# Patient Record
Sex: FEMALE | Race: BLACK OR AFRICAN AMERICAN | Employment: OTHER | URBAN - METROPOLITAN AREA
[De-identification: names, ages, dates, MRNs, and addresses within clinical notes are randomized per-mention and may not be internally consistent; named-entity substitution may affect disease eponyms.]

---

## 2017-04-13 ENCOUNTER — HOSPITAL ENCOUNTER (OUTPATIENT)
Age: 47
Setting detail: SPECIMEN
Discharge: HOME | End: 2017-04-13
Payer: COMMERCIAL

## 2017-04-14 LAB
DIRECT EXAM: ABNORMAL
Lab: ABNORMAL
SPECIMEN DESCRIPTION: ABNORMAL
STATUS: ABNORMAL

## 2017-05-11 ENCOUNTER — HOSPITAL ENCOUNTER (OUTPATIENT)
Age: 47
Setting detail: SPECIMEN
Discharge: HOME | End: 2017-05-11
Payer: COMMERCIAL

## 2017-05-11 LAB
ABSOLUTE EOS #: 0.1 K/UL (ref 0–0.4)
ABSOLUTE LYMPH #: 1.9 K/UL (ref 1–4.8)
ABSOLUTE MONO #: 0.4 K/UL (ref 0.1–1.2)
ALBUMIN SERPL-MCNC: 4.4 G/DL (ref 3.5–5.2)
ALBUMIN/GLOBULIN RATIO: 1.3 (ref 1–2.5)
ALP BLD-CCNC: 111 U/L (ref 35–104)
ALT SERPL-CCNC: 26 U/L (ref 5–33)
ANION GAP SERPL CALCULATED.3IONS-SCNC: 18 MMOL/L (ref 9–17)
AST SERPL-CCNC: 27 U/L
BASOPHILS # BLD: 0 %
BASOPHILS ABSOLUTE: 0 K/UL (ref 0–0.2)
BILIRUB SERPL-MCNC: 0.26 MG/DL (ref 0.3–1.2)
BUN BLDV-MCNC: 15 MG/DL (ref 6–20)
BUN/CREAT BLD: ABNORMAL (ref 9–20)
CALCIUM SERPL-MCNC: 10 MG/DL (ref 8.6–10.4)
CHLORIDE BLD-SCNC: 99 MMOL/L (ref 98–107)
CHOLESTEROL, FASTING: 176 MG/DL
CHOLESTEROL/HDL RATIO: 2
CO2: 23 MMOL/L (ref 20–31)
CREAT SERPL-MCNC: 0.68 MG/DL (ref 0.5–0.9)
DIFFERENTIAL TYPE: NORMAL
EOSINOPHILS RELATIVE PERCENT: 2 %
GFR AFRICAN AMERICAN: >60 ML/MIN
GFR NON-AFRICAN AMERICAN: >60 ML/MIN
GFR SERPL CREATININE-BSD FRML MDRD: ABNORMAL ML/MIN/{1.73_M2}
GFR SERPL CREATININE-BSD FRML MDRD: ABNORMAL ML/MIN/{1.73_M2}
GLUCOSE FASTING: 103 MG/DL (ref 70–99)
HCT VFR BLD CALC: 42.2 % (ref 36–46)
HDLC SERPL-MCNC: 88 MG/DL
HEMOGLOBIN: 14.1 G/DL (ref 12–16)
LDL CHOLESTEROL: 68 MG/DL (ref 0–130)
LYMPHOCYTES # BLD: 37 %
MCH RBC QN AUTO: 29.6 PG (ref 26–34)
MCHC RBC AUTO-ENTMCNC: 33.3 G/DL (ref 31–37)
MCV RBC AUTO: 88.8 FL (ref 80–100)
MONOCYTES # BLD: 8 %
PDW BLD-RTO: 13.9 % (ref 12.5–15.4)
PLATELET # BLD: 258 K/UL (ref 140–450)
PLATELET ESTIMATE: NORMAL
PMV BLD AUTO: 9.2 FL (ref 6–12)
POTASSIUM SERPL-SCNC: 3.9 MMOL/L (ref 3.7–5.3)
RBC # BLD: 4.75 M/UL (ref 4–5.2)
RBC # BLD: NORMAL 10*6/UL
SEG NEUTROPHILS: 53 %
SEGMENTED NEUTROPHILS ABSOLUTE COUNT: 2.7 K/UL (ref 1.8–7.7)
SODIUM BLD-SCNC: 140 MMOL/L (ref 135–144)
TOTAL PROTEIN: 7.7 G/DL (ref 6.4–8.3)
TRIGLYCERIDE, FASTING: 98 MG/DL
TSH SERPL DL<=0.05 MIU/L-ACNC: 1.13 MIU/L (ref 0.3–5)
VLDLC SERPL CALC-MCNC: NORMAL MG/DL (ref 1–30)
WBC # BLD: 5.1 K/UL (ref 3.5–11)
WBC # BLD: NORMAL 10*3/UL

## 2018-03-13 ENCOUNTER — HOSPITAL ENCOUNTER (EMERGENCY)
Age: 48
Discharge: HOME | End: 2018-03-14
Attending: EMERGENCY MEDICINE
Payer: COMMERCIAL

## 2018-03-13 DIAGNOSIS — R10.30 LOWER ABDOMINAL PAIN: Primary | ICD-10-CM

## 2018-03-13 LAB
-: NORMAL
ABSOLUTE EOS #: 0.09 K/UL (ref 0–0.44)
ABSOLUTE IMMATURE GRANULOCYTE: <0.03 K/UL (ref 0–0.3)
ABSOLUTE LYMPH #: 1.83 K/UL (ref 1.1–3.7)
ABSOLUTE MONO #: 0.54 K/UL (ref 0.1–1.2)
ALBUMIN SERPL-MCNC: 4.2 G/DL (ref 3.5–5.2)
ALBUMIN/GLOBULIN RATIO: 1.3 (ref 1–2.5)
ALP BLD-CCNC: 132 U/L (ref 35–104)
ALT SERPL-CCNC: 26 U/L (ref 5–33)
AMORPHOUS: NORMAL
ANION GAP SERPL CALCULATED.3IONS-SCNC: 13 MMOL/L (ref 9–17)
AST SERPL-CCNC: 42 U/L
BACTERIA: NORMAL
BASOPHILS # BLD: 0 % (ref 0–2)
BASOPHILS ABSOLUTE: <0.03 K/UL (ref 0–0.2)
BILIRUB SERPL-MCNC: 0.33 MG/DL (ref 0.3–1.2)
BILIRUBIN URINE: NEGATIVE
BUN BLDV-MCNC: 10 MG/DL (ref 6–20)
BUN/CREAT BLD: ABNORMAL (ref 9–20)
CALCIUM SERPL-MCNC: 9.5 MG/DL (ref 8.6–10.4)
CASTS UA: NORMAL /LPF (ref 0–8)
CHLORIDE BLD-SCNC: 105 MMOL/L (ref 98–107)
CO2: 26 MMOL/L (ref 20–31)
COLOR: YELLOW
CREAT SERPL-MCNC: 0.69 MG/DL (ref 0.5–0.9)
CRYSTALS, UA: NORMAL /HPF
DIFFERENTIAL TYPE: NORMAL
EOSINOPHILS RELATIVE PERCENT: 2 % (ref 1–4)
EPITHELIAL CELLS UA: NORMAL /HPF (ref 0–5)
GFR AFRICAN AMERICAN: >60 ML/MIN
GFR NON-AFRICAN AMERICAN: >60 ML/MIN
GFR SERPL CREATININE-BSD FRML MDRD: ABNORMAL ML/MIN/{1.73_M2}
GFR SERPL CREATININE-BSD FRML MDRD: ABNORMAL ML/MIN/{1.73_M2}
GLUCOSE BLD-MCNC: 99 MG/DL (ref 70–99)
GLUCOSE URINE: NEGATIVE
HCT VFR BLD CALC: 42.4 % (ref 36.3–47.1)
HEMOGLOBIN: 13.9 G/DL (ref 11.9–15.1)
IMMATURE GRANULOCYTES: 0 %
KETONES, URINE: NEGATIVE
LEUKOCYTE ESTERASE, URINE: NEGATIVE
LIPASE: 40 U/L (ref 13–60)
LYMPHOCYTES # BLD: 37 % (ref 24–43)
MCH RBC QN AUTO: 29.4 PG (ref 25.2–33.5)
MCHC RBC AUTO-ENTMCNC: 32.8 G/DL (ref 28.4–34.8)
MCV RBC AUTO: 89.6 FL (ref 82.6–102.9)
MONOCYTES # BLD: 11 % (ref 3–12)
MUCUS: NORMAL
NITRITE, URINE: NEGATIVE
NRBC AUTOMATED: 0 PER 100 WBC
OTHER OBSERVATIONS UA: NORMAL
PDW BLD-RTO: 13.3 % (ref 11.8–14.4)
PH UA: 7 (ref 5–8)
PLATELET # BLD: 267 K/UL (ref 138–453)
PLATELET ESTIMATE: NORMAL
PMV BLD AUTO: 10 FL (ref 8.1–13.5)
POTASSIUM SERPL-SCNC: 3.9 MMOL/L (ref 3.7–5.3)
PROTEIN UA: NEGATIVE
RBC # BLD: 4.73 M/UL (ref 3.95–5.11)
RBC # BLD: NORMAL 10*6/UL
RBC UA: NORMAL /HPF (ref 0–4)
RENAL EPITHELIAL, UA: NORMAL /HPF
SEG NEUTROPHILS: 50 % (ref 36–65)
SEGMENTED NEUTROPHILS ABSOLUTE COUNT: 2.53 K/UL (ref 1.5–8.1)
SODIUM BLD-SCNC: 144 MMOL/L (ref 135–144)
SPECIFIC GRAVITY UA: 1.01 (ref 1–1.03)
TOTAL PROTEIN: 7.4 G/DL (ref 6.4–8.3)
TRICHOMONAS: NORMAL
TURBIDITY: CLEAR
URINE HGB: NEGATIVE
UROBILINOGEN, URINE: NORMAL
WBC # BLD: 5 K/UL (ref 3.5–11.3)
WBC # BLD: NORMAL 10*3/UL
WBC UA: NORMAL /HPF (ref 0–5)
YEAST: NORMAL

## 2018-03-13 PROCEDURE — 6360000002 HC RX W HCPCS: Performed by: EMERGENCY MEDICINE

## 2018-03-13 PROCEDURE — 2580000003 HC RX 258: Performed by: EMERGENCY MEDICINE

## 2018-03-13 PROCEDURE — 80053 COMPREHEN METABOLIC PANEL: CPT

## 2018-03-13 PROCEDURE — 83690 ASSAY OF LIPASE: CPT

## 2018-03-13 PROCEDURE — 96374 THER/PROPH/DIAG INJ IV PUSH: CPT

## 2018-03-13 PROCEDURE — 85025 COMPLETE CBC W/AUTO DIFF WBC: CPT

## 2018-03-13 PROCEDURE — 99283 EMERGENCY DEPT VISIT LOW MDM: CPT

## 2018-03-13 PROCEDURE — 6370000000 HC RX 637 (ALT 250 FOR IP): Performed by: EMERGENCY MEDICINE

## 2018-03-13 PROCEDURE — 81001 URINALYSIS AUTO W/SCOPE: CPT

## 2018-03-13 RX ORDER — 0.9 % SODIUM CHLORIDE 0.9 %
1000 INTRAVENOUS SOLUTION INTRAVENOUS ONCE
Status: COMPLETED | OUTPATIENT
Start: 2018-03-13 | End: 2018-03-13

## 2018-03-13 RX ORDER — LABETALOL HYDROCHLORIDE 5 MG/ML
10 INJECTION, SOLUTION INTRAVENOUS ONCE
Status: COMPLETED | OUTPATIENT
Start: 2018-03-14 | End: 2018-03-14

## 2018-03-13 RX ORDER — KETOROLAC TROMETHAMINE 15 MG/ML
15 INJECTION, SOLUTION INTRAMUSCULAR; INTRAVENOUS ONCE
Status: COMPLETED | OUTPATIENT
Start: 2018-03-13 | End: 2018-03-13

## 2018-03-13 RX ORDER — HYDROCODONE BITARTRATE AND ACETAMINOPHEN 5; 325 MG/1; MG/1
1 TABLET ORAL ONCE
Status: COMPLETED | OUTPATIENT
Start: 2018-03-13 | End: 2018-03-13

## 2018-03-13 RX ORDER — LISINOPRIL 10 MG/1
20 TABLET ORAL ONCE
Status: COMPLETED | OUTPATIENT
Start: 2018-03-13 | End: 2018-03-13

## 2018-03-13 RX ORDER — AMLODIPINE BESYLATE 10 MG/1
5 TABLET ORAL ONCE
Status: COMPLETED | OUTPATIENT
Start: 2018-03-13 | End: 2018-03-13

## 2018-03-13 RX ADMIN — AMLODIPINE BESYLATE 5 MG: 10 TABLET ORAL at 22:05

## 2018-03-13 RX ADMIN — HYDROCODONE BITARTRATE AND ACETAMINOPHEN 1 TABLET: 5; 325 TABLET ORAL at 23:22

## 2018-03-13 RX ADMIN — SODIUM CHLORIDE 1000 ML: 9 INJECTION, SOLUTION INTRAVENOUS at 22:05

## 2018-03-13 RX ADMIN — KETOROLAC TROMETHAMINE 15 MG: 15 INJECTION, SOLUTION INTRAMUSCULAR; INTRAVENOUS at 22:05

## 2018-03-13 RX ADMIN — LISINOPRIL 20 MG: 10 TABLET ORAL at 22:05

## 2018-03-13 ASSESSMENT — ENCOUNTER SYMPTOMS
COUGH: 0
RHINORRHEA: 0
NAUSEA: 0
BACK PAIN: 1
DIARRHEA: 0
ABDOMINAL PAIN: 1
SHORTNESS OF BREATH: 0
CONSTIPATION: 0
VOMITING: 0

## 2018-03-13 ASSESSMENT — PAIN DESCRIPTION - ORIENTATION: ORIENTATION: RIGHT;LEFT;LOWER;UPPER

## 2018-03-13 ASSESSMENT — PAIN DESCRIPTION - LOCATION: LOCATION: ABDOMEN

## 2018-03-13 ASSESSMENT — PAIN DESCRIPTION - PAIN TYPE: TYPE: ACUTE PAIN

## 2018-03-13 ASSESSMENT — PAIN SCALES - GENERAL
PAINLEVEL_OUTOF10: 9
PAINLEVEL_OUTOF10: 8
PAINLEVEL_OUTOF10: 9

## 2018-03-13 ASSESSMENT — PAIN DESCRIPTION - FREQUENCY: FREQUENCY: CONTINUOUS

## 2018-03-13 ASSESSMENT — PAIN DESCRIPTION - ONSET: ONSET: ON-GOING

## 2018-03-13 ASSESSMENT — PAIN DESCRIPTION - DESCRIPTORS: DESCRIPTORS: PRESSURE

## 2018-03-14 VITALS
SYSTOLIC BLOOD PRESSURE: 168 MMHG | WEIGHT: 163 LBS | TEMPERATURE: 98.2 F | RESPIRATION RATE: 16 BRPM | OXYGEN SATURATION: 99 % | DIASTOLIC BLOOD PRESSURE: 90 MMHG | BODY MASS INDEX: 29.81 KG/M2 | HEART RATE: 76 BPM

## 2018-03-14 PROCEDURE — 2500000003 HC RX 250 WO HCPCS: Performed by: EMERGENCY MEDICINE

## 2018-03-14 PROCEDURE — 6370000000 HC RX 637 (ALT 250 FOR IP): Performed by: EMERGENCY MEDICINE

## 2018-03-14 PROCEDURE — 96375 TX/PRO/DX INJ NEW DRUG ADDON: CPT

## 2018-03-14 RX ORDER — CYCLOBENZAPRINE HCL 10 MG
10 TABLET ORAL ONCE
Status: COMPLETED | OUTPATIENT
Start: 2018-03-14 | End: 2018-03-14

## 2018-03-14 RX ORDER — CYCLOBENZAPRINE HCL 10 MG
10 TABLET ORAL 3 TIMES DAILY PRN
Qty: 15 TABLET | Refills: 0 | Status: SHIPPED | OUTPATIENT
Start: 2018-03-14 | End: 2018-03-24

## 2018-03-14 RX ADMIN — LABETALOL HYDROCHLORIDE 10 MG: 5 INJECTION, SOLUTION INTRAVENOUS at 00:07

## 2018-03-14 RX ADMIN — CYCLOBENZAPRINE HYDROCHLORIDE 10 MG: 10 TABLET, FILM COATED ORAL at 01:07

## 2018-03-14 NOTE — ED PROVIDER NOTES
today. Concern for nephrolithiasis. Well appearing. CBC, CMP, UA, Lipase pending. Fluids and toradol. Patient reassess following fluids and Toradol. Patient states that her pain has persisted patient was given Norco.  Patient was also given IV labetalol for her hypertension. Patient reassess following Norco and labetalol her blood pressure has responded and his last 168/90. Patient resting comfortably with no acute distress. Patient was given Flexeril and discharged home with prescription for Flexeril. Results patient's labs shows no acute urinary tract infection or evidence of nephrolithiasis. D/Cd    OUTSTANDING TASKS / RECOMMENDATIONS:    1. Follow up labs. 2. If negative and tolerating orals, ok to d/c.   3. If positive treat accordingly. FINAL IMPRESSION:     1.  Lower abdominal pain        DISPOSITION:         DISPOSITION:  [x]  Home   []  Nursing Facility   []  Transfer -    []  Admission -     FOLLOW-UP: [facility] [address] [address] [phone]    Schedule an appointment as soon as possible for a visit   As needed     DISCHARGE MEDICATIONS: Discharge Medication List as of 3/14/2018 12:50 AM      START taking these medications    Details   cyclobenzaprine (FLEXERIL) 10 MG tablet Take 1 tablet by mouth 3 times daily as needed for Muscle spasms, Disp-15 tablet, R-0Print                Erick Stroud MD  Emergency Medicine Resident  [address] Yonathan Allen MD  Resident  03/14/18 6444

## 2018-03-14 NOTE — ED NOTES
Bed: 04  Expected date:   Expected time:   Means of arrival:   Comments:     Edwin Arcos RN  03/13/18 5329

## 2018-03-14 NOTE — ED PROVIDER NOTES
adenopathy. Does not bruise/bleed easily. Psychiatric/Behavioral: Negative for behavioral problems and confusion. PAST MEDICAL / SURGICAL / SOCIAL / FAMILY HISTORY      has a past medical history of Bowel obstruction; Chronic back pain; Hypertension; and Substance abuse.       has a past surgical history that includes Hysterectomy;  section; other surgical history; and Tubal ligation. Social History     Social History    Marital status: Single     Spouse name: N/A    Number of children: N/A    Years of education: N/A     Occupational History    Not on file. Social History Main Topics    Smoking status: Current Every Day Smoker     Packs/day: 0.30     Years: 15.00     Types: Cigarettes    Smokeless tobacco: Never Used    Alcohol use Yes      Comment: socially    Drug use: Yes     Types: Cocaine    Sexual activity: Yes     Other Topics Concern    Not on file     Social History Narrative    No narrative on file         Family History   Problem Relation Age of Onset    High Blood Pressure Mother     High Blood Pressure Father        Portions of the past medical history, surgical history, social history, and family history were discussed and reviewed with the patient/family and is included here or in HPI if pertinent. ALLERGIES / IMMUNIZATIONS / HOME MEDICATIONS       Allergies:  Ibuprofen      IMMUNIZATIONS      There is no immunization history on file for this patient. Home Medications:  Prior to Admission medications    Medication Sig Start Date End Date Taking? Authorizing Provider   amLODIPine (NORVASC) 5 MG tablet Take 5 mg by mouth daily    Historical Provider, MD   naproxen (NAPROSYN) 375 MG tablet Take 1 tablet by mouth 2 times daily (with meals) 16   Samuel Richmond PA-C   ammonium lactate (AMLACTIN) 12 % cream Apply 385 g topically as needed. Apply topically as needed.     Historical Provider, MD   PROAIR  (90 BASE) MCG/ACT inhaler Inhale 1 She has a normal mood and affect. Nursing note and vitals reviewed. Vitals:    Vitals:    03/13/18 2054   BP: (!) 188/98   Pulse: 76   Resp: 16   Temp: 98.2 °F (36.8 °C)   TempSrc: Oral   SpO2: 97%   Weight: 163 lb (73.9 kg)       ORDERS AND MEDICATIONS     PLAN (LABS / IMAGING / EKG):  Orders Placed This Encounter   Procedures    URINALYSIS WITH MICROSCOPIC    CBC WITH AUTO DIFFERENTIAL    Comprehensive Metabolic Panel    LIPASE    Pulse oximetry, continuous    Insert peripheral IV       Plan of care is reviewed and discussed with the family/ patient when able. Family/ Patient consents to treatment and plan if able to do so.     MEDICATIONS ORDERED:  Orders Placed This Encounter   Medications    0.9 % sodium chloride bolus    ketorolac (TORADOL) injection 15 mg    lisinopril (PRINIVIL;ZESTRIL) tablet 20 mg    amLODIPine (NORVASC) tablet 5 mg       DIAGNOSTIC RESULTS      LABS:  Results for orders placed or performed during the hospital encounter of 03/13/18   CBC WITH AUTO DIFFERENTIAL   Result Value Ref Range    WBC 5.0 3.5 - 11.3 k/uL    RBC 4.73 3.95 - 5.11 m/uL    Hemoglobin 13.9 11.9 - 15.1 g/dL    Hematocrit 42.4 36.3 - 47.1 %    MCV 89.6 82.6 - 102.9 fL    MCH 29.4 25.2 - 33.5 pg    MCHC 32.8 28.4 - 34.8 g/dL    RDW 13.3 11.8 - 14.4 %    Platelets 515 666 - 204 k/uL    MPV 10.0 8.1 - 13.5 fL    NRBC Automated 0.0 0.0 per 100 WBC    Differential Type NOT REPORTED     Seg Neutrophils 50 36 - 65 %    Lymphocytes 37 24 - 43 %    Monocytes 11 3 - 12 %    Eosinophils % 2 1 - 4 %    Basophils 0 0 - 2 %    Immature Granulocytes 0 0 %    Segs Absolute 2.53 1.50 - 8.10 k/uL    Absolute Lymph # 1.83 1.10 - 3.70 k/uL    Absolute Mono # 0.54 0.10 - 1.20 k/uL    Absolute Eos # 0.09 0.00 - 0.44 k/uL    Basophils # <0.03 0.00 - 0.20 k/uL    Absolute Immature Granulocyte <0.03 0.00 - 0.30 k/uL    WBC Morphology NOT REPORTED     RBC Morphology NOT REPORTED     Platelet Estimate NOT REPORTED      Labs Reviewed   CBC WITH AUTO DIFFERENTIAL   URINALYSIS WITH MICROSCOPIC   COMPREHENSIVE METABOLIC PANEL   LIPASE       RADIOLOGY:      EKG      All EKG's are interpreted by the Emergency Department Physician who either signs or Co-signs this chart in the absence of a cardiologist.    ED BEDSIDE ULTRASOUND:   Not indicated      DIFFERENTIAL DX / [facility]     DIFFERENTIAL DIAGNOSIS:  Nephrolithiasis, UTI, gallbladder disease, pancreatitis, small bowel obstruction    EMERGENCY DEPARTMENT COURSE/[facility]  Patient presents with lower abdominal pain with bilateral radiation of pain to the sides. No CVA tenderness on exam.  Patient resting comfortably, no acute distress. Evidence soft with mild diffuse lower abdominal tenderness. No rebound or guarding. Heart regular rate and rhythm, lungs clear bilaterally. We will obtain abdominal labs, urinalysis. We'll give Toradol for pain, plan for reevaluation afterwards. Patient starting to receive her medications and labs just sent off at 10 PM.  Patient care signed out to Dr. Almita Duval. Plan for reevaluation after lab work is returned. To treat appropriately and his pain is continued to consider CT abdomen and pelvis to rule out possible nephrolithiasis. PROCEDURES:  Procedures    CONSULTS:  None    CRITICAL CARE:  See Attending note    FINAL IMPRESSION      1. Lower abdominal pain              DISPOSITION / PLAN     DISPOSITION        pending    PATIENT REFERRED TO:  No follow-up provider specified.     DISCHARGE MEDICATIONS:  New Prescriptions    No medications on file       Lorena Bradshaw DO  Emergency Medicine Resident    (Please note that portions of this note were completed with a voice recognition program.  Efforts were made to edit the dictations but occasionally words are mis-transcribed.)        Lorena Bradshaw DO  03/13/18 0876

## 2018-03-14 NOTE — ED NOTES
ULYSSES placed a call to provider to assist with transportation home upon D/C from ED      MYRNA Phillips  03/14/18 0200

## 2018-06-06 ENCOUNTER — APPOINTMENT (OUTPATIENT)
Dept: GENERAL RADIOLOGY | Age: 48
End: 2018-06-06
Payer: COMMERCIAL

## 2018-06-06 ENCOUNTER — HOSPITAL ENCOUNTER (EMERGENCY)
Age: 48
Discharge: HOME | End: 2018-06-06
Attending: EMERGENCY MEDICINE
Payer: COMMERCIAL

## 2018-06-06 VITALS
OXYGEN SATURATION: 100 % | HEART RATE: 98 BPM | DIASTOLIC BLOOD PRESSURE: 89 MMHG | WEIGHT: 165 LBS | RESPIRATION RATE: 15 BRPM | SYSTOLIC BLOOD PRESSURE: 141 MMHG | TEMPERATURE: 98 F | BODY MASS INDEX: 30.18 KG/M2

## 2018-06-06 DIAGNOSIS — E86.0 DEHYDRATION: ICD-10-CM

## 2018-06-06 DIAGNOSIS — K52.9 GASTROENTERITIS: ICD-10-CM

## 2018-06-06 DIAGNOSIS — G44.209 ACUTE NON INTRACTABLE TENSION-TYPE HEADACHE: Primary | ICD-10-CM

## 2018-06-06 LAB
BILIRUBIN URINE: NEGATIVE
COLOR: ABNORMAL
COMMENT UA: ABNORMAL
GLUCOSE URINE: NEGATIVE
KETONES, URINE: ABNORMAL
LEUKOCYTE ESTERASE, URINE: NEGATIVE
NITRITE, URINE: NEGATIVE
PH UA: 6.5 (ref 5–8)
PROTEIN UA: NEGATIVE
SPECIFIC GRAVITY UA: 1.02 (ref 1–1.03)
TURBIDITY: CLEAR
URINE HGB: NEGATIVE
UROBILINOGEN, URINE: NORMAL

## 2018-06-06 PROCEDURE — 96375 TX/PRO/DX INJ NEW DRUG ADDON: CPT

## 2018-06-06 PROCEDURE — 81003 URINALYSIS AUTO W/O SCOPE: CPT

## 2018-06-06 PROCEDURE — 6360000002 HC RX W HCPCS: Performed by: STUDENT IN AN ORGANIZED HEALTH CARE EDUCATION/TRAINING PROGRAM

## 2018-06-06 PROCEDURE — 96374 THER/PROPH/DIAG INJ IV PUSH: CPT

## 2018-06-06 PROCEDURE — 96361 HYDRATE IV INFUSION ADD-ON: CPT

## 2018-06-06 PROCEDURE — 99284 EMERGENCY DEPT VISIT MOD MDM: CPT

## 2018-06-06 PROCEDURE — 2580000003 HC RX 258: Performed by: STUDENT IN AN ORGANIZED HEALTH CARE EDUCATION/TRAINING PROGRAM

## 2018-06-06 RX ORDER — DIPHENHYDRAMINE HYDROCHLORIDE 50 MG/ML
25 INJECTION INTRAMUSCULAR; INTRAVENOUS ONCE
Status: COMPLETED | OUTPATIENT
Start: 2018-06-06 | End: 2018-06-06

## 2018-06-06 RX ORDER — ACETAMINOPHEN 325 MG/1
650 TABLET ORAL EVERY 6 HOURS PRN
Qty: 56 TABLET | Refills: 3 | Status: SHIPPED | OUTPATIENT
Start: 2018-06-06 | End: 2018-06-13

## 2018-06-06 RX ORDER — 0.9 % SODIUM CHLORIDE 0.9 %
1000 INTRAVENOUS SOLUTION INTRAVENOUS ONCE
Status: COMPLETED | OUTPATIENT
Start: 2018-06-06 | End: 2018-06-06

## 2018-06-06 RX ORDER — BUTALBITAL, ACETAMINOPHEN AND CAFFEINE 50; 325; 40 MG/1; MG/1; MG/1
1 TABLET ORAL EVERY 4 HOURS PRN
Status: DISCONTINUED | OUTPATIENT
Start: 2018-06-06 | End: 2018-06-06 | Stop reason: HOSPADM

## 2018-06-06 RX ADMIN — DIPHENHYDRAMINE HYDROCHLORIDE 25 MG: 50 INJECTION INTRAMUSCULAR; INTRAVENOUS at 19:54

## 2018-06-06 RX ADMIN — SODIUM CHLORIDE 1000 ML: 9 INJECTION, SOLUTION INTRAVENOUS at 19:54

## 2018-06-06 RX ADMIN — PROCHLORPERAZINE EDISYLATE 10 MG: 5 INJECTION INTRAMUSCULAR; INTRAVENOUS at 19:55

## 2018-06-06 ASSESSMENT — PAIN DESCRIPTION - DESCRIPTORS: DESCRIPTORS: ACHING

## 2018-06-06 ASSESSMENT — ENCOUNTER SYMPTOMS
WHEEZING: 0
VOMITING: 0
BACK PAIN: 1
COUGH: 1
SORE THROAT: 0
ABDOMINAL PAIN: 0
CONSTIPATION: 0
NAUSEA: 0
DIARRHEA: 0

## 2018-06-06 ASSESSMENT — PAIN DESCRIPTION - PAIN TYPE: TYPE: ACUTE PAIN

## 2018-06-06 ASSESSMENT — PAIN DESCRIPTION - LOCATION: LOCATION: HEAD

## 2018-06-06 ASSESSMENT — PAIN SCALES - GENERAL: PAINLEVEL_OUTOF10: 10

## 2018-06-21 ENCOUNTER — HOSPITAL ENCOUNTER (OUTPATIENT)
Age: 48
Setting detail: SPECIMEN
Discharge: HOME | End: 2018-06-21
Payer: COMMERCIAL

## 2018-06-21 LAB
DIRECT EXAM: ABNORMAL
Lab: ABNORMAL
SPECIMEN DESCRIPTION: ABNORMAL
STATUS: ABNORMAL

## 2018-06-22 LAB
C. TRACHOMATIS DNA ,URINE: NEGATIVE
CULTURE: NORMAL
Lab: NORMAL
N. GONORRHOEAE DNA, URINE: NEGATIVE
SPECIMEN DESCRIPTION: NORMAL
STATUS: NORMAL

## 2018-06-25 LAB
HPV SAMPLE: NORMAL
HPV SOURCE: NORMAL
HPV, GENOTYPE 16: NOT DETECTED
HPV, GENOTYPE 18: NOT DETECTED
HPV, HIGH RISK OTHER: NOT DETECTED
HPV, INTERPRETATION: NORMAL

## 2018-07-09 LAB — CYTOLOGY REPORT: NORMAL

## 2018-09-12 ENCOUNTER — HOSPITAL ENCOUNTER (EMERGENCY)
Age: 48
Discharge: HOME | End: 2018-09-12
Attending: EMERGENCY MEDICINE
Payer: COMMERCIAL

## 2018-09-12 VITALS
HEART RATE: 84 BPM | OXYGEN SATURATION: 99 % | TEMPERATURE: 97.4 F | SYSTOLIC BLOOD PRESSURE: 160 MMHG | DIASTOLIC BLOOD PRESSURE: 100 MMHG | RESPIRATION RATE: 17 BRPM

## 2018-09-12 DIAGNOSIS — S29.019A THORACIC MYOFASCIAL STRAIN, INITIAL ENCOUNTER: Primary | ICD-10-CM

## 2018-09-12 PROCEDURE — 96372 THER/PROPH/DIAG INJ SC/IM: CPT

## 2018-09-12 PROCEDURE — 6360000002 HC RX W HCPCS: Performed by: NURSE PRACTITIONER

## 2018-09-12 PROCEDURE — 6370000000 HC RX 637 (ALT 250 FOR IP): Performed by: NURSE PRACTITIONER

## 2018-09-12 PROCEDURE — 99283 EMERGENCY DEPT VISIT LOW MDM: CPT

## 2018-09-12 RX ORDER — METHOCARBAMOL 500 MG/1
750 TABLET, FILM COATED ORAL 3 TIMES DAILY
Qty: 15 TABLET | Refills: 0 | Status: SHIPPED | OUTPATIENT
Start: 2018-09-12 | End: 2018-09-22

## 2018-09-12 RX ORDER — ACETAMINOPHEN 500 MG
1000 TABLET ORAL EVERY 6 HOURS PRN
Qty: 30 TABLET | Refills: 0 | Status: SHIPPED | OUTPATIENT
Start: 2018-09-12 | End: 2018-09-12

## 2018-09-12 RX ORDER — METHOCARBAMOL 750 MG/1
1500 TABLET, FILM COATED ORAL ONCE
Status: COMPLETED | OUTPATIENT
Start: 2018-09-12 | End: 2018-09-12

## 2018-09-12 RX ORDER — KETOROLAC TROMETHAMINE 30 MG/ML
30 INJECTION, SOLUTION INTRAMUSCULAR; INTRAVENOUS ONCE
Status: COMPLETED | OUTPATIENT
Start: 2018-09-12 | End: 2018-09-12

## 2018-09-12 RX ADMIN — METHOCARBAMOL 1500 MG: 750 TABLET, FILM COATED ORAL at 18:07

## 2018-09-12 RX ADMIN — KETOROLAC TROMETHAMINE 30 MG: 30 INJECTION, SOLUTION INTRAMUSCULAR; INTRAVENOUS at 18:07

## 2018-09-12 ASSESSMENT — PAIN DESCRIPTION - LOCATION: LOCATION: NECK

## 2018-09-12 ASSESSMENT — PAIN SCALES - GENERAL
PAINLEVEL_OUTOF10: 10
PAINLEVEL_OUTOF10: 10

## 2018-09-12 ASSESSMENT — PAIN DESCRIPTION - ORIENTATION: ORIENTATION: LEFT

## 2018-09-12 ASSESSMENT — PAIN DESCRIPTION - DESCRIPTORS: DESCRIPTORS: ACHING

## 2018-09-12 ASSESSMENT — ENCOUNTER SYMPTOMS: BACK PAIN: 1

## 2018-09-12 ASSESSMENT — PAIN DESCRIPTION - PAIN TYPE: TYPE: ACUTE PAIN

## 2018-09-12 ASSESSMENT — PAIN DESCRIPTION - FREQUENCY: FREQUENCY: CONTINUOUS

## 2018-09-12 NOTE — ED PROVIDER NOTES
[facility] ED  eMERGENCY dEPARTMENT eNCOUnter   Independent Attestation     Pt Name: Tom Gaines  MRN: [id number]  [place] 1970  Date of evaluation: 9/12/18       Tom Gaines is a 51 y.o. female who presents with No chief complaint on file. Vitals: There were no vitals filed for this visit. Impression:   1. Thoracic myofascial strain, initial encounter          I was personally available for consultation in the Emergency Department. I have reviewed the chart and agree with the documentation as recorded by the [facility], including the assessment, treatment plan and disposition.     Meryle Primrose, MD  Attending Emergency  Physician       Mary Henry MD  09/12/18 9272

## 2018-09-12 NOTE — ED PROVIDER NOTES
[facility]  ED  Emergency Department Encounter  Mid Level Provider     Pt Name: Genie Pang  MRN: [id number]  [place] 1970  Date of evaluation: 18  PCP:  Dick Ring NP-C    [address] [place] [address]       Chief Complaint   Patient presents with    Neck Pain     hurts to turn neck to left with limited rom     Shoulder Pain       HISTORY OF PRESENT ILLNESS  (Location/Symptom, Timing/Onset, Context/Setting, Quality, Duration, Modifying Factors, Severity.)      Genie Pang is a 51 y.o. female who presents with Left upper back pain after pulling a muscle about one week ago. Patient requesting muscle relaxer other than Flexeril. Range of motion intact however does increase pain. No weakness to upper extremities. Patient is alert and eating [address] / SURGICAL / SOCIAL / FAMILY HISTORY      has a past medical history of Bowel obstruction ([address] Chronic back pain; Hypertension; and Substance abuse.     has a past surgical history that includes Hysterectomy;  section; other surgical history; and Tubal ligation. Social History     Social History    Marital status: Single     Spouse name: N/A    Number of children: N/A    Years of education: N/A     Occupational History    Not on file. Social History Main Topics    Smoking status: Current Every Day Smoker     Packs/day: 1.00     Years: 15.00     Types: Cigarettes    Smokeless tobacco: Never Used    Alcohol use Yes      Comment: socially    Drug use: Yes     Types: Cocaine    Sexual activity: Yes     Other Topics Concern    Not on file     Social History Narrative    No narrative on file       Family History   Problem Relation Age of Onset    High Blood Pressure Mother     High Blood Pressure Father        Allergies:  Ibuprofen    Home Medications:  Prior to Admission medications    Medication Sig Start Date End Date Taking?  Authorizing Provider   methocarbamol (ROBAXIN) 500 MG tablet Take 1.5 myofascial strain, initial encounter          DISPOSITION / PLAN     DISPOSITION     PATIENT REFERRED TO:  Temitope Jeong  [address] [place]  [phone]    Schedule an appointment as soon as possible for a visit         DISCHARGE MEDICATIONS:  Discharge Medication List as of 9/12/2018  6:02 PM      START taking these medications    Details   methocarbamol (ROBAXIN) 500 MG tablet Take 1.5 tablets by mouth 3 times daily for 10 days, Disp-15 tablet, R-0Print      acetaminophen (APAP EXTRA STRENGTH) 500 MG tablet Take 2 tablets by mouth every 6 hours as needed for Pain, Disp-30 tablet, R-0Print             YEYO Garcia CNP   Emergency Medicine Nurse Practitioner    (Please note that portions of this note were completed with a voice recognition program.  Efforts were made to edit the dictations but occasionally words are mis-transcribed.)        YEYO Garcia CNP  09/13/18 1019

## 2018-10-02 ENCOUNTER — HOSPITAL ENCOUNTER (OUTPATIENT)
Dept: PHYSICAL THERAPY | Age: 48
Setting detail: THERAPIES SERIES
Discharge: HOME | End: 2018-10-02
Payer: COMMERCIAL

## 2018-10-02 NOTE — FLOWSHEET NOTE
[x] [facility]) [facility] &  Therapy  [address]  [phone]  F: [phone] [] [address] [facility] [address]   [address]  P: [phone]  F: [phone] [] [place]  [address] [place] [address]  P: [phone]  F: [phone] [] [address]  [facility]   [address]   [facility] [place]: [phone]  F: [phone]        Physical Therapy Cancel/No Show note    Date: 10/2/2018  Patient: Varsha Min  : 1970  MRN: [id number]    Cancels/No Shows to date:     For today's appointment patient:  []  Cancelled  []  Rescheduled appointment  []  No-show     Reason given by patient:  []  Patient ill  []  Conflicting appointment  []  No transportation    []  Conflict with work  []  No reason given  []  Weather related  []  Other:      Comments:  Phone issue. []  Next appointment was confirmed    Electronically signed by: Keanu Cordoba

## 2018-10-09 ENCOUNTER — HOSPITAL ENCOUNTER (OUTPATIENT)
Dept: PHYSICAL THERAPY | Age: 48
Setting detail: THERAPIES SERIES
Discharge: HOME | End: 2018-10-09
Payer: COMMERCIAL

## 2019-02-05 ENCOUNTER — HOSPITAL ENCOUNTER (OUTPATIENT)
Dept: VASCULAR LAB | Age: 49
Discharge: HOME | End: 2019-02-05
Payer: COMMERCIAL

## 2019-02-05 ENCOUNTER — HOSPITAL ENCOUNTER (OUTPATIENT)
Age: 49
Setting detail: SPECIMEN
Discharge: HOME | End: 2019-02-05
Payer: COMMERCIAL

## 2019-02-05 LAB
-: ABNORMAL
ABSOLUTE EOS #: 0.1 K/UL (ref 0–0.44)
ABSOLUTE IMMATURE GRANULOCYTE: <0.03 K/UL (ref 0–0.3)
ABSOLUTE LYMPH #: 1.99 K/UL (ref 1.1–3.7)
ABSOLUTE MONO #: 0.52 K/UL (ref 0.1–1.2)
ALBUMIN SERPL-MCNC: 4.1 G/DL (ref 3.5–5.2)
ALBUMIN/GLOBULIN RATIO: 1.4 (ref 1–2.5)
ALP BLD-CCNC: 102 U/L (ref 35–104)
ALT SERPL-CCNC: 26 U/L (ref 5–33)
AMORPHOUS: ABNORMAL
ANION GAP SERPL CALCULATED.3IONS-SCNC: 14 MMOL/L (ref 9–17)
AST SERPL-CCNC: 35 U/L
BACTERIA: ABNORMAL
BASOPHILS # BLD: 0 % (ref 0–2)
BASOPHILS ABSOLUTE: <0.03 K/UL (ref 0–0.2)
BILIRUB SERPL-MCNC: 0.16 MG/DL (ref 0.3–1.2)
BILIRUBIN URINE: NEGATIVE
BUN BLDV-MCNC: 4 MG/DL (ref 6–20)
BUN/CREAT BLD: ABNORMAL (ref 9–20)
CALCIUM SERPL-MCNC: 9.2 MG/DL (ref 8.6–10.4)
CASTS UA: ABNORMAL /LPF (ref 0–8)
CHLORIDE BLD-SCNC: 106 MMOL/L (ref 98–107)
CHOLESTEROL/HDL RATIO: 2.5
CHOLESTEROL: 174 MG/DL
CO2: 24 MMOL/L (ref 20–31)
COLOR: YELLOW
COMMENT UA: ABNORMAL
CREAT SERPL-MCNC: 0.78 MG/DL (ref 0.5–0.9)
CRYSTALS, UA: ABNORMAL /HPF
DIFFERENTIAL TYPE: ABNORMAL
EOSINOPHILS RELATIVE PERCENT: 2 % (ref 1–4)
EPITHELIAL CELLS UA: ABNORMAL /HPF (ref 0–5)
GFR AFRICAN AMERICAN: >60 ML/MIN
GFR NON-AFRICAN AMERICAN: >60 ML/MIN
GFR SERPL CREATININE-BSD FRML MDRD: ABNORMAL ML/MIN/{1.73_M2}
GFR SERPL CREATININE-BSD FRML MDRD: ABNORMAL ML/MIN/{1.73_M2}
GLUCOSE BLD-MCNC: 112 MG/DL (ref 70–99)
GLUCOSE URINE: NEGATIVE
HCT VFR BLD CALC: 41.1 % (ref 36.3–47.1)
HDLC SERPL-MCNC: 71 MG/DL
HEMOGLOBIN: 14.1 G/DL (ref 11.9–15.1)
IMMATURE GRANULOCYTES: 0 %
KETONES, URINE: ABNORMAL
LDL CHOLESTEROL: 70 MG/DL (ref 0–130)
LEUKOCYTE ESTERASE, URINE: NEGATIVE
LYMPHOCYTES # BLD: 44 % (ref 24–43)
MAGNESIUM: 1.4 MG/DL (ref 1.6–2.6)
MCH RBC QN AUTO: 30.3 PG (ref 25.2–33.5)
MCHC RBC AUTO-ENTMCNC: 34.3 G/DL (ref 28.4–34.8)
MCV RBC AUTO: 88.4 FL (ref 82.6–102.9)
MONOCYTES # BLD: 12 % (ref 3–12)
MUCUS: ABNORMAL
NITRITE, URINE: NEGATIVE
NRBC AUTOMATED: 0 PER 100 WBC
OTHER OBSERVATIONS UA: ABNORMAL
PDW BLD-RTO: 12.5 % (ref 11.8–14.4)
PH UA: 6 (ref 5–8)
PLATELET # BLD: 253 K/UL (ref 138–453)
PLATELET ESTIMATE: ABNORMAL
PMV BLD AUTO: 11.4 FL (ref 8.1–13.5)
POTASSIUM SERPL-SCNC: 3.7 MMOL/L (ref 3.7–5.3)
PROTEIN UA: NEGATIVE
RBC # BLD: 4.65 M/UL (ref 3.95–5.11)
RBC # BLD: ABNORMAL 10*6/UL
RBC UA: ABNORMAL /HPF (ref 0–4)
RENAL EPITHELIAL, UA: ABNORMAL /HPF
SEG NEUTROPHILS: 42 % (ref 36–65)
SEGMENTED NEUTROPHILS ABSOLUTE COUNT: 1.9 K/UL (ref 1.5–8.1)
SODIUM BLD-SCNC: 144 MMOL/L (ref 135–144)
SPECIFIC GRAVITY UA: 1.01 (ref 1–1.03)
TOTAL PROTEIN: 7.1 G/DL (ref 6.4–8.3)
TRICHOMONAS: ABNORMAL
TRIGL SERPL-MCNC: 167 MG/DL
TSH SERPL DL<=0.05 MIU/L-ACNC: 1.11 MIU/L (ref 0.3–5)
TURBIDITY: ABNORMAL
URIC ACID: 4.5 MG/DL (ref 2.4–5.7)
URINE HGB: NEGATIVE
UROBILINOGEN, URINE: NORMAL
VITAMIN B-12: 700 PG/ML (ref 232–1245)
VLDLC SERPL CALC-MCNC: ABNORMAL MG/DL (ref 1–30)
WBC # BLD: 4.5 K/UL (ref 3.5–11.3)
WBC # BLD: ABNORMAL 10*3/UL
WBC UA: ABNORMAL /HPF (ref 0–5)
YEAST: ABNORMAL

## 2019-02-05 PROCEDURE — 93971 EXTREMITY STUDY: CPT

## 2019-02-08 LAB
ALCOHOL URINE: NEGATIVE MG/DL
AMPHETAMINE SCREEN URINE: NEGATIVE NG/ML
BARBITURATE SCREEN URINE: NEGATIVE NG/ML
BENZODIAZEPINE SCREEN, URINE: NEGATIVE NG/ML
CANNABINOID SCREEN URINE: POSITIVE NG/ML
COCAINE(METAB.)SCREEN, URINE: POSITIVE NG/ML
CREATININE URINE: 197.3 MG/DL (ref 20–400)
Lab: NORMAL
METHADONE SCREEN, URINE: NEGATIVE NG/ML
OPIATES, URINE: NEGATIVE NG/ML
PHENCYCLIDINE, URINE: NEGATIVE NG/ML
PROPOXYPHENE, URINE: NEGATIVE NG/ML

## 2019-02-10 LAB — COCAINE, CONFIRM, URINE: 94 NG/ML

## 2019-02-11 LAB — MARIJUANA CONFIRMATION URINE: 110 NG/ML

## 2019-04-02 ENCOUNTER — OFFICE VISIT (OUTPATIENT)
Dept: DERMATOLOGY | Age: 49
End: 2019-04-02
Payer: COMMERCIAL

## 2019-04-02 VITALS — BODY MASS INDEX: 29.44 KG/M2 | WEIGHT: 160 LBS | OXYGEN SATURATION: 97 % | HEIGHT: 62 IN | HEART RATE: 76 BPM

## 2019-04-02 DIAGNOSIS — L30.8 OTHER ECZEMA: Primary | ICD-10-CM

## 2019-04-02 PROCEDURE — G8419 CALC BMI OUT NRM PARAM NOF/U: HCPCS | Performed by: DERMATOLOGY

## 2019-04-02 PROCEDURE — 4004F PT TOBACCO SCREEN RCVD TLK: CPT | Performed by: DERMATOLOGY

## 2019-04-02 PROCEDURE — G8428 CUR MEDS NOT DOCUMENT: HCPCS | Performed by: DERMATOLOGY

## 2019-04-02 PROCEDURE — 3017F COLORECTAL CA SCREEN DOC REV: CPT | Performed by: DERMATOLOGY

## 2019-04-02 PROCEDURE — 99213 OFFICE O/P EST LOW 20 MIN: CPT | Performed by: DERMATOLOGY

## 2019-04-02 RX ORDER — HYDROXYZINE PAMOATE 50 MG/1
CAPSULE ORAL
Refills: 0 | COMMUNITY
Start: 2019-01-14 | End: 2019-04-02 | Stop reason: SDUPTHER

## 2019-04-02 RX ORDER — HYDROXYZINE PAMOATE 50 MG/1
CAPSULE ORAL
Qty: 30 CAPSULE | Refills: 1 | Status: SHIPPED | OUTPATIENT
Start: 2019-04-02 | End: 2020-08-30

## 2019-04-02 RX ORDER — TRIAMCINOLONE ACETONIDE 0.25 MG/G
CREAM TOPICAL
Qty: 454 G | Refills: 2 | Status: ON HOLD | OUTPATIENT
Start: 2019-04-02 | End: 2022-06-03 | Stop reason: HOSPADM

## 2019-04-02 NOTE — PATIENT INSTRUCTIONS
1. Apply triamcinolone ointment to the active areas of rash and thicker skin twice daily  2. Apply the cerave moisturizer on top of the triamcinolone and all over the body  3. Can put moisturizer in the fridge to apply a \"cool\" lotion for quick relief  4. Follow up in the office in 2 months     Eczema Instructions    The medicines and treatments used to take care of your child's eczema may change depending on the condition of the skin. Eczema flare-ups may come and go. We cannot cure eczema, but we can help control it with these treatments. Baths:  1-2 times a day with a mild soap such as Dove for Sensitive Skin, Cetaphil Cleanser, Cerave Cleanser, Aquaphor Wash or Vanicream Bar. Apply moisturizer within 3 minutes of patting dry. Medicines Prescribed by your Doctor    These medications should only be put on the eczema that you can see or feel. Eczema patches are red, rough, thickened or itchy. Once the skin looks and feels normal stop the medicated creams and ointments. These medications are chosen based on the location and thickness of your child's eczema. We always want to use the weakest medicated creams and ointments that will control your child's eczema. This will reduce the risk of side effects. If your child's eczema is not improving on this treatment plan or you need to use your Rescue medicines longer than recommended please call the dermatology clinic. Maintenance Medicines    Maintenance medicines can be used any day when eczema is seen or felt. Apply triamcinolone 0.025% ointment to eczema on body, arms and legs two times a day when eczema is present. Anti-Itch Medication  Take hydroxyzine by mouth as needed at bedtime    Moisturizer: This should be applied to all of our child's skin (including skin with eczema and skin without eczema). Continue to use this everyday even when your child's eczema is doing really well.     Apply moisturizer at least 2 times a day to all of your child's skin. If you are applying a prescription cream at the same time as a moisturizer, put the prescription cream on first and your moisturizer on top. Skin color changes may stay after the rough eczema is gone. The color of the skin where the eczema was may be lighter or darker after the eczema has cleared. These color changes or \"footprints\" will resolve over time and do not improve faster putting your maintenance or rescue medicines on them. Remember that your eczema medicines only go on red, rough, thick, or itchy patches of eczema. Continue to use your moisturizer on the footprints several times a day. Your moisturizer may help prevent new, itchy patches and footprints from forming. If you run out of medications or feel that your childs skin is getting worse despite following your treatment plan, please call us. If you think that you will run out of medications over the weekend or during a holiday, please try to call us during normal business hours so that we may get you the refills you need without delay.

## 2019-04-03 NOTE — PROGRESS NOTES
Dermatology Patient Note  [facility] DERMATOLOGY  [address] [facility]  [address] [place] [address]  Dept: [phone]  Dept Fax: [phone]      VISIT DATE: 4/2/2019   REFERRING PROVIDER: No ref. provider found      Myron Mckenzie is a 47 y.o. female  who presents today in the office for:    New Patient (c/o acne on face and buttocks; washes with \"whatever soap she can use\"; c/o itchy skin all over, using CeraVe moisturizer with no relief; wants refill of itch pills; thinks she has eczema; says she flares up in red, raised rash at night and she can't even touch it)      HISTORY OF PRESENT ILLNESS:  HPI Rash:    Lynn Harris was seen today for initial evaluation of Rash    Duration of Rash: months    Course: Worsening    Areas of Involvement: Generalized    Associated Symptoms: Itching    Exacerbating Factors: night     Current Medications for this Rash:  CeraVe, Hydroxyzine     Previously Tried Medications: None    Problem Specific Family Hx: No Contributory Family History      CURRENT MEDICATIONS:   Current Outpatient Medications   Medication Sig Dispense Refill    triamcinolone (KENALOG) 0.025 % cream Apply to rash twice daily 454 g 2    hydrOXYzine (VISTARIL) 50 MG capsule Take 1 capsule by mouth at bedtime 30 capsule 1    amLODIPine (NORVASC) 5 MG tablet Take 5 mg by mouth daily      naproxen (NAPROSYN) 375 MG tablet Take 1 tablet by mouth 2 times daily (with meals) 14 tablet 0    PROAIR  (90 BASE) MCG/ACT inhaler Inhale 1 puff into the lungs 4 times daily.  fluticasone (FLONASE) 50 MCG/ACT nasal spray 1 spray by Nasal route daily.  lisinopril (PRINIVIL;ZESTRIL) 30 MG tablet Take 40 mg by mouth daily       OLANZapine (ZYPREXA) 15 MG tablet Take 15 mg by mouth nightly.  omeprazole (PRILOSEC) 20 MG capsule Take 20 mg by mouth Daily.  multivitamin (ANIMAL SHAPES) WITH C & FA CHEW chewable tablet Take  by mouth daily.       ammonium lactate (AMLACTIN) 12 % cream Apply 385 g topically as needed. Apply topically as needed.  clotrimazole (LOTRIMIN) 1 % cream Apply  topically 2 times daily.  TROJAN-ENZ LUBRICATED MISC        No current facility-administered medications for this visit. ALLERGIES:   Allergies   Allergen Reactions    Ibuprofen Hives and Nausea Only       SOCIAL HISTORY:  Social History     Tobacco Use    Smoking status: Current Every Day Smoker     Packs/day: 1.00     Years: 15.00     Pack years: 15.00     Types: Cigarettes    Smokeless tobacco: Never Used   Substance Use Topics    Alcohol use: Yes     Comment: socially       REVIEW OF SYSTEMS:  Review of Systems   Constitutional: Negative. Skin:Denies any new changing, growing or bleeding lesions or rashes except as described in the HPI     PHYSICAL EXAM:   Pulse 76   Ht 5' 2\" (1.575 m)   Wt 160 lb (72.6 kg)   SpO2 97%   BMI 29.26 kg/m²     General Exam:  General Appearance: No acute distress, Well nourished     Neuro: Alert and oriented to person, place and time  Psych: Normal affect   Lymph Node: Not performed    Cutaneous Exam: Performed as documented in clinic note below. Total skin excluding undergarment areas, which includes the head/face, neck, both arms, chest, back, abdomen, both legs, digits and/or nails, was examined. Pertinent Physical Exam Findings:  Physical Exam   Skin:   Hyperpigmented scaly patches on the arms, legs and buttocks       Medical Necessity of Exam Performed:   Widespread Rash    Additional Diagnostic Testing performed during exam: Not performed ,  Not performed    ASSESSMENT:   Diagnosis Orders   1. Other eczema         Plan of Action is as Follows:  Assessment 1. Other eczema  1. Apply triamcinolone ointment to the active areas of rash and thicker skin twice daily  2. Apply the cerave moisturizer on top of the triamcinolone and all over the body  3. Can put moisturizer in the fridge to apply a \"cool\" lotion for quick relief  4.  Follow up in the office in 2 months           Patient Instructions   1. Apply triamcinolone ointment to the active areas of rash and thicker skin twice daily  2. Apply the cerave moisturizer on top of the triamcinolone and all over the body  3. Can put moisturizer in the fridge to apply a \"cool\" lotion for quick relief  4. Follow up in the office in 2 months     Eczema Instructions    The medicines and treatments used to take care of your child's eczema may change depending on the condition of the skin. Eczema flare-ups may come and go. We cannot cure eczema, but we can help control it with these treatments. Baths:  1-2 times a day with a mild soap such as Dove for Sensitive Skin, Cetaphil Cleanser, Cerave Cleanser, Aquaphor Wash or Vanicream Bar. Apply moisturizer within 3 minutes of patting dry. Medicines Prescribed by your Doctor    These medications should only be put on the eczema that you can see or feel. Eczema patches are red, rough, thickened or itchy. Once the skin looks and feels normal stop the medicated creams and ointments. These medications are chosen based on the location and thickness of your child's eczema. We always want to use the weakest medicated creams and ointments that will control your child's eczema. This will reduce the risk of side effects. If your child's eczema is not improving on this treatment plan or you need to use your Rescue medicines longer than recommended please call the dermatology clinic. Maintenance Medicines    Maintenance medicines can be used any day when eczema is seen or felt. Apply triamcinolone 0.025% ointment to eczema on body, arms and legs two times a day when eczema is present. Anti-Itch Medication  Take hydroxyzine by mouth as needed at bedtime    Moisturizer: This should be applied to all of our child's skin (including skin with eczema and skin without eczema). Continue to use this everyday even when your child's eczema is doing really well.     Apply moisturizer at least 2 times a day to all of your child's skin. If you are applying a prescription cream at the same time as a moisturizer, put the prescription cream on first and your moisturizer on top. Skin color changes may stay after the rough eczema is gone. The color of the skin where the eczema was may be lighter or darker after the eczema has cleared. These color changes or \"footprints\" will resolve over time and do not improve faster putting your maintenance or rescue medicines on them. Remember that your eczema medicines only go on red, rough, thick, or itchy patches of eczema. Continue to use your moisturizer on the footprints several times a day. Your moisturizer may help prevent new, itchy patches and footprints from forming. If you run out of medications or feel that your childs skin is getting worse despite following your treatment plan, please call us. If you think that you will run out of medications over the weekend or during a holiday, please try to call us during normal business hours so that we may get you the refills you need without delay. Photo surveillance performed: No    Follow-up: 2 months    This note was created with the assistance of aspeech-recognition program.  Although the intention is to generate a document that actually reflects thecontent of the visit, no guarantees can be provided that every mistake has been identified and corrected by editing.     Electronically signed by Evelyn Bates MD on 4/3/19 at 7:54 AM

## 2019-10-17 ENCOUNTER — HOSPITAL ENCOUNTER (OUTPATIENT)
Age: 49
Setting detail: SPECIMEN
Discharge: HOME | End: 2019-10-17
Payer: COMMERCIAL

## 2019-10-18 LAB
DIRECT EXAM: NORMAL
Lab: NORMAL
SPECIMEN DESCRIPTION: NORMAL

## 2019-10-21 LAB
C. TRACHOMATIS DNA ,URINE: NEGATIVE
N. GONORRHOEAE DNA, URINE: NEGATIVE
SPECIMEN DESCRIPTION: NORMAL

## 2019-11-13 ENCOUNTER — OFFICE VISIT (OUTPATIENT)
Dept: FAMILY MEDICINE CLINIC | Age: 49
End: 2019-11-13
Payer: COMMERCIAL

## 2019-11-13 VITALS
SYSTOLIC BLOOD PRESSURE: 118 MMHG | HEART RATE: 79 BPM | HEIGHT: 62 IN | DIASTOLIC BLOOD PRESSURE: 78 MMHG | OXYGEN SATURATION: 100 % | WEIGHT: 150 LBS | BODY MASS INDEX: 27.6 KG/M2

## 2019-11-13 DIAGNOSIS — L30.9 ECZEMA, UNSPECIFIED TYPE: ICD-10-CM

## 2019-11-13 DIAGNOSIS — E78.2 MIXED HYPERLIPIDEMIA: ICD-10-CM

## 2019-11-13 DIAGNOSIS — K59.04 CHRONIC IDIOPATHIC CONSTIPATION: ICD-10-CM

## 2019-11-13 DIAGNOSIS — G89.29 CHRONIC MIDLINE LOW BACK PAIN WITHOUT SCIATICA: ICD-10-CM

## 2019-11-13 DIAGNOSIS — Z23 NEED FOR VACCINATION: ICD-10-CM

## 2019-11-13 DIAGNOSIS — F32.5 MAJOR DEPRESSIVE DISORDER WITH SINGLE EPISODE, IN FULL REMISSION (HCC): ICD-10-CM

## 2019-11-13 DIAGNOSIS — Z87.891 PERSONAL HISTORY OF TOBACCO USE: ICD-10-CM

## 2019-11-13 DIAGNOSIS — I10 ESSENTIAL HYPERTENSION: Primary | ICD-10-CM

## 2019-11-13 DIAGNOSIS — Z12.31 ENCOUNTER FOR SCREENING MAMMOGRAM FOR BREAST CANCER: ICD-10-CM

## 2019-11-13 DIAGNOSIS — Z12.11 COLON CANCER SCREENING: ICD-10-CM

## 2019-11-13 DIAGNOSIS — M54.50 CHRONIC MIDLINE LOW BACK PAIN WITHOUT SCIATICA: ICD-10-CM

## 2019-11-13 PROCEDURE — 90732 PPSV23 VACC 2 YRS+ SUBQ/IM: CPT | Performed by: FAMILY MEDICINE

## 2019-11-13 PROCEDURE — 3017F COLORECTAL CA SCREEN DOC REV: CPT | Performed by: FAMILY MEDICINE

## 2019-11-13 PROCEDURE — G8484 FLU IMMUNIZE NO ADMIN: HCPCS | Performed by: FAMILY MEDICINE

## 2019-11-13 PROCEDURE — 90471 IMMUNIZATION ADMIN: CPT | Performed by: FAMILY MEDICINE

## 2019-11-13 PROCEDURE — G8427 DOCREV CUR MEDS BY ELIG CLIN: HCPCS | Performed by: FAMILY MEDICINE

## 2019-11-13 PROCEDURE — 99204 OFFICE O/P NEW MOD 45 MIN: CPT | Performed by: FAMILY MEDICINE

## 2019-11-13 PROCEDURE — G8419 CALC BMI OUT NRM PARAM NOF/U: HCPCS | Performed by: FAMILY MEDICINE

## 2019-11-13 PROCEDURE — 4004F PT TOBACCO SCREEN RCVD TLK: CPT | Performed by: FAMILY MEDICINE

## 2019-11-13 RX ORDER — DOCUSATE SODIUM 100 MG/1
100 CAPSULE, LIQUID FILLED ORAL DAILY
Qty: 60 CAPSULE | Refills: 3 | Status: SHIPPED | OUTPATIENT
Start: 2019-11-13 | End: 2020-08-30

## 2019-11-13 RX ORDER — ASPIRIN 81 MG/1
81 TABLET ORAL DAILY
Qty: 90 TABLET | Refills: 1 | Status: SHIPPED | OUTPATIENT
Start: 2019-11-13 | End: 2020-05-19

## 2019-11-13 RX ORDER — TIZANIDINE 4 MG/1
4 TABLET ORAL 3 TIMES DAILY
Qty: 30 TABLET | Refills: 0 | Status: SHIPPED | OUTPATIENT
Start: 2019-11-13 | End: 2020-01-27 | Stop reason: SDUPTHER

## 2019-11-13 RX ORDER — ATORVASTATIN CALCIUM 20 MG/1
20 TABLET, FILM COATED ORAL DAILY
Qty: 30 TABLET | Refills: 3 | Status: SHIPPED | OUTPATIENT
Start: 2019-11-13 | End: 2020-03-23

## 2019-11-13 ASSESSMENT — PATIENT HEALTH QUESTIONNAIRE - PHQ9
SUM OF ALL RESPONSES TO PHQ QUESTIONS 1-9: 0
SUM OF ALL RESPONSES TO PHQ9 QUESTIONS 1 & 2: 0
SUM OF ALL RESPONSES TO PHQ QUESTIONS 1-9: 0
2. FEELING DOWN, DEPRESSED OR HOPELESS: 0
1. LITTLE INTEREST OR PLEASURE IN DOING THINGS: 0

## 2019-11-13 ASSESSMENT — ENCOUNTER SYMPTOMS
ANAL BLEEDING: 0
NAUSEA: 0
COUGH: 0
RECTAL PAIN: 0
CHEST TIGHTNESS: 0
WHEEZING: 0
ABDOMINAL DISTENTION: 1
PHOTOPHOBIA: 0
VOMITING: 0
CONSTIPATION: 1
RHINORRHEA: 0
BACK PAIN: 1
DIARRHEA: 0
COLOR CHANGE: 0
SINUS PRESSURE: 0
SHORTNESS OF BREATH: 0
ABDOMINAL PAIN: 0

## 2019-11-26 ENCOUNTER — HOSPITAL ENCOUNTER (EMERGENCY)
Age: 49
Discharge: HOME | End: 2019-11-26
Attending: EMERGENCY MEDICINE
Payer: COMMERCIAL

## 2019-11-26 VITALS
HEIGHT: 62 IN | SYSTOLIC BLOOD PRESSURE: 135 MMHG | BODY MASS INDEX: 27.6 KG/M2 | OXYGEN SATURATION: 98 % | HEART RATE: 76 BPM | WEIGHT: 150 LBS | RESPIRATION RATE: 18 BRPM | DIASTOLIC BLOOD PRESSURE: 97 MMHG | TEMPERATURE: 98.1 F

## 2019-11-26 DIAGNOSIS — L02.91 ABSCESS: Primary | ICD-10-CM

## 2019-11-26 LAB
CHP ED QC CHECK: NORMAL
GLUCOSE BLD-MCNC: 154 MG/DL
GLUCOSE BLD-MCNC: 154 MG/DL (ref 65–105)

## 2019-11-26 PROCEDURE — 82947 ASSAY GLUCOSE BLOOD QUANT: CPT

## 2019-11-26 PROCEDURE — 6370000000 HC RX 637 (ALT 250 FOR IP): Performed by: EMERGENCY MEDICINE

## 2019-11-26 PROCEDURE — 99283 EMERGENCY DEPT VISIT LOW MDM: CPT

## 2019-11-26 RX ORDER — NAPROXEN 375 MG/1
375 TABLET ORAL 2 TIMES DAILY WITH MEALS
Qty: 20 TABLET | Refills: 0 | Status: ON HOLD | OUTPATIENT
Start: 2019-11-26 | End: 2020-05-29 | Stop reason: HOSPADM

## 2019-11-26 RX ORDER — DOXYCYCLINE HYCLATE 100 MG
100 TABLET ORAL 2 TIMES DAILY
Qty: 20 TABLET | Refills: 0 | Status: SHIPPED | OUTPATIENT
Start: 2019-11-26 | End: 2019-12-06

## 2019-11-26 RX ORDER — IBUPROFEN 600 MG/1
600 TABLET ORAL ONCE
Status: DISCONTINUED | OUTPATIENT
Start: 2019-11-26 | End: 2019-11-26

## 2019-11-26 RX ORDER — NAPROXEN 250 MG/1
250 TABLET ORAL ONCE
Status: COMPLETED | OUTPATIENT
Start: 2019-11-26 | End: 2019-11-26

## 2019-11-26 RX ADMIN — NAPROXEN 250 MG: 250 TABLET ORAL at 16:48

## 2019-11-26 ASSESSMENT — PAIN SCALES - GENERAL
PAINLEVEL_OUTOF10: 10
PAINLEVEL_OUTOF10: 10

## 2019-11-26 ASSESSMENT — ENCOUNTER SYMPTOMS
COUGH: 0
BACK PAIN: 0
SORE THROAT: 0
VOMITING: 0
SHORTNESS OF BREATH: 0
ABDOMINAL PAIN: 0
DIARRHEA: 0

## 2019-11-26 ASSESSMENT — PAIN DESCRIPTION - PAIN TYPE: TYPE: ACUTE PAIN

## 2019-11-26 ASSESSMENT — PAIN DESCRIPTION - DESCRIPTORS: DESCRIPTORS: THROBBING;SHARP

## 2019-11-26 ASSESSMENT — PAIN DESCRIPTION - ORIENTATION: ORIENTATION: RIGHT

## 2019-11-27 ENCOUNTER — TELEPHONE (OUTPATIENT)
Dept: FAMILY MEDICINE CLINIC | Age: 49
End: 2019-11-27

## 2019-12-11 ENCOUNTER — TELEPHONE (OUTPATIENT)
Dept: FAMILY MEDICINE CLINIC | Age: 49
End: 2019-12-11

## 2019-12-11 ENCOUNTER — OFFICE VISIT (OUTPATIENT)
Dept: FAMILY MEDICINE CLINIC | Age: 49
End: 2019-12-11
Payer: COMMERCIAL

## 2019-12-11 VITALS
BODY MASS INDEX: 27.42 KG/M2 | DIASTOLIC BLOOD PRESSURE: 84 MMHG | HEIGHT: 62 IN | WEIGHT: 149 LBS | SYSTOLIC BLOOD PRESSURE: 120 MMHG | HEART RATE: 81 BPM | OXYGEN SATURATION: 98 %

## 2019-12-11 DIAGNOSIS — R09.81 SINUS CONGESTION: ICD-10-CM

## 2019-12-11 DIAGNOSIS — Z23 NEED FOR SHINGLES VACCINE: ICD-10-CM

## 2019-12-11 DIAGNOSIS — Z23 NEED FOR TDAP VACCINATION: ICD-10-CM

## 2019-12-11 DIAGNOSIS — M25.562 ACUTE PAIN OF LEFT KNEE: Primary | ICD-10-CM

## 2019-12-11 DIAGNOSIS — Z23 NEED FOR INFLUENZA VACCINATION: ICD-10-CM

## 2019-12-11 DIAGNOSIS — L02.32 BOIL OF BUTTOCK: ICD-10-CM

## 2019-12-11 PROCEDURE — G8484 FLU IMMUNIZE NO ADMIN: HCPCS | Performed by: FAMILY MEDICINE

## 2019-12-11 PROCEDURE — 99213 OFFICE O/P EST LOW 20 MIN: CPT | Performed by: FAMILY MEDICINE

## 2019-12-11 PROCEDURE — G8427 DOCREV CUR MEDS BY ELIG CLIN: HCPCS | Performed by: FAMILY MEDICINE

## 2019-12-11 PROCEDURE — G8419 CALC BMI OUT NRM PARAM NOF/U: HCPCS | Performed by: FAMILY MEDICINE

## 2019-12-11 PROCEDURE — 4004F PT TOBACCO SCREEN RCVD TLK: CPT | Performed by: FAMILY MEDICINE

## 2019-12-11 PROCEDURE — 3017F COLORECTAL CA SCREEN DOC REV: CPT | Performed by: FAMILY MEDICINE

## 2019-12-11 RX ORDER — LORATADINE 10 MG/1
10 TABLET ORAL DAILY
Qty: 30 TABLET | Refills: 1 | Status: SHIPPED | OUTPATIENT
Start: 2019-12-11 | End: 2021-12-18

## 2019-12-11 RX ORDER — CAPSAICIN 0.07 G/100G
CREAM TOPICAL
Qty: 2 TUBE | Refills: 1 | Status: SHIPPED | OUTPATIENT
Start: 2019-12-11 | End: 2020-01-10

## 2019-12-11 RX ORDER — LIDOCAINE 40 MG/G
CREAM TOPICAL
Qty: 45 G | Refills: 3 | Status: SHIPPED | OUTPATIENT
Start: 2019-12-11 | End: 2021-11-03

## 2019-12-11 ASSESSMENT — ENCOUNTER SYMPTOMS
SINUS PRESSURE: 1
COLOR CHANGE: 1
SINUS PAIN: 1
COUGH: 0
PHOTOPHOBIA: 0

## 2019-12-13 ENCOUNTER — TELEPHONE (OUTPATIENT)
Dept: FAMILY MEDICINE CLINIC | Age: 49
End: 2019-12-13

## 2019-12-20 ENCOUNTER — HOSPITAL ENCOUNTER (EMERGENCY)
Age: 49
Discharge: HOME | End: 2019-12-20
Attending: EMERGENCY MEDICINE
Payer: COMMERCIAL

## 2019-12-20 VITALS
RESPIRATION RATE: 18 BRPM | TEMPERATURE: 98.3 F | OXYGEN SATURATION: 100 % | WEIGHT: 150 LBS | HEART RATE: 89 BPM | DIASTOLIC BLOOD PRESSURE: 77 MMHG | HEIGHT: 62 IN | BODY MASS INDEX: 27.6 KG/M2 | SYSTOLIC BLOOD PRESSURE: 112 MMHG

## 2019-12-20 DIAGNOSIS — J10.1 INFLUENZA B: Primary | ICD-10-CM

## 2019-12-20 PROCEDURE — 99283 EMERGENCY DEPT VISIT LOW MDM: CPT

## 2019-12-20 PROCEDURE — 6370000000 HC RX 637 (ALT 250 FOR IP): Performed by: STUDENT IN AN ORGANIZED HEALTH CARE EDUCATION/TRAINING PROGRAM

## 2019-12-20 RX ORDER — OSELTAMIVIR PHOSPHATE 75 MG/1
75 CAPSULE ORAL 2 TIMES DAILY
Qty: 10 CAPSULE | Refills: 0 | Status: SHIPPED | OUTPATIENT
Start: 2019-12-20 | End: 2019-12-25

## 2019-12-20 RX ORDER — OSELTAMIVIR PHOSPHATE 75 MG/1
75 CAPSULE ORAL ONCE
Status: COMPLETED | OUTPATIENT
Start: 2019-12-20 | End: 2019-12-20

## 2019-12-20 RX ORDER — AMOXICILLIN AND CLAVULANATE POTASSIUM 875; 125 MG/1; MG/1
1 TABLET, FILM COATED ORAL ONCE
Status: COMPLETED | OUTPATIENT
Start: 2019-12-20 | End: 2019-12-20

## 2019-12-20 RX ORDER — AMOXICILLIN AND CLAVULANATE POTASSIUM 875; 125 MG/1; MG/1
1 TABLET, FILM COATED ORAL 2 TIMES DAILY
Qty: 14 TABLET | Refills: 0 | Status: SHIPPED | OUTPATIENT
Start: 2019-12-20 | End: 2019-12-27

## 2019-12-20 RX ORDER — ACETAMINOPHEN 325 MG/1
325 TABLET ORAL EVERY 6 HOURS PRN
Qty: 120 TABLET | Refills: 0 | Status: SHIPPED | OUTPATIENT
Start: 2019-12-20 | End: 2020-08-25 | Stop reason: SINTOL

## 2019-12-20 RX ORDER — ACETAMINOPHEN 500 MG
1000 TABLET ORAL ONCE
Status: COMPLETED | OUTPATIENT
Start: 2019-12-20 | End: 2019-12-20

## 2019-12-20 RX ADMIN — AMOXICILLIN AND CLAVULANATE POTASSIUM 1 TABLET: 875; 125 TABLET, FILM COATED ORAL at 11:40

## 2019-12-20 RX ADMIN — ACETAMINOPHEN 1000 MG: 500 TABLET ORAL at 11:40

## 2019-12-20 RX ADMIN — OSELTAMIVIR PHOSPHATE 75 MG: 75 CAPSULE ORAL at 13:38

## 2019-12-20 ASSESSMENT — ENCOUNTER SYMPTOMS
WHEEZING: 0
RHINORRHEA: 1
ABDOMINAL PAIN: 0
BLOOD IN STOOL: 0
NAUSEA: 0
SHORTNESS OF BREATH: 0
TROUBLE SWALLOWING: 0
DIARRHEA: 1
COUGH: 1
SORE THROAT: 0
VOMITING: 0

## 2019-12-20 ASSESSMENT — PAIN DESCRIPTION - DESCRIPTORS: DESCRIPTORS: ACHING

## 2019-12-20 ASSESSMENT — PAIN DESCRIPTION - ONSET: ONSET: GRADUAL

## 2019-12-20 ASSESSMENT — PAIN DESCRIPTION - LOCATION: LOCATION: BUTTOCKS;HEAD

## 2019-12-20 ASSESSMENT — PAIN DESCRIPTION - PROGRESSION: CLINICAL_PROGRESSION: NOT CHANGED

## 2019-12-20 ASSESSMENT — PAIN SCALES - GENERAL: PAINLEVEL_OUTOF10: 10

## 2019-12-20 ASSESSMENT — PAIN DESCRIPTION - FREQUENCY: FREQUENCY: CONTINUOUS

## 2019-12-23 ENCOUNTER — TELEPHONE (OUTPATIENT)
Dept: FAMILY MEDICINE CLINIC | Age: 49
End: 2019-12-23

## 2019-12-26 ENCOUNTER — OFFICE VISIT (OUTPATIENT)
Dept: FAMILY MEDICINE CLINIC | Age: 49
End: 2019-12-26
Payer: COMMERCIAL

## 2019-12-26 VITALS
OXYGEN SATURATION: 94 % | BODY MASS INDEX: 27.6 KG/M2 | WEIGHT: 150 LBS | HEIGHT: 62 IN | DIASTOLIC BLOOD PRESSURE: 99 MMHG | HEART RATE: 75 BPM | SYSTOLIC BLOOD PRESSURE: 144 MMHG

## 2019-12-26 DIAGNOSIS — J10.1 INFLUENZA B: ICD-10-CM
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
## 2019-12-26 DIAGNOSIS — B37.31 VAGINAL YEAST INFECTION: ICD-10-CM

## 2019-12-26 DIAGNOSIS — K62.89 RECTAL IRRITATION: ICD-10-CM

## 2019-12-26 DIAGNOSIS — Z23 NEED FOR TDAP VACCINATION: ICD-10-CM

## 2019-12-26 PROCEDURE — 99213 OFFICE O/P EST LOW 20 MIN: CPT | Performed by: FAMILY MEDICINE

## 2019-12-26 PROCEDURE — G8427 DOCREV CUR MEDS BY ELIG CLIN: HCPCS | Performed by: FAMILY MEDICINE

## 2019-12-26 PROCEDURE — 3017F COLORECTAL CA SCREEN DOC REV: CPT | Performed by: FAMILY MEDICINE

## 2019-12-26 PROCEDURE — G8419 CALC BMI OUT NRM PARAM NOF/U: HCPCS | Performed by: FAMILY MEDICINE

## 2019-12-26 PROCEDURE — G8484 FLU IMMUNIZE NO ADMIN: HCPCS | Performed by: FAMILY MEDICINE

## 2019-12-26 PROCEDURE — 4004F PT TOBACCO SCREEN RCVD TLK: CPT | Performed by: FAMILY MEDICINE

## 2019-12-26 RX ORDER — CETIRIZINE HYDROCHLORIDE TABLETS 10 MG/1
TABLET, FILM COATED ORAL
Status: ON HOLD | COMMUNITY
Start: 2019-12-19 | End: 2022-06-03 | Stop reason: HOSPADM

## 2019-12-26 RX ORDER — CLOTRIMAZOLE 1 %
CREAM WITH APPLICATOR VAGINAL
Qty: 1 TUBE | Refills: 2 | Status: SHIPPED | OUTPATIENT
Start: 2019-12-26 | End: 2020-01-02

## 2019-12-26 RX ORDER — HYDROCHLOROTHIAZIDE 12.5 MG/1
TABLET ORAL
Status: ON HOLD | COMMUNITY
Start: 2019-12-16 | End: 2021-12-20 | Stop reason: HOSPADM

## 2019-12-26 RX ORDER — AMLODIPINE BESYLATE 10 MG/1
TABLET ORAL
COMMUNITY
Start: 2019-12-19 | End: 2022-01-31

## 2019-12-26 ASSESSMENT — ENCOUNTER SYMPTOMS
TROUBLE SWALLOWING: 0
COUGH: 0
APNEA: 0
SHORTNESS OF BREATH: 0
ABDOMINAL PAIN: 0
CONSTIPATION: 0
EYE PAIN: 0
SORE THROAT: 0
CHEST TIGHTNESS: 0
RESPIRATORY NEGATIVE: 1
COLOR CHANGE: 0
VOMITING: 0
NAUSEA: 0
RECTAL PAIN: 1
DIARRHEA: 0

## 2019-12-30 ENCOUNTER — TELEPHONE (OUTPATIENT)
Dept: FAMILY MEDICINE CLINIC | Age: 49
End: 2019-12-30

## 2020-01-02 ENCOUNTER — OFFICE VISIT (OUTPATIENT)
Dept: FAMILY MEDICINE CLINIC | Age: 50
End: 2020-01-02
Payer: COMMERCIAL

## 2020-01-02 ENCOUNTER — HOSPITAL ENCOUNTER (OUTPATIENT)
Age: 50
Discharge: HOME | End: 2020-01-02
Payer: COMMERCIAL

## 2020-01-02 VITALS
SYSTOLIC BLOOD PRESSURE: 126 MMHG | OXYGEN SATURATION: 98 % | WEIGHT: 147.2 LBS | HEIGHT: 62 IN | DIASTOLIC BLOOD PRESSURE: 88 MMHG | BODY MASS INDEX: 27.09 KG/M2 | HEART RATE: 83 BPM

## 2020-01-02 LAB
-: ABNORMAL
AMORPHOUS: ABNORMAL
BACTERIA: ABNORMAL
BILIRUBIN URINE: NEGATIVE
CASTS UA: ABNORMAL /LPF
COLOR: ABNORMAL
COMMENT UA: ABNORMAL
CRYSTALS, UA: ABNORMAL /HPF
EPITHELIAL CELLS UA: ABNORMAL /HPF
GLUCOSE URINE: NEGATIVE
HIV AG/AB: NONREACTIVE
KETONES, URINE: ABNORMAL
LEUKOCYTE ESTERASE, URINE: NEGATIVE
MUCUS: ABNORMAL
NITRITE, URINE: NEGATIVE
OTHER OBSERVATIONS UA: ABNORMAL
PH UA: 6 (ref 5–8)
PROTEIN UA: NEGATIVE
RBC UA: ABNORMAL /HPF
RENAL EPITHELIAL, UA: ABNORMAL /HPF
SPECIFIC GRAVITY UA: 1.03 (ref 1–1.03)
TRICHOMONAS: ABNORMAL
TURBIDITY: CLEAR
URINE HGB: NEGATIVE
UROBILINOGEN, URINE: NORMAL
WBC UA: ABNORMAL /HPF
YEAST: ABNORMAL

## 2020-01-02 PROCEDURE — 4004F PT TOBACCO SCREEN RCVD TLK: CPT | Performed by: FAMILY MEDICINE

## 2020-01-02 PROCEDURE — 3017F COLORECTAL CA SCREEN DOC REV: CPT | Performed by: FAMILY MEDICINE

## 2020-01-02 PROCEDURE — G8484 FLU IMMUNIZE NO ADMIN: HCPCS | Performed by: FAMILY MEDICINE

## 2020-01-02 PROCEDURE — 99214 OFFICE O/P EST MOD 30 MIN: CPT | Performed by: FAMILY MEDICINE

## 2020-01-02 PROCEDURE — 81001 URINALYSIS AUTO W/SCOPE: CPT

## 2020-01-02 PROCEDURE — 87389 HIV-1 AG W/HIV-1&-2 AB AG IA: CPT

## 2020-01-02 PROCEDURE — 83036 HEMOGLOBIN GLYCOSYLATED A1C: CPT

## 2020-01-02 PROCEDURE — G8419 CALC BMI OUT NRM PARAM NOF/U: HCPCS | Performed by: FAMILY MEDICINE

## 2020-01-02 PROCEDURE — G8427 DOCREV CUR MEDS BY ELIG CLIN: HCPCS | Performed by: FAMILY MEDICINE

## 2020-01-02 PROCEDURE — 36415 COLL VENOUS BLD VENIPUNCTURE: CPT

## 2020-01-02 RX ORDER — GUAIFENESIN AND PSEUDOEPHEDRINE HCL 1200; 120 MG/1; MG/1
1 TABLET, EXTENDED RELEASE ORAL 2 TIMES DAILY
Qty: 20 TABLET | Refills: 0 | Status: SHIPPED | OUTPATIENT
Start: 2020-01-02 | End: 2020-01-02

## 2020-01-02 RX ORDER — AZITHROMYCIN 250 MG/1
TABLET, FILM COATED ORAL
Qty: 6 TABLET | Refills: 0 | Status: SHIPPED | OUTPATIENT
Start: 2020-01-02 | End: 2020-01-02

## 2020-01-02 ASSESSMENT — ENCOUNTER SYMPTOMS
ANAL BLEEDING: 0
SINUS PAIN: 0
ABDOMINAL PAIN: 0
SINUS PRESSURE: 0
CHEST TIGHTNESS: 0
PHOTOPHOBIA: 0
CONSTIPATION: 0
SHORTNESS OF BREATH: 0
RHINORRHEA: 0
WHEEZING: 0
BLOOD IN STOOL: 0
ABDOMINAL DISTENTION: 0
VOMITING: 0

## 2020-01-02 NOTE — PROGRESS NOTES
Visit Information    Have you changed or started any medications since your last visit including any over-the-counter medicines, vitamins, or herbal medicines? no   Are you having any side effects from any of your medications? -  no  Have you stopped taking any of your medications? Is so, why? -  no    Have you seen any other physician or provider since your last visit? No  Have you had any other diagnostic tests since your last visit? No  Have you been seen in the emergency room and/or had an admission to a hospital since we last saw you? No  Have you had your routine dental cleaning in the past 6 months? no    Have you activated your [employer] account? If not, what are your barriers?  No:      Patient Care Team:  Sharla Espino MD as PCP - General (Family Medicine)  Sharla Espino MD as PCP - [facility]    Medical History Review  Past Medical, Family, and Social History reviewed and does contribute to the patient presenting condition    Health Maintenance   Topic Date Due    HIV screen  03/26/1984    Breast cancer screen  03/26/2019    Colon Cancer Screen FIT/FOBT  03/26/2019    Shingles Vaccine (1 of 2) 03/26/2019    Flu vaccine (1) 09/01/2019    Lipid screen  02/05/2020    Potassium monitoring  02/05/2020    Creatinine monitoring  02/05/2020    Diabetes screen  05/11/2020    Cervical cancer screen  06/21/2023    DTaP/Tdap/Td vaccine (2 - Td) 12/26/2029    Pneumococcal 0-64 years Vaccine  Completed

## 2020-01-02 NOTE — PROGRESS NOTES
Chief Complaint   Patient presents with    Vaginal Injury     skin hanging after sexual intercourse     Hypertension    Hyperlipidemia    Back Pain         Macho Saravia  here today for follow up on chronic medical problems, go over labs and/or diagnostic studies, and medication refills. Vaginal Injury (skin hanging after sexual intercourse ); Hypertension; Hyperlipidemia; and Back Pain      HPI: Patient is here for 3-month follow-up for hypertension, and also for lab testing and mammogram.  Patient has not done her lab work. Hypertension controlled denies any chest pain  . Patient had called for pelvic pain reports she had sexual intercourse 2 weeks before and she thought she had tearing her vaginal area. She had mild bleeding after intercourse. She wants to get checked and had pelvic exam done. Patient reports she feels daily needs to get stitched. /88   Pulse 83   Ht 5' 2\" (1.575 m)   Wt 147 lb 3.2 oz (66.8 kg)   SpO2 98% Comment: resting @ RA  BMI 26.92 kg/m²    Body mass index is 26.92 kg/m². Wt Readings from Last 3 Encounters:   01/02/20 147 lb 3.2 oz (66.8 kg)   12/26/19 150 lb (68 kg)   12/20/19 150 lb (68 kg)        []Negative depression screening. PHQ Scores 11/13/2019   PHQ2 Score 0   PHQ9 Score 0      []1-4 = Minimal depression   []5-9 = Milddepression   []10-14 = Moderate depression   []15-19 = Moderately severe depression   []20-27 = Severe depression    Discussed testing with the patient and all questions fully answered.     Admission on 11/26/2019, Discharged on 11/26/2019   Component Date Value Ref Range Status    Glucose 11/26/2019 154  mg/dL Final    QC OK? 11/26/2019 ok   Final    POC Glucose 11/26/2019 154* 65 - 105 mg/dL Final         Most recent labs reviewed:     Lab Results   Component Value Date    WBC 4.5 02/05/2019    HGB 14.1 02/05/2019    HCT 41.1 02/05/2019    MCV 88.4 02/05/2019     02/05/2019 @BRIEFLAB(NA,K,CL,CO2,BUN,CREATININE,GLUCOSE,CALCIUM)@     Lab Results   Component Value Date    ALT 26 02/05/2019    AST 35 (H) 02/05/2019    ALKPHOS 102 02/05/2019    BILITOT 0.16 (L) 02/05/2019       Lab Results   Component Value Date    TSH 1.11 02/05/2019       Lab Results   Component Value Date    CHOL 174 02/05/2019    CHOL 193 08/27/2015    CHOL 148 02/10/2014     Lab Results   Component Value Date    TRIG 167 (H) 02/05/2019    TRIG 84 08/27/2015    TRIG 50 02/10/2014     Lab Results   Component Value Date    HDL 71 02/05/2019    HDL 88 05/11/2017     08/27/2015     Lab Results   Component Value Date    LDLCHOLESTEROL 70 02/05/2019    LDLCHOLESTEROL 68 05/11/2017    LDLCHOLESTEROL 69 08/27/2015     Lab Results   Component Value Date    VLDL NOT REPORTED 02/05/2019    VLDL NOT REPORTED 05/11/2017    VLDL NOT REPORTED 08/27/2015     Lab Results   Component Value Date    CHOLHDLRATIO 2.5 02/05/2019    CHOLHDLRATIO 2.0 05/11/2017    CHOLHDLRATIO 1.8 08/27/2015       Lab Results   Component Value Date    LABA1C 5.6 02/10/2014       Lab Results   Component Value Date    WGELRXOM14 700 02/05/2019       No results found for: FOLATE    No results found for: IRON, TIBC, FERRITIN    No results found for: VITD25          Current Outpatient Medications   Medication Sig Dispense Refill    amLODIPine (NORVASC) 10 MG tablet       HM CETIRIZINE HCL 10 MG tablet       hydrochlorothiazide (HYDRODIURIL) 12.5 MG tablet       PRE-MOISTENED WITCH HAZEL 50 % PADS Use after each bowel movement 90 each 2    clotrimazole (LOTRIMIN) 1 % vaginal cream Place vaginally 2 times daily.  1 Tube 2    acetaminophen (TYLENOL) 325 MG tablet Take 1 tablet by mouth every 6 hours as needed for Pain 120 tablet 0    lidocaine (LMX) 4 % cream Apply topically every 8 hrs as needed for pain 45 g 3    Elastic Bandages & Supports (KNEE BRACE/HINGED/LARGE) MISC Use daily for knee pain, please provide according to size 1 each 0    loratadine (CLARITIN) 10 MG tablet Take 1 tablet by mouth daily 30 tablet 1    capsicum (ZOSTRIX) 0.075 % topical cream Apply topically 3 times daily. 2 Tube 1    naproxen (NAPROSYN) 375 MG tablet Take 1 tablet by mouth 2 times daily (with meals) 20 tablet 0    atorvastatin (LIPITOR) 20 MG tablet Take 1 tablet by mouth daily 30 tablet 3    aspirin EC 81 MG EC tablet Take 1 tablet by mouth daily 90 tablet 1    docusate sodium (COLACE) 100 MG capsule Take 1 capsule by mouth daily 60 capsule 3    tiZANidine (ZANAFLEX) 4 MG tablet Take 1 tablet by mouth 3 times daily 30 tablet 0    triamcinolone (KENALOG) 0.025 % cream Apply to rash twice daily 454 g 2    hydrOXYzine (VISTARIL) 50 MG capsule Take 1 capsule by mouth at bedtime 30 capsule 1    ammonium lactate (AMLACTIN) 12 % cream Apply 385 g topically as needed. Apply topically as needed.  PROAIR  (90 BASE) MCG/ACT inhaler Inhale 1 puff into the lungs 4 times daily.  clotrimazole (LOTRIMIN) 1 % cream Apply  topically 2 times daily.  fluticasone (FLONASE) 50 MCG/ACT nasal spray 1 spray by Nasal route daily.  lisinopril (PRINIVIL;ZESTRIL) 30 MG tablet Take 40 mg by mouth daily       OLANZapine (ZYPREXA) 15 MG tablet Take 15 mg by mouth nightly.  omeprazole (PRILOSEC) 20 MG capsule Take 20 mg by mouth Daily.  multivitamin (ANIMAL SHAPES) WITH C & FA CHEW chewable tablet Take  by mouth daily.  TROJAN-ENZ LUBRICATED MISC       zoster recombinant adjuvanted vaccine [facility]) 50 MCG/0.5ML SUSR injection Inject 0.5 mLs into the muscle See Admin Instructions 1 dose now and repeat in 2-6 months (Patient not taking: Reported on 1/2/2020) 0.5 mL 0     No current facility-administered medications for this visit.               Social History     Socioeconomic History    Marital status: Single     Spouse name: Not on file    Number of children: Not on file    Years of education: Not on file    Highest education level: Not on this encounter. Medications Discontinued During This Encounter   Medication Reason    azithromycin (ZITHROMAX) 250 MG tablet     benzocaine (CEPACOL) 10 MG LOZG     Pseudoephedrine-guaiFENesin ([place] [address] STRENGTH) 120-1200 MG TB12        Erica received counseling on the following healthy behaviors: nutrition, exercise, medication adherence and tobacco cessation  Reviewed prior labs and health maintenance  Continue current medications, diet and exercise. Discussed use, benefit, and side effects of prescribed medications. Barriers to medication compliance addressed. Patient given educational materials - see patient instructions  Was a self-tracking handout given in paper form or via [employer]? Yes    Requested Prescriptions      No prescriptions requested or ordered in this encounter       All patient questions answered. Patient voiced understanding. Quality Measures    Body mass index is 26.92 kg/m². Elevated. Weight control planned discussed Healthy diet and regular exercise. BP: 126/88 Blood pressure is normal. Treatment plan consists of No treatment change needed. Lab Results   Component Value Date    LDLCHOLESTEROL 70 02/05/2019    (goal LDL reduction with dx if diabetes is 50% LDL reduction)      PHQ Scores 11/13/2019   PHQ2 Score 0   PHQ9 Score 0     Interpretation of Total Score Depression Severity: 1-4 = Minimal depression, 5-9 = Mild depression, 10-14 = Moderate depression, 15-19 = Moderately severe depression, 20-27 = Severe depression    The patient'spast medical, surgical, social, and family history as well as her   current medications and allergies were reviewed as documented in today's encounter. Medications, labs, diagnostic studies, consultations andfollow-up as documented in this encounter. No follow-ups on file. Patient wasseen with total face to face time of 30 minutes. More than 50% of this visit was counseling and education.        Future Appointments   Date

## 2020-01-03 LAB
ESTIMATED AVERAGE GLUCOSE: 120 MG/DL
HBA1C MFR BLD: 5.8 % (ref 4–6)

## 2020-01-24 ENCOUNTER — HOSPITAL ENCOUNTER (EMERGENCY)
Age: 50
Discharge: HOME | End: 2020-01-24
Attending: EMERGENCY MEDICINE
Payer: COMMERCIAL

## 2020-01-24 VITALS
WEIGHT: 150 LBS | SYSTOLIC BLOOD PRESSURE: 134 MMHG | DIASTOLIC BLOOD PRESSURE: 77 MMHG | TEMPERATURE: 98 F | RESPIRATION RATE: 14 BRPM | BODY MASS INDEX: 27.44 KG/M2 | OXYGEN SATURATION: 97 % | HEART RATE: 74 BPM

## 2020-01-24 PROCEDURE — 96375 TX/PRO/DX INJ NEW DRUG ADDON: CPT

## 2020-01-24 PROCEDURE — 99283 EMERGENCY DEPT VISIT LOW MDM: CPT

## 2020-01-24 PROCEDURE — 2500000003 HC RX 250 WO HCPCS: Performed by: STUDENT IN AN ORGANIZED HEALTH CARE EDUCATION/TRAINING PROGRAM

## 2020-01-24 PROCEDURE — 96374 THER/PROPH/DIAG INJ IV PUSH: CPT

## 2020-01-24 PROCEDURE — 6360000002 HC RX W HCPCS: Performed by: STUDENT IN AN ORGANIZED HEALTH CARE EDUCATION/TRAINING PROGRAM

## 2020-01-24 PROCEDURE — 6370000000 HC RX 637 (ALT 250 FOR IP): Performed by: STUDENT IN AN ORGANIZED HEALTH CARE EDUCATION/TRAINING PROGRAM

## 2020-01-24 RX ORDER — ONDANSETRON 2 MG/ML
4 INJECTION INTRAMUSCULAR; INTRAVENOUS ONCE
Status: COMPLETED | OUTPATIENT
Start: 2020-01-24 | End: 2020-01-24

## 2020-01-24 RX ORDER — DIPHENHYDRAMINE HYDROCHLORIDE 50 MG/ML
25 INJECTION INTRAMUSCULAR; INTRAVENOUS ONCE
Status: COMPLETED | OUTPATIENT
Start: 2020-01-24 | End: 2020-01-24

## 2020-01-24 RX ORDER — MAGNESIUM HYDROXIDE/ALUMINUM HYDROXICE/SIMETHICONE 120; 1200; 1200 MG/30ML; MG/30ML; MG/30ML
30 SUSPENSION ORAL ONCE
Status: COMPLETED | OUTPATIENT
Start: 2020-01-24 | End: 2020-01-24

## 2020-01-24 RX ORDER — LIDOCAINE HYDROCHLORIDE 20 MG/ML
15 SOLUTION OROPHARYNGEAL ONCE
Status: COMPLETED | OUTPATIENT
Start: 2020-01-24 | End: 2020-01-24

## 2020-01-24 RX ORDER — METHYLPREDNISOLONE SODIUM SUCCINATE 125 MG/2ML
125 INJECTION, POWDER, LYOPHILIZED, FOR SOLUTION INTRAMUSCULAR; INTRAVENOUS ONCE
Status: COMPLETED | OUTPATIENT
Start: 2020-01-24 | End: 2020-01-24

## 2020-01-24 RX ORDER — DIPHENHYDRAMINE HCL 25 MG
25 CAPSULE ORAL EVERY 4 HOURS PRN
Qty: 1 CAPSULE | Refills: 0 | Status: SHIPPED | OUTPATIENT
Start: 2020-01-24 | End: 2020-02-03

## 2020-01-24 RX ADMIN — ALUMINUM HYDROXIDE, MAGNESIUM HYDROXIDE, AND SIMETHICONE 30 ML: 200; 200; 20 SUSPENSION ORAL at 18:40

## 2020-01-24 RX ADMIN — FAMOTIDINE 20 MG: 10 INJECTION, SOLUTION INTRAVENOUS at 17:45

## 2020-01-24 RX ADMIN — DIPHENHYDRAMINE HYDROCHLORIDE 25 MG: 50 INJECTION, SOLUTION INTRAMUSCULAR; INTRAVENOUS at 17:45

## 2020-01-24 RX ADMIN — METHYLPREDNISOLONE SODIUM SUCCINATE 125 MG: 125 INJECTION, POWDER, FOR SOLUTION INTRAMUSCULAR; INTRAVENOUS at 17:45

## 2020-01-24 RX ADMIN — ONDANSETRON 4 MG: 2 INJECTION INTRAMUSCULAR; INTRAVENOUS at 17:53

## 2020-01-24 RX ADMIN — LIDOCAINE HYDROCHLORIDE 15 ML: 20 SOLUTION ORAL; TOPICAL at 18:40

## 2020-01-24 ASSESSMENT — ENCOUNTER SYMPTOMS
SORE THROAT: 0
COUGH: 0
SHORTNESS OF BREATH: 0
DIARRHEA: 0
FACIAL SWELLING: 0
VOMITING: 0
NAUSEA: 1
ABDOMINAL PAIN: 0
TROUBLE SWALLOWING: 0

## 2020-01-24 ASSESSMENT — PAIN SCALES - GENERAL: PAINLEVEL_OUTOF10: 10

## 2020-01-24 NOTE — ED NOTES
When this RN entered room to administer zofran pt's bilateral legs were almost completely absent of the hives. Redness no longer noted to pt's skin.       Trace Smith RN  01/24/20 9042

## 2020-01-24 NOTE — ED PROVIDER NOTES
Course as of Jan 24 1952   Sam Ríos Jan 24, 2020   8112 Patient reevaluated, states that she feels much better at this time, itching is significantly improved. No appreciable hives, rash to lower extremities, upper extremities except for a small part on the left upper forearm. Patient in no acute respiratory distress, no difficulty handling secretions, talking in full sentences. She is requesting something for heartburn for which she takes Omeprazole at home. Will give GI cocktail and plan to discharge home.    [LW]   1923 Vital signs improved. We will plan to discharge patient home at this time. [LW]      ED Course User Index  [LW] Ascutney Brought, DO      Patient feels much better after GI cocktail. We will plan discharge home at this time. Patient given strict return precautions including any worsening or new symptoms such as difficulty breathing, difficulty handling secretions, feeling like her throat is closing, return of the rash, chest pain, shortness of breath or any other new or concerning symptoms. She was instructed to take Benadryl as needed as prescribed for itching. She was instructed to call PCP and schedule a follow-up appointment in the next week for reevaluation. She was instructed to avoid shrimp, other shellfish as she is most likely allergic to these. Patient agreeable for discharge at this time, all questions answered. Patient discharged home in stable condition. DIAGNOSTIC RESULTS / EMERGENCYDEPARTMENT COURSE / MDM     LABS:  Labs Reviewed - No data to display      PROCEDURES:  None    CONSULTS:  None    CRITICAL CARE:  Please see attending note    FINAL IMPRESSION      1.  Allergic reaction, initial encounter          DISPOSITION / PLAN     DISPOSITION : Decision to discharge        PATIENT REFERRED TO:  [facility] ED  [place] [id number]  [phone]  Go to   If symptoms worsen    Sharon Ace MD  [address] [facility] [address]  [phone]    In 3 days        DISCHARGE MEDICATIONS:  New Prescriptions    DIPHENHYDRAMINE (BENADRYL) 25 MG CAPSULE    Take 1 capsule by mouth every 4 hours as needed for Itching       Saurav Lozano DO  Emergency Medicine Resident    (Please note that portions of this note were completed with a voice recognition program.Efforts were made to edit the dictations but occasionally words are mis-transcribed.)        Saurav Lozano DO  Resident  01/24/20 0969

## 2020-01-24 NOTE — ED PROVIDER NOTES
[address] ED  eMERGENCY dEPARTMENT eNCOUnter   Attending Attestation     Pt Name: Aubrie Todd  MRN: [id number]  [place] 1970  Date of evaluation: 1/24/20       Aubrie Todd is a 47 y.o. female who presents with Allergic Reaction      History:   Patient states that she ate some shrimp a little while ago got in the shower and all of a sudden started having a rash and severe itching. Patient denies any swelling in the throat and has no shortness of breath. Patient states that she did not know she was allergic to shrimp. Exam: Vitals:   Vitals:    01/24/20 1731 01/24/20 1733   BP: (!) 183/113 (!) 150/80   Pulse: 110 132   Resp: 20 20   Temp: 97.5 °F (36.4 °C)    TempSrc: Oral    SpO2: 99% 99%   Weight: 150 lb (68 kg)      Patient has no signs of stridor and his lungs are clear. Patient does have a diffuse urticarial rash    I performed a history and physical examination of the patient and discussed management with the resident. I reviewed the residents note and agree with the documented findings and plan of care. Any areas of disagreement are noted on the chart. I was personally present for the key portions of any procedures. I have documented in the chart those procedures where I was not present during the key portions. I have personally reviewed all images and agree with the resident's interpretation. I have reviewed the emergency nurses triage note. I agree with the chief complaint, past medical history, past surgical history, allergies, medications, social and family history as documented unless otherwise noted below. Documentation of the HPI, Physical Exam and Medical Decision Making performed by medical students or scribes is based on my personal performance of the HPI, PE and MDM. I personally evaluated and examined the patient in conjunction with the APC and agree with the assessment, treatment plan, and disposition of the patient as recorded by the APC.  Additional findings are as noted.    Beevrley Masterson MD  Attending Emergency  Physician              Wen Shukla MD  01/24/20 0648

## 2020-01-27 ENCOUNTER — TELEPHONE (OUTPATIENT)
Dept: FAMILY MEDICINE CLINIC | Age: 50
End: 2020-01-27

## 2020-01-27 RX ORDER — TIZANIDINE 4 MG/1
4 TABLET ORAL 3 TIMES DAILY
Qty: 30 TABLET | Refills: 0 | Status: SHIPPED | OUTPATIENT
Start: 2020-01-27 | End: 2020-08-05 | Stop reason: SINTOL

## 2020-01-27 NOTE — TELEPHONE ENCOUNTER
Please Approve or Refuse.   Send to Pharmacy per Pt's Request: TIZANIDINE      Next Visit Date:  Visit date not found   Last Visit Date: 1/2/2020    Hemoglobin A1C (%)   Date Value   01/02/2020 5.8   02/10/2014 5.6             ( goal A1C is < 7)   BP Readings from Last 3 Encounters:   01/24/20 134/77   01/02/20 126/88   12/26/19 (!) 144/99          (goal 120/80)  BUN   Date Value Ref Range Status   02/05/2019 4 (L) 6 - 20 mg/dL Final     CREATININE   Date Value Ref Range Status   02/05/2019 0.78 0.50 - 0.90 mg/dL Final     Potassium   Date Value Ref Range Status   02/05/2019 3.7 3.7 - 5.3 mmol/L Final

## 2020-02-03 ENCOUNTER — TELEPHONE (OUTPATIENT)
Dept: FAMILY MEDICINE CLINIC | Age: 50
End: 2020-02-03

## 2020-03-23 RX ORDER — ATORVASTATIN CALCIUM 20 MG/1
20 TABLET, FILM COATED ORAL DAILY
Qty: 30 TABLET | Refills: 3 | Status: ON HOLD | OUTPATIENT
Start: 2020-03-23 | End: 2022-06-03 | Stop reason: HOSPADM

## 2020-03-23 NOTE — TELEPHONE ENCOUNTER
Please Approve or Refuse.   Send to Pharmacy per Pt's Request:    Next Visit Date:  Visit date not found   Last Visit Date: 1/2/2020    Hemoglobin A1C (%)   Date Value   01/02/2020 5.8   02/10/2014 5.6             ( goal A1C is < 7)   BP Readings from Last 3 Encounters:   01/24/20 134/77   01/02/20 126/88   12/26/19 (!) 144/99          (goal 120/80)  BUN   Date Value Ref Range Status   02/05/2019 4 (L) 6 - 20 mg/dL Final     CREATININE   Date Value Ref Range Status   02/05/2019 0.78 0.50 - 0.90 mg/dL Final     Potassium   Date Value Ref Range Status   02/05/2019 3.7 3.7 - 5.3 mmol/L Final

## 2020-04-23 ENCOUNTER — TELEPHONE (OUTPATIENT)
Dept: FAMILY MEDICINE CLINIC | Age: 50
End: 2020-04-23

## 2020-05-19 RX ORDER — ASPIRIN 81 MG/1
TABLET, COATED ORAL
Qty: 90 TABLET | Refills: 1 | Status: SHIPPED | OUTPATIENT
Start: 2020-05-19 | End: 2022-01-31

## 2020-05-27 ENCOUNTER — APPOINTMENT (OUTPATIENT)
Dept: GENERAL RADIOLOGY | Age: 50
End: 2020-05-27
Payer: COMMERCIAL

## 2020-05-27 ENCOUNTER — HOSPITAL ENCOUNTER (OUTPATIENT)
Age: 50
Setting detail: OBSERVATION
Discharge: HOME | End: 2020-05-29
Attending: EMERGENCY MEDICINE | Admitting: INTERNAL MEDICINE
Payer: COMMERCIAL

## 2020-05-27 PROBLEM — R07.9 CHEST PAIN: Status: ACTIVE | Noted: 2020-05-27

## 2020-05-27 LAB
ABSOLUTE EOS #: 0.1 K/UL (ref 0–0.4)
ABSOLUTE IMMATURE GRANULOCYTE: ABNORMAL K/UL (ref 0–0.3)
ABSOLUTE LYMPH #: 2.1 K/UL (ref 1–4.8)
ABSOLUTE MONO #: 0.7 K/UL (ref 0.1–1.3)
ALBUMIN SERPL-MCNC: 4.4 G/DL (ref 3.5–5.2)
ALBUMIN/GLOBULIN RATIO: ABNORMAL (ref 1–2.5)
ALP BLD-CCNC: 115 U/L (ref 35–104)
ALT SERPL-CCNC: 64 U/L (ref 5–33)
ANION GAP SERPL CALCULATED.3IONS-SCNC: 15 MMOL/L (ref 9–17)
AST SERPL-CCNC: 67 U/L
BASOPHILS # BLD: 1 % (ref 0–2)
BASOPHILS ABSOLUTE: 0.1 K/UL (ref 0–0.2)
BILIRUB SERPL-MCNC: 0.32 MG/DL (ref 0.3–1.2)
BUN BLDV-MCNC: 14 MG/DL (ref 6–20)
BUN/CREAT BLD: ABNORMAL (ref 9–20)
C-REACTIVE PROTEIN: 0.8 MG/L (ref 0–5)
CALCIUM SERPL-MCNC: 9.4 MG/DL (ref 8.6–10.4)
CHLORIDE BLD-SCNC: 102 MMOL/L (ref 98–107)
CO2: 25 MMOL/L (ref 20–31)
CREAT SERPL-MCNC: 0.85 MG/DL (ref 0.5–0.9)
D-DIMER QUANTITATIVE: <0.27 MG/L FEU (ref 0–0.59)
DIFFERENTIAL TYPE: ABNORMAL
EOSINOPHILS RELATIVE PERCENT: 2 % (ref 0–4)
GFR AFRICAN AMERICAN: >60 ML/MIN
GFR NON-AFRICAN AMERICAN: >60 ML/MIN
GFR SERPL CREATININE-BSD FRML MDRD: ABNORMAL ML/MIN/{1.73_M2}
GFR SERPL CREATININE-BSD FRML MDRD: ABNORMAL ML/MIN/{1.73_M2}
GLUCOSE BLD-MCNC: 105 MG/DL (ref 70–99)
HCT VFR BLD CALC: 40.2 % (ref 36–46)
HEMOGLOBIN: 13.2 G/DL (ref 12–16)
IMMATURE GRANULOCYTES: ABNORMAL %
LACTATE DEHYDROGENASE: 316 U/L (ref 135–214)
LYMPHOCYTES # BLD: 34 % (ref 24–44)
MAGNESIUM: 1.8 MG/DL (ref 1.6–2.6)
MCH RBC QN AUTO: 29.9 PG (ref 26–34)
MCHC RBC AUTO-ENTMCNC: 32.8 G/DL (ref 31–37)
MCV RBC AUTO: 91 FL (ref 80–100)
MONOCYTES # BLD: 12 % (ref 1–7)
NRBC AUTOMATED: ABNORMAL PER 100 WBC
PDW BLD-RTO: 13.8 % (ref 11.5–14.9)
PLATELET # BLD: 284 K/UL (ref 150–450)
PLATELET ESTIMATE: ABNORMAL
PMV BLD AUTO: 8.2 FL (ref 6–12)
POTASSIUM SERPL-SCNC: 3.9 MMOL/L (ref 3.7–5.3)
RBC # BLD: 4.42 M/UL (ref 4–5.2)
RBC # BLD: ABNORMAL 10*6/UL
SARS-COV-2, PCR: NORMAL
SARS-COV-2, RAPID: NOT DETECTED
SARS-COV-2: NORMAL
SEG NEUTROPHILS: 51 % (ref 36–66)
SEGMENTED NEUTROPHILS ABSOLUTE COUNT: 3.2 K/UL (ref 1.3–9.1)
SODIUM BLD-SCNC: 142 MMOL/L (ref 135–144)
SOURCE: NORMAL
TOTAL PROTEIN: 7.8 G/DL (ref 6.4–8.3)
TROPONIN INTERP: NORMAL
TROPONIN INTERP: NORMAL
TROPONIN T: NORMAL NG/ML
TROPONIN T: NORMAL NG/ML
TROPONIN, HIGH SENSITIVITY: 6 NG/L (ref 0–14)
TROPONIN, HIGH SENSITIVITY: 7 NG/L (ref 0–14)
WBC # BLD: 6.2 K/UL (ref 3.5–11)
WBC # BLD: ABNORMAL 10*3/UL

## 2020-05-27 PROCEDURE — G0378 HOSPITAL OBSERVATION PER HR: HCPCS

## 2020-05-27 PROCEDURE — 99285 EMERGENCY DEPT VISIT HI MDM: CPT

## 2020-05-27 PROCEDURE — 71045 X-RAY EXAM CHEST 1 VIEW: CPT

## 2020-05-27 PROCEDURE — 83735 ASSAY OF MAGNESIUM: CPT

## 2020-05-27 PROCEDURE — 83615 LACTATE (LD) (LDH) ENZYME: CPT

## 2020-05-27 PROCEDURE — 6370000000 HC RX 637 (ALT 250 FOR IP): Performed by: EMERGENCY MEDICINE

## 2020-05-27 PROCEDURE — 84484 ASSAY OF TROPONIN QUANT: CPT

## 2020-05-27 PROCEDURE — 85379 FIBRIN DEGRADATION QUANT: CPT

## 2020-05-27 PROCEDURE — 86140 C-REACTIVE PROTEIN: CPT

## 2020-05-27 PROCEDURE — 36415 COLL VENOUS BLD VENIPUNCTURE: CPT

## 2020-05-27 PROCEDURE — 85025 COMPLETE CBC W/AUTO DIFF WBC: CPT

## 2020-05-27 PROCEDURE — 80053 COMPREHEN METABOLIC PANEL: CPT

## 2020-05-27 PROCEDURE — 93005 ELECTROCARDIOGRAM TRACING: CPT | Performed by: EMERGENCY MEDICINE

## 2020-05-27 PROCEDURE — U0002 COVID-19 LAB TEST NON-CDC: HCPCS

## 2020-05-27 RX ORDER — PROMETHAZINE HYDROCHLORIDE 25 MG/1
12.5 TABLET ORAL EVERY 6 HOURS PRN
Status: DISCONTINUED | OUTPATIENT
Start: 2020-05-27 | End: 2020-05-29 | Stop reason: HOSPADM

## 2020-05-27 RX ORDER — HYDROXYZINE HYDROCHLORIDE 25 MG/1
50 TABLET, FILM COATED ORAL NIGHTLY PRN
Status: DISCONTINUED | OUTPATIENT
Start: 2020-05-28 | End: 2020-05-28

## 2020-05-27 RX ORDER — AMLODIPINE BESYLATE 10 MG/1
10 TABLET ORAL DAILY
Status: DISCONTINUED | OUTPATIENT
Start: 2020-05-28 | End: 2020-05-29 | Stop reason: HOSPADM

## 2020-05-27 RX ORDER — ACETAMINOPHEN 650 MG/1
650 SUPPOSITORY RECTAL EVERY 6 HOURS PRN
Status: DISCONTINUED | OUTPATIENT
Start: 2020-05-27 | End: 2020-05-29 | Stop reason: HOSPADM

## 2020-05-27 RX ORDER — DIAZEPAM 5 MG/1
5 TABLET ORAL ONCE
Status: COMPLETED | OUTPATIENT
Start: 2020-05-27 | End: 2020-05-27

## 2020-05-27 RX ORDER — POLYETHYLENE GLYCOL 3350 17 G/17G
17 POWDER, FOR SOLUTION ORAL DAILY PRN
Status: DISCONTINUED | OUTPATIENT
Start: 2020-05-27 | End: 2020-05-29 | Stop reason: HOSPADM

## 2020-05-27 RX ORDER — LISINOPRIL 20 MG/1
40 TABLET ORAL DAILY
Status: DISCONTINUED | OUTPATIENT
Start: 2020-05-28 | End: 2020-05-29 | Stop reason: HOSPADM

## 2020-05-27 RX ORDER — ASPIRIN 81 MG/1
81 TABLET ORAL DAILY
Status: DISCONTINUED | OUTPATIENT
Start: 2020-05-28 | End: 2020-05-29 | Stop reason: HOSPADM

## 2020-05-27 RX ORDER — ATORVASTATIN CALCIUM 20 MG/1
20 TABLET, FILM COATED ORAL DAILY
Status: DISCONTINUED | OUTPATIENT
Start: 2020-05-28 | End: 2020-05-29 | Stop reason: HOSPADM

## 2020-05-27 RX ORDER — NITROGLYCERIN 0.4 MG/1
0.4 TABLET SUBLINGUAL EVERY 5 MIN PRN
Status: COMPLETED | OUTPATIENT
Start: 2020-05-27 | End: 2020-05-28

## 2020-05-27 RX ORDER — SODIUM CHLORIDE 0.9 % (FLUSH) 0.9 %
10 SYRINGE (ML) INJECTION PRN
Status: DISCONTINUED | OUTPATIENT
Start: 2020-05-27 | End: 2020-05-29 | Stop reason: HOSPADM

## 2020-05-27 RX ORDER — HYDROCHLOROTHIAZIDE 25 MG/1
12.5 TABLET ORAL DAILY
Status: DISCONTINUED | OUTPATIENT
Start: 2020-05-28 | End: 2020-05-29 | Stop reason: HOSPADM

## 2020-05-27 RX ORDER — SODIUM CHLORIDE 0.9 % (FLUSH) 0.9 %
10 SYRINGE (ML) INJECTION EVERY 12 HOURS SCHEDULED
Status: DISCONTINUED | OUTPATIENT
Start: 2020-05-27 | End: 2020-05-29 | Stop reason: HOSPADM

## 2020-05-27 RX ORDER — ONDANSETRON 2 MG/ML
4 INJECTION INTRAMUSCULAR; INTRAVENOUS EVERY 6 HOURS PRN
Status: DISCONTINUED | OUTPATIENT
Start: 2020-05-27 | End: 2020-05-29 | Stop reason: HOSPADM

## 2020-05-27 RX ORDER — ACETAMINOPHEN 325 MG/1
650 TABLET ORAL EVERY 6 HOURS PRN
Status: DISCONTINUED | OUTPATIENT
Start: 2020-05-27 | End: 2020-05-29 | Stop reason: HOSPADM

## 2020-05-27 RX ADMIN — DIAZEPAM 5 MG: 5 TABLET ORAL at 21:02

## 2020-05-27 ASSESSMENT — ENCOUNTER SYMPTOMS
SHORTNESS OF BREATH: 1
NAUSEA: 0
BACK PAIN: 0
VOMITING: 0
SINUS PAIN: 0
WHEEZING: 0
CONSTIPATION: 0
CHEST TIGHTNESS: 1
EYE PAIN: 0
ABDOMINAL PAIN: 0
CHEST TIGHTNESS: 0
DIARRHEA: 0
COUGH: 1

## 2020-05-27 ASSESSMENT — PAIN SCALES - GENERAL: PAINLEVEL_OUTOF10: 9

## 2020-05-27 ASSESSMENT — HEART SCORE: ECG: 0

## 2020-05-27 NOTE — ED TRIAGE NOTES
Mode of arrival (squad #, walk in, police, etc) : walk in        Chief complaint(s): chest pain        Arrival Note (brief scenario, treatment PTA, etc). : Pt reports chest pain x2 days. Pt states it prevents her from going to sleep. Pt says it's stabbing pain and pressure. Pt states pain is worse when she is trying to do something that utilizes upper body strength, like pushing a wheelchair. Pt is A&Ox4, no acute distress, respirations even and unlabored. C= \"Have you ever felt that you should Cut down on your drinking? \"  No  A= \"Have people Annoyed you by criticizing your drinking? \"  No  G= \"Have you ever felt bad or Guilty about your drinking? \"  No  E= \"Have you ever had a drink as an Eye-opener first thing in the morning to steady your nerves or to help a hangover? \"  No      Deferred []      Reason for deferring: N/A    *If yes to two or more: probable alcohol abuse. *

## 2020-05-28 ENCOUNTER — APPOINTMENT (OUTPATIENT)
Dept: NUCLEAR MEDICINE | Age: 50
End: 2020-05-28
Payer: COMMERCIAL

## 2020-05-28 LAB
EKG ATRIAL RATE: 67 BPM
EKG P AXIS: 61 DEGREES
EKG P-R INTERVAL: 176 MS
EKG Q-T INTERVAL: 434 MS
EKG QRS DURATION: 80 MS
EKG QTC CALCULATION (BAZETT): 458 MS
EKG R AXIS: 24 DEGREES
EKG T AXIS: 50 DEGREES
EKG VENTRICULAR RATE: 67 BPM
HCG QUALITATIVE: NEGATIVE
HCT VFR BLD CALC: 36.3 % (ref 36–46)
HEMOGLOBIN: 11.9 G/DL (ref 12–16)
MCH RBC QN AUTO: 30 PG (ref 26–34)
MCHC RBC AUTO-ENTMCNC: 32.7 G/DL (ref 31–37)
MCV RBC AUTO: 92 FL (ref 80–100)
NRBC AUTOMATED: ABNORMAL PER 100 WBC
PDW BLD-RTO: 13.7 % (ref 11.5–14.9)
PLATELET # BLD: 252 K/UL (ref 150–450)
PMV BLD AUTO: 8.5 FL (ref 6–12)
RBC # BLD: 3.95 M/UL (ref 4–5.2)
WBC # BLD: 4.5 K/UL (ref 3.5–11)

## 2020-05-28 PROCEDURE — 36415 COLL VENOUS BLD VENIPUNCTURE: CPT

## 2020-05-28 PROCEDURE — 99220 PR INITIAL OBSERVATION CARE/DAY 70 MINUTES: CPT | Performed by: INTERNAL MEDICINE

## 2020-05-28 PROCEDURE — A9500 TC99M SESTAMIBI: HCPCS | Performed by: INTERNAL MEDICINE

## 2020-05-28 PROCEDURE — 84703 CHORIONIC GONADOTROPIN ASSAY: CPT

## 2020-05-28 PROCEDURE — 96372 THER/PROPH/DIAG INJ SC/IM: CPT

## 2020-05-28 PROCEDURE — 2580000003 HC RX 258: Performed by: PHYSICIAN ASSISTANT

## 2020-05-28 PROCEDURE — 6360000002 HC RX W HCPCS: Performed by: PHYSICIAN ASSISTANT

## 2020-05-28 PROCEDURE — 3430000000 HC RX DIAGNOSTIC RADIOPHARMACEUTICAL: Performed by: INTERNAL MEDICINE

## 2020-05-28 PROCEDURE — 6370000000 HC RX 637 (ALT 250 FOR IP): Performed by: INTERNAL MEDICINE

## 2020-05-28 PROCEDURE — 6370000000 HC RX 637 (ALT 250 FOR IP): Performed by: PHYSICIAN ASSISTANT

## 2020-05-28 PROCEDURE — 85027 COMPLETE CBC AUTOMATED: CPT

## 2020-05-28 PROCEDURE — 78452 HT MUSCLE IMAGE SPECT MULT: CPT

## 2020-05-28 PROCEDURE — G0378 HOSPITAL OBSERVATION PER HR: HCPCS

## 2020-05-28 PROCEDURE — 2580000003 HC RX 258: Performed by: INTERNAL MEDICINE

## 2020-05-28 PROCEDURE — 93010 ELECTROCARDIOGRAM REPORT: CPT | Performed by: INTERNAL MEDICINE

## 2020-05-28 RX ORDER — TIZANIDINE 4 MG/1
4 TABLET ORAL 3 TIMES DAILY PRN
Status: DISCONTINUED | OUTPATIENT
Start: 2020-05-28 | End: 2020-05-29 | Stop reason: HOSPADM

## 2020-05-28 RX ORDER — DIPHENHYDRAMINE HCL 25 MG
25 TABLET ORAL 2 TIMES DAILY PRN
Status: DISCONTINUED | OUTPATIENT
Start: 2020-05-28 | End: 2020-05-29 | Stop reason: HOSPADM

## 2020-05-28 RX ORDER — LORAZEPAM 0.5 MG/1
0.5 TABLET ORAL EVERY 4 HOURS PRN
Status: DISCONTINUED | OUTPATIENT
Start: 2020-05-28 | End: 2020-05-29 | Stop reason: HOSPADM

## 2020-05-28 RX ORDER — NICOTINE 21 MG/24HR
1 PATCH, TRANSDERMAL 24 HOURS TRANSDERMAL DAILY
Status: DISCONTINUED | OUTPATIENT
Start: 2020-05-28 | End: 2020-05-29 | Stop reason: HOSPADM

## 2020-05-28 RX ORDER — SODIUM CHLORIDE 0.9 % (FLUSH) 0.9 %
10 SYRINGE (ML) INJECTION PRN
Status: DISCONTINUED | OUTPATIENT
Start: 2020-05-28 | End: 2020-05-29 | Stop reason: HOSPADM

## 2020-05-28 RX ORDER — AMMONIUM LACTATE 12 G/100G
LOTION TOPICAL PRN
Status: DISCONTINUED | OUTPATIENT
Start: 2020-05-28 | End: 2020-05-29 | Stop reason: HOSPADM

## 2020-05-28 RX ADMIN — TIZANIDINE 4 MG: 4 TABLET ORAL at 20:01

## 2020-05-28 RX ADMIN — LORAZEPAM 0.5 MG: 0.5 TABLET ORAL at 20:01

## 2020-05-28 RX ADMIN — TIZANIDINE 4 MG: 4 TABLET ORAL at 00:22

## 2020-05-28 RX ADMIN — NITROGLYCERIN 0.4 MG: 0.4 TABLET SUBLINGUAL at 00:03

## 2020-05-28 RX ADMIN — Medication 10 ML: at 07:33

## 2020-05-28 RX ADMIN — TETRAKIS(2-METHOXYISOBUTYLISOCYANIDE)COPPER(I) TETRAFLUOROBORATE 36.9 MILLICURIE: 1 INJECTION, POWDER, LYOPHILIZED, FOR SOLUTION INTRAVENOUS at 12:10

## 2020-05-28 RX ADMIN — NITROGLYCERIN 0.4 MG: 0.4 TABLET SUBLINGUAL at 00:09

## 2020-05-28 RX ADMIN — Medication 10 ML: at 19:53

## 2020-05-28 RX ADMIN — NITROGLYCERIN 0.4 MG: 0.4 TABLET SUBLINGUAL at 00:14

## 2020-05-28 RX ADMIN — ATORVASTATIN CALCIUM 20 MG: 20 TABLET, FILM COATED ORAL at 07:31

## 2020-05-28 RX ADMIN — ENOXAPARIN SODIUM 40 MG: 40 INJECTION SUBCUTANEOUS at 07:31

## 2020-05-28 RX ADMIN — Medication 10 ML: at 12:10

## 2020-05-28 RX ADMIN — LISINOPRIL 40 MG: 20 TABLET ORAL at 07:31

## 2020-05-28 RX ADMIN — ASPIRIN 81 MG: 81 TABLET, COATED ORAL at 07:31

## 2020-05-28 RX ADMIN — Medication 10 ML: at 00:07

## 2020-05-28 RX ADMIN — TIZANIDINE 4 MG: 4 TABLET ORAL at 11:35

## 2020-05-28 RX ADMIN — LORAZEPAM 0.5 MG: 0.5 TABLET ORAL at 00:22

## 2020-05-28 RX ADMIN — DIPHENHYDRAMINE HCL 25 MG: 25 TABLET ORAL at 11:35

## 2020-05-28 NOTE — PROGRESS NOTES
Nutrition Assessment (Low Risk)    Type and Reason for Visit: Positive Nutrition Screen(Weight loss & poor intake)    Nutrition Recommendations: Continue diet as ordered. Nutrition Assessment:  Patient assessed for nutritional risk. Deemed to be at low risk at this time. Will continue to monitor for changes in status. Patient is eating % of meals with weight history not reflecting any weight loss. Malnutrition Assessment:  · Malnutrition Status: No malnutrition    Nutrition Risk Level   Risk Level: Low    Nutrition Diagnosis:   · Problem: No nutrition diagnosis at this time    Nutrition Intervention:  Food and/or Delivery: Continue current diet  Nutrition Education/Counseling/Coordination of Care:  Continued Inpatient Monitoring      Some areas of assessment may be incomplete due to COVID-19 precautions. Keila Menezes R.D., L.D.   Clinical Dietitian  Office: [phone]

## 2020-05-28 NOTE — H&P
HOME MEDICATIONS        Prior to Admission medications    Medication Sig Start Date End Date Taking? Authorizing Provider   ASPIRIN LOW DOSE 81 MG EC tablet TAKE 1 TABLET BY MOUTH DAILY 5/19/20  Yes Chiki Parekh MD   atorvastatin (LIPITOR) 20 MG tablet TAKE 1 TABLET BY MOUTH DAILY 3/23/20  Yes Chiki Parekh MD   tiZANidine (ZANAFLEX) 4 MG tablet Take 1 tablet by mouth 3 times daily 1/27/20  Yes Chiki Parekh MD   amLODIPine (NORVASC) 10 MG tablet  12/19/19  Yes Historical Provider, MD    CETIRIZINE HCL 10 MG tablet  12/19/19  Yes Historical Provider, MD   hydrochlorothiazide (HYDRODIURIL) 12.5 MG tablet  12/16/19  Yes Historical Provider, MD   naproxen (NAPROSYN) 375 MG tablet Take 1 tablet by mouth 2 times daily (with meals) 11/26/19  Yes Asif Ferrari MD   docusate sodium (COLACE) 100 MG capsule Take 1 capsule by mouth daily 11/13/19  Yes Chiki Parekh MD   triamcinolone (KENALOG) 0.025 % cream Apply to rash twice daily 4/2/19  Yes Radha Arnold MD   PROAIR  (90 BASE) MCG/ACT inhaler Inhale 1 puff into the lungs 4 times daily. 10/8/13  Yes Historical Provider, MD   lisinopril (PRINIVIL;ZESTRIL) 30 MG tablet Take 40 mg by mouth daily  10/11/13  Yes Historical Provider, MD   omeprazole (PRILOSEC) 20 MG capsule Take 20 mg by mouth Daily. 10/11/13  Yes Historical Provider, MD   multivitamin (ANIMAL SHAPES) WITH C & FA CHEW chewable tablet Take  by mouth daily.  11/15/13  Yes Historical Provider, MD   PRE-MOISTENED WITCH HAZEL 50 % PADS Use after each bowel movement 12/26/19   Zion Gagnon, APRN - CNP   acetaminophen (TYLENOL) 325 MG tablet Take 1 tablet by mouth every 6 hours as needed for Pain 12/20/19   Al Lee DO   zoster recombinant adjuvanted vaccine [facility]) 50 MCG/0.5ML SUSR injection Inject 0.5 mLs into the muscle See Admin Instructions 1 dose now and repeat in 2-6 months  Patient not taking: Reported on 1/2/2020 12/11/19   Chiki Parekh MD   lidocaine (LMX) 4 % cream Magnesium:  Recent Labs     05/27/20 1910   MG 1.8     Glycosylated hemoglobin A1C:   Lab Results   Component Value Date    LABA1C 5.8 01/02/2020     Hepatic:   Recent Labs     05/27/20 1910   AST 67*   ALT 64*   ALKPHOS 115*     CARDIAC ENZY:   Recent Labs     05/27/20 1910 05/27/20  2104   TROPHS 6 7     Thyroid functions:   Lab Results   Component Value Date    TSH 1.11 02/05/2019      Imaging/Diagonstics:     Xr Chest Portable    Result Date: 5/27/2020  EXAMINATION: ONE XRAY VIEW OF THE CHEST 5/27/2020 7:40 pm COMPARISON: December 14, 2014 HISTORY: ORDERING SYSTEM PROVIDED HISTORY: Cough, chest dedra TECHNOLOGIST PROVIDED HISTORY: Cough, chest dedra Reason for Exam: Chest pains Acuity: Acute Type of Exam: Initial FINDINGS: Lungs are clear. Cardiomegaly. No pulmonary edema. No acute findings. ASSESSMENT  and  PLAN     Principal Problem:    Chest pain  Active Problems:    Hypertension    Substance abuse ([facility] [address]  Resolved Problems:    * No resolved hospital problems. *    Plan:  Chest pain  - No known recent cardiac work-up patient with a heart score of 4  - Cardiology consultation  - Patient admitted for observation with telemetry monitoring     Hypertension  -Continue home blood pressure medications    Substance abuse  - Patient admits to recent substance abuse and states would like to quit with help of rehab. - Social work consult      Consultations:     IP CONSULT TO SOCIAL WORK  IP CONSULT TO Minnie Mccormick   5/28/2020  2:26 AM    [address], [address] [place] [address]. Phone [phone]   Attending Physician Statement  I have discussed the care of Fady Wolff and I have examined the patient myselft and taken ros and hpi , including pertinent history and exam findings,  with the resident. I have reviewed the key elements of all parts of the encounter with the resident.   I agree with the assessment, plan and orders as documented by the

## 2020-05-28 NOTE — ED PROVIDER NOTES
[address] [facility] ED  Emergency Department Encounter  Emergency Medicine Attending Note     Pt Name: Jaren Beltre  MRN: [id number]  [place] 1970   Date of evaluation: 2020  PCP:  MD Sunita Barriga       Chief Complaint   Patient presents with    Chest Pain       HISTORY OF PRESENT ILLNESS  (Location/Symptom, Timing/Onset, Context/Setting, Quality, Duration, Modifying Factors, Severity.)      Jaren Beltre is a 45 y.o. female who presents with left sided chest pain for ~ 3 days. It is left sided, non-radiating. Associated with shortness of breath and deep inspiration. History of cocaine use, last used 2-3 days ago. Pain worse with exertion, not affected by laying flat. Has also had a cough sttarting yesterday. No known sick contacts, but has been going out to her dealers house. No abdominal pain, nuasea, or vomiting. Has a history of a prior \"small heart attack\" due to cocaine 20 years ago. H/o HTN. No HLD or DM. Unknown family history. Smokes cigarettes. The patient denies any hemoptysis. No unilateral calf swelling. No history of previous DVTs. No recent travel, prolonged immobilization, or surgeries. No active malignancies. No exogenous estrogen use. PAST MEDICAL / SURGICAL / SOCIAL / FAMILY HISTORY     Past Medical History:  has a past medical history of Asthma, Bowel obstruction ([address] Chronic back pain, Hypertension, and Substance abuse ([address] Past Surgical History:  has a past surgical history that includes Hysterectomy;  section; other surgical history; Tubal ligation; and knee surgery (Right). Allergies:  Ibuprofen     Home Meds:   Prior to Visit Medications    Medication Sig Taking?  Authorizing Provider   ASPIRIN LOW DOSE 81 MG EC tablet TAKE 1 TABLET BY MOUTH DAILY Yes Leah Ruth MD   atorvastatin (LIPITOR) 20 MG tablet TAKE 1 TABLET BY MOUTH DAILY Yes Leah Ruth MD   tiZANidine (ZANAFLEX) 4 MG tablet Take 1

## 2020-05-28 NOTE — CARE COORDINATION
CASE MANAGEMENT NOTE:    Admission Date:  5/27/2020 Evangelist Bailey is a 45 y.o.  female    Admitted for : Chest pain [R07.9]    Met with:  Patient    PCP:  Dr. Jori Langford:  Renu      Current Residence/ Living Arrangements:  independently at home             Current Services PTA:  No    Is patient agreeable to VNS: No    Freedom of choice provided:  Yes    List of [address] provided: Yes    VNS chosen:  No    DME:  none    Home Oxygen: No    Nebulizer: No    CPAP/BIPAP: No    Supplier: N/A    Potential Assistance Needed: Yes    SNF needed: No    Freedom of choice and list provided: NA    Pharmacy:  [address] [place] [address] Road       Does Patient want to use MEDS to BEDS? No    Is the Patient an [facility] with Readmission Risk Score greater than 14%? No  If yes, pt needs a follow up appointment made within 7 days. Family Members/Caregivers that pt would like involved in their care:    Yes    If yes, list name here:  Anushka Colon    Transportation Provider:  [employer]             Is patient in Isolation/One on One/Altered Mental Status? No  If yes, skip next question. If no, would they like an I-Pad to  use? No  If yes, call [phone]. Discharge Plan:  5/28: Garnette Olszewski - Patient is from home alone. DME - None. Plans on going to [facility] for drug rehab and that is already in the works. List of food wells given to patient. Patient will call for her own cab upon discharge. Will follow along. Stress test tomorrow.  //WEN                 Electronically signed by: Reyes Simpler, RN on 5/28/2020 at 3:52 PM

## 2020-05-28 NOTE — CONSULTS
[facility]    Consult                        Today's Date: 2020  Patient Name: Jolene Smith  Date of admission: 2020  6:24 PM  Patient's age: 45 y.o., 1970  Admission Dx: Chest pain [R07.9]    Reason for Consult: Chest pain  Requesting Physician: Arsenio Champagne MD    CHIEF COMPLAINT:  Chest pain    History Obtained From:  patient    HISTORY OF PRESENT ILLNESS:      The patient is a 45 y.o.  female who is admitted to the hospital for Chest pain,  The patient presented with exertional chest pain and SOB, no dizziness or syncope and no palpitations. Past Medical History:   has a past medical history of Asthma, Bowel obstruction ([facility] [address] Chronic back pain, Hypertension, and Substance abuse ([facility] [address] Past Surgical History:   has a past surgical history that includes Hysterectomy;  section; other surgical history; Tubal ligation; and knee surgery (Right). Home Medications:    Prior to Admission medications    Medication Sig Start Date End Date Taking?  Authorizing Provider   ASPIRIN LOW DOSE 81 MG EC tablet TAKE 1 TABLET BY MOUTH DAILY 20  Yes Bedford Galeazzi, MD   atorvastatin (LIPITOR) 20 MG tablet TAKE 1 TABLET BY MOUTH DAILY 3/23/20  Yes Bedford Galeazzi, MD   tiZANidine (ZANAFLEX) 4 MG tablet Take 1 tablet by mouth 3 times daily 20  Yes Bedford Galeazzi, MD   amLODIPine (NORVASC) 10 MG tablet  19  Yes Historical Provider, MD GUERIN CETIRIZINE HCL 10 MG tablet  19  Yes Historical Provider, MD   hydrochlorothiazide (HYDRODIURIL) 12.5 MG tablet  19  Yes Historical Provider, MD   naproxen (NAPROSYN) 375 MG tablet Take 1 tablet by mouth 2 times daily (with meals) 19  Yes Elisha Holman MD   docusate sodium (COLACE) 100 MG capsule Take 1 capsule by mouth daily 19  Yes Bedford Galeazzi, MD   triamcinolone (KENALOG) 0.025 % cream Apply to rash twice daily 19  Yes Harman Mcelroy MD   PROAIR  (76 BASE) MCG/ACT inhaler Inhale 1 puff into the lungs 4 times daily. 10/8/13  Yes Historical Provider, MD   lisinopril (PRINIVIL;ZESTRIL) 30 MG tablet Take 40 mg by mouth daily  10/11/13  Yes Historical Provider, MD   omeprazole (PRILOSEC) 20 MG capsule Take 20 mg by mouth Daily. 10/11/13  Yes Historical Provider, MD   multivitamin (ANIMAL SHAPES) WITH C & FA CHEW chewable tablet Take  by mouth daily. 11/15/13  Yes Historical Provider, MD   PRE-MOISTENED WITCH HAZEL 50 % PADS Use after each bowel movement 12/26/19   Zion Gagnon, APRN - CNP   acetaminophen (TYLENOL) 325 MG tablet Take 1 tablet by mouth every 6 hours as needed for Pain 12/20/19   Silvia Griffiths,    zoster recombinant adjuvanted vaccine [facility]) 50 MCG/0.5ML SUSR injection Inject 0.5 mLs into the muscle See Admin Instructions 1 dose now and repeat in 2-6 months  Patient not taking: Reported on 1/2/2020 12/11/19   Elsie Looney MD   lidocaine (LMX) 4 % cream Apply topically every 8 hrs as needed for pain 12/11/19   Elsie Looney MD   Elastic Bandages & Supports (KNEE BRACE/HINGED/LARGE) MISC Use daily for knee pain, please provide according to size 12/11/19   Elsie Looney MD   loratadine (CLARITIN) 10 MG tablet Take 1 tablet by mouth daily 12/11/19   Elsie Looney MD   hydrOXYzine (VISTARIL) 50 MG capsule Take 1 capsule by mouth at bedtime 4/2/19   Altagracia Montano MD   ammonium lactate (AMLACTIN) 12 % cream Apply 385 g topically as needed. Apply topically as needed. Historical Provider, MD   clotrimazole (LOTRIMIN) 1 % cream Apply  topically 2 times daily. 11/15/13   Historical Provider, MD   fluticasone (FLONASE) 50 MCG/ACT nasal spray 1 spray by Nasal route daily. 10/11/13   Historical Provider, MD   OLANZapine (ZYPREXA) 15 MG tablet Take 15 mg by mouth nightly.  11/18/13   Historical Provider, MD Sam Glover [facility] [address] [facility]  10/11/13   Historical Provider, MD       Allergies:  Ibuprofen    Social History:   reports that she has been smoking cigarettes. She has a 15.00 pack-year smoking history. She has never used smokeless tobacco. She reports current alcohol use. She reports current drug use. Drug: Cocaine. Family History: family history includes High Blood Pressure in her father and mother. No h/o sudden cardiac death. No for premature CAD    REVIEW OF SYSTEMS:    · Constitutional: there has been no unanticipated weight loss. There's been Yes change in energy level, Yes change in activity level. · Eyes: No visual changes or diplopia. No scleral icterus. · ENT: No Headaches, hearing loss or vertigo. No mouth sores or sore throat. · Cardiovascular: Yes chest pain, Yes dyspnea on exertion, No palpitations or No loss of consciousness. No cough, hemoptysis, No pleuritic pain, or phlebitis. · Respiratory: No cough or wheezing, no sputum production. No hematemesis. · Gastrointestinal: No abdominal pain, appetite loss, blood in stools. No change in bowel or bladder habits. · Genitourinary: No dysuria, trouble voiding, or hematuria. · Musculoskeletal:  No gait disturbance, No weakness or joint complaints. · Integumentary: No rash or pruritis. · Neurological: No headache, diplopia, change in muscle strength, numbness or tingling. No change in gait, balance, coordination, mood, affect, memory, mentation, behavior. · Psychiatric: No anxiety, or depression. · Endocrine: No temperature intolerance. No excessive thirst, fluid intake, or urination. No tremor. · Hematologic/Lymphatic: No abnormal bruising or bleeding, blood clots or swollen lymph nodes. · Allergic/Immunologic: No nasal congestion or hives. PHYSICAL EXAM:      BP (!) 101/59   Pulse 69   Temp 98 °F (36.7 °C) (Oral)   Resp 18   Ht 5' 2\" (1.575 m)   Wt 151 lb 0.2 oz (68.5 kg)   SpO2 95%   BMI 27.62 kg/m²    Constitutional and General Appearance: alert, cooperative, no distress and appears stated age  HEENT: PERRL, no cervical lymphadenopathy. No masses palpable.  Normal oral

## 2020-05-29 VITALS
HEART RATE: 63 BPM | OXYGEN SATURATION: 99 % | TEMPERATURE: 97.7 F | WEIGHT: 159.61 LBS | BODY MASS INDEX: 29.37 KG/M2 | HEIGHT: 62 IN | RESPIRATION RATE: 18 BRPM | DIASTOLIC BLOOD PRESSURE: 97 MMHG | SYSTOLIC BLOOD PRESSURE: 110 MMHG

## 2020-05-29 LAB
LV EF: 55 %
LVEF MODALITY: NORMAL

## 2020-05-29 PROCEDURE — G0378 HOSPITAL OBSERVATION PER HR: HCPCS

## 2020-05-29 PROCEDURE — 2580000003 HC RX 258: Performed by: PHYSICIAN ASSISTANT

## 2020-05-29 PROCEDURE — 6370000000 HC RX 637 (ALT 250 FOR IP): Performed by: PHYSICIAN ASSISTANT

## 2020-05-29 PROCEDURE — 99217 PR OBSERVATION CARE DISCHARGE MANAGEMENT: CPT | Performed by: INTERNAL MEDICINE

## 2020-05-29 PROCEDURE — A9500 TC99M SESTAMIBI: HCPCS | Performed by: INTERNAL MEDICINE

## 2020-05-29 PROCEDURE — 78452 HT MUSCLE IMAGE SPECT MULT: CPT

## 2020-05-29 PROCEDURE — 93017 CV STRESS TEST TRACING ONLY: CPT

## 2020-05-29 PROCEDURE — 3430000000 HC RX DIAGNOSTIC RADIOPHARMACEUTICAL: Performed by: INTERNAL MEDICINE

## 2020-05-29 RX ORDER — ATROPINE SULFATE 0.1 MG/ML
0.5 INJECTION INTRAVENOUS EVERY 5 MIN PRN
Status: ACTIVE | OUTPATIENT
Start: 2020-05-29 | End: 2020-05-29

## 2020-05-29 RX ORDER — SODIUM CHLORIDE 0.9 % (FLUSH) 0.9 %
10 SYRINGE (ML) INJECTION PRN
Status: ACTIVE | OUTPATIENT
Start: 2020-05-29 | End: 2020-05-29

## 2020-05-29 RX ORDER — METOPROLOL TARTRATE 5 MG/5ML
5 INJECTION INTRAVENOUS EVERY 5 MIN PRN
Status: ACTIVE | OUTPATIENT
Start: 2020-05-29 | End: 2020-05-29

## 2020-05-29 RX ORDER — SODIUM CHLORIDE 9 MG/ML
500 INJECTION, SOLUTION INTRAVENOUS CONTINUOUS PRN
Status: ACTIVE | OUTPATIENT
Start: 2020-05-29 | End: 2020-05-29

## 2020-05-29 RX ORDER — NITROGLYCERIN 0.4 MG/1
0.4 TABLET SUBLINGUAL EVERY 5 MIN PRN
Status: ACTIVE | OUTPATIENT
Start: 2020-05-29 | End: 2020-05-29

## 2020-05-29 RX ORDER — ALBUTEROL SULFATE 2.5 MG/3ML
2.5 SOLUTION RESPIRATORY (INHALATION) EVERY 6 HOURS PRN
Status: DISCONTINUED | OUTPATIENT
Start: 2020-05-29 | End: 2020-05-29 | Stop reason: HOSPADM

## 2020-05-29 RX ADMIN — TIZANIDINE 4 MG: 4 TABLET ORAL at 14:11

## 2020-05-29 RX ADMIN — ASPIRIN 81 MG: 81 TABLET, COATED ORAL at 09:27

## 2020-05-29 RX ADMIN — TETRAKIS(2-METHOXYISOBUTYLISOCYANIDE)COPPER(I) TETRAFLUOROBORATE 31.8 MILLICURIE: 1 INJECTION, POWDER, LYOPHILIZED, FOR SOLUTION INTRAVENOUS at 11:17

## 2020-05-29 RX ADMIN — AMLODIPINE BESYLATE 10 MG: 10 TABLET ORAL at 09:27

## 2020-05-29 RX ADMIN — Medication 10 ML: at 12:50

## 2020-05-29 RX ADMIN — LISINOPRIL 40 MG: 20 TABLET ORAL at 09:28

## 2020-05-29 RX ADMIN — LORAZEPAM 0.5 MG: 0.5 TABLET ORAL at 00:12

## 2020-05-29 RX ADMIN — ATORVASTATIN CALCIUM 20 MG: 20 TABLET, FILM COATED ORAL at 09:28

## 2020-05-29 NOTE — PROGRESS NOTES
IV removed all pt belongings collected including pt home medications, discharge instructions explained. Pt called cab to come pick her up, will be taken out by wheelchair when ready.

## 2020-05-29 NOTE — PROGRESS NOTES
LEXISCAN COMPLETED, /82, , PATIENT STATES SHE FELT SLIGHT CHEST TIGHTNESS IN HER LEFT SIDE THAT LASTED 5 SECONDS AND HAD SHORTNESS OF BREATH WHILE WALKING ON THE TREADMILL.   WATER GIVEN AND PATIENT SAT BACK IN BED

## 2020-05-29 NOTE — DISCHARGE SUMMARY
cardiology supervision, 0.4mg Lexiscan was infused. After pharmacologic stress, 31.8 mCi of Tc 99 labeled sestamibi were injected. SPECT images with ECG gating were acquired. COMPARISON: None Available. HISTORY: ORDERING SYSTEM PROVIDED HISTORY: Chest pain TECHNOLOGIST PROVIDED HISTORY: Reason for Exam: Chest pain Procedure Type->Exercise Chest pain Is the patient pregnant?->No Reason for Exam: chest pain Acuity: Unknown Type of Exam: Unknown FINDINGS: Images interpreted utilizing [employer] and [employer]. There is normal distribution of radiotracer throughout the myocardium without evidence for a significant reversible or fixed perfusion defect. Perfusion scores are visually adjusted to account for artifact. Summed stress score:  0 Summed rest score:  0 Summed reversibility score:  0 Function: End diastolic volume:  65JH Left ventricular ejection fraction:  55% Wall motion abnormalities:  None. TID score:  1.17 (scores greater than 1.39 are considered elevated for Lexiscan stress with Tc99m)     1. No discrete perfusion abnormality to suggest myocardial ischemia/infarction. 2. No wall motion abnormality. Calculated ejection fraction of 55%. 3. Risk stratification: Low Notes concerning risk stratification: Risk stratification incorporates both clinical history and some testing results. Final risk determination is the responsibility of the ordering provider as other patient information and test results may increase or decrease the risk assessment reported for this examination. Risk stratification criteria are adapted from \"Noninvasive Risk Stratification\" criteria from [employer]. Al, ACC/AATS/AHA/ASE/ASNC/SCAI/SCCT/STS 2017 Appropriate Use Criteria For Coronary Revascularization in Patients With Stable Ischemic Heart Disease [facility] Volume 69, Issue 17, May 2017 High risk (>3% annual death or MI) 1. Severe resting LV dysfunction (LVEF <35%) not readily explained by non coronary causes 2.  Resting

## 2020-05-29 NOTE — PROGRESS NOTES
PATIENT TO STRESS LAB VIA WHEELCHAIR, PLACED ON EKG AND VITALS MACHINE, IV FLUSHED WITH NORMAL SALINE IN THE RT AC, PATIENT STATES THE SITE IS SORE AND HURTS AND THE SITE IS LEAKING FLUID, IV REMOVED , EXPLAINED CARDIOLITE TREADMILL STRESS TEST. PATIENT DENIES CHEST PAIN AND SHORTNESS OF BREATH AT THIS TIME. O2 % ROOM AIR, /49, HR 63, NSR- SINUS ARRHYTHMIA ON MONITOR.

## 2020-06-01 ENCOUNTER — TELEPHONE (OUTPATIENT)
Dept: FAMILY MEDICINE CLINIC | Age: 50
End: 2020-06-01

## 2020-07-30 PROBLEM — F17.210 NICOTINE DEPENDENCE, CIGARETTES, UNCOMPLICATED: Status: ACTIVE | Noted: 2017-09-11

## 2020-08-04 RX ORDER — TRAZODONE HYDROCHLORIDE 100 MG/1
1 TABLET ORAL NIGHTLY
Status: ON HOLD | COMMUNITY
Start: 2020-07-13 | End: 2022-06-03 | Stop reason: HOSPADM

## 2020-08-04 RX ORDER — PNV NO.95/FERROUS FUM/FOLIC AC 28MG-0.8MG
1 TABLET ORAL DAILY
Status: ON HOLD | COMMUNITY
Start: 2020-07-16 | End: 2022-02-17 | Stop reason: HOSPADM

## 2020-08-04 RX ORDER — OMEPRAZOLE 40 MG/1
1 CAPSULE, DELAYED RELEASE ORAL DAILY
Status: ON HOLD | COMMUNITY
Start: 2020-07-07 | End: 2021-12-20 | Stop reason: HOSPADM

## 2020-08-04 NOTE — PROGRESS NOTES
[facility] PHYSICIANS  [facility] FAMILY PHYSICIANS [address] [facility] [address].  [address] [address]  Dept: [phone]     2020   Chief Complaint   Patient presents with    Other     HARD TO WALK HAD PLATE PUT IN 40 YEARS AGO POSSIBLE SLIPPED     Mass     right side of her stomach      RORY Castellon (:  1970) is a 45 y.o. female is an established patient of Dr. Millie Perry. Patient has a history of hypertension, incisional hernia, hyperlipidemia, chronic back pain, vitamin d deficiency, chronic fatigue,     Patient c/o abdominal pain and noticed a lump on her right inguinal area. This has been going on for awhile now. She has not gotten evaluated. She reports chronic constipation. Constant pain aggravated with any type of movement. Lump appears and reduced when she lays down. Patient had some low back pain and hip pain which is also longstanding. Patient had several abdominal surgeries including several c/s and hysterectomy. HYPERTENSION  Argenis Castellon has a well controlled hypertension. she is currently on lisinopril, HCTZ, AMlodipine, . Patient's most recent BP in the office was stable. she reports stable BP readings at home. @Patient denies any adverse reaction to this therapy. she denies any CP, SOB, HA, or palpitations. Cardiovascular risk factors: hypertension, obesity (BMI >= 30 kg/m2), sedentary lifestyle and smoking/ tobacco exposure. Use of agents associated with hypertension: NSAIDS. History of target organ damage: none. Lab Results   Component Value Date    CREATININE 0.85 2020     HYPERLIPIDEMIA  Argenis Castellon is currently on atorvastatin (Lipitor). Patient denies adverse reaction to this medication. Compliance with treatment thus far has been excellent. The patient is not known to have coexisting coronary artery disease.  The 10-year CVD risk score (D'Agostino, et al., 2008) is: 4.9%    Values used to calculate the score:      Age: 45 years      Sex: Female psychotic features ([facility]      Past Medical History:   Diagnosis Date    Asthma     Bowel obstruction ([facility]     Chronic back pain     Hypertension     Substance abuse ([facility]       Past Surgical History:   Procedure Laterality Date     SECTION      HYSTERECTOMY      KNEE SURGERY Right     has pins in right knee     OTHER SURGICAL HISTORY      surgery for bowel obstruction    TUBAL LIGATION       Family History   Problem Relation Age of Onset    High Blood Pressure Mother     High Blood Pressure Father      Current Outpatient Medications   Medication Sig Dispense Refill    zoster recombinant adjuvanted vaccine (SHINGRIX) 50 MCG/0.5ML SUSR injection Inject 0.5 mLs into the muscle See Admin Instructions 1 dose now and repeat in 2-6 months 0.5 mL 0    cyclobenzaprine (FLEXERIL) 10 MG tablet Take 1 tablet by mouth 3 times daily as needed for Muscle spasms 21 tablet 0    naproxen (NAPROSYN) 250 MG tablet Take 2 tablets by mouth 2 times daily (with meals) 180 tablet 1    lidocaine (LIDODERM) 5 % Place 1 patch onto the skin daily for 10 days 12 hours on, 12 hours off.  10 patch 0    traZODone (DESYREL) 100 MG tablet Take 1 tablet by mouth nightly      omeprazole (PRILOSEC) 40 MG delayed release capsule Take 1 capsule by mouth daily      magnesium oxide (MAG-OX) 250 MG TABS tablet Take 1 tablet by mouth daily      ASPIRIN LOW DOSE 81 MG EC tablet TAKE 1 TABLET BY MOUTH DAILY 90 tablet 1    atorvastatin (LIPITOR) 20 MG tablet TAKE 1 TABLET BY MOUTH DAILY 30 tablet 3    amLODIPine (NORVASC) 10 MG tablet       HM CETIRIZINE HCL 10 MG tablet       hydrochlorothiazide (HYDRODIURIL) 12.5 MG tablet       PRE-MOISTENED WITCH HAZEL 50 % PADS Use after each bowel movement 90 each 2    acetaminophen (TYLENOL) 325 MG tablet Take 1 tablet by mouth every 6 hours as needed for Pain 120 tablet 0    lidocaine (LMX) 4 % cream Apply topically every 8 hrs as needed for pain 45 g 3    Elastic Bandages & Supports (KNEE BRACE/HINGED/LARGE) MISC Use daily for knee pain, please provide according to size 1 each 0    loratadine (CLARITIN) 10 MG tablet Take 1 tablet by mouth daily 30 tablet 1    docusate sodium (COLACE) 100 MG capsule Take 1 capsule by mouth daily 60 capsule 3    triamcinolone (KENALOG) 0.025 % cream Apply to rash twice daily 454 g 2    hydrOXYzine (VISTARIL) 50 MG capsule Take 1 capsule by mouth at bedtime 30 capsule 1    ammonium lactate (AMLACTIN) 12 % cream Apply 385 g topically as needed. Apply topically as needed.  PROAIR  (90 BASE) MCG/ACT inhaler Inhale 1 puff into the lungs 4 times daily.  clotrimazole (LOTRIMIN) 1 % cream Apply  topically 2 times daily.  fluticasone (FLONASE) 50 MCG/ACT nasal spray 1 spray by Nasal route daily.  lisinopril (PRINIVIL;ZESTRIL) 30 MG tablet Take 40 mg by mouth daily       OLANZapine (ZYPREXA) 15 MG tablet Take 15 mg by mouth nightly.  multivitamin (ANIMAL SHAPES) WITH C & FA CHEW chewable tablet Take  by mouth daily.  TROJAN-ENZ LUBRICATED MISC        No current facility-administered medications for this visit. Review of Systems   Prior to Visit Medications    Medication Sig Taking? Authorizing Provider   zoster recombinant adjuvanted vaccine (SHINGRIX) 50 MCG/0.5ML SUSR injection Inject 0.5 mLs into the muscle See Admin Instructions 1 dose now and repeat in 2-6 months Yes YEYO Velasquez CNP   cyclobenzaprine (FLEXERIL) 10 MG tablet Take 1 tablet by mouth 3 times daily as needed for Muscle spasms Yes YEYO Velasquez CNP   naproxen (NAPROSYN) 250 MG tablet Take 2 tablets by mouth 2 times daily (with meals) Yes YEYO Velasquez CNP   lidocaine (LIDODERM) 5 % Place 1 patch onto the skin daily for 10 days 12 hours on, 12 hours off.  Yes YEYO Velasquez CNP   traZODone (DESYREL) 100 MG tablet Take 1 tablet by mouth nightly Yes Historical Provider, MD   omeprazole (PRILOSEC) 40 MG delayed release capsule Take 1 capsule by mouth daily Yes Historical Provider, MD   magnesium oxide (MAG-OX) 250 MG TABS tablet Take 1 tablet by mouth daily Yes Historical Provider, MD   ASPIRIN LOW DOSE 81 MG EC tablet TAKE 1 TABLET BY MOUTH DAILY Yes Renato Walter MD   atorvastatin (LIPITOR) 20 MG tablet TAKE 1 TABLET BY MOUTH DAILY Yes Renato Walter MD   amLODIPine (NORVASC) 10 MG tablet  Yes Historical Provider, MD    CETIRIZINE HCL 10 MG tablet  Yes Historical Provider, MD   hydrochlorothiazide (HYDRODIURIL) 12.5 MG tablet  Yes Historical Provider, MD   PRE-MOISTENED WITCH HAZEL 50 % PADS Use after each bowel movement Yes Zion Gagnon APRN - CNP   acetaminophen (TYLENOL) 325 MG tablet Take 1 tablet by mouth every 6 hours as needed for Pain Yes Jcarlos Mckee DO   lidocaine (LMX) 4 % cream Apply topically every 8 hrs as needed for pain Yes Renato Walter MD   Elastic Bandages & Supports (KNEE BRACE/HINGED/LARGE) MISC Use daily for knee pain, please provide according to size Yes Renato Walter MD   loratadine (CLARITIN) 10 MG tablet Take 1 tablet by mouth daily Yes Renato Walter MD   docusate sodium (COLACE) 100 MG capsule Take 1 capsule by mouth daily Yes Renato Walter MD   triamcinolone (KENALOG) 0.025 % cream Apply to rash twice daily Yes Jorge Ramirez MD   hydrOXYzine (VISTARIL) 50 MG capsule Take 1 capsule by mouth at bedtime Yes Jorge Ramirez MD   ammonium lactate (AMLACTIN) 12 % cream Apply 385 g topically as needed. Apply topically as needed. Yes Historical Provider, MD   PROAIR  (90 BASE) MCG/ACT inhaler Inhale 1 puff into the lungs 4 times daily. Yes Historical Provider, MD   clotrimazole (LOTRIMIN) 1 % cream Apply  topically 2 times daily. Yes Historical Provider, MD   fluticasone (FLONASE) 50 MCG/ACT nasal spray 1 spray by Nasal route daily.  Yes Historical Provider, MD   lisinopril (PRINIVIL;ZESTRIL) 30 MG tablet Take 40 mg by mouth daily  Yes Historical Provider, MD   OLANZapine (ZYPREXA) 15 MG tablet Take 15 mg by mouth nightly. Yes Historical Provider, MD   multivitamin (ANIMAL SHAPES) WITH C & FA CHEW chewable tablet Take  by mouth daily. Yes Historical Provider, MD   Sania Sloan LUBRICATED [address] [facility]  Yes Historical Provider, MD      Social History     Tobacco Use    Smoking status: Current Every Day Smoker     Packs/day: 1.00     Years: 15.00     Pack years: 15.00     Types: Cigarettes    Smokeless tobacco: Never Used   Substance Use Topics    Alcohol use: Yes     Comment: socially      Vitals:    08/05/20 1531   BP: 110/80   Pulse: 75   Temp: 97.3 °F (36.3 °C)   SpO2: 98%   Weight: 147 lb (66.7 kg)   Height: 5' 2\" (1.575 m)     Estimated body mass index is 26.89 kg/m² as calculated from the following:    Height as of this encounter: 5' 2\" (1.575 m). Weight as of this encounter: 147 lb (66.7 kg). Physical Exam  Most recent labs reviewed with patient, and all questions answered.   Lab Results   Component Value Date    WBC 4.5 05/28/2020    HGB 11.9 (L) 05/28/2020    HCT 36.3 05/28/2020    MCV 92.0 05/28/2020     05/28/2020     Lab Results   Component Value Date     05/27/2020    K 3.9 05/27/2020     05/27/2020    CO2 25 05/27/2020    BUN 14 05/27/2020    CREATININE 0.85 05/27/2020    GLUCOSE 105 05/27/2020    GLUCOSE 95 04/23/2012    CALCIUM 9.4 05/27/2020      Lab Results   Component Value Date    ALT 64 (H) 05/27/2020    AST 67 (H) 05/27/2020    ALKPHOS 115 (H) 05/27/2020    BILITOT 0.32 05/27/2020     Lab Results   Component Value Date    TSH 1.11 02/05/2019     Lab Results   Component Value Date    CHOL 174 02/05/2019    CHOL 193 08/27/2015    CHOL 148 02/10/2014     Lab Results   Component Value Date    TRIG 167 (H) 02/05/2019    TRIG 84 08/27/2015    TRIG 50 02/10/2014     Lab Results   Component Value Date    HDL 71 02/05/2019    HDL 88 05/11/2017     08/27/2015     Lab Results   Component Value Date    LDLCHOLESTEROL 70 02/05/2019    LDLCHOLESTEROL 68 05/11/2017    LDLCHOLESTEROL 69 08/27/2015     Lab Results   Component Value Date    CHOLHDLRATIO 2.5 02/05/2019    CHOLHDLRATIO 2.0 05/11/2017    CHOLHDLRATIO 1.8 08/27/2015     Lab Results   Component Value Date    LABA1C 5.8 01/02/2020      Lab Results   Component Value Date    JRICQZCS36 700 02/05/2019     ASSESSMENT/PLAN:  1. Incisional hernia, without obstruction or gangrene  Worsening  Evaluate for hernia  Refer to surgery when needed  - US ABDOMEN LIMITED; Future    2. Essential hypertension  Stable  Continue current therapy. DISCUSSED AND ADVISED TO:  Cut down on your salt intake. Cut down on caffeinated drinks, sports drinks. Instructed to check BP at home regularly. Report for any chest pains, shortness of breath, headaches, and lightheadedness. Call the office if your blood pressure continue to be higher than 140/90 or 90/50.    - Urinalysis Reflex to Culture; Future    3. Mixed hyperlipidemia  Stable  Continue therapy   Advised to decrease the consumption of red meats, fried foods, trans fats, sweets, sugary beverages. Advised to increase fish, vegetables, and fruits consumption. Advised to add fiber or OTC supplements in diet. Discussed weight loss which will result in improvement of lipids levels. Advised to increase daily physical activities and add regular exercises. - Lipid, Fasting; Future    4. Chronic midline low back pain without sciatica  Failure to Improve  Start Naproxen  Start lidoderm and Flexeril  DISCUSSED AND ADVISED TO:  Use heat packs 15 to 20 mins every 2-3 hours. Do some back stretches as tolerated. Refer to hand out for instructions. Call for worsening, numbness, weakness.    - XR LUMBAR SPINE (2-3 VIEWS); Future  - cyclobenzaprine (FLEXERIL) 10 MG tablet; Take 1 tablet by mouth 3 times daily as needed for Muscle spasms  Dispense: 21 tablet; Refill: 0  - naproxen (NAPROSYN) 250 MG tablet;  Take 2 tablets by mouth 2 times daily (with meals)  Dispense: 180 tablet; Refill: 1  - lidocaine (LIDODERM) 5 %; Place 1 patch onto the skin daily for 10 days 12 hours on, 12 hours off. Dispense: 10 patch; Refill: 0    5. Vitamin D deficiency  Start Vitamin D supplementation  DISCUSSED AND ADVISED TO:  Foods that contain a lot of vitamin D includes [place], tuna, and mackerel. Cheese, egg yolks, and beef liver have small amounts of vit D.  Milk, soy drinks, orange juice, yogurt, margarine, and some kinds of cereal have vitamin D added to them. Continue to use sunblock when out in the sun to prevent skin cancer.  - Vitamin D 25 Hydroxy; Future    6. Chronic fatigue  Evaluate for endocrine disorder  - Hemoglobin A1C; Future  - TSH without Reflex; Future    7. Need for varicella vaccine  Recommended by CDC. No current infection. Denies previous adverse reaction to vaccination. - zoster recombinant adjuvanted vaccine [facility]) 50 MCG/0.5ML SUSR injection; Inject 0.5 mLs into the muscle See Admin Instructions 1 dose now and repeat in 2-6 months  Dispense: 0.5 mL; Refill: 0     Controlled Substance Monitoring:  Acute and Chronic Pain Monitoring:   RX Monitoring 8/5/2020   Periodic Controlled Substance Monitoring No signs of potential drug abuse or diversion identified.      Orders Placed This Encounter   Procedures    US ABDOMEN LIMITED     Standing Status:   Future     Standing Expiration Date:   8/5/2021     Order Specific Question:   Reason for exam:     Answer:   suprapubic and right inguinal hernia    XR LUMBAR SPINE (2-3 VIEWS)     Standing Status:   Future     Standing Expiration Date:   8/5/2021     Order Specific Question:   Reason for exam:     Answer:   low back pain    Hemoglobin A1C     Standing Status:   Future     Standing Expiration Date:   8/5/2021    Lipid, Fasting     Standing Status:   Future     Standing Expiration Date:   8/5/2021    TSH without Reflex     Standing Status:   Future     Standing Expiration Date:   8/5/2021    Urinalysis Reflex to Culture  zoster recombinant adjuvanted vaccine (SHINGRIX) 50 MCG/0.5ML SUSR injection 0.5 mL 0     Sig: Inject 0.5 mLs into the muscle See Admin Instructions 1 dose now and repeat in 2-6 months    cyclobenzaprine (FLEXERIL) 10 MG tablet 21 tablet 0     Sig: Take 1 tablet by mouth 3 times daily as needed for Muscle spasms    naproxen (NAPROSYN) 250 MG tablet 180 tablet 1     Sig: Take 2 tablets by mouth 2 times daily (with meals)    lidocaine (LIDODERM) 5 % 10 patch 0     Sig: Place 1 patch onto the skin daily for 10 days 12 hours on, 12 hours off. All patient questions answered. Patient voiced understanding. Quality Measures    Body mass index is 26.89 kg/m². Normal. Weight control planned discussed Healthy diet and regular exercise. BP: 110/80 Blood pressure is normal. Treatment plan consists of No treatment change needed. Lab Results   Component Value Date    LDLCHOLESTEROL 70 02/05/2019    (goal LDL reduction with dx if diabetes is 50% LDL reduction)      PHQ Scores 11/13/2019   PHQ2 Score 0   PHQ9 Score 0     Interpretation of Total Score Depression Severity: 1-4 = Minimal depression, 5-9 = Mild depression, 10-14 = Moderate depression, 15-19 = Moderately severe depression, 20-27 = Severe depression   This note was completed by using the assistance of a speech-recognition program. However, inadvertent computerized transcription errors may be present. Although every effort was made to ensure accuracy, no guarantees can be provided that every mistake has been identified and corrected by editing   An electronic signature was used to authenticate this note.   --YEYO Vergara - CNP on 8/5/2020 at 8:26 PM

## 2020-08-05 ENCOUNTER — OFFICE VISIT (OUTPATIENT)
Dept: FAMILY MEDICINE CLINIC | Age: 50
End: 2020-08-05
Payer: COMMERCIAL

## 2020-08-05 VITALS
OXYGEN SATURATION: 98 % | HEIGHT: 62 IN | DIASTOLIC BLOOD PRESSURE: 80 MMHG | SYSTOLIC BLOOD PRESSURE: 110 MMHG | HEART RATE: 75 BPM | BODY MASS INDEX: 27.05 KG/M2 | WEIGHT: 147 LBS | TEMPERATURE: 97.3 F

## 2020-08-05 PROBLEM — H25.13 NUCLEAR SCLEROTIC CATARACT OF BOTH EYES: Status: ACTIVE | Noted: 2020-06-25

## 2020-08-05 PROBLEM — K43.2 INCISIONAL HERNIA, WITHOUT OBSTRUCTION OR GANGRENE: Status: ACTIVE | Noted: 2020-08-05

## 2020-08-05 PROBLEM — F32.3 SEVERE MAJOR DEPRESSION WITH PSYCHOTIC FEATURES (HCC): Status: ACTIVE | Noted: 2020-06-25

## 2020-08-05 PROBLEM — F14.20 COCAINE DEPENDENCE (HCC): Status: ACTIVE | Noted: 2020-06-25

## 2020-08-05 PROBLEM — R53.82 CHRONIC FATIGUE: Status: ACTIVE | Noted: 2020-08-05

## 2020-08-05 PROBLEM — H52.4 HYPEROPIA WITH PRESBYOPIA, BILATERAL: Status: ACTIVE | Noted: 2020-06-25

## 2020-08-05 PROBLEM — H52.03 HYPEROPIA WITH PRESBYOPIA, BILATERAL: Status: ACTIVE | Noted: 2020-06-25

## 2020-08-05 PROBLEM — E55.9 VITAMIN D DEFICIENCY: Status: ACTIVE | Noted: 2020-08-05

## 2020-08-05 PROCEDURE — 99214 OFFICE O/P EST MOD 30 MIN: CPT | Performed by: FAMILY MEDICINE

## 2020-08-05 PROCEDURE — 3017F COLORECTAL CA SCREEN DOC REV: CPT | Performed by: FAMILY MEDICINE

## 2020-08-05 PROCEDURE — 4004F PT TOBACCO SCREEN RCVD TLK: CPT | Performed by: FAMILY MEDICINE

## 2020-08-05 PROCEDURE — 81003 URINALYSIS AUTO W/O SCOPE: CPT | Performed by: FAMILY MEDICINE

## 2020-08-05 PROCEDURE — G8419 CALC BMI OUT NRM PARAM NOF/U: HCPCS | Performed by: FAMILY MEDICINE

## 2020-08-05 PROCEDURE — G8427 DOCREV CUR MEDS BY ELIG CLIN: HCPCS | Performed by: FAMILY MEDICINE

## 2020-08-05 RX ORDER — NAPROXEN 250 MG/1
500 TABLET ORAL 2 TIMES DAILY WITH MEALS
Qty: 180 TABLET | Refills: 1 | Status: SHIPPED | OUTPATIENT
Start: 2020-08-05 | End: 2020-08-19 | Stop reason: ALTCHOICE

## 2020-08-05 RX ORDER — ZOSTER VACCINE RECOMBINANT, ADJUVANTED 50 MCG/0.5
0.5 KIT INTRAMUSCULAR SEE ADMIN INSTRUCTIONS
Qty: 0.5 ML | Refills: 0 | Status: SHIPPED | OUTPATIENT
Start: 2020-08-05 | End: 2021-02-01

## 2020-08-05 RX ORDER — LIDOCAINE 50 MG/G
1 PATCH TOPICAL DAILY
Qty: 10 PATCH | Refills: 0 | Status: SHIPPED | OUTPATIENT
Start: 2020-08-05 | End: 2020-08-15

## 2020-08-05 RX ORDER — CYCLOBENZAPRINE HCL 10 MG
10 TABLET ORAL 3 TIMES DAILY PRN
Qty: 21 TABLET | Refills: 0 | Status: SHIPPED | OUTPATIENT
Start: 2020-08-05 | End: 2020-08-15

## 2020-08-05 SDOH — ECONOMIC STABILITY: FOOD INSECURITY: WITHIN THE PAST 12 MONTHS, YOU WORRIED THAT YOUR FOOD WOULD RUN OUT BEFORE YOU GOT MONEY TO BUY MORE.: SOMETIMES TRUE

## 2020-08-05 SDOH — ECONOMIC STABILITY: TRANSPORTATION INSECURITY
IN THE PAST 12 MONTHS, HAS THE LACK OF TRANSPORTATION KEPT YOU FROM MEDICAL APPOINTMENTS OR FROM GETTING MEDICATIONS?: NO

## 2020-08-05 SDOH — ECONOMIC STABILITY: FOOD INSECURITY: WITHIN THE PAST 12 MONTHS, THE FOOD YOU BOUGHT JUST DIDN'T LAST AND YOU DIDN'T HAVE MONEY TO GET MORE.: SOMETIMES TRUE

## 2020-08-05 SDOH — ECONOMIC STABILITY: TRANSPORTATION INSECURITY
IN THE PAST 12 MONTHS, HAS LACK OF TRANSPORTATION KEPT YOU FROM MEETINGS, WORK, OR FROM GETTING THINGS NEEDED FOR DAILY LIVING?: NO

## 2020-08-05 SDOH — ECONOMIC STABILITY: INCOME INSECURITY: HOW HARD IS IT FOR YOU TO PAY FOR THE VERY BASICS LIKE FOOD, HOUSING, MEDICAL CARE, AND HEATING?: SOMEWHAT HARD

## 2020-08-05 NOTE — PATIENT INSTRUCTIONS
Patient Education        Hernia: Care Instructions  Your Care Instructions     A hernia develops when tissue bulges through a weak spot in the wall of your belly. The groin area and the navel are common areas for a hernia. A hernia can also develop near the area of a surgery you had before. Pressure from lifting, straining, or coughing can tear the weak area, causing the hernia to bulge and be painful. If you cannot push a hernia back into place, the tissue may become trapped outside the belly wall. If the hernia gets twisted and loses its blood supply, it will swell and die. This is called a strangulated hernia. It usually causes a lot of pain. It needs treatment right away. Some hernias need to be repaired to prevent a strangulated hernia. If your hernia causes symptoms or is large, you may need surgery. Follow-up care is a key part of your treatment and safety. Be sure to make and go to all appointments, and call your doctor if you are having problems. It's also a good idea to know your test results and keep a list of the medicines you take. How can you care for yourself at home? · Take care when lifting heavy objects. · Stay at a healthy weight. · Do not smoke. Smoking can cause coughing, which can cause your hernia to bulge. If you need help quitting, talk to your doctor about stop-smoking programs and medicines. These can increase your chances of quitting for good. · Talk with your doctor before wearing a corset or truss for a hernia. These devices are not recommended for treating hernias and sometimes can do more harm than good. There may be certain situations when your doctor thinks a truss would work, but these are rare. When should you call for help? Call your doctor now or seek immediate medical care if:  · You have new or worse belly pain. · You are vomiting. · You cannot pass stools or gas. · You cannot push the hernia back into place with gentle pressure when you are lying down.   · The area over the hernia turns red or becomes tender. Watch closely for changes in your health, and be sure to contact your doctor if you have any problems. Where can you learn more? Go to [email or web address]://VIVA.miDrive. org and sign in to your [employer] account. Enter C129 in the VISup box to learn more about \"Hernia: Care Instructions. \"     If you do not have an account, please click on the \"Sign Up Now\" link. Current as of: August 12, 2019               Content Version: 12.5  © 0881-7193 [employer]wise, Incorporated. Care instructions adapted under license by [facility] ([facility]). If you have questions about a medical condition or this instruction, always ask your healthcare professional. [address] any warranty or liability for your use of this information.

## 2020-08-07 ENCOUNTER — HOSPITAL ENCOUNTER (OUTPATIENT)
Age: 50
Discharge: HOME | End: 2020-08-07
Payer: COMMERCIAL

## 2020-08-07 ENCOUNTER — HOSPITAL ENCOUNTER (OUTPATIENT)
Dept: GENERAL RADIOLOGY | Age: 50
Discharge: HOME | End: 2020-08-09
Payer: COMMERCIAL

## 2020-08-07 ENCOUNTER — HOSPITAL ENCOUNTER (OUTPATIENT)
Age: 50
Discharge: HOME | End: 2020-08-09
Payer: COMMERCIAL

## 2020-08-07 ENCOUNTER — HOSPITAL ENCOUNTER (OUTPATIENT)
Dept: ULTRASOUND IMAGING | Age: 50
Discharge: HOME | End: 2020-08-09
Payer: COMMERCIAL

## 2020-08-07 PROBLEM — M47.818 SI JOINT ARTHRITIS: Status: ACTIVE | Noted: 2020-08-07

## 2020-08-07 PROBLEM — R73.03 PREDIABETES: Status: ACTIVE | Noted: 2020-08-07

## 2020-08-07 PROBLEM — M46.1 SI JOINT ARTHRITIS: Status: ACTIVE | Noted: 2020-08-07

## 2020-08-07 LAB
BILIRUBIN URINE: NEGATIVE
CHOLESTEROL/HDL RATIO: 1.9
CHOLESTEROL: 171 MG/DL
COLOR: YELLOW
COMMENT UA: NORMAL
ESTIMATED AVERAGE GLUCOSE: 128 MG/DL
GLUCOSE URINE: NEGATIVE
HBA1C MFR BLD: 6.1 % (ref 4–6)
HDLC SERPL-MCNC: 89 MG/DL
KETONES, URINE: NEGATIVE
LDL CHOLESTEROL: 60 MG/DL (ref 0–130)
LEUKOCYTE ESTERASE, URINE: NEGATIVE
NITRITE, URINE: NEGATIVE
PH UA: 5.5 (ref 5–8)
PROTEIN UA: NEGATIVE
SPECIFIC GRAVITY UA: 1.01 (ref 1–1.03)
TRIGL SERPL-MCNC: 109 MG/DL
TSH SERPL DL<=0.05 MIU/L-ACNC: 1.61 MIU/L (ref 0.3–5)
TURBIDITY: CLEAR
URINE HGB: NEGATIVE
UROBILINOGEN, URINE: NORMAL
VITAMIN D 25-HYDROXY: 76.5 NG/ML (ref 30–100)
VLDLC SERPL CALC-MCNC: NORMAL MG/DL (ref 1–30)

## 2020-08-07 PROCEDURE — 84443 ASSAY THYROID STIM HORMONE: CPT

## 2020-08-07 PROCEDURE — 81003 URINALYSIS AUTO W/O SCOPE: CPT

## 2020-08-07 PROCEDURE — 83036 HEMOGLOBIN GLYCOSYLATED A1C: CPT

## 2020-08-07 PROCEDURE — 72100 X-RAY EXAM L-S SPINE 2/3 VWS: CPT

## 2020-08-07 PROCEDURE — 80061 LIPID PANEL: CPT

## 2020-08-07 PROCEDURE — 76857 US EXAM PELVIC LIMITED: CPT

## 2020-08-07 PROCEDURE — 82306 VITAMIN D 25 HYDROXY: CPT

## 2020-08-07 PROCEDURE — 36415 COLL VENOUS BLD VENIPUNCTURE: CPT

## 2020-08-07 NOTE — RESULT ENCOUNTER NOTE
Please let her know that the US did not show any hernias. The radiologist recommended a [address], which I ordered today. Also, she has arthritis on her lower spine. Thanks.

## 2020-08-07 NOTE — RESULT ENCOUNTER NOTE
Patient has prediabetes, her A1c is increasing. Please advise patient to:  Decrease carbohydrates, sugary drinks, desserts in your diet. Exercise regularly, as tolerated. Try to lose weight. Vitamin D normal.    Chol slightly elevated on bad cholesterol. Please advise the patient to:  Cut down on red meats and trans fats in their diet. Increase physical activity. Add some regular exercise at least 3 times a week on their routine  Try to lose weight to help improve their cholesterol levels.      Thyroid function was normal.

## 2020-08-15 ENCOUNTER — HOSPITAL ENCOUNTER (OUTPATIENT)
Dept: CT IMAGING | Age: 50
Discharge: HOME | End: 2020-08-17
Payer: COMMERCIAL

## 2020-08-15 LAB
BUN BLDV-MCNC: 13 MG/DL (ref 6–20)
CREAT SERPL-MCNC: 0.8 MG/DL (ref 0.5–0.9)
GFR AFRICAN AMERICAN: >60 ML/MIN
GFR NON-AFRICAN AMERICAN: >60 ML/MIN
GFR SERPL CREATININE-BSD FRML MDRD: NORMAL ML/MIN/{1.73_M2}
GFR SERPL CREATININE-BSD FRML MDRD: NORMAL ML/MIN/{1.73_M2}

## 2020-08-15 PROCEDURE — 84520 ASSAY OF UREA NITROGEN: CPT

## 2020-08-15 PROCEDURE — 82565 ASSAY OF CREATININE: CPT

## 2020-08-15 PROCEDURE — 74177 CT ABD & PELVIS W/CONTRAST: CPT

## 2020-08-15 PROCEDURE — 2580000003 HC RX 258: Performed by: FAMILY MEDICINE

## 2020-08-15 PROCEDURE — 6360000004 HC RX CONTRAST MEDICATION: Performed by: FAMILY MEDICINE

## 2020-08-15 RX ORDER — 0.9 % SODIUM CHLORIDE 0.9 %
80 INTRAVENOUS SOLUTION INTRAVENOUS ONCE
Status: COMPLETED | OUTPATIENT
Start: 2020-08-15 | End: 2020-08-15

## 2020-08-15 RX ORDER — SODIUM CHLORIDE 0.9 % (FLUSH) 0.9 %
10 SYRINGE (ML) INJECTION PRN
Status: DISCONTINUED | OUTPATIENT
Start: 2020-08-15 | End: 2020-08-18 | Stop reason: HOSPADM

## 2020-08-15 RX ADMIN — IOHEXOL 50 ML: 240 INJECTION, SOLUTION INTRATHECAL; INTRAVASCULAR; INTRAVENOUS; ORAL at 15:17

## 2020-08-15 RX ADMIN — Medication 10 ML: at 15:18

## 2020-08-15 RX ADMIN — IOVERSOL 75 ML: 741 INJECTION INTRA-ARTERIAL; INTRAVENOUS at 15:17

## 2020-08-15 RX ADMIN — SODIUM CHLORIDE 80 ML: 9 INJECTION, SOLUTION INTRAVENOUS at 15:17

## 2020-08-16 NOTE — RESULT ENCOUNTER NOTE
Please let the patient know that their recent abdo CT came back without any mention of abnormalities. However, her liver enzymes were elevated. She is encouraged to stop drinking alcohol, tylenol. We will discuss these results further on her next visit.  Thanks

## 2020-08-18 ENCOUNTER — TELEPHONE (OUTPATIENT)
Dept: FAMILY MEDICINE CLINIC | Age: 50
End: 2020-08-18

## 2020-08-19 ENCOUNTER — TELEPHONE (OUTPATIENT)
Dept: FAMILY MEDICINE CLINIC | Age: 50
End: 2020-08-19

## 2020-08-25 ENCOUNTER — OFFICE VISIT (OUTPATIENT)
Dept: FAMILY MEDICINE CLINIC | Age: 50
End: 2020-08-25
Payer: COMMERCIAL

## 2020-08-25 VITALS
WEIGHT: 153.8 LBS | TEMPERATURE: 97.7 F | BODY MASS INDEX: 28.13 KG/M2 | HEART RATE: 86 BPM | SYSTOLIC BLOOD PRESSURE: 116 MMHG | DIASTOLIC BLOOD PRESSURE: 80 MMHG | OXYGEN SATURATION: 98 %

## 2020-08-25 PROBLEM — R74.8 ELEVATED LIVER ENZYMES: Status: ACTIVE | Noted: 2020-08-25

## 2020-08-25 PROBLEM — F32.3 SEVERE MAJOR DEPRESSION WITH PSYCHOTIC FEATURES (HCC): Status: RESOLVED | Noted: 2020-06-25 | Resolved: 2020-08-25

## 2020-08-25 PROBLEM — R07.9 CHEST PAIN: Status: RESOLVED | Noted: 2020-05-27 | Resolved: 2020-08-25

## 2020-08-25 PROCEDURE — 3017F COLORECTAL CA SCREEN DOC REV: CPT | Performed by: FAMILY MEDICINE

## 2020-08-25 PROCEDURE — 99214 OFFICE O/P EST MOD 30 MIN: CPT | Performed by: FAMILY MEDICINE

## 2020-08-25 PROCEDURE — 4004F PT TOBACCO SCREEN RCVD TLK: CPT | Performed by: FAMILY MEDICINE

## 2020-08-25 PROCEDURE — G8427 DOCREV CUR MEDS BY ELIG CLIN: HCPCS | Performed by: FAMILY MEDICINE

## 2020-08-25 PROCEDURE — G8419 CALC BMI OUT NRM PARAM NOF/U: HCPCS | Performed by: FAMILY MEDICINE

## 2020-08-25 RX ORDER — DICYCLOMINE HYDROCHLORIDE 10 MG/1
10 CAPSULE ORAL 2 TIMES DAILY PRN
Qty: 60 CAPSULE | Refills: 1 | Status: SHIPPED | OUTPATIENT
Start: 2020-08-25 | End: 2020-08-25 | Stop reason: SDUPTHER

## 2020-08-25 RX ORDER — TIZANIDINE 4 MG/1
4 TABLET ORAL NIGHTLY PRN
Qty: 30 TABLET | Refills: 1 | Status: SHIPPED | OUTPATIENT
Start: 2020-08-25 | End: 2020-08-30

## 2020-08-25 RX ORDER — TIZANIDINE 4 MG/1
4 TABLET ORAL NIGHTLY PRN
Qty: 30 TABLET | Refills: 1 | Status: SHIPPED | OUTPATIENT
Start: 2020-08-25 | End: 2020-08-25 | Stop reason: SDUPTHER

## 2020-08-25 RX ORDER — LIDOCAINE 4 G/G
1 PATCH TOPICAL DAILY
Qty: 30 PATCH | Refills: 0 | Status: SHIPPED | OUTPATIENT
Start: 2020-08-25 | End: 2020-09-24

## 2020-08-25 RX ORDER — DICYCLOMINE HYDROCHLORIDE 10 MG/1
10 CAPSULE ORAL 2 TIMES DAILY PRN
Qty: 60 CAPSULE | Refills: 1 | Status: SHIPPED | OUTPATIENT
Start: 2020-08-25 | End: 2020-09-15 | Stop reason: ALTCHOICE

## 2020-08-25 RX ORDER — MELATONIN
Status: ON HOLD | COMMUNITY
Start: 2020-08-12 | End: 2022-06-03 | Stop reason: HOSPADM

## 2020-08-25 RX ORDER — LIDOCAINE 4 G/G
1 PATCH TOPICAL DAILY
Qty: 30 PATCH | Refills: 0 | Status: SHIPPED | OUTPATIENT
Start: 2020-08-25 | End: 2020-08-25 | Stop reason: SDUPTHER

## 2020-08-25 ASSESSMENT — ENCOUNTER SYMPTOMS
SINUS PRESSURE: 0
NAUSEA: 0
WHEEZING: 0
PHOTOPHOBIA: 0
BACK PAIN: 1
DIARRHEA: 0
BLOOD IN STOOL: 0
CHEST TIGHTNESS: 0
ABDOMINAL PAIN: 1
SORE THROAT: 0
CONSTIPATION: 1
ABDOMINAL DISTENTION: 1
SHORTNESS OF BREATH: 0
RECTAL PAIN: 0
ANAL BLEEDING: 0
VOMITING: 0
COLOR CHANGE: 0
COUGH: 0

## 2020-08-25 NOTE — PROGRESS NOTES
Chronic Disease Visit Information    BP Readings from Last 3 Encounters:   08/05/20 110/80   05/29/20 (!) 110/97   01/24/20 134/77          Hemoglobin A1C (%)   Date Value   08/07/2020 6.1 (H)   01/02/2020 5.8   02/10/2014 5.6     LDL Cholesterol (mg/dL)   Date Value   08/07/2020 60     HDL (mg/dL)   Date Value   08/07/2020 89     BUN (mg/dL)   Date Value   08/15/2020 13     CREATININE (mg/dL)   Date Value   08/15/2020 0.80     Glucose (mg/dL)   Date Value   05/27/2020 105 (H)   04/23/2012 95    pt c/o back         Have you changed or started any medications since your last visit including any over-the-counter medicines, vitamins, or herbal medicines? no   Are you having any side effects from any of your medications? -  no  Have you stopped taking any of your medications? Is so, why? -  no    Have you seen any other physician or provider since your last visit? No  Have you had any other diagnostic tests since your last visit? No  Have you been seen in the emergency room and/or had an admission to a hospital since we last saw you? No  Have you had your annual diabetic retinal (eye) exam? Yes - Records Obtained  Have you had your routine dental cleaning in the past 6 months? no    Have you activated your [employer] account? If not, what are your barriers?  No:      Patient Care Team:  Kedar Hoffman MD as PCP - General (Family Medicine)  Kedar Hoffman MD as PCP - [facility]         Medical History Review  Past Medical, Family, and Social History reviewed and does contribute to the patient presenting condition    Health Maintenance   Topic Date Due    Breast cancer screen  03/26/2019    Colon Cancer Screen FIT/FOBT  03/26/2019    Shingles Vaccine (1 of 2) 03/26/2019    Flu vaccine (1) 09/01/2020    Potassium monitoring  05/27/2021    A1C test (Diabetic or Prediabetic)  08/07/2021    Lipid screen  08/07/2021    Creatinine monitoring  08/15/2021    Cervical cancer screen  06/21/2023    DTaP/Tdap/Td

## 2020-08-25 NOTE — PROGRESS NOTES
Chief Complaint   Patient presents with    Hypertension         Mahnaz Parson  here today for follow up on chronic medical problems, go over labs and/or diagnostic studies, and medication refills. Hypertension      HPI: Patient is here for follow-up for abdominal pain and cramping, was seen 2 weeks before by nurse practitioner and had ultrasound abdomen and CT abdomen ordered for possible umbilical hernia. Both results came back normal.      Patient still complains of pain on and off in the form of cramping sensation. Patient reports she also has history of constipation and does not move her bowels every day. She is on Colace but does not take on regular basis. She also feels flatus and also has history of bowel obstruction in the past.  Patient denies any blood with stools, has never completed fit test.  Patient denies any family history of colon cancers at young age. Patient also history of GERD is on Prilosec. Patient is on multiple psych medications, follows with psychiatrist denies any substance abuse since last 2 years. Also had blood work done that showed prediabetes, discussed to watch your diet. Also has elevated liver enzymes patient drinks 3-4 bottles of alcohol 3-4 times in a week. Patient also complains of low back pain which is chronic, moderate side near the right sacroiliac joint, has x-rays done that showed arthritis. Patient takes NSAIDs as needed reports she used patch that helped. She denies any radiation denies any numbness and tingling never had physical therapy done. She denies any weakness. /80 (Site: Right Upper Arm, Position: Sitting, Cuff Size: Large Adult)   Pulse 86   Temp 97.7 °F (36.5 °C) (Temporal)   Wt 153 lb 12.8 oz (69.8 kg)   SpO2 98%   BMI 28.13 kg/m²    Body mass index is 28.13 kg/m².   Wt Readings from Last 3 Encounters:   08/25/20 153 lb 12.8 oz (69.8 kg)   08/05/20 147 lb (66.7 kg)   05/29/20 159 lb 9.8 oz (72.4 kg) [x]Negative depression screening. PHQ Scores 11/13/2019   PHQ2 Score 0   PHQ9 Score 0      []1-4 = Minimal depression   []5-9 = Milddepression   []10-14 = Moderate depression   []15-19 = Moderately severe depression   []20-27 = Severe depression    Discussed testing with the patient and all questions fully answered. Hospital Outpatient Visit on 08/15/2020   Component Date Value Ref Range Status    BUN 08/15/2020 13  6 - 20 mg/dL Final    CREATININE 08/15/2020 0.80  0.50 - 0.90 mg/dL Final    GFR Non- 08/15/2020 >60  >60 mL/min Final    GFR  08/15/2020 >60  >60 mL/min Final    GFR Comment 08/15/2020        Final    Comment: Average GFR for 52-63 years old:   80 mL/min/1.73sq m  Chronic Kidney Disease:   <60 mL/min/1.73sq m  Kidney failure:   <15 mL/min/1.73sq m              eGFR calculated using average adult body mass.  Additional eGFR calculator available at:        [email or web address]            GFR Staging 08/15/2020 NOT REPORTED   Final         Most recent labs reviewed:     Lab Results   Component Value Date    WBC 4.5 05/28/2020    HGB 11.9 (L) 05/28/2020    HCT 36.3 05/28/2020    MCV 92.0 05/28/2020     05/28/2020       @BRIEFLAB(NA,K,CL,CO2,BUN,CREATININE,GLUCOSE,CALCIUM)@     Lab Results   Component Value Date    ALT 64 (H) 05/27/2020    AST 67 (H) 05/27/2020    ALKPHOS 115 (H) 05/27/2020    BILITOT 0.32 05/27/2020       Lab Results   Component Value Date    TSH 1.61 08/07/2020       Lab Results   Component Value Date    CHOL 171 08/07/2020    CHOL 174 02/05/2019    CHOL 193 08/27/2015     Lab Results   Component Value Date    TRIG 109 08/07/2020    TRIG 167 (H) 02/05/2019    TRIG 84 08/27/2015     Lab Results   Component Value Date    HDL 89 08/07/2020    HDL 71 02/05/2019    HDL 88 05/11/2017     Lab Results   Component Value Date    LDLCHOLESTEROL 60 08/07/2020    LDLCHOLESTEROL 70 02/05/2019    LDLCHOLESTEROL 68 05/11/2017 Lab Results   Component Value Date    VLDL NOT REPORTED 08/07/2020    VLDL NOT REPORTED 02/05/2019    VLDL NOT REPORTED 05/11/2017     Lab Results   Component Value Date    CHOLHDLRATIO 1.9 08/07/2020    CHOLHDLRATIO 2.5 02/05/2019    CHOLHDLRATIO 2.0 05/11/2017       Lab Results   Component Value Date    LABA1C 6.1 (H) 08/07/2020       Lab Results   Component Value Date    JPGAFHSX56 700 02/05/2019       No results found for: FOLATE    No results found for: IRON, TIBC, FERRITIN    Lab Results   Component Value Date    VITD25 76.5 08/07/2020             Current Outpatient Medications   Medication Sig Dispense Refill    vitamin D3 (CHOLECALCIFEROL) 25 MCG (1000 UT) TABS tablet       dicyclomine (BENTYL) 10 MG capsule Take 1 capsule by mouth 2 times daily as needed (abdominal cramping) 60 capsule 1    lidocaine 4 % external patch Place 1 patch onto the skin daily 30 patch 0    tiZANidine (ZANAFLEX) 4 MG tablet Take 1 tablet by mouth nightly as needed (low back pain) 30 tablet 1    diclofenac (VOLTAREN) 50 MG EC tablet Take 1 tablet by mouth 2 times daily 60 tablet 3    zoster recombinant adjuvanted vaccine (SHINGRIX) 50 MCG/0.5ML SUSR injection Inject 0.5 mLs into the muscle See Admin Instructions 1 dose now and repeat in 2-6 months 0.5 mL 0    traZODone (DESYREL) 100 MG tablet Take 1 tablet by mouth nightly      omeprazole (PRILOSEC) 40 MG delayed release capsule Take 1 capsule by mouth daily      magnesium oxide (MAG-OX) 250 MG TABS tablet Take 1 tablet by mouth daily      ASPIRIN LOW DOSE 81 MG EC tablet TAKE 1 TABLET BY MOUTH DAILY 90 tablet 1    amLODIPine (NORVASC) 10 MG tablet       HM CETIRIZINE HCL 10 MG tablet       hydrochlorothiazide (HYDRODIURIL) 12.5 MG tablet       PRE-MOISTENED WITCH HAZEL 50 % PADS Use after each bowel movement 90 each 2    lidocaine (LMX) 4 % cream Apply topically every 8 hrs as needed for pain 45 g 3    Elastic Bandages & Supports (KNEE BRACE/HINGED/LARGE) MISC Use daily for knee pain, please provide according to size 1 each 0    loratadine (CLARITIN) 10 MG tablet Take 1 tablet by mouth daily 30 tablet 1    docusate sodium (COLACE) 100 MG capsule Take 1 capsule by mouth daily 60 capsule 3    triamcinolone (KENALOG) 0.025 % cream Apply to rash twice daily 454 g 2    ammonium lactate (AMLACTIN) 12 % cream Apply 385 g topically as needed. Apply topically as needed.  PROAIR  (90 BASE) MCG/ACT inhaler Inhale 1 puff into the lungs 4 times daily.  clotrimazole (LOTRIMIN) 1 % cream Apply  topically 2 times daily.  lisinopril (PRINIVIL;ZESTRIL) 30 MG tablet Take 40 mg by mouth daily       OLANZapine (ZYPREXA) 15 MG tablet Take 15 mg by mouth nightly.  multivitamin (ANIMAL SHAPES) WITH C & FA CHEW chewable tablet Take  by mouth daily.  TROJAN-ENZ LUBRICATED MISC       atorvastatin (LIPITOR) 20 MG tablet TAKE 1 TABLET BY MOUTH DAILY (Patient not taking: Reported on 8/25/2020) 30 tablet 3    hydrOXYzine (VISTARIL) 50 MG capsule Take 1 capsule by mouth at bedtime (Patient not taking: Reported on 8/25/2020) 30 capsule 1    fluticasone (FLONASE) 50 MCG/ACT nasal spray 1 spray by Nasal route daily. No current facility-administered medications for this visit.               Social History     Socioeconomic History    Marital status: Single     Spouse name: Not on file    Number of children: Not on file    Years of education: Not on file    Highest education level: Not on file   Occupational History    Not on file   Social Needs    Financial resource strain: Somewhat hard    Food insecurity     Worry: Sometimes true     Inability: Sometimes true    Transportation needs     Medical: No     Non-medical: No   Tobacco Use    Smoking status: Current Every Day Smoker     Packs/day: 1.00     Years: 15.00     Pack years: 15.00     Types: Cigarettes    Smokeless tobacco: Never Used   Substance and Sexual Activity    Alcohol use: Yes     Comment: socially    Drug use: Yes     Types: Cocaine     Comment: pt unsure last time she used    Sexual activity: Yes   Lifestyle    Physical activity     Days per week: Not on file     Minutes per session: Not on file    Stress: Not on file   Relationships    Social connections     Talks on phone: Not on file     Gets together: Not on file     Attends Orthodox service: Not on file     Active member of club or organization: Not on file     Attends meetings of clubs or organizations: Not on file     Relationship status: Not on file    Intimate partner violence     Fear of current or ex partner: Not on file     Emotionally abused: Not on file     Physically abused: Not on file     Forced sexual activity: Not on file   Other Topics Concern    Not on file   Social History Narrative    Not on file     Ready to quit: Not Answered  Counseling given: Not Answered        Family History   Problem Relation Age of Onset    High Blood Pressure Mother     High Blood Pressure Father              -rest of complaints with corresponding details per ROS    The patient's past medical, surgical, social, and family history as well as her current medications and allergies were reviewed as documented intoday's encounter. Review of Systems   Constitutional: Positive for activity change. Negative for appetite change, chills, diaphoresis, fatigue, fever and unexpected weight change. HENT: Negative for congestion, ear discharge, ear pain, sinus pressure and sore throat. Eyes: Negative for photophobia and visual disturbance. Respiratory: Negative for cough, chest tightness, shortness of breath and wheezing. Cardiovascular: Negative for chest pain, palpitations and leg swelling. Gastrointestinal: Positive for abdominal distention, abdominal pain and constipation. Negative for anal bleeding, blood in stool, diarrhea, nausea, rectal pain and vomiting. Endocrine: Negative for polyphagia and polyuria.    Genitourinary: normal.         Thought Content: Thought content normal.         Cognition and Memory: Cognition normal.             ASSESSMENT AND PLAN      1. Essential hypertension  -Stable continue same medications    2. Major depressive disorder with single episode, in full remission ([address]  Stable on current treatment follows with psychiatrist    3. Elevated liver enzymes  Counseling done about the alcohol will repeat testing few months    4. Prediabetes  Discussed to watch her diet take low-carb low-fat diet. 5. Generalized abdominal pain  Start on Bentyl as needed  - dicyclomine (BENTYL) 10 MG capsule; Take 1 capsule by mouth 2 times daily as needed (abdominal cramping)  Dispense: 60 capsule; Refill: 1    6. Chronic idiopathic constipation  Take Colace daily    7. Chronic midline low back pain without sciatica  Refer to physical therapy back to muscle ligament as needed  - [facility]  - lidocaine 4 % external patch; Place 1 patch onto the skin daily  Dispense: 30 patch; Refill: 0  - tiZANidine (ZANAFLEX) 4 MG tablet; Take 1 tablet by mouth nightly as needed (low back pain)  Dispense: 30 tablet; Refill: 1    8. Colon cancer screening    - Cologuard (For External Results Only); Future      Orders Placed This Encounter   Procedures    Cologuard (For External Results Only)     This test is performed by an external laboratory and is used for result attachment only. It is required that this order requisition be faxed to: [facility] @ [phone]. See [email or web address] for further information.      Standing Status:   Future     Standing Expiration Date:   8/25/2021   [address]     Referral Priority:   Routine     Referral Type:   Eval and Treat     Referral Reason:   Specialty Services Required     Requested Specialty:   Physical Therapy     Number of Visits Requested:   1         Medications Discontinued During This Encounter   Medication Reason    acetaminophen (TYLENOL) 325 MG tablet Side effects    dicyclomine (BENTYL) 10 MG capsule REORDER    lidocaine 4 % external patch REORDER    tiZANidine (ZANAFLEX) 4 MG tablet REORDER    dicyclomine (BENTYL) 10 MG capsule REORDER    lidocaine 4 % external patch REORDER    tiZANidine (ZANAFLEX) 4 MG tablet REORDER       Erica received counseling on the following healthy behaviors: nutrition, exercise, medication adherence and decrease in alcohol consumption  Reviewed prior labs and health maintenance  Continue current medications, diet and exercise. Discussed use, benefit, and side effects of prescribed medications. Barriers to medication compliance addressed. Patient given educational materials - see patient instructions  Was a self-tracking handout given in paper form or via [employer]? Yes    Requested Prescriptions     Signed Prescriptions Disp Refills    dicyclomine (BENTYL) 10 MG capsule 60 capsule 1     Sig: Take 1 capsule by mouth 2 times daily as needed (abdominal cramping)    lidocaine 4 % external patch 30 patch 0     Sig: Place 1 patch onto the skin daily    tiZANidine (ZANAFLEX) 4 MG tablet 30 tablet 1     Sig: Take 1 tablet by mouth nightly as needed (low back pain)       All patient questions answered. Patient voiced understanding. Quality Measures    Body mass index is 28.13 kg/m². Elevated. Weight control planned discussed Healthy diet and regular exercise. BP: 116/80 Blood pressure is normal. Treatment plan consists of No treatment change needed.     Lab Results   Component Value Date    LDLCHOLESTEROL 60 08/07/2020    (goal LDL reduction with dx if diabetes is 50% LDL reduction)      PHQ Scores 11/13/2019   PHQ2 Score 0   PHQ9 Score 0     Interpretation of Total Score Depression Severity: 1-4 = Minimal depression, 5-9 = Mild depression, 10-14 = Moderate depression, 15-19 = Moderately severe depression, 20-27 = Severe depression    The patient'spast medical, surgical, social, and family history as well as her   current medications and allergies were reviewed as documented in today's encounter. Medications, labs, diagnostic studies, consultations andfollow-up as documented in this encounter. No follow-ups on file. Patient wasseen with total face to face time of 25 minutes. More than 50% of this visit was counseling and education. Future Appointments   Date Time Provider Aurelia Shira   12/1/2020  3:30 PM Erik Reddy MD [facility] [address] [facility]     This note was completed by using the assistance of a speech-recognition program. However, inadvertent computerized transcription errors may be present. Althoughevery effort was made to ensure accuracy, no guarantees can be provided that every mistake has been identified and corrected by editing.   Electronically signed by Erik Reddy MD on 8/25/2020  4:23 PM

## 2020-08-25 NOTE — PATIENT INSTRUCTIONS
Patient Education        Low Back Pain: Exercises  Introduction  Here are some examples of exercises for you to try. The exercises may be suggested for a condition or for rehabilitation. Start each exercise slowly. Ease off the exercises if you start to have pain. You will be told when to start these exercises and which ones will work best for you. How to do the exercises  Press-up   1. Lie on your stomach, supporting your body with your forearms. 2. Press your elbows down into the floor to raise your upper back. As you do this, relax your stomach muscles and allow your back to arch without using your back muscles. As your press up, do not let your hips or pelvis come off the floor. 3. Hold for 15 to 30 seconds, then relax. 4. Repeat 2 to 4 times. Alternate arm and leg (bird dog) exercise   Do this exercise slowly. Try to keep your body straight at all times, and do not let one hip drop lower than the other. 1. Start on the floor, on your hands and knees. 2. Tighten your belly muscles. 3. Raise one leg off the floor, and hold it straight out behind you. Be careful not to let your hip drop down, because that will twist your trunk. 4. Hold for about 6 seconds, then lower your leg and switch to the other leg. 5. Repeat 8 to 12 times on each leg. 6. Over time, work up to holding for 10 to 30 seconds each time. 7. If you feel stable and secure with your leg raised, try raising the opposite arm straight out in front of you at the same time. Knee-to-chest exercise   1. Lie on your back with your knees bent and your feet flat on the floor. 2. Bring one knee to your chest, keeping the other foot flat on the floor (or keeping the other leg straight, whichever feels better on your lower back). 3. Keep your lower back pressed to the floor. Hold for at least 15 to 30 seconds. 4. Relax, and lower the knee to the starting position. 5. Repeat with the other leg. Repeat 2 to 4 times with each leg.   6. To get more stretch, put your other leg flat on the floor while pulling your knee to your chest.    Curl-ups   1. Lie on the floor on your back with your knees bent at a 90-degree angle. Your feet should be flat on the floor, about 12 inches from your buttocks. 2. Cross your arms over your chest. If this bothers your neck, try putting your hands behind your neck (not your head), with your elbows spread apart. 3. Slowly tighten your belly muscles and raise your shoulder blades off the floor. 4. Keep your head in line with your body, and do not press your chin to your chest.  5. Hold this position for 1 or 2 seconds, then slowly lower yourself back down to the floor. 6. Repeat 8 to 12 times. Pelvic tilt exercise   1. Lie on your back with your knees bent. 2. \"Brace\" your stomach. This means to tighten your muscles by pulling in and imagining your belly button moving toward your spine. You should feel like your back is pressing to the floor and your hips and pelvis are rocking back. 3. Hold for about 6 seconds while you breathe smoothly. 4. Repeat 8 to 12 times. Heel dig bridging   1. Lie on your back with both knees bent and your ankles bent so that only your heels are digging into the floor. Your knees should be bent about 90 degrees. 2. Then push your heels into the floor, squeeze your buttocks, and lift your hips off the floor until your shoulders, hips, and knees are all in a straight line. 3. Hold for about 6 seconds as you continue to breathe normally, and then slowly lower your hips back down to the floor and rest for up to 10 seconds. 4. Do 8 to 12 repetitions. Hamstring stretch in doorway   1. Lie on your back in a doorway, with one leg through the open door. 2. Slide your leg up the wall to straighten your knee. You should feel a gentle stretch down the back of your leg. 3. Hold the stretch for at least 15 to 30 seconds. Do not arch your back, point your toes, or bend either knee.  Keep one heel pain.  Your doctor or physical therapist will tell you when you can start these exercises and which ones will work best for you. When you are not being active, find a comfortable position for rest. Some people are comfortable on the floor or a medium-firm bed with a small pillow under their head and another under their knees. Some people prefer to lie on their side with a pillow between their knees. Don't stay in one position for too long. Take short walks (10 to 20 minutes) every 2 to 3 hours. Avoid slopes, hills, and stairs until you feel better. Walk only distances you can manage without pain, especially leg pain. How to do the exercises  Pelvic tilt   1. Lie on your back with your knees bent. 2. \"Brace\" your stomach--tighten your muscles by pulling in and imagining your belly button moving toward your spine. 3. Press your lower back into the floor. You should feel your hips and pelvis rock back. 4. Hold for 6 seconds while breathing smoothly. 5. Relax and allow your pelvis and hips to rock forward. 6. Repeat 8 to 12 times. Back stretches   1. Get down on your hands and knees on the floor. 2. Relax your head and allow it to droop. Round your back up toward the ceiling until you feel a nice stretch in your upper, middle, and lower back. Hold this stretch for as long as it feels comfortable, or about 15 to 30 seconds. 3. Return to the starting position with a flat back while you are on your hands and knees. 4. Let your back sway by pressing your stomach toward the floor. Lift your buttocks toward the ceiling. 5. Hold this position for 15 to 30 seconds. 6. Repeat 2 to 4 times. Follow-up care is a key part of your treatment and safety. Be sure to make and go to all appointments, and call your doctor if you are having problems. It's also a good idea to know your test results and keep a list of the medicines you take. Where can you learn more? Go to [email or web address]://joseph.[email or web address] org and sign in to your [employer] account. Enter N832 in the [employer] box to learn more about \"Low Back Arthritis: Exercises. \"     If you do not have an account, please click on the \"Sign Up Now\" link. Current as of: March 2, 2020               Content Version: 12.5  © 9952-0535 [employer], [employer]. Care instructions adapted under license by [facility] [place] ([facility]). If you have questions about a medical condition or this instruction, always ask your healthcare professional. [address] any warranty or liability for your use of this information.

## 2020-08-30 ENCOUNTER — HOSPITAL ENCOUNTER (EMERGENCY)
Age: 50
Discharge: HOME | End: 2020-08-30
Attending: EMERGENCY MEDICINE
Payer: COMMERCIAL

## 2020-08-30 ENCOUNTER — APPOINTMENT (OUTPATIENT)
Dept: GENERAL RADIOLOGY | Age: 50
End: 2020-08-30
Payer: COMMERCIAL

## 2020-08-30 VITALS
HEART RATE: 89 BPM | TEMPERATURE: 97.5 F | RESPIRATION RATE: 16 BRPM | DIASTOLIC BLOOD PRESSURE: 92 MMHG | SYSTOLIC BLOOD PRESSURE: 145 MMHG | OXYGEN SATURATION: 100 %

## 2020-08-30 PROCEDURE — 99283 EMERGENCY DEPT VISIT LOW MDM: CPT

## 2020-08-30 PROCEDURE — 71046 X-RAY EXAM CHEST 2 VIEWS: CPT

## 2020-08-30 PROCEDURE — 6370000000 HC RX 637 (ALT 250 FOR IP): Performed by: STUDENT IN AN ORGANIZED HEALTH CARE EDUCATION/TRAINING PROGRAM

## 2020-08-30 RX ORDER — CYCLOBENZAPRINE HCL 10 MG
10 TABLET ORAL ONCE
Status: COMPLETED | OUTPATIENT
Start: 2020-08-30 | End: 2020-08-30

## 2020-08-30 RX ORDER — ACETAMINOPHEN 500 MG
1000 TABLET ORAL ONCE
Status: DISCONTINUED | OUTPATIENT
Start: 2020-08-30 | End: 2020-08-30

## 2020-08-30 RX ORDER — POLYETHYLENE GLYCOL 3350 17 G/17G
17 POWDER, FOR SOLUTION ORAL DAILY
Qty: 510 G | Refills: 0 | Status: SHIPPED | OUTPATIENT
Start: 2020-08-30 | End: 2020-09-29

## 2020-08-30 RX ORDER — METHOCARBAMOL 750 MG/1
750 TABLET, FILM COATED ORAL 4 TIMES DAILY
Qty: 40 TABLET | Refills: 0 | Status: SHIPPED | OUTPATIENT
Start: 2020-08-30 | End: 2020-09-09

## 2020-08-30 RX ORDER — SENNA PLUS 8.6 MG/1
1 TABLET ORAL 2 TIMES DAILY
Qty: 60 TABLET | Refills: 0 | Status: SHIPPED | OUTPATIENT
Start: 2020-08-30 | End: 2020-09-29

## 2020-08-30 RX ORDER — NAPROXEN 250 MG/1
500 TABLET ORAL ONCE
Status: COMPLETED | OUTPATIENT
Start: 2020-08-30 | End: 2020-08-30

## 2020-08-30 RX ORDER — NAPROXEN 500 MG/1
500 TABLET ORAL 2 TIMES DAILY WITH MEALS
Qty: 20 TABLET | Refills: 0 | Status: SHIPPED | OUTPATIENT
Start: 2020-08-30 | End: 2020-11-05 | Stop reason: SDUPTHER

## 2020-08-30 RX ADMIN — NAPROXEN 500 MG: 250 TABLET ORAL at 02:31

## 2020-08-30 RX ADMIN — CYCLOBENZAPRINE 10 MG: 10 TABLET, FILM COATED ORAL at 02:41

## 2020-08-30 ASSESSMENT — PAIN DESCRIPTION - LOCATION: LOCATION: RIB CAGE

## 2020-08-30 ASSESSMENT — PAIN DESCRIPTION - DESCRIPTORS: DESCRIPTORS: SHARP

## 2020-08-30 ASSESSMENT — ENCOUNTER SYMPTOMS
VOMITING: 0
RHINORRHEA: 0
COUGH: 0
NAUSEA: 0
CONSTIPATION: 1
ABDOMINAL PAIN: 0
SHORTNESS OF BREATH: 0

## 2020-08-30 ASSESSMENT — PAIN SCALES - GENERAL
PAINLEVEL_OUTOF10: 10
PAINLEVEL_OUTOF10: 10

## 2020-08-30 ASSESSMENT — PAIN DESCRIPTION - ORIENTATION: ORIENTATION: RIGHT

## 2020-08-30 ASSESSMENT — PAIN DESCRIPTION - PAIN TYPE: TYPE: ACUTE PAIN

## 2020-08-30 NOTE — ED PROVIDER NOTES
Types: Cigarettes    Smokeless tobacco: Never Used   Substance and Sexual Activity    Alcohol use: Yes     Comment: socially    Drug use: Yes     Types: Cocaine     Comment: pt unsure last time she used    Sexual activity: Yes   Lifestyle    Physical activity     Days per week: Not on file     Minutes per session: Not on file    Stress: Not on file   Relationships    Social connections     Talks on phone: Not on file     Gets together: Not on file     Attends [employer] service: Not on file     Active member of club or organization: Not on file     Attends meetings of clubs or organizations: Not on file     Relationship status: Not on file    Intimate partner violence     Fear of current or ex partner: Not on file     Emotionally abused: Not on file     Physically abused: Not on file     Forced sexual activity: Not on file   Other Topics Concern    Not on file   Social History Narrative    Not on file       Family History   Problem Relation Age of Onset    High Blood Pressure Mother     High Blood Pressure Father        Allergies:    Ibuprofen    Home Medications:  Prior to Admission medications    Medication Sig Start Date End Date Taking?  Authorizing Provider   senna (SENOKOT) 8.6 MG tablet Take 1 tablet by mouth 2 times daily 8/30/20 9/29/20 Yes Franci Washburn MD   polyethylene glycol [facility]) 17 GM/SCOOP powder Take 17 g by mouth daily PRN constipation 8/30/20 9/29/20 Yes Franci Washburn MD   naproxen (NAPROSYN) 500 MG tablet Take 1 tablet by mouth 2 times daily (with meals) for 10 days 8/30/20 9/9/20 Yes Franci Washburn MD   methocarbamol (ROBAXIN-750) 750 MG tablet Take 1 tablet by mouth 4 times daily for 10 days 8/30/20 9/9/20 Yes Franci Washburn MD   vitamin D3 (CHOLECALCIFEROL) 25 MCG (1000 UT) TABS tablet  8/12/20   Historical Provider, MD   dicyclomine (BENTYL) 10 MG capsule Take 1 capsule by mouth 2 times daily as needed (abdominal cramping) 8/25/20   Ro Garner MD topically 2 times daily. 11/15/13   Historical Provider, MD   fluticasone (FLONASE) 50 MCG/ACT nasal spray 1 spray by Nasal route daily. 10/11/13   Historical Provider, MD   lisinopril (PRINIVIL;ZESTRIL) 30 MG tablet Take 40 mg by mouth daily  10/11/13   Historical Provider, MD   OLANZapine (ZYPREXA) 15 MG tablet Take 15 mg by mouth nightly. 11/18/13   Historical Provider, MD   multivitamin (ANIMAL SHAPES) WITH C & FA CHEW chewable tablet Take  by mouth daily. 11/15/13   Historical Provider, MD       REVIEW OF SYSTEMS    (2-9 systems for level 4, 10 or more for level 5)    Review of Systems   Constitutional: Negative for chills, fatigue and fever. HENT: Negative for congestion and rhinorrhea. Respiratory: Negative for cough and shortness of breath. Cardiovascular: Negative for chest pain. Right rib pain   Gastrointestinal: Positive for constipation. Negative for abdominal pain, nausea and vomiting. Musculoskeletal: Negative for myalgias. Neurological: Negative for headaches. All other systems reviewed and are negative. PHYSICAL EXAM   (up to 7 for level 4, 8 or more for level 5)    INITIAL VITALS:   ED Triage Vitals   BP Temp Temp Source Pulse Resp SpO2 Height Weight   08/30/20 0205 08/30/20 0157 08/30/20 0157 08/30/20 0205 08/30/20 0205 08/30/20 0205 -- --   (!) 145/92 97.5 °F (36.4 °C) Oral 89 16 100 %         Physical Exam  Vitals signs and nursing note reviewed. Constitutional:       General: She is not in acute distress. Appearance: She is well-developed. She is not ill-appearing. Cardiovascular:      Rate and Rhythm: Normal rate and regular rhythm. Pulses: Normal pulses. Radial pulses are 2+ on the right side and 2+ on the left side. Heart sounds: Normal heart sounds. No murmur. Pulmonary:      Effort: Pulmonary effort is normal. No respiratory distress. Breath sounds: Normal breath sounds. No stridor. No decreased breath sounds, wheezing or rhonchi. Chest:      Chest wall: Tenderness present. No deformity, swelling, crepitus or edema. Abdominal:      General: Bowel sounds are normal. There is no distension. Palpations: Abdomen is soft. Tenderness: There is no abdominal tenderness. Musculoskeletal:         General: Tenderness present. Skin:     General: Skin is warm and dry. Capillary Refill: Capillary refill takes less than 2 seconds. Neurological:      General: No focal deficit present. Mental Status: She is alert and oriented to person, place, and time. GCS: GCS eye subscore is 4. GCS verbal subscore is 5. GCS motor subscore is 6. Psychiatric:         Behavior: Behavior is cooperative. DIFFERENTIAL  DIAGNOSIS   PLAN (LABS / IMAGING / EKG):  Orders Placed This Encounter   Procedures    XR CHEST (2 VW)       MEDICATIONS ORDERED:  Orders Placed This Encounter   Medications    DISCONTD: acetaminophen (TYLENOL) tablet 1,000 mg    naproxen (NAPROSYN) tablet 500 mg    cyclobenzaprine (FLEXERIL) tablet 10 mg    senna (SENOKOT) 8.6 MG tablet     Sig: Take 1 tablet by mouth 2 times daily     Dispense:  60 tablet     Refill:  0    polyethylene glycol (MIRALAX) 17 GM/SCOOP powder     Sig: Take 17 g by mouth daily PRN constipation     Dispense:  510 g     Refill:  0    naproxen (NAPROSYN) 500 MG tablet     Sig: Take 1 tablet by mouth 2 times daily (with meals) for 10 days     Dispense:  20 tablet     Refill:  0    methocarbamol (ROBAXIN-750) 750 MG tablet     Sig: Take 1 tablet by mouth 4 times daily for 10 days     Dispense:  40 tablet     Refill:  0         DIAGNOSTIC RESULTS / EMERGENCYDEPARTMENT COURSE / MDM   LABS:  Labs Reviewed - No data to display    RADIOLOGY:  Xr Chest (2 Vw)    Result Date: 8/30/2020  EXAMINATION: TWO XRAY VIEWS OF THE CHEST 8/30/2020 2:41 am COMPARISON: 05/27/2020.  HISTORY: ORDERING SYSTEM PROVIDED HISTORY: right sided rib pain TECHNOLOGIST PROVIDED HISTORY: -SIRD right sided rib pain Physician, PGY-2    (Please note that portions of this note were completed with a voice recognition program.  Efforts were made to edit the dictations but occasionally words are mis-transcribed.)          Jennifer Carter MD  08/30/20 [id number]

## 2020-08-30 NOTE — ED PROVIDER NOTES
[facility]     Emergency Department     Faculty Attestation        I performed a history and physical examination of the patient and discussed management with the resident. I reviewed the residents note and agree with the documented findings and plan of care. Any areas of disagreement are noted on the chart. I was personally present for the key portions of any procedures. I have documented in the chart those procedures where I was not present during the key portions. I have reviewed the emergency nurses triage note. I agree with the chief complaint, past medical history, past surgical history, allergies, medications, social and family history as documented unless otherwise noted below. For mid-level providers such as nurse practitioners as well as physicians assistants:    I have personally seen and evaluated the patient. I find the patient's history and physical exam are consistent with NP/PA documentation. I agree with the care provided, treatment rendered, disposition, & follow-up plan. Additional findings are as noted.     Vital Signs: BP (!) 145/92   Pulse 89   Temp 97.5 °F (36.4 °C) (Oral)   Resp 16   SpO2 100%   PCP:  Josey Schultz MD    Pertinent Comments:     Focal right rib pain      Critical Care  None          Ino Andrews MD  Attending Emergency Medicine Physician              Elian Santizo MD  08/30/20 [id number]

## 2020-08-31 ENCOUNTER — TELEPHONE (OUTPATIENT)
Dept: FAMILY MEDICINE CLINIC | Age: 50
End: 2020-08-31

## 2020-08-31 NOTE — PROGRESS NOTES
Visit Information    Have you changed or started any medications since your last visit including any over-the-counter medicines, vitamins, or herbal medicines? no   Are you having any side effects from any of your medications? -  no  Have you stopped taking any of your medications? Is so, why? -  no    Have you seen any other physician or provider since your last visit? No  Have you had any other diagnostic tests since your last visit? Yes - Records Obtained  Have you been seen in the emergency room and/or had an admission to a hospital since we last saw you? Yes - Records Obtained  Have you had your routine dental cleaning in the past 6 months? no    Have you activated your [employer] account? If not, what are your barriers?  No: DECLINED      Patient Care Team:  Ro Garner MD as PCP - General (Family Medicine)  Ro Garner MD as PCP - [facility]    Medical History Review  Past Medical, Family, and Social History reviewed and does contribute to the patient presenting condition    Health Maintenance   Topic Date Due    Breast cancer screen  03/26/2019    Colon Cancer Screen FIT/FOBT  03/26/2019    Shingles Vaccine (1 of 2) 03/26/2019    Flu vaccine (1) 09/01/2020    Potassium monitoring  05/27/2021    A1C test (Diabetic or Prediabetic)  08/07/2021    Lipid screen  08/07/2021    Creatinine monitoring  08/15/2021    Cervical cancer screen  06/21/2023    DTaP/Tdap/Td vaccine (2 - Td) 12/26/2029    Pneumococcal 0-64 years Vaccine  Completed    HIV screen  Completed    Hepatitis A vaccine  Aged Out    Hepatitis B vaccine  Aged Out    Hib vaccine  Aged Out    Meningococcal (ACWY) vaccine  Aged Out

## 2020-09-01 ENCOUNTER — VIRTUAL VISIT (OUTPATIENT)
Dept: FAMILY MEDICINE CLINIC | Age: 50
End: 2020-09-01
Payer: COMMERCIAL

## 2020-09-01 ENCOUNTER — TELEPHONE (OUTPATIENT)
Dept: FAMILY MEDICINE CLINIC | Age: 50
End: 2020-09-01

## 2020-09-01 PROBLEM — R93.2 ABNORMAL LIVER SCAN: Status: ACTIVE | Noted: 2020-09-01

## 2020-09-01 PROBLEM — R07.81 RIB PAIN ON RIGHT SIDE: Status: ACTIVE | Noted: 2020-09-01

## 2020-09-01 PROCEDURE — 99442 PR PHYS/QHP TELEPHONE EVALUATION 11-20 MIN: CPT | Performed by: FAMILY MEDICINE

## 2020-09-01 RX ORDER — FLUCONAZOLE 150 MG/1
150 TABLET ORAL ONCE
Qty: 2 TABLET | Refills: 0 | Status: SHIPPED | OUTPATIENT
Start: 2020-09-01 | End: 2020-09-01

## 2020-09-01 ASSESSMENT — ENCOUNTER SYMPTOMS
ABDOMINAL PAIN: 1
RESPIRATORY NEGATIVE: 1
SHORTNESS OF BREATH: 0
COUGH: 0

## 2020-09-01 NOTE — TELEPHONE ENCOUNTER
Patient had VV today , she said she forgot to tell you that she has yeats infection , discharge itching , no rash , no chills , some abd pain .    Please Advice

## 2020-09-01 NOTE — PATIENT INSTRUCTIONS
acetaminophen, which is Tylenol. Too much acetaminophen (Tylenol) can be harmful. When should you call for help? Call your doctor now or seek immediate medical care if:  · You have worse symptoms of infection, such as:  ? Increased pain, swelling, warmth, or redness. ? Red streaks leading from the area. ? Pus draining from the area. ? A fever. Watch closely for changes in your health, and be sure to contact your doctor if:  · Your lymph nodes do not get smaller or do not return to normal.  · You do not get better as expected. Where can you learn more? Go to https://pinion-pinspeBeboeb.AlephD. org and sign in to your [employer] account. Enter B079 in the [employer] box to learn more about \"Swollen Lymph Nodes: Care Instructions. \"     If you do not have an account, please click on the \"Sign Up Now\" link. Current as of: February 11, 2020               Content Version: 12.5  © 2006-2020 [employer]. Care instructions adapted under license by [facility] ([facility]). If you have questions about a medical condition or this instruction, always ask your healthcare professional. [place] [address] any warranty or liability for your use of this information.

## 2020-09-01 NOTE — PROGRESS NOTES
[address] [facility] [address]  [address] [facility]  Phone: [phone], Fax: [phone]    TELEHEALTH EVALUATION -- Audio/Phone (During TFPTV-47 public health emergency)    Ynes Arredondo (:  1970) has requested an audio/video evaluation for the following concern(s):  Chief Complaint   Patient presents with    ED Follow-up     RIB PAIN       HPI:  Ynes Arredondo is an established patient of Dr. Brian Peña. Patient scheduled this appointment for a follow up from an ED visit. Patient has a known history of right rib contusion chronic right upper quadrant pain. Patient was recently seen in the emergency room for right rib area pain. Patient denies any trauma. She thinks that she may have eaten something that may have caused some aggravation, she had some bloating. Had some issues with taking big breaths. X-rays were done in the ER which was found normal no rib fracture noted. Patient was given some anti-inflammatory and muscle relaxants. Previous medications were changed. Patient have not picked up the new prescriptions. Patient requests a different type of pain medication. Opiates are not currently prescribed in this office today. Patient will report if new sets of therapy will help the pain. Patient c/o RUQ which is chronic. She also noticed a lump on her right inguinal area. This has both been going on for awhile now. She also reports chronic constipation. Constant pain aggravated with any type of movement. Lump appears and reduced when she lays down. Patient had some low back pain and hip pain which is also longstanding. Patient had several abdominal surgeries including several c/s and hysterectomy. Patient had a recent abdominal ultrasound ordered and a CT scan of the abdomen. Liver ultrasound reported some lymph nodes that were suggested nonmalignant. Patient is still very concerned about this.   She continues to have some pain on her right upper quadrant going to the flank area at times.  Still does not have any fever or chills no nausea or vomiting. At this point, I will be referring her to the gastroenterologist for further evaluation. 8/7/2020         TECHNIQUE:    Direct scanning right groin         COMPARISON:    None         HISTORY:    ORDERING SYSTEM PROVIDED HISTORY: Incisional hernia, without obstruction or    gangrene    TECHNOLOGIST PROVIDED HISTORY:    suprapubic and right inguinal hernia    Acuity: Acute    Type of Exam: Initial         FINDINGS:    Direct scanning in the right inguinal region at the site of palpable    lump/pain was performed.  By ultrasound no discrete hernia is seen. Lali Kail    are a few small benign-appearing lymph nodes in the region.  Lymph nodes    identified contain fatty iveth and are most likely benign    reactive/inflammatory.  Representative nodes measure 5.9 x 6.6 x 11.5 mm and    6.0 x 5.8 x 13.7 mm.         Suggest CT if additional imaging is warranted clinically.              Impression    Essentially unremarkable exam.  No hernia is identified by ultrasound. FINDINGS:    Lower Chest: Dependent atelectatic changes.         Organs: Liver, portal vein, gallbladder, pancreas, spleen, adrenal glands and    kidneys all appear unremarkable.  Abdominal aorta appears normal in caliber.         GI/Bowel: Stomach is grossly unremarkable.  Small bowel appears nondilated. Oral contrast reached the distal small bowel.  Appendix is normal.  No acute    colonic abnormality.         Pelvis: Urinary bladder is grossly unremarkable.  Uterus has been removed. No adnexal mass.         Peritoneum/Retroperitoneum: No free air, free fluid or lymphadenopathy.         Bones/Soft Tissues: Abdominal wall demonstrates no acute findings.  Osseous    structures demonstrate no acute findings.              Impression    No acute intra-abdominal process.         XR CHEST (2 VW)  Narrative: EXAMINATION:  TWO XRAY VIEWS OF THE CHEST    8/30/2020 2:41 am    COMPARISON:  2020. HISTORY:  ORDERING SYSTEM PROVIDED HISTORY: right sided rib pain  TECHNOLOGIST PROVIDED HISTORY:  -SIRD  right sided rib pain  Reason for Exam: pain    FINDINGS:  The cardiac silhouette is within normal limits for size. There is minimal  bibasilar opacification. No pleural effusion or pneumothorax. No acute  osseous abnormality seen. Impression: 1. Minimal bibasilar opacification, which could represent atelectasis versus  early airspace disease. 2. Otherwise, no acute cardiopulmonary abnormality.       Patient Active Problem List    Diagnosis Date Noted    Rib pain on right side 2020    Abnormal liver scan (Lymph nodes) 2020    Elevated liver enzymes 2020    SI joint arthritis 2020    Prediabetes 2020    Incisional hernia, without obstruction or gangrene 2020    Vitamin D deficiency 2020    Chronic fatigue 2020    Cocaine dependence ([address] 2020    Hyperopia with presbyopia, bilateral 2020    Nuclear sclerotic cataract of both eyes 2020    Acute pain of left knee 2019    Chronic idiopathic constipation 2019    Eczema 2019    Mixed hyperlipidemia 2019    Personal history of tobacco use 2019    Major depressive disorder with single episode, in full remission ([address] 2019    Nicotine dependence, cigarettes, uncomplicated     Hypertension     Substance abuse ([address]     Chronic back pain        Past Medical History:   Diagnosis Date    Asthma     Bowel obstruction (HCC)     Chronic back pain     Hypertension     Prediabetes 2020    SI joint arthritis 2020    Substance abuse ([address]      Past Surgical History:   Procedure Laterality Date     SECTION      HYSTERECTOMY      KNEE SURGERY Right     has pins in right knee     OTHER SURGICAL HISTORY      surgery for bowel obstruction    TUBAL LIGATION       Family History   Problem Relation Age of Onset    High Blood Pressure Mother     High Blood Pressure Father      Current Outpatient Medications   Medication Sig Dispense Refill    senna (SENOKOT) 8.6 MG tablet Take 1 tablet by mouth 2 times daily 60 tablet 0    polyethylene glycol (MIRALAX) 17 GM/SCOOP powder Take 17 g by mouth daily PRN constipation 510 g 0    naproxen (NAPROSYN) 500 MG tablet Take 1 tablet by mouth 2 times daily (with meals) for 10 days 20 tablet 0    methocarbamol (ROBAXIN-750) 750 MG tablet Take 1 tablet by mouth 4 times daily for 10 days 40 tablet 0    vitamin D3 (CHOLECALCIFEROL) 25 MCG (1000 UT) TABS tablet       dicyclomine (BENTYL) 10 MG capsule Take 1 capsule by mouth 2 times daily as needed (abdominal cramping) 60 capsule 1    lidocaine 4 % external patch Place 1 patch onto the skin daily 30 patch 0    zoster recombinant adjuvanted vaccine (SHINGRIX) 50 MCG/0.5ML SUSR injection Inject 0.5 mLs into the muscle See Admin Instructions 1 dose now and repeat in 2-6 months 0.5 mL 0    traZODone (DESYREL) 100 MG tablet Take 1 tablet by mouth nightly      omeprazole (PRILOSEC) 40 MG delayed release capsule Take 1 capsule by mouth daily      magnesium oxide (MAG-OX) 250 MG TABS tablet Take 1 tablet by mouth daily      ASPIRIN LOW DOSE 81 MG EC tablet TAKE 1 TABLET BY MOUTH DAILY 90 tablet 1    atorvastatin (LIPITOR) 20 MG tablet TAKE 1 TABLET BY MOUTH DAILY 30 tablet 3    amLODIPine (NORVASC) 10 MG tablet       HM CETIRIZINE HCL 10 MG tablet       hydrochlorothiazide (HYDRODIURIL) 12.5 MG tablet       PRE-MOISTENED WITCH HAZEL 50 % PADS Use after each bowel movement 90 each 2    lidocaine (LMX) 4 % cream Apply topically every 8 hrs as needed for pain 45 g 3    Elastic Bandages & Supports (KNEE BRACE/HINGED/LARGE) MISC Use daily for knee pain, please provide according to size 1 each 0    loratadine (CLARITIN) 10 MG tablet Take 1 tablet by mouth daily 30 tablet 1    triamcinolone (KENALOG) 0.025 % cream Apply to rash twice daily 454 g 2    ammonium lactate (AMLACTIN) 12 % cream Apply 385 g topically as needed. Apply topically as needed.  PROAIR  (90 BASE) MCG/ACT inhaler Inhale 1 puff into the lungs 4 times daily.  clotrimazole (LOTRIMIN) 1 % cream Apply  topically 2 times daily.  fluticasone (FLONASE) 50 MCG/ACT nasal spray 1 spray by Nasal route daily.  lisinopril (PRINIVIL;ZESTRIL) 30 MG tablet Take 40 mg by mouth daily       OLANZapine (ZYPREXA) 15 MG tablet Take 15 mg by mouth nightly.  multivitamin (ANIMAL SHAPES) WITH C & FA CHEW chewable tablet Take  by mouth daily. No current facility-administered medications for this visit. Review of Systems   Constitutional: Negative for chills, fatigue and fever. HENT: Negative. Respiratory: Negative. Negative for cough and shortness of breath. Cardiovascular: Negative. Negative for chest pain and palpitations. Gastrointestinal: Positive for abdominal pain. Genitourinary: Positive for flank pain. Negative for dysuria. Musculoskeletal: Positive for myalgias. Negative for arthralgias. Skin: Negative for rash. Neurological: Negative for light-headedness and headaches. Psychiatric/Behavioral: Positive for sleep disturbance. The patient is nervous/anxious. Allergies   Allergen Reactions    Ibuprofen Hives and Nausea Only        Prior to Visit Medications    Medication Sig Taking?  Authorizing Provider   senna (SENOKOT) 8.6 MG tablet Take 1 tablet by mouth 2 times daily Yes Jeanne Gamboa MD   polyethylene glycol (MIRALAX) 17 GM/SCOOP powder Take 17 g by mouth daily PRN constipation Yes Jeanne Gamboa MD   naproxen (NAPROSYN) 500 MG tablet Take 1 tablet by mouth 2 times daily (with meals) for 10 days Yes Jeanne Gamboa MD   methocarbamol (ROBAXIN-750) 750 MG tablet Take 1 tablet by mouth 4 times daily for 10 days Yes Jeanne Gamboa MD   vitamin D3 (CHOLECALCIFEROL) 25 MCG (1000 UT) TABS tablet  Yes Historical Provider, MD   dicyclomine (BENTYL) 10 MG capsule Take 1 capsule by mouth 2 times daily as needed (abdominal cramping) Yes Ron Bazan MD   lidocaine 4 % external patch Place 1 patch onto the skin daily Yes Ron Bazan MD   zoster recombinant adjuvanted vaccine [facility]) 50 MCG/0.5ML SUSR injection Inject 0.5 mLs into the muscle See Admin Instructions 1 dose now and repeat in 2-6 months Yes YEYO Velasquez CNP   traZODone (DESYREL) 100 MG tablet Take 1 tablet by mouth nightly Yes Historical Provider, MD   omeprazole (PRILOSEC) 40 MG delayed release capsule Take 1 capsule by mouth daily Yes Historical Provider, MD   magnesium oxide (MAG-OX) 250 MG TABS tablet Take 1 tablet by mouth daily Yes Historical Provider, MD   ASPIRIN LOW DOSE 81 MG EC tablet TAKE 1 TABLET BY MOUTH DAILY Yes Ron Bazan MD   atorvastatin (LIPITOR) 20 MG tablet TAKE 1 TABLET BY MOUTH DAILY Yes Ron Bazan MD   amLODIPine (NORVASC) 10 MG tablet  Yes Historical Provider, MD    CETIRIZINE HCL 10 MG tablet  Yes Historical Provider, MD   hydrochlorothiazide (HYDRODIURIL) 12.5 MG tablet  Yes Historical Provider, MD   PRE-MOISTENED WITCH HAZEL 50 % PADS Use after each bowel movement Yes YEYO Velasquez CNP   lidocaine (LMX) 4 % cream Apply topically every 8 hrs as needed for pain Yes Ron Bazan MD   Elastic Bandages & Supports (KNEE BRACE/HINGED/LARGE) MISC Use daily for knee pain, please provide according to size Yes Ron Bazan MD   loratadine (CLARITIN) 10 MG tablet Take 1 tablet by mouth daily Yes Ron Bazan MD   triamcinolone (KENALOG) 0.025 % cream Apply to rash twice daily Yes Susan Nieves MD   ammonium lactate (AMLACTIN) 12 % cream Apply 385 g topically as needed. Apply topically as needed. Yes Historical Provider, MD   PROAIR  (90 BASE) MCG/ACT inhaler Inhale 1 puff into the lungs 4 times daily.  Yes Historical Provider, MD   clotrimazole (Nolan Hernandez) Placed This Encounter   Procedures    Ambulatory referral to Gastroenterology     Referral Priority:   Routine     Referral Type:   Eval and Treat     Referral Reason:   Specialty Services Required     Referred to Provider:   Starla Ramires MD     Requested Specialty:   Gastroenterology     Number of Visits Requested:   1     No orders of the defined types were placed in this encounter. Erica received counseling on the following healthy behaviors: nutrition, exercise, medication adherence and tobacco cessation  Reviewed prior labs and health maintenance. Continue current medications, diet and exercise. Discussed use, benefit, and side effects of prescribed medications. Barriers to medication compliance addressed. Patient given educational materials - see patient instructions. All patient questions answered. Patient voiced understanding. No follow-ups on file. Consent:  She and/or health care decision maker is aware that that she may receive a bill for this telephone service, depending on her insurance coverage, and has provided verbal consent to proceed: Yes  I affirm this is a Patient Initiated Episode with a Patient who has not had a related appointment within my department in the past 7 days or scheduled within the next 24 hours. Patient identification was verified at the start of the visit: Yes    Total Time: minutes: 11-20 minutes    Zion Franks is a 45 y.o. female being evaluated by a Virtual Visit (phone visit) encounter to address concerns as mentioned above. Due to this being a TeleHealth encounter (During [facility] public health emergency), evaluation of the following organ systems was limited:Vitals/Constitutional/EENT/Resp/CV/GI//MS/Neuro/Skin/Heme-Lymph-Imm. Services were provided through a video synchronous discussion virtually to substitute for in-person clinic visit.  This is a telehealth visit that was performed with the originating site at Patient Location: home and provider Location of [place], [place]. Verbal consent to participate in phone visit was obtained. I discussed with the patient the nature of our telehealth visits via interactive/real-time audio that:  - I would evaluate the patient and recommend diagnostics and treatments based on my assessment  - Our sessions are not being recorded and that personal health information is protected  - Our team would provide follow up care in person if/when the patient needs it. Pursuant to the emergency declaration under the [address] [place] [address], [address] [place] [address] authority and the [employer] Act, this Virtual Visit was conducted with patient's (and/or legal guardian's) consent, to reduce the patient's risk of exposure to COVID-19 and provide necessary medical care. The patient (and/or legal guardian) has also been advised to contact this office for worsening conditions or problems, and seek emergency medical treatment and/or call 911 if deemed necessary. This note was completed by using the assistance of a speech-recognition program. However, inadvertent computerized transcription errors may be present. Although every effort was made to ensure accuracy, no guarantees can be provided that every mistake has been identified and corrected by editing.   Electronically signed by YEYO Clay CNP on 8/31/20 at 8:09 PM EDT

## 2020-09-15 ENCOUNTER — OFFICE VISIT (OUTPATIENT)
Dept: GASTROENTEROLOGY | Age: 50
End: 2020-09-15
Payer: COMMERCIAL

## 2020-09-15 VITALS — HEIGHT: 62 IN | BODY MASS INDEX: 28.85 KG/M2 | WEIGHT: 156.8 LBS

## 2020-09-15 PROCEDURE — 99203 OFFICE O/P NEW LOW 30 MIN: CPT | Performed by: INTERNAL MEDICINE

## 2020-09-15 PROCEDURE — 4004F PT TOBACCO SCREEN RCVD TLK: CPT | Performed by: INTERNAL MEDICINE

## 2020-09-15 PROCEDURE — 3017F COLORECTAL CA SCREEN DOC REV: CPT | Performed by: INTERNAL MEDICINE

## 2020-09-15 PROCEDURE — G8427 DOCREV CUR MEDS BY ELIG CLIN: HCPCS | Performed by: INTERNAL MEDICINE

## 2020-09-15 PROCEDURE — G8419 CALC BMI OUT NRM PARAM NOF/U: HCPCS | Performed by: INTERNAL MEDICINE

## 2020-09-15 RX ORDER — SIMETHICONE 80 MG
80 TABLET,CHEWABLE ORAL 4 TIMES DAILY PRN
Qty: 180 TABLET | Refills: 3 | Status: ON HOLD | OUTPATIENT
Start: 2020-09-15 | End: 2022-02-17 | Stop reason: SDUPTHER

## 2020-09-15 ASSESSMENT — ENCOUNTER SYMPTOMS
ABDOMINAL PAIN: 1
ABDOMINAL DISTENTION: 1
WHEEZING: 0
SHORTNESS OF BREATH: 1
DIARRHEA: 1
BACK PAIN: 1
COUGH: 0
CONSTIPATION: 1

## 2020-09-15 NOTE — PROGRESS NOTES
Review of Systems   Constitutional: Positive for fatigue. HENT: Negative. Eyes: Positive for visual disturbance. Respiratory: Positive for shortness of breath. Negative for cough and wheezing. Cardiovascular: Negative for chest pain and leg swelling. Gastrointestinal: Positive for abdominal distention, abdominal pain, constipation and diarrhea. Endocrine: Negative. Genitourinary: Negative. Musculoskeletal: Positive for back pain. Muscle spasms   Skin: Negative. Allergic/Immunologic: Positive for environmental allergies. Neurological: Positive for weakness. Hematological: Bruises/bleeds easily.

## 2020-09-15 NOTE — PROGRESS NOTES
Reason for Referral: Abnormalities      Kris Amaya, APRN - CNP  [address]  [address] [facility],[address], [address] [facility]    Chief Complaint   Patient presents with   [facility] Patient     Elevated Liver Enzymes/ enlarged lymph nodes    GI Problem     Pt had a bowel obstruction about 10 years ago (maybe went to [facility]) . Patient has melanie incontinence and believes the stool is coming out a \"hole inside her body. and she could feel the stool running down her leg- internally\".  Abdominal Pain     Abd distention- Patient was given Miralax and states she needs diapers because she can't \"go about her daily business\" because she has to have a bm. HISTORY OF PRESENT ILLNESS: Kadeem Nephew is a 45 y.o. female with a past history remarkable for asthma, HTN, pre-DM, bowel obstruction s/p ?bowel resection performed approximately 10 years ago, chronic hepatitis C, treatment naïve, noncirrhotic based on prior imaging, referred for evaluation of chronic abdominal pain which appears to be secondary to her constipation, smoking habits, dietary habits. Smoker: active smoker-- 1/2 ppd   Drinking history: socially   Illicit drugs: Cocaine at times, Juan Pulling at times  Abdominal surgeries: bowel resection  > 10 yrs   Prior Colonoscopy: No prior   Prior EGD:  None   FH of GI issues: not known to patient. Chronic HCV-genotype 1a/1B, coexistent of other infections not known, noncirrhotic based on previous imaging, likely related to prior substance abuse. Treatment naïve      Past Medical,Family, and Social History reviewed and does contribute to the patient presentingcondition. Patient's PMH/PSH,SH,PSYCH Hx, MEDs, ALLERGIES, and ROS were all reviewed and updated in the appropriate sections.     PAST MEDICAL HISTORY:  Past Medical History:   Diagnosis Date    Asthma     [facility]el obstruction ([facility]     Chronic back pain     Hypertension     Prediabetes 8/7/2020    SI joint arthritis 8/7/2020    Substance abuse [facility])        Past Surgical History:   Procedure Laterality Date     SECTION      HYSTERECTOMY      KNEE SURGERY Right     has pins in right knee     OTHER SURGICAL HISTORY      surgery for bowel obstruction    TUBAL LIGATION         CURRENT MEDICATIONS:    Current Outpatient Medications:     senna (SENOKOT) 8.6 MG tablet, Take 1 tablet by mouth 2 times daily, Disp: 60 tablet, Rfl: 0    polyethylene glycol (MIRALAX) 17 GM/SCOOP powder, Take 17 g by mouth daily PRN constipation, Disp: 510 g, Rfl: 0    vitamin D3 (CHOLECALCIFEROL) 25 MCG (1000 UT) TABS tablet, , Disp: , Rfl:     dicyclomine (BENTYL) 10 MG capsule, Take 1 capsule by mouth 2 times daily as needed (abdominal cramping), Disp: 60 capsule, Rfl: 1    lidocaine 4 % external patch, Place 1 patch onto the skin daily, Disp: 30 patch, Rfl: 0    zoster recombinant adjuvanted vaccine (SHINGRIX) 50 MCG/0.5ML SUSR injection, Inject 0.5 mLs into the muscle See Admin Instructions 1 dose now and repeat in 2-6 months, Disp: 0.5 mL, Rfl: 0    traZODone (DESYREL) 100 MG tablet, Take 1 tablet by mouth nightly, Disp: , Rfl:     omeprazole (PRILOSEC) 40 MG delayed release capsule, Take 1 capsule by mouth daily, Disp: , Rfl:     magnesium oxide (MAG-OX) 250 MG TABS tablet, Take 1 tablet by mouth daily, Disp: , Rfl:     ASPIRIN LOW DOSE 81 MG EC tablet, TAKE 1 TABLET BY MOUTH DAILY, Disp: 90 tablet, Rfl: 1    atorvastatin (LIPITOR) 20 MG tablet, TAKE 1 TABLET BY MOUTH DAILY, Disp: 30 tablet, Rfl: 3    amLODIPine (NORVASC) 10 MG tablet, , Disp: , Rfl:     HM CETIRIZINE HCL 10 MG tablet, , Disp: , Rfl:     hydrochlorothiazide (HYDRODIURIL) 12.5 MG tablet, , Disp: , Rfl:     PRE-MOISTENED WITCH HAZEL 50 % PADS, Use after each bowel movement, Disp: 90 each, Rfl: 2    lidocaine (LMX) 4 % cream, Apply topically every 8 hrs as needed for pain, Disp: 45 g, Rfl: 3    Elastic Bandages & Supports (KNEE BRACE/HINGED/LARGE) MISC, Use daily for knee pain, Yes     Comment: socially    Drug use: Yes     Types: Cocaine     Comment: pt unsure last time she used    Sexual activity: Yes   Lifestyle    Physical activity     Days per week: Not on file     Minutes per session: Not on file    Stress: Not on file   Relationships    Social connections     Talks on phone: Not on file     Gets together: Not on file     Attends [employer] service: Not on file     Active member of club or organization: Not on file     Attends meetings of clubs or organizations: Not on file     Relationship status: Not on file    Intimate partner violence     Fear of current or ex partner: Not on file     Emotionally abused: Not on file     Physically abused: Not on file     Forced sexual activity: Not on file   Other Topics Concern    Not on file   Social History Narrative    Not on file         REVIEW OF SYSTEMS: A 12-point review of systems was obtained and pertinent positives and negatives were listed below. REVIEW OF SYSTEMS:     Constitutional: No fever, no chills, no lethargy, no weakness. HEENT:  No headache, otalgia, itchy eyes, nasal discharge or sore throat. Cardiac:  No chest pain, dyspnea, orthopnea or PND. Chest:   No cough, phlegm or wheezing. Abdomen:      Detailed by MA   Neuro:  No focal weakness, abnormal movements or seizure like activity. Skin:   No rashes, no itching. :   No hematuria, no pyuria, no dysuria, no flank pain. Extremities:  No swelling or joint pains. ROS was otherwise negative    Review of Systems    PHYSICAL EXAMINATION: Vital signs reviewed per the nursing documentation. Ht 5' 2\" (1.575 m)   Wt 156 lb 12.8 oz (71.1 kg)   BMI 28.68 kg/m²   Body mass index is 28.68 kg/m². Physical Exam    Physical Exam   Constitutional: Patient is oriented to person, place, and time. Patient appears well-developed and well-nourished. HENT:   Head: Normocephalic and atraumatic. Eyes: Pupils are equal, round, and reactive to light.  EOM are normal. Exam: pain FINDINGS: The cardiac silhouette is within normal limits for size. There is minimal bibasilar opacification. No pleural effusion or pneumothorax. No acute osseous abnormality seen. 1. Minimal bibasilar opacification, which could represent atelectasis versus early airspace disease. 2. Otherwise, no acute cardiopulmonary abnormality. IMPRESSION: Kadeem Caballero is a 45 y.o. female with a past history remarkable for asthma, HTN, pre-DM, bowel obstruction s/p ?bowel resection performed approximately 10 years ago, chronic hepatitis C, treatment naïve, noncirrhotic based on prior imaging, referred for evaluation of chronic abdominal pain which appears to be secondary to her constipation, smoking habits, dietary habits. Smoker: active smoker-- 1/2 ppd   Drinking history: socially   Illicit drugs: Cocaine at times, Juan Pulling at times  Abdominal surgeries: bowel resection  > 10 yrs   Prior Colonoscopy: No prior   Prior EGD:  None   FH of GI issues: not known to patient. Chronic HCV-genotype 1a/1B, coexistent of other infections not known, noncirrhotic based on previous imaging, likely related to prior substance abuse. Treatment naïve    PLAN:    1) Constipation-slow transit- appears to be chronic. Will obtain basic labs including electrolyte panel and evaluate for hypothyroidism. Advised patient to increase her fluid volume to release 84 ounces of water daily. Advised against smoking and against drinking. Continue stool softeners and PRN laxatives. Will add additional laxatives based on response to dietary modifications. 2) Chronic HCV- GT 1 a/b--likely cause of the patient's abnormal LFTs. Untreated. Will send a liver ultrasound and hep serologies to further stage patient's viral infection. 3) RTC 6 weeks to discuss results      Thank you for allowing me to participate in the care of Ms. Red Simon. For any further questions please do not hesitate to contact me.       I have reviewed and agree with the MA/LPN ROS please refer to their documentation from today's encounter on a separate note. Mary Feliciano MD, MPH   [facility]  Office #: [phone]          this note is created with the assistance of a speech recognition program.  While intending to generate a document that actually reflects the content of the visit, the document can still have some errors including those of syntax and sound a like substitutions which may escape proof reading. It such instances, actual meaning can be extrapolated by contextual diversion.

## 2020-09-22 ENCOUNTER — HOSPITAL ENCOUNTER (EMERGENCY)
Age: 50
Discharge: HOME | End: 2020-09-22
Attending: EMERGENCY MEDICINE
Payer: COMMERCIAL

## 2020-09-22 VITALS
HEIGHT: 62 IN | WEIGHT: 150 LBS | BODY MASS INDEX: 27.6 KG/M2 | SYSTOLIC BLOOD PRESSURE: 132 MMHG | DIASTOLIC BLOOD PRESSURE: 91 MMHG | TEMPERATURE: 98.5 F | RESPIRATION RATE: 16 BRPM | OXYGEN SATURATION: 98 % | HEART RATE: 92 BPM

## 2020-09-22 PROCEDURE — 99283 EMERGENCY DEPT VISIT LOW MDM: CPT

## 2020-09-22 PROCEDURE — 6370000000 HC RX 637 (ALT 250 FOR IP): Performed by: EMERGENCY MEDICINE

## 2020-09-22 RX ORDER — NAPROXEN 250 MG/1
500 TABLET ORAL ONCE
Status: COMPLETED | OUTPATIENT
Start: 2020-09-22 | End: 2020-09-22

## 2020-09-22 RX ORDER — NAPROXEN 500 MG/1
500 TABLET ORAL 2 TIMES DAILY
Qty: 60 TABLET | Refills: 0 | Status: SHIPPED | OUTPATIENT
Start: 2020-09-22 | End: 2022-02-24 | Stop reason: ALTCHOICE

## 2020-09-22 RX ORDER — LIDOCAINE 50 MG/G
1 PATCH TOPICAL DAILY
Qty: 10 PATCH | Refills: 0 | Status: SHIPPED | OUTPATIENT
Start: 2020-09-22 | End: 2020-10-02

## 2020-09-22 RX ORDER — CYCLOBENZAPRINE HCL 10 MG
10 TABLET ORAL ONCE
Status: COMPLETED | OUTPATIENT
Start: 2020-09-22 | End: 2020-09-22

## 2020-09-22 RX ORDER — HYDROCODONE BITARTRATE AND ACETAMINOPHEN 5; 325 MG/1; MG/1
1 TABLET ORAL ONCE
Status: COMPLETED | OUTPATIENT
Start: 2020-09-22 | End: 2020-09-22

## 2020-09-22 RX ORDER — CYCLOBENZAPRINE HCL 5 MG
5 TABLET ORAL 3 TIMES DAILY PRN
Qty: 20 TABLET | Refills: 0 | Status: SHIPPED | OUTPATIENT
Start: 2020-09-22 | End: 2020-10-02

## 2020-09-22 RX ADMIN — CYCLOBENZAPRINE 10 MG: 10 TABLET, FILM COATED ORAL at 19:12

## 2020-09-22 RX ADMIN — HYDROCODONE BITARTRATE AND ACETAMINOPHEN 1 TABLET: 5; 325 TABLET ORAL at 19:12

## 2020-09-22 RX ADMIN — NAPROXEN 500 MG: 250 TABLET ORAL at 19:12

## 2020-09-22 ASSESSMENT — PAIN DESCRIPTION - LOCATION: LOCATION: PELVIS

## 2020-09-22 ASSESSMENT — PAIN DESCRIPTION - PAIN TYPE: TYPE: ACUTE PAIN

## 2020-09-22 ASSESSMENT — PAIN SCALES - GENERAL
PAINLEVEL_OUTOF10: 10
PAINLEVEL_OUTOF10: 10

## 2020-09-22 NOTE — ED PROVIDER NOTES
[facility]     Emergency Department     Faculty Attestation    I performed a history and physical examination of the patient and discussed management with the resident. I reviewed the residents note and agree with the documented findings including all diagnostic interpretations and plan of care. Any areas of disagreement are noted on the chart. I was personally present for the key portions of any procedures. I have documented in the chart those procedures where I was not present during the key portions. I have reviewed the emergency nurses triage note. I agree with the chief complaint, past medical history, past surgical history, allergies, medications, social and family history as documented unless otherwise noted below. Documentation of the HPI, Physical Exam and Medical Decision Making performed by scribrichelle is based on my personal performance of the HPI, PE and MDM. For Physician Assistant/ Nurse Practitioner cases/documentation I have personally evaluated this patient and have completed at least one if not all key elements of the E/M (history, physical exam, and MDM). Additional findings are as noted. This patient was evaluated in the Emergency Department for symptoms described in the history of present illness. He/she was evaluated in the context of the global COVID-19 pandemic, which necessitated consideration that the patient might be at risk for infection with the SARS-CoV-2 virus that causes COVID-19. Institutional protocols and algorithms that pertain to the evaluation of patients at risk for COVID-19 are in a state of rapid change based on information released by regulatory bodies including the CDC and federal and state organizations. These policies and algorithms were followed during the patient's care in the ED. Primary Care Physician: Luis Fatima MD    History:  This is a 45 y.o. female who presents to the Emergency Department with complaint of pain around hips and pelvis. Worse when walking. She reports that she has had issues with this since a significant trauma to the pelvic bones when she was 5years old. Denies any pain burning with urination no vaginal bleeding or discharge no nausea no vomiting. Has been attempting new exercises recently. Physical:     height is 5' 2\" (1.575 m) and weight is 150 lb (68 kg). Her oral temperature is 98.5 °F (36.9 °C). Her blood pressure is 132/91 (abnormal) and her pulse is 92. Her respiration is 16 and oxygen saturation is 98%.    45 y.o. female no acute distress, abdomen soft nontender nondistended. Pelvis is stable on lateral compression. Normal range of motion of the hip joints bilaterally. Impression: hip/pelvis pain. Suspect osteoarthritic given history of pelvic bone fractures at young age. Plan: I did offer x-rays for this patient although I do believe this is fairly low yield given no recent trauma. Patient would prefer medication therapy and follow-up as she would like to leave soon to catch the bus. I instructed her to follow-up with PCP if this continues to be a issue.     Hilda Puckett MD, [facility]  Attending Emergency Physician        Michel Mcqueen MD  09/22/20 4051

## 2020-09-22 NOTE — ED TRIAGE NOTES
Pt arrived to the Ed with c/o pelvic pain. Pt states when she was 5years old she fx her pelvis and has pain ever since. Pt states she feels pain and nothing relieves the pain at home Pt states she wants to get an x-ray or just figure out what needs to be done to stop the pain. Pt is alert and oriented x4.

## 2020-09-23 ENCOUNTER — TELEPHONE (OUTPATIENT)
Dept: FAMILY MEDICINE CLINIC | Age: 50
End: 2020-09-23

## 2020-09-23 ASSESSMENT — ENCOUNTER SYMPTOMS
COUGH: 0
ABDOMINAL PAIN: 0
EYE PAIN: 0
SHORTNESS OF BREATH: 0
BACK PAIN: 1
SORE THROAT: 0
NAUSEA: 0
DIARRHEA: 0

## 2020-09-23 NOTE — ED PROVIDER NOTES
(Right). Social History     Socioeconomic History    Marital status: Single     Spouse name: Not on file    Number of children: Not on file    Years of education: Not on file    Highest education level: Not on file   Occupational History    Not on file   Social Needs    Financial resource strain: Somewhat hard    Food insecurity     Worry: Sometimes true     Inability: Sometimes true    Transportation needs     Medical: No     Non-medical: No   Tobacco Use    Smoking status: Current Every Day Smoker     Packs/day: 1.00     Years: 15.00     Pack years: 15.00     Types: Cigarettes    Smokeless tobacco: Never Used   Substance and Sexual Activity    Alcohol use: Yes     Comment: socially    Drug use: Not Currently     Types: Cocaine     Comment: pt unsure last time she used    Sexual activity: Yes   Lifestyle    Physical activity     Days per week: Not on file     Minutes per session: Not on file    Stress: Not on file   Relationships    Social connections     Talks on phone: Not on file     Gets together: Not on file     Attends Moravian service: Not on file     Active member of club or organization: Not on file     Attends meetings of clubs or organizations: Not on file     Relationship status: Not on file    Intimate partner violence     Fear of current or ex partner: Not on file     Emotionally abused: Not on file     Physically abused: Not on file     Forced sexual activity: Not on file   Other Topics Concern    Not on file   Social History Narrative    Not on file       Family History   Problem Relation Age of Onset    High Blood Pressure Mother     High Blood Pressure Father        Allergies:  Ibuprofen    Home Medications:  Prior to Admission medications    Medication Sig Start Date End Date Taking?  Authorizing Provider   naproxen (NAPROSYN) 500 MG tablet Take 1 tablet by mouth 2 times daily 9/22/20  Yes Dianna Schwab MD   cyclobenzaprine (FLEXERIL) 5 MG tablet Take 1 tablet by (HYDRODIURIL) 12.5 MG tablet  12/16/19   Historical Provider, MD   PRE-MOISTENED WITCH HAZEL 50 % PADS Use after each bowel movement 12/26/19   YEYO Velasquez - CNP   lidocaine (LMX) 4 % cream Apply topically every 8 hrs as needed for pain 12/11/19   Alberta Ge MD   Elastic Bandages & Supports (KNEE BRACE/HINGED/LARGE) MISC Use daily for knee pain, please provide according to size 12/11/19   Alberta Ge MD   loratadine (CLARITIN) 10 MG tablet Take 1 tablet by mouth daily 12/11/19   Alberta Ge MD   triamcinolone (KENALOG) 0.025 % cream Apply to rash twice daily 4/2/19   Helio Lazaro MD   ammonium lactate (AMLACTIN) 12 % cream Apply 385 g topically as needed. Apply topically as needed. Historical Provider, MD   PROAIR  (90 BASE) MCG/ACT inhaler Inhale 1 puff into the lungs 4 times daily. 10/8/13   Historical Provider, MD   clotrimazole (LOTRIMIN) 1 % cream Apply  topically 2 times daily. 11/15/13   Historical Provider, MD   fluticasone (FLONASE) 50 MCG/ACT nasal spray 1 spray by Nasal route daily. 10/11/13   Historical Provider, MD   lisinopril (PRINIVIL;ZESTRIL) 30 MG tablet Take 40 mg by mouth daily  10/11/13   Historical Provider, MD   OLANZapine (ZYPREXA) 15 MG tablet Take 15 mg by mouth nightly. 11/18/13   Historical Provider, MD   multivitamin (ANIMAL SHAPES) WITH C & FA CHEW chewable tablet Take  by mouth daily. 11/15/13   Historical Provider, MD       REVIEW OF SYSTEMS    (2-9 systems for level 4, 10 or more for level 5)      Review of Systems   Constitutional: Negative for activity change, appetite change and fever. HENT: Negative for congestion and sore throat. Eyes: Negative for pain and visual disturbance. Respiratory: Negative for cough and shortness of breath. Cardiovascular: Negative for chest pain and leg swelling. Gastrointestinal: Negative for abdominal pain, diarrhea and nausea. Endocrine: Negative for polyphagia and polyuria.    Genitourinary: Positive for decreased urine volume and pelvic pain. Negative for dysuria, frequency and vaginal bleeding. Musculoskeletal: Positive for arthralgias, back pain and gait problem. Negative for myalgias. Skin: Negative for rash and wound. Allergic/Immunologic: Negative for environmental allergies and food allergies. Neurological: Negative for syncope and weakness. Hematological: Negative for adenopathy. Does not bruise/bleed easily. Psychiatric/Behavioral: Negative for confusion. The patient is not nervous/anxious. PHYSICAL EXAM   (up to 7 for level 4, 8 or more for level 5)      INITIAL VITALS:   BP (!) 132/91   Pulse 92   Temp 98.5 °F (36.9 °C) (Oral)   Resp 16   Ht 5' 2\" (1.575 m)   Wt 150 lb (68 kg)   SpO2 98%   BMI 27.44 kg/m²     Physical Exam  Constitutional:       General: She is not in acute distress. Appearance: She is well-developed. HENT:      Head: Normocephalic and atraumatic. Eyes:      Conjunctiva/sclera: Conjunctivae normal.      Pupils: Pupils are equal, round, and reactive to light. Neck:      Musculoskeletal: Normal range of motion and neck supple. Cardiovascular:      Rate and Rhythm: Normal rate and regular rhythm. Heart sounds: Normal heart sounds. No murmur. No friction rub. No gallop. Pulmonary:      Effort: Pulmonary effort is normal. No respiratory distress. Breath sounds: Normal breath sounds. No wheezing, rhonchi or rales. Abdominal:      General: Bowel sounds are normal. There is no distension. Palpations: Abdomen is soft. Tenderness: There is no abdominal tenderness. There is no right CVA tenderness, left CVA tenderness, guarding or rebound. Musculoskeletal:      Left hip: She exhibits tenderness. Lumbar back: She exhibits decreased range of motion and tenderness. She exhibits no bony tenderness, no swelling and no edema. Legs:    Skin:     General: Skin is warm and dry. Findings: No rash.    Neurological:      Mental dictations but occasionally words are mis-transcribed.)       Jacquelyn Sharma MD  Resident  09/23/20 [id number]

## 2020-09-23 NOTE — TELEPHONE ENCOUNTER
Pt was seen in ED and is asking for xray order. Scheduled f/u for 10/1 but is asking for the order now. Will you do without an appt first? ED was going to do it but she wouldn't wait.

## 2020-09-23 NOTE — TELEPHONE ENCOUNTER
Pt has CT abd , xray lumbar spine done  Recently . What xray pt is asking for . We can see her next week and order depending upon her complaints.

## 2020-09-23 NOTE — TELEPHONE ENCOUNTER
[facility]) ED Follow up Call    Reason for ED visit:           Astrid Yu , this is Austin Anderson from Dr. Jonathan Rousseau office, just calling to see how you are doing after your recent ED visit. Did you receive discharge instructions? Yes  Do you understand the discharge instructions? Yes  Did the ED give you any new prescriptions? Yes  Were you able to fill your prescriptions? Yes      Do you have one of our red, yellow and green  Zone sheets that help you to determine when you should go to the ED? Yes      Do you need or want to make a follow up appt with your PCP? Yes    Do you have any further needs in the home i.e. Equipment?   No        FU appts/Provider:    Future Appointments   Date Time Provider Aurelia Silva   11/18/2020  3:00 PM [facility]   12/1/2020  3:30 PM [facility]

## 2020-09-24 ENCOUNTER — HOSPITAL ENCOUNTER (OUTPATIENT)
Dept: GENERAL RADIOLOGY | Age: 50
Discharge: HOME | End: 2020-09-26
Payer: COMMERCIAL

## 2020-09-24 ENCOUNTER — HOSPITAL ENCOUNTER (OUTPATIENT)
Age: 50
Discharge: HOME | End: 2020-09-24
Payer: COMMERCIAL

## 2020-09-24 ENCOUNTER — HOSPITAL ENCOUNTER (OUTPATIENT)
Age: 50
Discharge: HOME | End: 2020-09-26
Payer: COMMERCIAL

## 2020-09-24 LAB
ABSOLUTE EOS #: 0.2 K/UL (ref 0–0.4)
ABSOLUTE IMMATURE GRANULOCYTE: ABNORMAL K/UL (ref 0–0.3)
ABSOLUTE LYMPH #: 2 K/UL (ref 1–4.8)
ABSOLUTE MONO #: 0.5 K/UL (ref 0.1–1.3)
ALBUMIN SERPL-MCNC: 4.2 G/DL (ref 3.5–5.2)
ALBUMIN/GLOBULIN RATIO: ABNORMAL (ref 1–2.5)
ALP BLD-CCNC: 127 U/L (ref 35–104)
ALT SERPL-CCNC: 32 U/L (ref 5–33)
AMPHETAMINE SCREEN URINE: NEGATIVE
ANION GAP SERPL CALCULATED.3IONS-SCNC: 13 MMOL/L (ref 9–17)
AST SERPL-CCNC: 35 U/L
BARBITURATE SCREEN URINE: NEGATIVE
BASOPHILS # BLD: 1 % (ref 0–2)
BASOPHILS ABSOLUTE: 0.1 K/UL (ref 0–0.2)
BENZODIAZEPINE SCREEN, URINE: NEGATIVE
BILIRUB SERPL-MCNC: 0.19 MG/DL (ref 0.3–1.2)
BILIRUBIN DIRECT: 0.12 MG/DL
BILIRUBIN, INDIRECT: 0.07 MG/DL (ref 0–1)
BUN BLDV-MCNC: 29 MG/DL (ref 6–20)
BUPRENORPHINE URINE: ABNORMAL
CALCIUM SERPL-MCNC: 9.1 MG/DL (ref 8.6–10.4)
CANNABINOID SCREEN URINE: NEGATIVE
CHLORIDE BLD-SCNC: 102 MMOL/L (ref 98–107)
CO2: 24 MMOL/L (ref 20–31)
COCAINE METABOLITE, URINE: POSITIVE
CREAT SERPL-MCNC: 0.93 MG/DL (ref 0.5–0.9)
DIFFERENTIAL TYPE: ABNORMAL
EOSINOPHILS RELATIVE PERCENT: 5 % (ref 0–4)
ETHANOL PERCENT: <0.01 %
ETHANOL: <10 MG/DL
GFR AFRICAN AMERICAN: >60 ML/MIN
GFR NON-AFRICAN AMERICAN: >60 ML/MIN
GFR SERPL CREATININE-BSD FRML MDRD: ABNORMAL ML/MIN/{1.73_M2}
GFR SERPL CREATININE-BSD FRML MDRD: ABNORMAL ML/MIN/{1.73_M2}
GLUCOSE BLD-MCNC: 95 MG/DL (ref 70–99)
HAV AB SERPL IA-ACNC: NONREACTIVE
HBV SURFACE AB TITR SER: <3.5 MIU/ML
HCT VFR BLD CALC: 36.8 % (ref 36–46)
HEMOGLOBIN: 12.3 G/DL (ref 12–16)
HEPATITIS B CORE TOTAL ANTIBODY: NONREACTIVE
HEPATITIS C ANTIBODY: REACTIVE
HIV AG/AB: NONREACTIVE
IMMATURE GRANULOCYTES: ABNORMAL %
LYMPHOCYTES # BLD: 41 % (ref 24–44)
MCH RBC QN AUTO: 30.7 PG (ref 26–34)
MCHC RBC AUTO-ENTMCNC: 33.4 G/DL (ref 31–37)
MCV RBC AUTO: 92 FL (ref 80–100)
MDMA URINE: ABNORMAL
METHADONE SCREEN, URINE: NEGATIVE
METHAMPHETAMINE, URINE: ABNORMAL
MONOCYTES # BLD: 10 % (ref 1–7)
NRBC AUTOMATED: ABNORMAL PER 100 WBC
OPIATES, URINE: NEGATIVE
OXYCODONE SCREEN URINE: NEGATIVE
PDW BLD-RTO: 13.2 % (ref 11.5–14.9)
PHENCYCLIDINE, URINE: NEGATIVE
PLATELET # BLD: 314 K/UL (ref 150–450)
PLATELET ESTIMATE: ABNORMAL
PMV BLD AUTO: 8.6 FL (ref 6–12)
POTASSIUM SERPL-SCNC: 4.4 MMOL/L (ref 3.7–5.3)
PROPOXYPHENE, URINE: ABNORMAL
RBC # BLD: 4 M/UL (ref 4–5.2)
RBC # BLD: ABNORMAL 10*6/UL
SEG NEUTROPHILS: 43 % (ref 36–66)
SEGMENTED NEUTROPHILS ABSOLUTE COUNT: 2.1 K/UL (ref 1.3–9.1)
SODIUM BLD-SCNC: 139 MMOL/L (ref 135–144)
TEST INFORMATION: ABNORMAL
TOTAL PROTEIN: 7.4 G/DL (ref 6.4–8.3)
TRICYCLIC ANTIDEPRESSANTS, UR: ABNORMAL
WBC # BLD: 4.9 K/UL (ref 3.5–11)
WBC # BLD: ABNORMAL 10*3/UL

## 2020-09-24 PROCEDURE — 86704 HEP B CORE ANTIBODY TOTAL: CPT

## 2020-09-24 PROCEDURE — 84450 TRANSFERASE (AST) (SGOT): CPT

## 2020-09-24 PROCEDURE — 82248 BILIRUBIN DIRECT: CPT

## 2020-09-24 PROCEDURE — 80053 COMPREHEN METABOLIC PANEL: CPT

## 2020-09-24 PROCEDURE — 85025 COMPLETE CBC W/AUTO DIFF WBC: CPT

## 2020-09-24 PROCEDURE — 80307 DRUG TEST PRSMV CHEM ANLYZR: CPT

## 2020-09-24 PROCEDURE — 84460 ALANINE AMINO (ALT) (SGPT): CPT

## 2020-09-24 PROCEDURE — 87389 HIV-1 AG W/HIV-1&-2 AB AG IA: CPT

## 2020-09-24 PROCEDURE — 86708 HEPATITIS A ANTIBODY: CPT

## 2020-09-24 PROCEDURE — 73521 X-RAY EXAM HIPS BI 2 VIEWS: CPT

## 2020-09-24 PROCEDURE — 84520 ASSAY OF UREA NITROGEN: CPT

## 2020-09-24 PROCEDURE — 86317 IMMUNOASSAY INFECTIOUS AGENT: CPT

## 2020-09-24 PROCEDURE — 86803 HEPATITIS C AB TEST: CPT

## 2020-09-24 PROCEDURE — 36415 COLL VENOUS BLD VENIPUNCTURE: CPT

## 2020-09-24 PROCEDURE — 83883 ASSAY NEPHELOMETRY NOT SPEC: CPT

## 2020-09-24 PROCEDURE — G0480 DRUG TEST DEF 1-7 CLASSES: HCPCS

## 2020-09-24 PROCEDURE — 82977 ASSAY OF GGT: CPT

## 2020-09-24 PROCEDURE — 87522 HEPATITIS C REVRS TRNSCRPJ: CPT

## 2020-09-24 NOTE — TELEPHONE ENCOUNTER
Pelvic XR is shifting and its painful so she is wondering if she can get that ordered now before she comes in for her appointment so that way you can review those results when she comes in next week.

## 2020-09-28 LAB
ALANINE AMINOTRANSFERASE, FIBROMETER: 38 U/L (ref 5–40)
ALPHA-2-MACROGLOBULIN, FIBROMETER: 302 MG/DL (ref 131–293)
ASPARTATE AMINOTRANSFERASE, FIBROMETER: 37 U/L (ref 9–40)
CIRRHOMETER PATIENT SCORE: 0.01
EER FIBROMETER REPORT: ABNORMAL
FIBROMETER INTERPRETATION: ABNORMAL
FIBROMETER PATIENT SCORE: 0.31
FIBROMETER PLATELET COUNT: 314
FIBROMETER PROTHROMBIN INDEX: 99 % (ref 90–120)
FIBROSIS METAVIR CLASSIFICATION: ABNORMAL
GAMMA GLUTAMYL TRANSFERASE, FIBROMETER: 329 U/L (ref 7–33)
INFLAMETER METAVIR CLASSIFICATION: ABNORMAL
INFLAMETER PATIENT SCORE: 0.3
UREA NITROGEN, FIBROMETER: 30 MG/DL (ref 7–20)

## 2020-09-29 LAB
DIRECT EXAM: ABNORMAL
DIRECT EXAM: ABNORMAL
Lab: ABNORMAL
SPECIMEN DESCRIPTION: ABNORMAL

## 2020-10-01 ENCOUNTER — TELEPHONE (OUTPATIENT)
Dept: GASTROENTEROLOGY | Age: 50
End: 2020-10-01

## 2020-10-01 NOTE — TELEPHONE ENCOUNTER
Patient called office stating that she had to cancel for today. Advised that this is with her PCP. To call them to cancel. Number given of [phone]. Writer thanked and call ended.

## 2020-10-02 ENCOUNTER — OFFICE VISIT (OUTPATIENT)
Dept: GASTROENTEROLOGY | Age: 50
End: 2020-10-02
Payer: COMMERCIAL

## 2020-10-02 ENCOUNTER — TELEPHONE (OUTPATIENT)
Dept: GASTROENTEROLOGY | Age: 50
End: 2020-10-02

## 2020-10-02 VITALS — DIASTOLIC BLOOD PRESSURE: 81 MMHG | BODY MASS INDEX: 28.9 KG/M2 | SYSTOLIC BLOOD PRESSURE: 120 MMHG | WEIGHT: 158 LBS

## 2020-10-02 PROCEDURE — 99213 OFFICE O/P EST LOW 20 MIN: CPT | Performed by: INTERNAL MEDICINE

## 2020-10-02 PROCEDURE — G8484 FLU IMMUNIZE NO ADMIN: HCPCS | Performed by: INTERNAL MEDICINE

## 2020-10-02 PROCEDURE — 3017F COLORECTAL CA SCREEN DOC REV: CPT | Performed by: INTERNAL MEDICINE

## 2020-10-02 PROCEDURE — 4004F PT TOBACCO SCREEN RCVD TLK: CPT | Performed by: INTERNAL MEDICINE

## 2020-10-02 PROCEDURE — G8427 DOCREV CUR MEDS BY ELIG CLIN: HCPCS | Performed by: INTERNAL MEDICINE

## 2020-10-02 PROCEDURE — G8419 CALC BMI OUT NRM PARAM NOF/U: HCPCS | Performed by: INTERNAL MEDICINE

## 2020-10-02 RX ORDER — POLYETHYLENE GLYCOL 3350 17 G/17G
POWDER, FOR SOLUTION ORAL
Qty: 238 G | Refills: 0 | Status: SHIPPED | OUTPATIENT
Start: 2020-10-02 | End: 2020-11-30 | Stop reason: SDUPTHER

## 2020-10-02 RX ORDER — OMEPRAZOLE 40 MG/1
40 CAPSULE, DELAYED RELEASE ORAL DAILY
Qty: 30 CAPSULE | Refills: 3 | Status: SHIPPED | OUTPATIENT
Start: 2020-10-02 | End: 2021-03-23 | Stop reason: SDUPTHER

## 2020-10-02 ASSESSMENT — ENCOUNTER SYMPTOMS
WHEEZING: 0
ABDOMINAL DISTENTION: 1
COUGH: 0
BACK PAIN: 1
SHORTNESS OF BREATH: 1
DIARRHEA: 1
ABDOMINAL PAIN: 1
CONSTIPATION: 1

## 2020-10-02 NOTE — TELEPHONE ENCOUNTER
Patient called the office to check which office she was going to. [address] @ [facility]. Writer thanked and call ended.

## 2020-10-02 NOTE — PROGRESS NOTES
Bowel obstruction (HCC)     Chronic back pain     Hypertension     Prediabetes 2020    SI joint arthritis 2020    Substance abuse ([facility] [address]        Past Surgical History:   Procedure Laterality Date     SECTION      HYSTERECTOMY      KNEE SURGERY Right     has pins in right knee     OTHER SURGICAL HISTORY      surgery for bowel obstruction    TUBAL LIGATION         CURRENT MEDICATIONS:    Current Outpatient Medications:     naproxen (NAPROSYN) 500 MG tablet, Take 1 tablet by mouth 2 times daily, Disp: 60 tablet, Rfl: 0    cyclobenzaprine (FLEXERIL) 5 MG tablet, Take 1 tablet by mouth 3 times daily as needed for Muscle spasms, Disp: 20 tablet, Rfl: 0    lidocaine (LIDODERM) 5 %, Place 1 patch onto the skin daily for 10 days 12 hours on, 12 hours off., Disp: 10 patch, Rfl: 0    diclofenac sodium (VOLTAREN) 1 % GEL, Apply 4 g topically 4 times daily, Disp: 4 Tube, Rfl: 0    simethicone (MYLICON) 80 MG chewable tablet, Take 1 tablet by mouth 4 times daily as needed for Flatulence, Disp: 180 tablet, Rfl: 3    vitamin D3 (CHOLECALCIFEROL) 25 MCG (1000 UT) TABS tablet, , Disp: , Rfl:     zoster recombinant adjuvanted vaccine (SHINGRIX) 50 MCG/0.5ML SUSR injection, Inject 0.5 mLs into the muscle See Admin Instructions 1 dose now and repeat in 2-6 months, Disp: 0.5 mL, Rfl: 0    traZODone (DESYREL) 100 MG tablet, Take 1 tablet by mouth nightly, Disp: , Rfl:     omeprazole (PRILOSEC) 40 MG delayed release capsule, Take 1 capsule by mouth daily, Disp: , Rfl:     magnesium oxide (MAG-OX) 250 MG TABS tablet, Take 1 tablet by mouth daily, Disp: , Rfl:     ASPIRIN LOW DOSE 81 MG EC tablet, TAKE 1 TABLET BY MOUTH DAILY, Disp: 90 tablet, Rfl: 1    atorvastatin (LIPITOR) 20 MG tablet, TAKE 1 TABLET BY MOUTH DAILY, Disp: 30 tablet, Rfl: 3    amLODIPine (NORVASC) 10 MG tablet, , Disp: , Rfl:     HM CETIRIZINE HCL 10 MG tablet, , Disp: , Rfl:     hydrochlorothiazide (HYDRODIURIL) 12.5 MG tablet, , Disp: , Rfl:     PRE-MOISTENED WITCH HAZEL 50 % PADS, Use after each bowel movement, Disp: 90 each, Rfl: 2    lidocaine (LMX) 4 % cream, Apply topically every 8 hrs as needed for pain, Disp: 45 g, Rfl: 3    Elastic Bandages & Supports (KNEE BRACE/HINGED/LARGE) MISC, Use daily for knee pain, please provide according to size, Disp: 1 each, Rfl: 0    loratadine (CLARITIN) 10 MG tablet, Take 1 tablet by mouth daily, Disp: 30 tablet, Rfl: 1    triamcinolone (KENALOG) 0.025 % cream, Apply to rash twice daily, Disp: 454 g, Rfl: 2    ammonium lactate (AMLACTIN) 12 % cream, Apply 385 g topically as needed. Apply topically as needed. , Disp: , Rfl:     PROAIR  (90 BASE) MCG/ACT inhaler, Inhale 1 puff into the lungs 4 times daily. , Disp: , Rfl:     clotrimazole (LOTRIMIN) 1 % cream, Apply  topically 2 times daily. , Disp: , Rfl:     fluticasone (FLONASE) 50 MCG/ACT nasal spray, 1 spray by Nasal route daily. , Disp: , Rfl:     lisinopril (PRINIVIL;ZESTRIL) 30 MG tablet, Take 40 mg by mouth daily , Disp: , Rfl:     OLANZapine (ZYPREXA) 15 MG tablet, Take 15 mg by mouth nightly., Disp: , Rfl:     multivitamin (ANIMAL SHAPES) WITH C & FA CHEW chewable tablet, Take  by mouth daily. , Disp: , Rfl:     naproxen (NAPROSYN) 500 MG tablet, Take 1 tablet by mouth 2 times daily (with meals) for 10 days, Disp: 20 tablet, Rfl: 0    ALLERGIES:   Allergies   Allergen Reactions    Ibuprofen Hives and Nausea Only       FAMILY HISTORY:       Problem Relation Age of Onset    High Blood Pressure Mother     High Blood Pressure Father          SOCIAL HISTORY:   Social History     Socioeconomic History    Marital status: Single     Spouse name: Not on file    Number of children: Not on file    Years of education: Not on file    Highest education level: Not on file   Occupational History    Not on file   Social Needs    Financial resource strain: Somewhat hard    Food insecurity     Worry: Sometimes true     Inability: Sometimes true    Transportation needs     Medical: No     Non-medical: No   Tobacco Use    Smoking status: Current Every Day Smoker     Packs/day: 1.00     Years: 15.00     Pack years: 15.00     Types: Cigarettes    Smokeless tobacco: Never Used   Substance and Sexual Activity    Alcohol use: Yes     Comment: socially    Drug use: Not Currently     Types: Cocaine     Comment: pt unsure last time she used    Sexual activity: Yes   Lifestyle    Physical activity     Days per week: Not on file     Minutes per session: Not on file    Stress: Not on file   Relationships    Social connections     Talks on phone: Not on file     Gets together: Not on file     Attends Yazdanism service: Not on file     Active member of club or organization: Not on file     Attends meetings of clubs or organizations: Not on file     Relationship status: Not on file    Intimate partner violence     Fear of current or ex partner: Not on file     Emotionally abused: Not on file     Physically abused: Not on file     Forced sexual activity: Not on file   Other Topics Concern    Not on file   Social History Narrative    Not on file       REVIEW OF SYSTEMS: A 12-point review of systems was obtained and pertinent positives and negatives were listed below. REVIEW OF SYSTEMS:     Constitutional: No fever, no chills, no lethargy, no weakness. HEENT:  No headache, otalgia, itchy eyes, nasal discharge or sore throat. Cardiac:  No chest pain, dyspnea, orthopnea or PND. Chest:   No cough, phlegm or wheezing. Abdomen:      Detailed by MA   Neuro:  No focal weakness, abnormal movements or seizure like activity. Skin:   No rashes, no itching. :   No hematuria, no pyuria, no dysuria, no flank pain. Extremities:  No swelling or joint pains. ROS was otherwise negative    Review of Systems   Constitutional: Positive for fatigue. HENT: Negative. Eyes: Positive for visual disturbance. Respiratory: Positive for shortness of breath. 09/24/2020     09/24/2020    CO2 24 09/24/2020    BUN 29 (H) 09/24/2020    CREATININE 0.93 (H) 09/24/2020    LABPROT 7.1 12/12/2012    LABALBU 4.2 09/24/2020    BILITOT 0.19 (L) 09/24/2020    ALKPHOS 127 (H) 09/24/2020    AST 35 (H) 09/24/2020    ALT 32 09/24/2020    INR 1.1 05/29/2014         Lab Results   Component Value Date    RBC 4.00 09/24/2020    HGB 12.3 09/24/2020    MCV 92.0 09/24/2020    MCH 30.7 09/24/2020    MCHC 33.4 09/24/2020    RDW 13.2 09/24/2020    MPV 8.6 09/24/2020    BASOPCT 1 09/24/2020    LYMPHSABS 2.00 09/24/2020    MONOSABS 0.50 09/24/2020    NEUTROABS 2.10 09/24/2020    EOSABS 0.20 09/24/2020    BASOSABS 0.10 09/24/2020         DIAGNOSTIC TESTING:     Xr Hip Bilateral W Ap Pelvis (2 Views)    Result Date: 9/24/2020  EXAMINATION: ONE XRAY VIEW OF THE PELVIS AND TWO XRAY VIEWS OF EACH OF THE BILATERAL HIPS 9/24/2020 5:17 pm COMPARISON: None. HISTORY: ORDERING SYSTEM PROVIDED HISTORY: Pain in pelvis TECHNOLOGIST PROVIDED HISTORY: pain Reason for Exam: Bilateral groin pain Acuity: Unknown Type of Exam: Unknown FINDINGS: The hip demonstrates normal alignment. No evidence of acute fracture. No focal osseus lesion. Pelvis is intact. No acute abnormality of the hips.           IMPRESSION: Jennifer Khan is a 45 y.o. female with a past history remarkable for asthma, HTN, pre-DM, bowel obstruction s/p ?bowel resection performed approximately 10 years ago, chronic hepatitis C, treatment naïve, noncirrhotic based on prior imaging, referred for evaluation of chronic abdominal pain which appears to be secondary to her constipation, smoking habits, dietary habits.        Smoker: active smoker-- 1/2 ppd   Drinking history: socially   Illicit drugs: Cocaine at times, Vinod Heike at times  Abdominal surgeries: bowel resection  > 10 yrs   Prior Colonoscopy: No prior   Prior EGD:  None   FH of GI issues: not known to patient.      Chronic HCV-genotype 1a/1B, coexistent of other infections not known, noncirrhotic based on previous imaging, likely related to prior substance abuse. Treatment naïve      Urine toxicology was positive for cocaine  Patient is not an ideal candidate for hepatitis C treatment      PLAN:    1) nonspecific dyspepsia-we will plan for diagnostic EGD. Patient to continue with PPI therapy and dietary modifications    2) HCV-urine tox was positive for illicit drugs patient is not an ideal candidate for DAA. 3) screening colonoscopy- we will plan for procedure alongside EGD. RTC in 3 months. Thank you for allowing me to participate in the care of Ms. Cary Kapoor. For any further questions please do not hesitate to contact me. I have reviewed and agree with the ROS entered by the MA/LPN from today's encounter documented in a separate note. Erika Antonio MD, MPH   [facility]  Office #: [phone]    this note is created with the assistance of a speech recognition program.  While intending to generate a document that actually reflects the content of the visit, the document can still have some errors including those of syntax and sound a like substitutions which may escape proof reading. It such instances, actual meaning can be extrapolated by contextual diversion.

## 2020-10-12 ENCOUNTER — HOSPITAL ENCOUNTER (OUTPATIENT)
Dept: PREADMISSION TESTING | Age: 50
Setting detail: OUTPATIENT SURGERY
Discharge: HOME | End: 2020-10-16
Payer: COMMERCIAL

## 2020-10-12 ENCOUNTER — TELEPHONE (OUTPATIENT)
Dept: GASTROENTEROLOGY | Age: 50
End: 2020-10-12

## 2020-10-12 PROCEDURE — U0003 INFECTIOUS AGENT DETECTION BY NUCLEIC ACID (DNA OR RNA); SEVERE ACUTE RESPIRATORY SYNDROME CORONAVIRUS 2 (SARS-COV-2) (CORONAVIRUS DISEASE [COVID-19]), AMPLIFIED PROBE TECHNIQUE, MAKING USE OF HIGH THROUGHPUT TECHNOLOGIES AS DESCRIBED BY CMS-2020-01-R: HCPCS

## 2020-10-12 NOTE — TELEPHONE ENCOUNTER
Ammon Kim called to say she went for her Covid testing on 10/11/20 at [facility] and they weren't open. She is now scheduled 10/12/20 @ 1:30 pm on [place]. Address and phone number given.

## 2020-10-13 LAB
SARS-COV-2, RAPID: NORMAL
SARS-COV-2: NORMAL
SARS-COV-2: NOT DETECTED
SOURCE: NORMAL

## 2020-10-14 ENCOUNTER — TELEPHONE (OUTPATIENT)
Dept: GASTROENTEROLOGY | Age: 50
End: 2020-10-14

## 2020-10-14 NOTE — TELEPHONE ENCOUNTER
[facility] who states she didn't take the Dulcolax tablets at 10 am.  Writer instructed her to take them as soon as she can and to start the Miralax/Gatorade mixture about 5 pm.  Not to eat and food. She voiced understanding.

## 2020-10-15 ENCOUNTER — ANESTHESIA (OUTPATIENT)
Dept: ENDOSCOPY | Age: 50
End: 2020-10-15
Payer: COMMERCIAL

## 2020-10-15 ENCOUNTER — HOSPITAL ENCOUNTER (OUTPATIENT)
Age: 50
Setting detail: OUTPATIENT SURGERY
Discharge: HOME | End: 2020-10-15
Attending: INTERNAL MEDICINE | Admitting: INTERNAL MEDICINE
Payer: COMMERCIAL

## 2020-10-15 ENCOUNTER — ANESTHESIA EVENT (OUTPATIENT)
Dept: ENDOSCOPY | Age: 50
End: 2020-10-15
Payer: COMMERCIAL

## 2020-10-15 VITALS
SYSTOLIC BLOOD PRESSURE: 104 MMHG | RESPIRATION RATE: 25 BRPM | OXYGEN SATURATION: 99 % | DIASTOLIC BLOOD PRESSURE: 70 MMHG

## 2020-10-15 VITALS
OXYGEN SATURATION: 100 % | TEMPERATURE: 96 F | HEART RATE: 81 BPM | DIASTOLIC BLOOD PRESSURE: 89 MMHG | SYSTOLIC BLOOD PRESSURE: 129 MMHG | BODY MASS INDEX: 27.6 KG/M2 | RESPIRATION RATE: 18 BRPM | WEIGHT: 150 LBS | HEIGHT: 62 IN

## 2020-10-15 PROCEDURE — 2580000003 HC RX 258: Performed by: INTERNAL MEDICINE

## 2020-10-15 PROCEDURE — 6360000002 HC RX W HCPCS: Performed by: NURSE ANESTHETIST, CERTIFIED REGISTERED

## 2020-10-15 PROCEDURE — 45380 COLONOSCOPY AND BIOPSY: CPT | Performed by: INTERNAL MEDICINE

## 2020-10-15 PROCEDURE — 2500000003 HC RX 250 WO HCPCS: Performed by: NURSE ANESTHETIST, CERTIFIED REGISTERED

## 2020-10-15 PROCEDURE — 7100000010 HC PHASE II RECOVERY - FIRST 15 MIN: Performed by: INTERNAL MEDICINE

## 2020-10-15 PROCEDURE — 3700000000 HC ANESTHESIA ATTENDED CARE: Performed by: INTERNAL MEDICINE

## 2020-10-15 PROCEDURE — 3609010600 HC COLONOSCOPY POLYPECTOMY SNARE/COLD BIOPSY: Performed by: INTERNAL MEDICINE

## 2020-10-15 PROCEDURE — 88305 TISSUE EXAM BY PATHOLOGIST: CPT

## 2020-10-15 PROCEDURE — 3609012400 HC EGD TRANSORAL BIOPSY SINGLE/MULTIPLE: Performed by: INTERNAL MEDICINE

## 2020-10-15 PROCEDURE — 7100000011 HC PHASE II RECOVERY - ADDTL 15 MIN: Performed by: INTERNAL MEDICINE

## 2020-10-15 PROCEDURE — 2709999900 HC NON-CHARGEABLE SUPPLY: Performed by: INTERNAL MEDICINE

## 2020-10-15 PROCEDURE — 2580000003 HC RX 258: Performed by: NURSE ANESTHETIST, CERTIFIED REGISTERED

## 2020-10-15 PROCEDURE — 3700000001 HC ADD 15 MINUTES (ANESTHESIA): Performed by: INTERNAL MEDICINE

## 2020-10-15 PROCEDURE — 43239 EGD BIOPSY SINGLE/MULTIPLE: CPT | Performed by: INTERNAL MEDICINE

## 2020-10-15 RX ORDER — LIDOCAINE HYDROCHLORIDE 10 MG/ML
INJECTION, SOLUTION EPIDURAL; INFILTRATION; INTRACAUDAL; PERINEURAL PRN
Status: DISCONTINUED | OUTPATIENT
Start: 2020-10-15 | End: 2020-10-15 | Stop reason: SDUPTHER

## 2020-10-15 RX ORDER — SODIUM CHLORIDE, SODIUM LACTATE, POTASSIUM CHLORIDE, CALCIUM CHLORIDE 600; 310; 30; 20 MG/100ML; MG/100ML; MG/100ML; MG/100ML
INJECTION, SOLUTION INTRAVENOUS CONTINUOUS PRN
Status: DISCONTINUED | OUTPATIENT
Start: 2020-10-15 | End: 2020-10-15 | Stop reason: SDUPTHER

## 2020-10-15 RX ORDER — MIDAZOLAM HYDROCHLORIDE 1 MG/ML
INJECTION INTRAMUSCULAR; INTRAVENOUS PRN
Status: DISCONTINUED | OUTPATIENT
Start: 2020-10-15 | End: 2020-10-15 | Stop reason: SDUPTHER

## 2020-10-15 RX ORDER — PROPOFOL 10 MG/ML
INJECTION, EMULSION INTRAVENOUS PRN
Status: DISCONTINUED | OUTPATIENT
Start: 2020-10-15 | End: 2020-10-15 | Stop reason: SDUPTHER

## 2020-10-15 RX ORDER — ACETAMINOPHEN 500 MG
1000 TABLET ORAL EVERY 6 HOURS PRN
COMMUNITY
End: 2021-11-03

## 2020-10-15 RX ORDER — GLYCOPYRROLATE 1 MG/5 ML
SYRINGE (ML) INTRAVENOUS PRN
Status: DISCONTINUED | OUTPATIENT
Start: 2020-10-15 | End: 2020-10-15 | Stop reason: SDUPTHER

## 2020-10-15 RX ORDER — SODIUM CHLORIDE 9 MG/ML
INJECTION, SOLUTION INTRAVENOUS ONCE
Status: COMPLETED | OUTPATIENT
Start: 2020-10-15 | End: 2020-10-15

## 2020-10-15 RX ADMIN — SODIUM CHLORIDE: 9 INJECTION, SOLUTION INTRAVENOUS at 09:43

## 2020-10-15 RX ADMIN — MIDAZOLAM HYDROCHLORIDE 2 MG: 1 INJECTION, SOLUTION INTRAMUSCULAR; INTRAVENOUS at 10:02

## 2020-10-15 RX ADMIN — LIDOCAINE HYDROCHLORIDE 25 MG: 10 INJECTION, SOLUTION EPIDURAL; INFILTRATION; INTRACAUDAL; PERINEURAL at 10:02

## 2020-10-15 RX ADMIN — Medication 0.2 MG: at 10:02

## 2020-10-15 RX ADMIN — SODIUM CHLORIDE, POTASSIUM CHLORIDE, SODIUM LACTATE AND CALCIUM CHLORIDE: 600; 310; 30; 20 INJECTION, SOLUTION INTRAVENOUS at 10:24

## 2020-10-15 RX ADMIN — PROPOFOL 500 MG: 10 INJECTION, EMULSION INTRAVENOUS at 10:05

## 2020-10-15 RX ADMIN — SODIUM CHLORIDE, POTASSIUM CHLORIDE, SODIUM LACTATE AND CALCIUM CHLORIDE: 600; 310; 30; 20 INJECTION, SOLUTION INTRAVENOUS at 09:45

## 2020-10-15 ASSESSMENT — LIFESTYLE VARIABLES: SMOKING_STATUS: 1

## 2020-10-15 NOTE — ANESTHESIA PRE PROCEDURE
Department of Anesthesiology  Preprocedure Note       Name:  Adrianna Garvin   Age:  45 y.o.  :  1970                                          MRN:  [id number]         Date:  10/15/2020      Surgeon: Mariola Haro):  Ciara Moura MD    Procedure: Procedure(s):  EGD ESOPHAGOGASTRODUODENOSCOPY  COLORECTAL CANCER SCREENING, NOT HIGH RISK    Medications prior to admission:   Prior to Admission medications    Medication Sig Start Date End Date Taking? Authorizing Provider   omeprazole (PRILOSEC) 40 MG delayed release capsule Take 1 capsule by mouth daily 10/2/20   Ciara Moura MD   polyethylene glycol [facility]) 17 GM/SCOOP powder Use as directed by following your patient instructions given by office.  10/2/20   Ciara Moura MD   bisacodyl (DULCOLAX) 5 MG EC tablet TAKE 4 TABS AS DIRECTED BY PHYSICIAN OFFICE 10/2/20   Ciara Moura MD   magnesium citrate solution Take 296 mLs by mouth once for 1 dose 10/2/20 10/2/20  Ciara Moura MD   naproxen (NAPROSYN) 500 MG tablet Take 1 tablet by mouth 2 times daily 20   Daquan Ledezma MD   diclofenac sodium (VOLTAREN) 1 % GEL Apply 4 g topically 4 times daily 20   Daquan Ledezma MD   simethicone (MYLICON) 80 MG chewable tablet Take 1 tablet by mouth 4 times daily as needed for Flatulence 9/15/20   Ciara Moura MD   naproxen (NAPROSYN) 500 MG tablet Take 1 tablet by mouth 2 times daily (with meals) for 10 days 20  Mayank Vicente MD   vitamin D3 (CHOLECALCIFEROL) 25 MCG (1000 UT) TABS tablet  20   Historical Provider, MD   zoster recombinant adjuvanted vaccine [facility]) 50 MCG/0.5ML SUSR injection Inject 0.5 mLs into the muscle See Admin Instructions 1 dose now and repeat in 2-6 months 20  Zion Gagnon, APRN - CNP   traZODone (DESYREL) 100 MG tablet Take 1 tablet by mouth nightly 20   Historical Provider, MD   omeprazole (PRILOSEC) 40 MG delayed release capsule Take 1 capsule by mouth daily 20   Historical Provider, MD   magnesium oxide (MAG-OX) 250 MG TABS tablet Take 1 tablet by mouth daily 7/16/20   Historical Provider, MD   ASPIRIN LOW DOSE 81 MG EC tablet TAKE 1 TABLET BY MOUTH DAILY 5/19/20   Fernanda Segura MD   atorvastatin (LIPITOR) 20 MG tablet TAKE 1 TABLET BY MOUTH DAILY 3/23/20   Fernanda Segura MD   amLODIPine (NORVASC) 10 MG tablet  12/19/19   Historical Provider, MD GUERIN CETIRIZINE HCL 10 MG tablet  12/19/19   Historical Provider, MD   hydrochlorothiazide (HYDRODIURIL) 12.5 MG tablet  12/16/19   Historical Provider, MD   PRE-MOISTENED WITCH HAZEL 50 % PADS Use after each bowel movement 12/26/19   Zion Gagnon, APRN - CNP   lidocaine (LMX) 4 % cream Apply topically every 8 hrs as needed for pain 12/11/19   Fernanda Segura MD   Elastic Bandages & Supports (KNEE BRACE/HINGED/LARGE) MISC Use daily for knee pain, please provide according to size 12/11/19   Fernanda Segura MD   loratadine (CLARITIN) 10 MG tablet Take 1 tablet by mouth daily 12/11/19   Fernanda Segura MD   triamcinolone (KENALOG) 0.025 % cream Apply to rash twice daily 4/2/19   Nora Osgood, MD   ammonium lactate (AMLACTIN) 12 % cream Apply 385 g topically as needed. Apply topically as needed. Historical Provider, MD   PROAIR  (90 BASE) MCG/ACT inhaler Inhale 1 puff into the lungs 4 times daily. 10/8/13   Historical Provider, MD   clotrimazole (LOTRIMIN) 1 % cream Apply  topically 2 times daily. 11/15/13   Historical Provider, MD   fluticasone (FLONASE) 50 MCG/ACT nasal spray 1 spray by Nasal route daily. 10/11/13   Historical Provider, MD   lisinopril (PRINIVIL;ZESTRIL) 30 MG tablet Take 40 mg by mouth daily  10/11/13   Historical Provider, MD   OLANZapine (ZYPREXA) 15 MG tablet Take 15 mg by mouth nightly. 11/18/13   Historical Provider, MD   multivitamin (ANIMAL SHAPES) WITH C & FA CHEW chewable tablet Take  by mouth daily.  11/15/13   Historical Provider, MD       Current medications:    Current Facility-Administered Medications   Medication Dose Route Frequency Provider Last Rate Last Dose    0.9 % sodium chloride infusion   Intravenous Once Ramon Frazier MD           Allergies:     Allergies   Allergen Reactions    Ibuprofen Hives and Nausea Only       Problem List:    Patient Active Problem List   Diagnosis Code    Hypertension I10    Substance abuse ([facility] [address] F19.10    Chronic back pain M54.9, G89.29    Chronic idiopathic constipation K59.04    Eczema L30.9    Mixed hyperlipidemia E78.2    Personal history of tobacco use Z87.891    Major depressive disorder with single episode, in full remission ([facility] [address] F32.5    Acute pain of left knee M25.562    Nicotine dependence, cigarettes, uncomplicated P18.940    Incisional hernia, without obstruction or gangrene K43.2    Vitamin D deficiency E55.9    Chronic fatigue R53.82    Cocaine dependence (HCC) F14.20    Hyperopia with presbyopia, bilateral H52.03, H52.4    Nuclear sclerotic cataract of both eyes H25.13    SI joint arthritis M47.818    Prediabetes R73.03    Elevated liver enzymes R74.8    Rib pain on right side R07.81    Abnormal liver scan (Lymph nodes) R93.2       Past Medical History:        Diagnosis Date    Asthma     Bowel obstruction (HCC)     Chronic back pain     Hypertension     Prediabetes 2020    SI joint arthritis 2020    Substance abuse ([facility] [address]        Past Surgical History:        Procedure Laterality Date     SECTION      HYSTERECTOMY      KNEE SURGERY Right     has pins in right knee     OTHER SURGICAL HISTORY      surgery for bowel obstruction    TUBAL LIGATION         Social History:    Social History     Tobacco Use    Smoking status: Current Every Day Smoker     Packs/day: 1.00     Years: 15.00     Pack years: 15.00     Types: Cigarettes    Smokeless tobacco: Never Used   Substance Use Topics    Alcohol use: Yes     Comment: socially                                Ready to quit: Not Answered  Counseling given: Not Answered      Vital Signs (Current):   Vitals:    10/15/20 0931   BP: (!) 125/101   Pulse: 77   Resp: 20   SpO2: 100%   Weight: 150 lb (68 kg)   Height: 5' 2\" (1.575 m)                                              BP Readings from Last 3 Encounters:   10/15/20 (!) 125/101   10/02/20 120/81   09/22/20 (!) 132/91       NPO Status:                                                                                 BMI:   Wt Readings from Last 3 Encounters:   10/15/20 150 lb (68 kg)   10/02/20 158 lb (71.7 kg)   09/22/20 150 lb (68 kg)     Body mass index is 27.44 kg/m². CBC:   Lab Results   Component Value Date    WBC 4.9 09/24/2020    RBC 4.00 09/24/2020    HGB 12.3 09/24/2020    HCT 36.8 09/24/2020    MCV 92.0 09/24/2020    RDW 13.2 09/24/2020     09/24/2020       CMP:   Lab Results   Component Value Date     09/24/2020    K 4.4 09/24/2020     09/24/2020    CO2 24 09/24/2020    BUN 29 09/24/2020    CREATININE 0.93 09/24/2020    GFRAA >60 09/24/2020    LABGLOM >60 09/24/2020    GLUCOSE 95 09/24/2020    GLUCOSE 95 04/23/2012    PROT 7.4 09/24/2020    CALCIUM 9.1 09/24/2020    BILITOT 0.19 09/24/2020    ALKPHOS 127 09/24/2020    AST 35 09/24/2020    ALT 32 09/24/2020       POC Tests: No results for input(s): POCGLU, POCNA, POCK, POCCL, POCBUN, POCHEMO, POCHCT in the last 72 hours.     Coags:   Lab Results   Component Value Date    PROTIME 11.2 05/29/2014    INR 1.1 05/29/2014    APTT 27.3 12/14/2014       HCG (If Applicable):   Lab Results   Component Value Date    PREGTESTUR NEGATIVE 04/28/2012    HCG NEGATIVE 05/21/2014        ABGs: No results found for: PHART, PO2ART, JPK3VBL, UXU5LVC, BEART, F8UKVBYS     Type & Screen (If Applicable):  No results found for: LABABO, LABRH    Drug/Infectious Status (If Applicable):  Lab Results   Component Value Date    HEPCAB REACTIVE 09/24/2020       COVID-19 Screening (If Applicable):   Lab Results   Component Value Date    COVID19 Not Detected 10/12/2020 Anesthesia Evaluation  Patient summary reviewed and Nursing notes reviewed no history of anesthetic complications:   Airway: Mallampati: II  TM distance: >3 FB   Neck ROM: full  Mouth opening: > = 3 FB Dental:    (+) upper dentures and lower dentures      Pulmonary:normal exam    (+) asthma: current smoker                           Cardiovascular:  Exercise tolerance: good (>4 METS),   (+) hypertension:, hyperlipidemia    (-) past MI, CAD, CABG/stent, dysrhythmias and  angina      Rhythm: regular  Rate: normal           Beta Blocker:  Not on Beta Blocker         Neuro/Psych:   (+) psychiatric history:            GI/Hepatic/Renal: Neg GI/Hepatic/Renal ROS            Endo/Other: Negative Endo/Other ROS                    Abdominal:           Vascular:                                      Anesthesia Plan      MAC and TIVA     ASA 3       Induction: intravenous. Anesthetic plan and risks discussed with patient.       Plan discussed with CRNA and surgical team.                  Sravani Brunson MD   10/15/2020

## 2020-10-15 NOTE — H&P
History and Physical    Pt Name: Norberto Ugalde  MRN: [id number]  YOB: 1970  Date of evaluation: 10/15/2020  Primary Care Physician: Suleman BANUELOSC, YEYO - ABDIAS    SUBJECTIVE:   History of Chief Complaint:    Norberto Ugalde is a 45 y.o. female who is scheduled today for EGD and colonoscopy. She reports history of bowel obstruction 20-25 years ago, but reports no surgical intervention for this. She reports history of GERD, not controlled with current use of Omeprazole. History of chronic HCV. Reports using cocaine, last usage 2 weeks ago. She complains of abdominal bloating. No prior colonoscopy. Allergies  is allergic to ibuprofen. Medications  Prior to Admission medications    Medication Sig Start Date End Date Taking? Authorizing Provider   acetaminophen (TYLENOL) 500 MG tablet Take 1,000 mg by mouth every 6 hours as needed for Pain   Yes Historical Provider, MD   polyethylene glycol (GLYCOLAX) 17 GM/SCOOP powder Use as directed by following your patient instructions given by office.  10/2/20  Yes Joy Child MD   bisacodyl (DULCOLAX) 5 MG EC tablet TAKE 4 TABS AS DIRECTED BY PHYSICIAN OFFICE 10/2/20  Yes Joy Child MD   naproxen (NAPROSYN) 500 MG tablet Take 1 tablet by mouth 2 times daily 9/22/20  Yes Omaira Weller MD   diclofenac sodium (VOLTAREN) 1 % GEL Apply 4 g topically 4 times daily 9/22/20  Yes Omaira Weller MD   simethicone (MYLICON) 80 MG chewable tablet Take 1 tablet by mouth 4 times daily as needed for Flatulence 9/15/20  Yes Joy Child MD   vitamin D3 (CHOLECALCIFEROL) 25 MCG (1000 UT) TABS tablet  8/12/20  Yes Historical Provider, MD   traZODone (DESYREL) 100 MG tablet Take 1 tablet by mouth nightly 7/13/20  Yes Historical Provider, MD   omeprazole (PRILOSEC) 40 MG delayed release capsule Take 1 capsule by mouth daily 7/7/20  Yes Historical Provider, MD   magnesium oxide (MAG-OX) 250 MG TABS tablet Take 1 tablet by mouth daily 7/16/20  Yes Historical Provider, MD ASPIRIN LOW DOSE 81 MG EC tablet TAKE 1 TABLET BY MOUTH DAILY 5/19/20  Yes Florencio Handy MD   atorvastatin (LIPITOR) 20 MG tablet TAKE 1 TABLET BY MOUTH DAILY 3/23/20  Yes Florencio Handy MD   amLODIPine (NORVASC) 10 MG tablet  12/19/19  Yes Historical Provider, MD   hydrochlorothiazide (HYDRODIURIL) 12.5 MG tablet  12/16/19  Yes Historical Provider, MD   loratadine (CLARITIN) 10 MG tablet Take 1 tablet by mouth daily 12/11/19  Yes Florencio Handy MD   PROAIR  (90 BASE) MCG/ACT inhaler Inhale 1 puff into the lungs 4 times daily. 10/8/13  Yes Historical Provider, MD   fluticasone (FLONASE) 50 MCG/ACT nasal spray 1 spray by Nasal route daily. 10/11/13  Yes Historical Provider, MD   OLANZapine (ZYPREXA) 15 MG tablet Take 15 mg by mouth nightly. 11/18/13  Yes Historical Provider, MD   multivitamin (ANIMAL SHAPES) WITH C & FA CHEW chewable tablet Take  by mouth daily.  11/15/13  Yes Historical Provider, MD   omeprazole (PRILOSEC) 40 MG delayed release capsule Take 1 capsule by mouth daily 10/2/20   Doron Lynn MD   magnesium citrate solution Take 296 mLs by mouth once for 1 dose 10/2/20 10/2/20  Doron Lynn MD   naproxen (NAPROSYN) 500 MG tablet Take 1 tablet by mouth 2 times daily (with meals) for 10 days 8/30/20 9/9/20  Alin Enriquez MD   zoster recombinant adjuvanted vaccine [facility]) 50 MCG/0.5ML SUSR injection Inject 0.5 mLs into the muscle See Admin Instructions 1 dose now and repeat in 2-6 months 8/5/20 2/1/21  YEYO Velasquez CNP   HM CETIRIZINE HCL 10 MG tablet  12/19/19   Historical Provider, MD   PRE-MOISTENED WITCH HAZEL 50 % PADS Use after each bowel movement 12/26/19   YEYO Velasquez CNP   lidocaine (LMX) 4 % cream Apply topically every 8 hrs as needed for pain 12/11/19   Florencio Handy MD   Elastic Bandages & Supports (KNEE BRACE/HINGED/LARGE) MISC Use daily for knee pain, please provide according to size 12/11/19   Florencio Handy MD   triamcinolone (KENALOG) 0.025 % cream Apply to rash twice daily 19   Callie Pike MD   ammonium lactate (AMLACTIN) 12 % cream Apply 385 g topically as needed. Apply topically as needed. Historical Provider, MD   clotrimazole (LOTRIMIN) 1 % cream Apply  topically 2 times daily. 11/15/13   Historical Provider, MD   lisinopril (PRINIVIL;ZESTRIL) 30 MG tablet Take 40 mg by mouth daily  10/11/13   Historical Provider, MD     Past Medical History    has a past medical history of Asthma, Bowel obstruction ([address] Chronic back pain, Chronic hepatitis ([address] History of anemia, Hyperlipidemia, Hypertension, Prediabetes, SI joint arthritis, Substance abuse ([facility] [address] Wears dentures, and Wears glasses. Past Surgical History   has a past surgical history that includes Hysterectomy;  section; other surgical history ( or earlier ?); Tubal ligation; knee surgery (Right); and brain surgery (N/A, age 5). Social History   reports that she has been smoking cigarettes. She has a 15.00 pack-year smoking history. She has never used smokeless tobacco.   reports current alcohol use. reports previous drug use. Drugs: Cocaine and Marijuana. Marital Status single  Children 6  Occupation none  Family History  Family Status   Relation Name Status    Mother      Father      MGM      MGF      PGM      PGF       family history includes High Blood Pressure in her father and mother. OBJECTIVE:   VITALS:  height is 5' 2\" (1.575 m) and weight is 150 lb (68 kg). Her blood pressure is 125/101 (abnormal) and her pulse is 77. Her respiration is 20 and oxygen saturation is 100%. CONSTITUTIONAL:Alert and orientated to person, place and time. No acute distress. Friendly. Talkative. Vague historian.    SKIN:  Warm & dry, no rashes on exposed skin  HEENT: HEAD: Normocephalic, atraumatic        EYES:  PERRL, EOMs intact, conjunctiva clear, wearing glasses       EARS:  Equal bilaterally, no edema or thickening,

## 2020-10-15 NOTE — PROGRESS NOTES
DISCHARGE CRITERIA MET INSTRUCTIONS GIVEN IV DISCONTINUED SIE CLEAR 400 ML DISCARDED UP WITH STEADY GAIT

## 2020-10-15 NOTE — ANESTHESIA POSTPROCEDURE EVALUATION
Department of Anesthesiology  Postprocedure Note    Patient: Isabell Frances  MRN: [id number]  YOB: 1970  Date of evaluation: 10/15/2020  Time:  1:29 PM     Procedure Summary     Date:  10/15/20 Room / Location:  [address]    Anesthesia Start:  1002 Anesthesia Stop:  1039    Procedures:       EGD BIOPSY (N/A )      COLONOSCOPY POLYPECTOMY SNARE/COLD BIOPSY (N/A ) Diagnosis:  (DYSPEPSIA, SCREENING)    Surgeon:  Shey Savage MD Responsible Provider:  Laurence Ugarte MD    Anesthesia Type:  MAC, TIVA ASA Status:  3          Anesthesia Type: MAC, TIVA    Apollo Phase I:      Apollo Phase II: Apollo Score: 9    Last vitals: Reviewed and per EMR flowsheets.        Anesthesia Post Evaluation    Patient location during evaluation: PACU  Patient participation: complete - patient participated  Level of consciousness: awake and alert  Pain score: 0  Airway patency: patent  Nausea & Vomiting: no nausea and no vomiting  Complications: no  Cardiovascular status: hemodynamically stable  Respiratory status: room air  Hydration status: euvolemic

## 2020-10-15 NOTE — OP NOTE
[facility]    [facility] ENDOSCOPY     EGD    PROCEDURE DATE: 10/15/20    REFERRING PHYSICIAN: No ref. provider found     PRIMARY CARE PROVIDER: Aimee Huertas NP-C, APRN - NP    ATTENDING PHYSICIAN: Erika Antonio MD     HISTORY: Ms. Roxane Omer is a 45 y.o. female who presents to the [facility] Endoscopy unit for upper endoscopy. The patient's clinical history is remarkable for HTN, substance abuse, HL, MDD, vit D deficiency, chronic dyspepsia, referred for diagnostic EGD and screening colonoscopy . She is currently medically stable and appropriate for the planned procedure. PREOPERATIVE DIAGNOSIS: Dyspepsia. PROCEDURES:   1) Transoral Upper Endoscopy with cold biopsy. POSTOPERATIVE DIAGNOSIS:     1) Small paraesophageal hernia identified during retroflexion, 1 cm in size. 2) Normal appearing esophagus, stomach and duodenum     MEDICATIONS:   MAC per anesthesia     EBL:  <10cc    INSTRUMENT: Olympus GIF-H180  flexible Gastroscope. PREPARATION: The nature and character of the procedure as well as risks, benefits, and alternatives were discussed with the patient and informed consent was obtained. Complications were said to include, but were not limited to: medication allergy, medication reaction, cardiovascular and respiratory problems, bleeding, perforation, infection, and/or missed diagnosis. Following arrival in the endoscopy room, the patient was placed in the left lateral decubitus position and final time-out accomplished in the presence of the nursing staff. Baseline vital signs were obtained and reviewed, and IV sedation was subsequently initiated. FINDINGS:   Esophagus: The esophagus was inspected to the Z-line. The endoscopic exam showed normal appearing proximal, mid-, and distal esophagus. Stomach: The stomach was inspected in both forward and retroflex fashion and was appropriately distensible.  The cardia, fundus, incisura, antrum and pylorus were identified via detailed above. She is currently medically stable and appropriate for the planned procedure. PREOPERATIVE DIAGNOSIS: screening for colon cancer. PROCEDURES:   Transanal Colonoscopy with polypectomy (cold biopsy). POSTPROCEDURE DIAGNOSIS:    POOR PREP (evidence of large amount of semi-solid green stool through out the colon)    1) Small flat polypoid appearing lesion in the transverse colon, 5mm in size, s/p cold biopsy and removal  2) Small polyp identified in the rectosigmoid area, 6mm  In size, hyperplastic appearing. S/p cold biopsy and removal.  3) Small internal hemorrhoids    MEDICATIONS:     MAC per anesthesia     EBL <10cc      INSTRUMENT: Olympus CF-H180 AL Pediatric flexible Colonoscope. PREPARATION: The nature and character of the procedure as well as risks, benefits, and alternatives were discussed with the patient and informed consent was obtained. Complications were said to include, but were not limited to: medication allergy, medication reaction, cardiovascular and respiratory problems, bleeding, perforation, infection, and/or missed diagnosis. Following arrival in the endoscopy room, the patient was placed in the left lateral decubitus position and final time-out accomplished in the presence of the nursing staff. Baseline vital signs were obtained and reviewed, and IV sedation was subsequently initiated. FINDINGS: Rectal examination demonstrated no significant visible external abnormality and digital palpation was unremarkable. Following adequate conscious sedation the colonoscope was introduced and advanced under direct visualization to the cecum, which was identified by the ileocecal valve and appendiceal orifice. The bowel preparation was felt to be POOR. This included large amounts of green, thick stool that was not able to be adequately irrigated and aspirated. Cecal intubation time was 9 minutes.      Once maximally inserted, the endoscope was withdrawn and the mucosa was carefully inspected. The mucosal exam was revealed small polyp in the sigmoid and transverse colon that was biopsied and removed. Retroflexion was performed in the rectum and small internal hemorrhoids. Withdrawal time was 23 minutes. IMPRESSION:     POOR PREP (evidence of large amount of semi-solid green stool through out the colon)    1) Small flat polypoid appearing lesion in the transverse colon, 5mm in size, s/p cold biopsy and removal  2) Small polyp identified in the rectosigmoid area, 6mm  In size, hyperplastic appearing. S/p cold biopsy and removal.  3) Small internal hemorrhoids      RECOMMENDATIONS:   1) Follow up with referring provider, as previously scheduled. 2) Repeat Colonoscopy in 3-6 months with extended bowel prep regimen. 3) Follow up path in GI clinic      [facility]   10/15/20    This note is created with the assistance of a speech recognition program.  While intending to generate a document that actually reflects the content of the visit, the document can still have some errors including those of syntax and sound a like substitutions which may escape proof reading. It such instances, actual meaning can be extrapolated by contextual diversion. The patient was counseled at length about the risks of darby Covid-19 during their perioperative period and any recovery window from their procedure. The patient was made aware that darby Covid-19  may worsen their prognosis for recovering from their procedure  and lend to a higher morbidity and/or mortality risk. All material risks, benefits, and reasonable alternatives including postponing the procedure were discussed. The patient DOES wish to proceed with the procedure at this time.

## 2020-10-16 LAB — SURGICAL PATHOLOGY REPORT: NORMAL

## 2020-10-22 ENCOUNTER — TELEPHONE (OUTPATIENT)
Dept: FAMILY MEDICINE CLINIC | Age: 50
End: 2020-10-22

## 2020-10-25 ENCOUNTER — APPOINTMENT (OUTPATIENT)
Dept: CT IMAGING | Age: 50
End: 2020-10-25
Payer: COMMERCIAL

## 2020-10-25 ENCOUNTER — HOSPITAL ENCOUNTER (OUTPATIENT)
Age: 50
Setting detail: OBSERVATION
Discharge: HOME | End: 2020-10-26
Attending: EMERGENCY MEDICINE | Admitting: EMERGENCY MEDICINE
Payer: COMMERCIAL

## 2020-10-25 PROBLEM — R10.9 ABDOMINAL PAIN: Status: ACTIVE | Noted: 2020-10-25

## 2020-10-25 LAB
-: NORMAL
ABSOLUTE EOS #: 0.05 K/UL (ref 0–0.44)
ABSOLUTE IMMATURE GRANULOCYTE: <0.03 K/UL (ref 0–0.3)
ABSOLUTE LYMPH #: 1.72 K/UL (ref 1.1–3.7)
ABSOLUTE MONO #: 0.5 K/UL (ref 0.1–1.2)
ALBUMIN SERPL-MCNC: 4.1 G/DL (ref 3.5–5.2)
ALBUMIN/GLOBULIN RATIO: 1.3 (ref 1–2.5)
ALP BLD-CCNC: 132 U/L (ref 35–104)
ALT SERPL-CCNC: 24 U/L (ref 5–33)
AMORPHOUS: NORMAL
ANION GAP SERPL CALCULATED.3IONS-SCNC: 14 MMOL/L (ref 9–17)
AST SERPL-CCNC: 30 U/L
BACTERIA: NORMAL
BASOPHILS # BLD: 0 % (ref 0–2)
BASOPHILS ABSOLUTE: <0.03 K/UL (ref 0–0.2)
BILIRUB SERPL-MCNC: 0.17 MG/DL (ref 0.3–1.2)
BILIRUBIN URINE: NEGATIVE
BUN BLDV-MCNC: 9 MG/DL (ref 6–20)
BUN/CREAT BLD: ABNORMAL (ref 9–20)
CALCIUM SERPL-MCNC: 9.5 MG/DL (ref 8.6–10.4)
CASTS UA: NORMAL /LPF (ref 0–8)
CHLORIDE BLD-SCNC: 103 MMOL/L (ref 98–107)
CO2: 22 MMOL/L (ref 20–31)
COLOR: YELLOW
CREAT SERPL-MCNC: 0.68 MG/DL (ref 0.5–0.9)
CRYSTALS, UA: NORMAL /HPF
DIFFERENTIAL TYPE: NORMAL
EOSINOPHILS RELATIVE PERCENT: 1 % (ref 1–4)
EPITHELIAL CELLS UA: NORMAL /HPF (ref 0–5)
GFR AFRICAN AMERICAN: >60 ML/MIN
GFR NON-AFRICAN AMERICAN: >60 ML/MIN
GFR SERPL CREATININE-BSD FRML MDRD: ABNORMAL ML/MIN/{1.73_M2}
GFR SERPL CREATININE-BSD FRML MDRD: ABNORMAL ML/MIN/{1.73_M2}
GLUCOSE BLD-MCNC: 124 MG/DL (ref 70–99)
GLUCOSE URINE: NEGATIVE
HCT VFR BLD CALC: 40.1 % (ref 36.3–47.1)
HEMOGLOBIN: 12.6 G/DL (ref 11.9–15.1)
IMMATURE GRANULOCYTES: 0 %
KETONES, URINE: NEGATIVE
LEUKOCYTE ESTERASE, URINE: NEGATIVE
LIPASE: 35 U/L (ref 13–60)
LYMPHOCYTES # BLD: 26 % (ref 24–43)
MCH RBC QN AUTO: 29.4 PG (ref 25.2–33.5)
MCHC RBC AUTO-ENTMCNC: 31.4 G/DL (ref 28.4–34.8)
MCV RBC AUTO: 93.5 FL (ref 82.6–102.9)
MONOCYTES # BLD: 8 % (ref 3–12)
MUCUS: NORMAL
NITRITE, URINE: NEGATIVE
NRBC AUTOMATED: 0 PER 100 WBC
OTHER OBSERVATIONS UA: NORMAL
PDW BLD-RTO: 12.4 % (ref 11.8–14.4)
PH UA: 6 (ref 5–8)
PLATELET # BLD: 329 K/UL (ref 138–453)
PLATELET ESTIMATE: NORMAL
PMV BLD AUTO: 9.7 FL (ref 8.1–13.5)
POTASSIUM SERPL-SCNC: 4.3 MMOL/L (ref 3.7–5.3)
PROTEIN UA: NEGATIVE
RBC # BLD: 4.29 M/UL (ref 3.95–5.11)
RBC # BLD: NORMAL 10*6/UL
RBC UA: NORMAL /HPF (ref 0–4)
RENAL EPITHELIAL, UA: NORMAL /HPF
SEG NEUTROPHILS: 65 % (ref 36–65)
SEGMENTED NEUTROPHILS ABSOLUTE COUNT: 4.32 K/UL (ref 1.5–8.1)
SODIUM BLD-SCNC: 139 MMOL/L (ref 135–144)
SPECIFIC GRAVITY UA: 1.01 (ref 1–1.03)
TOTAL PROTEIN: 7.3 G/DL (ref 6.4–8.3)
TRICHOMONAS: NORMAL
TROPONIN INTERP: NORMAL
TROPONIN T: NORMAL NG/ML
TROPONIN, HIGH SENSITIVITY: 12 NG/L (ref 0–14)
TURBIDITY: CLEAR
URINE HGB: NEGATIVE
UROBILINOGEN, URINE: NORMAL
WBC # BLD: 6.6 K/UL (ref 3.5–11.3)
WBC # BLD: NORMAL 10*3/UL
WBC UA: NORMAL /HPF (ref 0–5)
YEAST: NORMAL

## 2020-10-25 PROCEDURE — 96376 TX/PRO/DX INJ SAME DRUG ADON: CPT

## 2020-10-25 PROCEDURE — G0378 HOSPITAL OBSERVATION PER HR: HCPCS

## 2020-10-25 PROCEDURE — 83690 ASSAY OF LIPASE: CPT

## 2020-10-25 PROCEDURE — 99285 EMERGENCY DEPT VISIT HI MDM: CPT

## 2020-10-25 PROCEDURE — 6360000002 HC RX W HCPCS: Performed by: STUDENT IN AN ORGANIZED HEALTH CARE EDUCATION/TRAINING PROGRAM

## 2020-10-25 PROCEDURE — 74177 CT ABD & PELVIS W/CONTRAST: CPT

## 2020-10-25 PROCEDURE — 6370000000 HC RX 637 (ALT 250 FOR IP): Performed by: STUDENT IN AN ORGANIZED HEALTH CARE EDUCATION/TRAINING PROGRAM

## 2020-10-25 PROCEDURE — 96372 THER/PROPH/DIAG INJ SC/IM: CPT

## 2020-10-25 PROCEDURE — 96365 THER/PROPH/DIAG IV INF INIT: CPT

## 2020-10-25 PROCEDURE — 84484 ASSAY OF TROPONIN QUANT: CPT

## 2020-10-25 PROCEDURE — 81001 URINALYSIS AUTO W/SCOPE: CPT

## 2020-10-25 PROCEDURE — 96374 THER/PROPH/DIAG INJ IV PUSH: CPT

## 2020-10-25 PROCEDURE — 93005 ELECTROCARDIOGRAM TRACING: CPT | Performed by: STUDENT IN AN ORGANIZED HEALTH CARE EDUCATION/TRAINING PROGRAM

## 2020-10-25 PROCEDURE — 6360000004 HC RX CONTRAST MEDICATION: Performed by: STUDENT IN AN ORGANIZED HEALTH CARE EDUCATION/TRAINING PROGRAM

## 2020-10-25 PROCEDURE — 96366 THER/PROPH/DIAG IV INF ADDON: CPT

## 2020-10-25 PROCEDURE — 85025 COMPLETE CBC W/AUTO DIFF WBC: CPT

## 2020-10-25 PROCEDURE — 80053 COMPREHEN METABOLIC PANEL: CPT

## 2020-10-25 PROCEDURE — 2580000003 HC RX 258: Performed by: STUDENT IN AN ORGANIZED HEALTH CARE EDUCATION/TRAINING PROGRAM

## 2020-10-25 PROCEDURE — 96375 TX/PRO/DX INJ NEW DRUG ADDON: CPT

## 2020-10-25 RX ORDER — ACETAMINOPHEN 325 MG/1
650 TABLET ORAL EVERY 4 HOURS PRN
Status: DISCONTINUED | OUTPATIENT
Start: 2020-10-25 | End: 2020-10-26 | Stop reason: HOSPADM

## 2020-10-25 RX ORDER — SODIUM CHLORIDE 0.9 % (FLUSH) 0.9 %
10 SYRINGE (ML) INJECTION EVERY 12 HOURS SCHEDULED
Status: DISCONTINUED | OUTPATIENT
Start: 2020-10-25 | End: 2020-10-26 | Stop reason: HOSPADM

## 2020-10-25 RX ORDER — DIPHENHYDRAMINE HCL 25 MG
25 TABLET ORAL EVERY 6 HOURS PRN
Status: DISCONTINUED | OUTPATIENT
Start: 2020-10-25 | End: 2020-10-26 | Stop reason: HOSPADM

## 2020-10-25 RX ORDER — MORPHINE SULFATE 4 MG/ML
4 INJECTION, SOLUTION INTRAMUSCULAR; INTRAVENOUS ONCE
Status: COMPLETED | OUTPATIENT
Start: 2020-10-25 | End: 2020-10-25

## 2020-10-25 RX ORDER — ADENOSINE 3 MG/ML
INJECTION, SOLUTION INTRAVENOUS
Status: DISPENSED
Start: 2020-10-25 | End: 2020-10-26

## 2020-10-25 RX ORDER — ONDANSETRON 2 MG/ML
4 INJECTION INTRAMUSCULAR; INTRAVENOUS ONCE
Status: COMPLETED | OUTPATIENT
Start: 2020-10-25 | End: 2020-10-25

## 2020-10-25 RX ORDER — ONDANSETRON 4 MG/1
8 TABLET, ORALLY DISINTEGRATING ORAL EVERY 8 HOURS PRN
Status: DISCONTINUED | OUTPATIENT
Start: 2020-10-25 | End: 2020-10-26 | Stop reason: HOSPADM

## 2020-10-25 RX ORDER — SODIUM CHLORIDE 0.9 % (FLUSH) 0.9 %
10 SYRINGE (ML) INJECTION PRN
Status: DISCONTINUED | OUTPATIENT
Start: 2020-10-25 | End: 2020-10-26 | Stop reason: HOSPADM

## 2020-10-25 RX ORDER — SODIUM CHLORIDE, SODIUM LACTATE, POTASSIUM CHLORIDE, AND CALCIUM CHLORIDE .6; .31; .03; .02 G/100ML; G/100ML; G/100ML; G/100ML
1000 INJECTION, SOLUTION INTRAVENOUS ONCE
Status: COMPLETED | OUTPATIENT
Start: 2020-10-25 | End: 2020-10-25

## 2020-10-25 RX ORDER — DICYCLOMINE HYDROCHLORIDE 10 MG/ML
20 INJECTION INTRAMUSCULAR ONCE
Status: DISCONTINUED | OUTPATIENT
Start: 2020-10-25 | End: 2020-10-25

## 2020-10-25 RX ORDER — DICYCLOMINE HYDROCHLORIDE 10 MG/1
20 CAPSULE ORAL ONCE
Status: COMPLETED | OUTPATIENT
Start: 2020-10-25 | End: 2020-10-25

## 2020-10-25 RX ORDER — MORPHINE SULFATE 4 MG/ML
4 INJECTION, SOLUTION INTRAMUSCULAR; INTRAVENOUS EVERY 4 HOURS PRN
Status: DISCONTINUED | OUTPATIENT
Start: 2020-10-25 | End: 2020-10-26 | Stop reason: HOSPADM

## 2020-10-25 RX ADMIN — MORPHINE SULFATE 4 MG: 4 INJECTION INTRAVENOUS at 17:38

## 2020-10-25 RX ADMIN — MORPHINE SULFATE 4 MG: 4 INJECTION INTRAVENOUS at 13:44

## 2020-10-25 RX ADMIN — SODIUM CHLORIDE, POTASSIUM CHLORIDE, SODIUM LACTATE AND CALCIUM CHLORIDE 1000 ML: 600; 310; 30; 20 INJECTION, SOLUTION INTRAVENOUS at 13:44

## 2020-10-25 RX ADMIN — DICYCLOMINE HYDROCHLORIDE 20 MG: 10 CAPSULE ORAL at 16:41

## 2020-10-25 RX ADMIN — DIPHENHYDRAMINE HCL 25 MG: 25 TABLET ORAL at 23:41

## 2020-10-25 RX ADMIN — IOPAMIDOL 75 ML: 755 INJECTION, SOLUTION INTRAVENOUS at 15:34

## 2020-10-25 RX ADMIN — ONDANSETRON 4 MG: 2 INJECTION INTRAMUSCULAR; INTRAVENOUS at 13:45

## 2020-10-25 RX ADMIN — SODIUM CHLORIDE, PRESERVATIVE FREE 10 ML: 5 INJECTION INTRAVENOUS at 20:19

## 2020-10-25 RX ADMIN — ACETAMINOPHEN 650 MG: 325 TABLET ORAL at 21:57

## 2020-10-25 ASSESSMENT — PAIN DESCRIPTION - LOCATION
LOCATION: BACK
LOCATION: CHEST;ABDOMEN

## 2020-10-25 ASSESSMENT — ENCOUNTER SYMPTOMS
NAUSEA: 1
CONSTIPATION: 1
BLOOD IN STOOL: 0
WHEEZING: 0
RHINORRHEA: 0
PHOTOPHOBIA: 0
SHORTNESS OF BREATH: 0
ABDOMINAL PAIN: 1
VOMITING: 1

## 2020-10-25 ASSESSMENT — PAIN DESCRIPTION - ORIENTATION: ORIENTATION: LOWER;RIGHT

## 2020-10-25 ASSESSMENT — PAIN DESCRIPTION - DESCRIPTORS: DESCRIPTORS: STABBING

## 2020-10-25 ASSESSMENT — PAIN SCALES - GENERAL
PAINLEVEL_OUTOF10: 9
PAINLEVEL_OUTOF10: 10
PAINLEVEL_OUTOF10: 10
PAINLEVEL_OUTOF10: 6
PAINLEVEL_OUTOF10: 0

## 2020-10-25 ASSESSMENT — PAIN DESCRIPTION - PAIN TYPE
TYPE: ACUTE PAIN
TYPE: ACUTE PAIN

## 2020-10-25 NOTE — ED PROVIDER NOTES
[facility]     Emergency Department     Faculty Attestation    I performed a history and physical examination of the patient and discussed management with the resident. I reviewed the residents note and agree with the documented findings including all diagnostic interpretations and plan of care. Any areas of disagreement are noted on the chart. I was personally present for the key portions of any procedures. I have documented in the chart those procedures where I was not present during the key portions. I have reviewed the emergency nurses triage note. I agree with the chief complaint, past medical history, past surgical history, allergies, medications, social and family history as documented unless otherwise noted below. Documentation of the HPI, Physical Exam and Medical Decision Making performed by scribrichelle is based on my personal performance of the HPI, PE and MDM. For Physician Assistant/ Nurse Practitioner cases/documentation I have personally evaluated this patient and have completed at least one if not all key elements of the E/M (history, physical exam, and MDM). Additional findings are as noted. This patient was evaluated in the Emergency Department for symptoms described in the history of present illness. He/she was evaluated in the context of the global COVID-19 pandemic, which necessitated consideration that the patient might be at risk for infection with the SARS-CoV-2 virus that causes COVID-19. Institutional protocols and algorithms that pertain to the evaluation of patients at risk for COVID-19 are in a state of rapid change based on information released by regulatory bodies including the CDC and federal and state organizations. These policies and algorithms were followed during the patient's care in the ED. Primary Care Physician: Richard BANUELOSC, APRN - NP    History:  This is a 45 y.o. female who presents to the Emergency Department with complaint of abdom pain. Epigastric. No fever. Nausea but no vomiting. Recent unremarkable EGD. Physical:     weight is 150 lb (68 kg). Her temporal temperature is 97.2 °F (36.2 °C). Her blood pressure is 174/109 (abnormal) and her pulse is 83. Her respiration is 18 and oxygen saturation is 98%.    45 y.o. female No acute distress, alert, answering questions appropriately, cardiac exam regular rate and rhythm no murmurs rubs gallops, pulmonary clear to auscultation bilaterally, abdomen is soft somewhat tender to epigastric and RUQ, nondistended, radial pulse 2+ bilaterally.     Impression: abdom pain    Plan: labs, CT abdom, reassess    EKG Interpretation  EKG Interpretation    Interpreted by emergency department physician    Rhythm: normal sinus   Rate: normal  Axis: normal  Ectopy: none  Conduction: normal  ST Segments: no acute change  T Waves: no acute change  Q Waves: none    Clinical Impression: no acute changes    Avery Chicas Dzurik    Interpreted by me      Medardo Henriquez MD, Kathryn Cruz  Attending Emergency Physician         Renea Grover MD  10/25/20 0011

## 2020-10-25 NOTE — ED PROVIDER NOTES
FACULTY SIGN-OUT  ADDENDUM     Care of this patient was assumed from previous attending physician. The patient's initial evaluation and plan have been discussed with the prior provider who initially evaluated the patient. Attestation  I was available and discussed any additional care issues that arose and coordinated the management plans with the resident(s) caring for the patient during my duty period. Any areas of disagreement with resident's documentation of care or procedures are noted on the chart. I was personally present for the key portions of any/all procedures, during my duty period. I have documented in the chart those procedures where I was not present during the key portions. ED COURSE      The patient was given the following medications:  Orders Placed This Encounter   Medications    adenosine (ADENOCARD) 6 MG/2ML injection     VIET [facility]LLS: cabinet override    lactated ringers bolus    ondansetron (ZOFRAN) injection 4 mg    morphine injection 4 mg       RECENT VITALS:   Temp: 97.2 °F (36.2 °C), Pulse: 83, Resp: 18, BP: (!) 174/109    MEDICAL DECISION MAKING        Julius Stewart is a 45 y.o. female who presents to the Emergency Department with complaints of abdominal pain with recent EGD approximately 1 week ago. CT scan abdomen is pending at this time. Flako Greene MD, F.A.C.E.P.   Attending Emergency Physician    (Please note that portions of this note were completed with a voice recognition program.  Efforts were made to edit the dictations but occasionally words are mis-transcribed.)         Flako Greene MD  10/25/20 6948

## 2020-10-25 NOTE — ED NOTES
Pt to ED with complaint of right upper abdominal pain x2 days, left chest pain x2 days, vomiting x1. Pt states she used cocaine and etoh within the past few days. Pt states she had colonoscopy 9-10 days ago and states she was told she has lots of stool in her still. Pt also feels that she maybe constipated.       Herman Rinaldi RN  10/25/20 2843

## 2020-10-25 NOTE — ED PROVIDER NOTES
[facility] [address] [facility]  Emergency Department  Emergency Medicine Resident Sign-out     Care of Issac Purcell was assumed from Dr. Neel Blakely and is being seen for Abdominal Pain and Chest Pain  . The patient's initial evaluation and plan have been discussed with the prior provider who initially evaluated the patient. EMERGENCY DEPARTMENT COURSE / MEDICAL DECISION MAKING:       MEDICATIONS GIVEN:  Orders Placed This Encounter   Medications    adenosine (ADENOCARD) 6 MG/2ML injection     [facility]: cabinet override    lactated ringers bolus    ondansetron (ZOFRAN) injection 4 mg    morphine injection 4 mg    iopamidol (ISOVUE-370) 76 % injection 75 mL    DISCONTD: dicyclomine (BENTYL) injection 20 mg    dicyclomine (BENTYL) capsule 20 mg    morphine injection 4 mg       LABS / RADIOLOGY:     Labs Reviewed   COMPREHENSIVE METABOLIC PANEL - Abnormal; Notable for the following components:       Result Value    Glucose 124 (*)     Alkaline Phosphatase 132 (*)     Total Bilirubin 0.17 (*)     All other components within normal limits   CBC WITH AUTO DIFFERENTIAL   LIPASE   TROPONIN   URINALYSIS WITH MICROSCOPIC       No results found. RECENT VITALS:     Temp: 97.2 °F (36.2 °C),  Pulse: 65, Resp: 18, BP: 129/67, SpO2: 96 %      This patient is a 45 y.o. Female with generalized abdominal pain, there is initial concern that she may have obstruction, laboratory work-up grossly unremarkable, CT abdomen pelvis is pending. Reassess patient. ED Course as of Oct 25 1757   Sun Oct 25, 2020   1424 No UTI   Troponin, High Sensitivity: 12 [KD]   1425 WBC: 6.6 [KD]   1430 Lipase: 35 [KD]   1520 Awaiting CT scan    [KD]   1550 Enteritis vs illeus, AVN of the bilateral femoral heads    [RB]   1556 Paulie with patient, she feels that she is no longer nauseous however she is still having intermittent pain in the right upper abdomen. Will give patient Bentyl and reassess.     [RB]   6935 Admission to ETU for pain control.     [RB]      ED Course User Index  [KD] Quinton Roblero DO  [RB] Aiyana Basilio DO       OUTSTANDING TASKS / RECOMMENDATIONS:    1. CT abdomen pelvis if negative discharge. FINAL IMPRESSION:     1. Abdominal pain, epigastric        DISPOSITION:         DISPOSITION:  []  Discharge   []  Transfer -    []  Admission -     []  Against Medical Advice   []  Eloped   FOLLOW-UP: No follow-up provider specified.    DISCHARGE MEDICATIONS: New Prescriptions    No medications on file          DO Dr. Sanket Beckham, [facility] Emergency [facility] Medicine Resident PGY - [id number] [address]        Aiyana Basilio [place]  Resident  10/25/20 3582

## 2020-10-25 NOTE — ED PROVIDER NOTES
education level: Not on file   Occupational History    Not on file   Social Needs    Financial resource strain: Somewhat hard    Food insecurity     Worry: Sometimes true     Inability: Sometimes true    Transportation needs     Medical: No     Non-medical: No   Tobacco Use    Smoking status: Current Every Day Smoker     Packs/day: 1.00     Years: 15.00     Pack years: 15.00     Types: Cigarettes    Smokeless tobacco: Never Used   Substance and Sexual Activity    Alcohol use: Yes     Comment: socially    Drug use: Not Currently     Types: Cocaine, Marijuana     Comment: last cocaine use 10/21/20    Sexual activity: Yes   Lifestyle    Physical activity     Days per week: Not on file     Minutes per session: Not on file    Stress: Not on file   Relationships    Social connections     Talks on phone: Not on file     Gets together: Not on file     Attends Bahai service: Not on file     Active member of club or organization: Not on file     Attends meetings of clubs or organizations: Not on file     Relationship status: Not on file    Intimate partner violence     Fear of current or ex partner: Not on file     Emotionally abused: Not on file     Physically abused: Not on file     Forced sexual activity: Not on file   Other Topics Concern    Not on file   Social History Narrative    Not on file       Family History   Problem Relation Age of Onset    High Blood Pressure Mother     High Blood Pressure Father         Allergies:  Ibuprofen    Home Medications:  Prior to Admission medications    Medication Sig Start Date End Date Taking?  Authorizing Provider   acetaminophen (TYLENOL) 500 MG tablet Take 1,000 mg by mouth every 6 hours as needed for Pain   Yes Historical Provider, MD   omeprazole (PRILOSEC) 40 MG delayed release capsule Take 1 capsule by mouth daily 10/2/20  Yes Rosalva Pizarro MD   polyethylene glycol [facility]) 17 GM/SCOOP powder Use as directed by following your patient instructions given by office.  10/2/20  Yes Louie Calvillo MD   bisacodyl (DULCOLAX) 5 MG EC tablet TAKE 4 TABS AS DIRECTED BY PHYSICIAN OFFICE 10/2/20  Yes Louie Calvillo MD   naproxen (NAPROSYN) 500 MG tablet Take 1 tablet by mouth 2 times daily 9/22/20  Yes Amanda Esquivel MD   diclofenac sodium (VOLTAREN) 1 % GEL Apply 4 g topically 4 times daily 9/22/20  Yes Amanda Esquivel MD   simethicone (MYLICON) 80 MG chewable tablet Take 1 tablet by mouth 4 times daily as needed for Flatulence 9/15/20  Yes Louie Calvillo MD   vitamin D3 (CHOLECALCIFEROL) 25 MCG (1000 UT) TABS tablet  8/12/20  Yes Historical Provider, MD   zoster recombinant adjuvanted vaccine [facility]) 50 MCG/0.5ML SUSR injection Inject 0.5 mLs into the muscle See Admin Instructions 1 dose now and repeat in 2-6 months 8/5/20 2/1/21 Yes YEYO Velasquez CNP   traZODone (DESYREL) 100 MG tablet Take 1 tablet by mouth nightly 7/13/20  Yes Historical Provider, MD   omeprazole (PRILOSEC) 40 MG delayed release capsule Take 1 capsule by mouth daily 7/7/20  Yes Historical Provider, MD   magnesium oxide (MAG-OX) 250 MG TABS tablet Take 1 tablet by mouth daily 7/16/20  Yes Historical Provider, MD   ASPIRIN LOW DOSE 81 MG EC tablet TAKE 1 TABLET BY MOUTH DAILY 5/19/20  Yes Kenneth Villanueva MD   atorvastatin (LIPITOR) 20 MG tablet TAKE 1 TABLET BY MOUTH DAILY 3/23/20  Yes Kenneth Villanueva MD   amLODIPine (NORVASC) 10 MG tablet  12/19/19  Yes Historical Provider, MD    CETIRIZINE HCL 10 MG tablet  12/19/19  Yes Historical Provider, MD   hydrochlorothiazide (HYDRODIURIL) 12.5 MG tablet  12/16/19  Yes Historical Provider, MD   PRE-MOISTENED WITCH HAZEL 50 % PADS Use after each bowel movement 12/26/19  Yes YEYO Velasquez CNP   lidocaine (LMX) 4 % cream Apply topically every 8 hrs as needed for pain 12/11/19  Yes Kenneth Villanueva MD   Elastic Bandages & Supports (KNEE BRACE/HINGED/LARGE) MISC Use daily for knee pain, please provide according to size 12/11/19  Yes Cheng Guerrero John Horta MD   loratadine (CLARITIN) 10 MG tablet Take 1 tablet by mouth daily 12/11/19  Yes Florencio Handy MD   triamcinolone (KENALOG) 0.025 % cream Apply to rash twice daily 4/2/19  Yes Veronika Richardson MD   ammonium lactate (AMLACTIN) 12 % cream Apply 385 g topically as needed. Apply topically as needed. Yes Historical Provider, MD   PROAIR  (90 BASE) MCG/ACT inhaler Inhale 1 puff into the lungs 4 times daily. 10/8/13  Yes Historical Provider, MD   clotrimazole (LOTRIMIN) 1 % cream Apply  topically 2 times daily. 11/15/13  Yes Historical Provider, MD   fluticasone (FLONASE) 50 MCG/ACT nasal spray 1 spray by Nasal route daily. 10/11/13  Yes Historical Provider, MD   lisinopril (PRINIVIL;ZESTRIL) 30 MG tablet Take 40 mg by mouth daily  10/11/13  Yes Historical Provider, MD   OLANZapine (ZYPREXA) 15 MG tablet Take 15 mg by mouth nightly. 11/18/13  Yes Historical Provider, MD   multivitamin (ANIMAL SHAPES) WITH C & FA CHEW chewable tablet Take  by mouth daily. 11/15/13  Yes Historical Provider, MD   magnesium citrate solution Take 296 mLs by mouth once for 1 dose 10/2/20 10/2/20  Doron Lynn MD   naproxen (NAPROSYN) 500 MG tablet Take 1 tablet by mouth 2 times daily (with meals) for 10 days 8/30/20 9/9/20  Alin Enriquez MD       REVIEW OFSYSTEMS    (2-9 systems for level 4, 10 or more for level 5)      Review of Systems   Constitutional: Negative for chills and fever. HENT: Negative for congestion and rhinorrhea. Eyes: Negative for photophobia and visual disturbance. Respiratory: Negative for shortness of breath and wheezing. Cardiovascular: Negative for chest pain and palpitations. Gastrointestinal: Positive for abdominal pain, constipation, nausea and vomiting. Negative for blood in stool. Genitourinary: Negative for dysuria. Musculoskeletal: Negative for neck pain and neck stiffness. Skin: Negative for rash. Neurological: Negative for dizziness and headaches.        PHYSICAL EXAM (up to 7 for level 4, 8 or more forlevel 5)      INITIAL VITALS:   ED Triage Vitals   BP Temp Temp Source Pulse Resp SpO2 Height Weight   10/25/20 1243 10/25/20 1231 10/25/20 1231 10/25/20 1243 10/25/20 1243 10/25/20 1243 -- 10/25/20 1240   (!) 174/109 97.2 °F (36.2 °C) Temporal 83 18 98 %  150 lb (68 kg)       Physical Exam  Constitutional:       General: She is not in acute distress. Appearance: She is well-developed. HENT:      Head: Normocephalic and atraumatic. Eyes:      Extraocular Movements: Extraocular movements intact. Conjunctiva/sclera: Conjunctivae normal.   Neck:      Musculoskeletal: Normal range of motion and neck supple. Cardiovascular:      Rate and Rhythm: Normal rate. Heart sounds: Normal heart sounds. Pulmonary:      Effort: Pulmonary effort is normal.      Breath sounds: Normal breath sounds. Abdominal:      Comments: Abdomen is soft and nondistended. There is diffuse tenderness especially in the epigastrium and right upper quadrant. Negative Garcia sign. No focal tenderness over McBurney's point. Negative CVA tenderness bilaterally. Patient does have an old well-healed vertical midline incision   Musculoskeletal: Normal range of motion. General: No deformity. Skin:     General: Skin is warm. Capillary Refill: Capillary refill takes less than 2 seconds. Neurological:      Mental Status: She is alert and oriented to person, place, and time. Mental status is at baseline.          DIFFERENTIAL  DIAGNOSIS     PLAN (LABS / IMAGING / EKG):  Orders Placed This Encounter   Procedures    CT ABDOMEN PELVIS W IV CONTRAST Additional Contrast? None    CBC Auto Differential    Comprehensive Metabolic Panel    Lipase    Troponin    Urinalysis with microscopic    EKG 12 Lead       MEDICATIONS ORDERED:  Orders Placed This Encounter   Medications    adenosine (ADENOCARD) 6 MG/2ML injection     VIET WELLS: cabinet override    lactated ringers bolus  ondansetron (ZOFRAN) injection 4 mg    morphine injection 4 mg    iopamidol (ISOVUE-370) 76 % injection 75 mL       Initial MDM/Plan: 45 y.o. female who presents with diffuse abdominal pain with associated nausea and emesis. History of bowel obstruction. Also recent EGD and colonoscopy. Consider obstruction and also possible perforation. Her abdomen is tender but no signs of peritonitis. Also consider pancreatitis, gastritis, gastroenteritis. Labs, fluids, antiemetic, analgesia. Patient did admit to chest pain in triage and so an EKG was taken. EKG upon my initial evaluation was nonspecific and no STEMI noted. Even though she denies any chest pain. Consider that this may be an atypical presentation of ACS given her gender, age, and risk factors. DIAGNOSTIC RESULTS / EMERGENCYDEPARTMENT COURSE / MDM     LABS:  Labs Reviewed   COMPREHENSIVE METABOLIC PANEL - Abnormal; Notable for the following components:       Result Value    Glucose 124 (*)     Alkaline Phosphatase 132 (*)     Total Bilirubin 0.17 (*)     All other components within normal limits   CBC WITH AUTO DIFFERENTIAL   LIPASE   TROPONIN   URINALYSIS WITH MICROSCOPIC         RADIOLOGY:  No results found.     EKG  EKG Interpretation    Interpreted by me    Rhythm: normal sinus   Rate: normal  Axis: normal  Ectopy: none  Conduction: normal  ST Segments: nonspecific  T Waves: no acute change  Q Waves: none    Clinical Impression: nonspecific EKG    All EKG's are interpreted by the Emergency Department Physicianwho either signs or Co-signs this chart in the absence of a cardiologist.    EMERGENCY DEPARTMENT COURSE:  ED Course as of Oct 25 1520   Sun Oct 25, 2020   1424 No UTI   Troponin, High Sensitivity: 12 [KD]   1425 WBC: 6.6 [KD]   1430 Lipase: 35 [KD]   1520 Awaiting CT scan    [KD]      ED Course User Index  [KD] Christianne Schwartz DO          PROCEDURES:  None    CONSULTS:  None    CRITICAL CARE:  Please see attending note    FINAL IMPRESSION      1. Abdominal pain, epigastric          DISPOSITION / PLAN     DISPOSITION  Signed out to oncoming resident      PATIENT REFERRED TO:  No follow-up provider specified.     DISCHARGE MEDICATIONS:  New Prescriptions    No medications on file       Jovita Madden DO  Emergency Medicine Resident    (Please note that portions of this note were completed with a voice recognition program.Efforts were made to edit the dictations but occasionally words are mis-transcribed.)       Jovita Madden DO  Resident  10/25/20 1523

## 2020-10-26 VITALS
WEIGHT: 152.5 LBS | SYSTOLIC BLOOD PRESSURE: 124 MMHG | DIASTOLIC BLOOD PRESSURE: 81 MMHG | OXYGEN SATURATION: 97 % | TEMPERATURE: 97.2 F | HEIGHT: 62 IN | HEART RATE: 73 BPM | BODY MASS INDEX: 28.06 KG/M2 | RESPIRATION RATE: 16 BRPM

## 2020-10-26 LAB
EKG ATRIAL RATE: 80 BPM
EKG P AXIS: 55 DEGREES
EKG P-R INTERVAL: 154 MS
EKG Q-T INTERVAL: 392 MS
EKG QRS DURATION: 82 MS
EKG QTC CALCULATION (BAZETT): 452 MS
EKG R AXIS: 26 DEGREES
EKG T AXIS: 43 DEGREES
EKG VENTRICULAR RATE: 80 BPM

## 2020-10-26 PROCEDURE — 6360000002 HC RX W HCPCS: Performed by: EMERGENCY MEDICINE

## 2020-10-26 PROCEDURE — G0378 HOSPITAL OBSERVATION PER HR: HCPCS

## 2020-10-26 PROCEDURE — 93010 ELECTROCARDIOGRAM REPORT: CPT | Performed by: INTERNAL MEDICINE

## 2020-10-26 PROCEDURE — 2580000003 HC RX 258: Performed by: STUDENT IN AN ORGANIZED HEALTH CARE EDUCATION/TRAINING PROGRAM

## 2020-10-26 PROCEDURE — 6370000000 HC RX 637 (ALT 250 FOR IP): Performed by: EMERGENCY MEDICINE

## 2020-10-26 PROCEDURE — 6370000000 HC RX 637 (ALT 250 FOR IP): Performed by: STUDENT IN AN ORGANIZED HEALTH CARE EDUCATION/TRAINING PROGRAM

## 2020-10-26 RX ORDER — MINERAL OIL/I-PROP MYR/WATER
LOTION (ML) TOPICAL 2 TIMES DAILY PRN
Status: DISCONTINUED | OUTPATIENT
Start: 2020-10-26 | End: 2020-10-26 | Stop reason: CLARIF

## 2020-10-26 RX ORDER — MORPHINE SULFATE 4 MG/ML
4 INJECTION, SOLUTION INTRAMUSCULAR; INTRAVENOUS ONCE
Status: COMPLETED | OUTPATIENT
Start: 2020-10-26 | End: 2020-10-26

## 2020-10-26 RX ORDER — LANOLIN/MINERAL OIL
LOTION (ML) TOPICAL 2 TIMES DAILY PRN
Status: DISCONTINUED | OUTPATIENT
Start: 2020-10-26 | End: 2020-10-26 | Stop reason: HOSPADM

## 2020-10-26 RX ORDER — ALBUTEROL SULFATE 2.5 MG/3ML
2.5 SOLUTION RESPIRATORY (INHALATION) EVERY 6 HOURS PRN
Status: DISCONTINUED | OUTPATIENT
Start: 2020-10-26 | End: 2020-10-26 | Stop reason: HOSPADM

## 2020-10-26 RX ORDER — DIPHENHYDRAMINE HYDROCHLORIDE 50 MG/ML
12.5 INJECTION INTRAMUSCULAR; INTRAVENOUS ONCE
Status: COMPLETED | OUTPATIENT
Start: 2020-10-26 | End: 2020-10-26

## 2020-10-26 RX ORDER — DIPHENHYDRAMINE HCL 25 MG
25 TABLET ORAL
Status: DISCONTINUED | OUTPATIENT
Start: 2020-10-26 | End: 2020-10-26 | Stop reason: HOSPADM

## 2020-10-26 RX ADMIN — MORPHINE SULFATE 4 MG: 4 INJECTION INTRAVENOUS at 09:01

## 2020-10-26 RX ADMIN — DIPHENHYDRAMINE HCL 25 MG: 25 TABLET ORAL at 07:44

## 2020-10-26 RX ADMIN — SODIUM CHLORIDE, PRESERVATIVE FREE 10 ML: 5 INJECTION INTRAVENOUS at 07:45

## 2020-10-26 RX ADMIN — DIPHENHYDRAMINE HYDROCHLORIDE 12.5 MG: 50 INJECTION, SOLUTION INTRAMUSCULAR; INTRAVENOUS at 09:01

## 2020-10-26 RX ADMIN — Medication: at 11:39

## 2020-10-26 ASSESSMENT — PAIN SCALES - GENERAL
PAINLEVEL_OUTOF10: 0
PAINLEVEL_OUTOF10: 0
PAINLEVEL_OUTOF10: 7
PAINLEVEL_OUTOF10: 4

## 2020-10-26 ASSESSMENT — PAIN DESCRIPTION - PAIN TYPE: TYPE: ACUTE PAIN

## 2020-10-26 ASSESSMENT — PAIN DESCRIPTION - LOCATION: LOCATION: ABDOMEN

## 2020-10-26 ASSESSMENT — PAIN DESCRIPTION - ONSET: ONSET: ON-GOING

## 2020-10-26 ASSESSMENT — ENCOUNTER SYMPTOMS
BACK PAIN: 1
SORE THROAT: 0
DIARRHEA: 0
RHINORRHEA: 0
CONSTIPATION: 0
ABDOMINAL PAIN: 1
EYE PAIN: 0
SHORTNESS OF BREATH: 0
COUGH: 0

## 2020-10-26 ASSESSMENT — PAIN - FUNCTIONAL ASSESSMENT: PAIN_FUNCTIONAL_ASSESSMENT: ACTIVITIES ARE NOT PREVENTED

## 2020-10-26 ASSESSMENT — PAIN DESCRIPTION - DESCRIPTORS: DESCRIPTORS: ACHING

## 2020-10-26 ASSESSMENT — PAIN DESCRIPTION - FREQUENCY: FREQUENCY: INTERMITTENT

## 2020-10-26 ASSESSMENT — PAIN DESCRIPTION - PROGRESSION: CLINICAL_PROGRESSION: GRADUALLY IMPROVING

## 2020-10-26 ASSESSMENT — PAIN DESCRIPTION - ORIENTATION: ORIENTATION: MID;LOWER

## 2020-10-26 NOTE — PROGRESS NOTES
CDU Daily Progress Note  Attending Physician       Pt Name: Sagrario Zuniga  MRN: [id number]  [place] 1970  Date of evaluation: 10/26/20    I performed a history and physical examination of the patient and discussed management with the resident. I reviewed the residents note and agree with the documented findings and plan of care. Any areas of disagreement are noted on the chart. I was personally present for the key portions of any procedures. I have documented in the chart those procedures where I was not present during the key portions. I have reviewed the emergency nurses triage note. I agree with the chief complaint, past medical history, past surgical history, allergies, medications, social and family history as documented unless otherwise noted below. Documentation of the HPI, Physical Exam and Medical Decision Making performed by medical students or scribes is based on my personal performance of the HPI, PE and MDM. For Physician Assistant/ Nurse Practitioner cases/documentation I have personally evaluated this patient and have completed at least one if not all key elements of the E/M (history, physical exam, and MDM). Additional findings are as noted. The Family History, Social History and Review of Systems are unchanged from the previous day. No significant events overnight. The patient is prediabetic. I have reviewed the patient's chart and found the most recent A1c is 6.1. This indicates reasonable control. Will continue to follow-up with PCP. Patient smokes 1 ppd of cigarettes for 15 years. Smoking cessation counseling for 3 minutes. Discussed the effects of smoking in regards to healing. I explained how this negatively effects her condition, will contribute to increased recovery time, and possible lead to further health problems in the future. Patient is feeling better. Finding of bilateral avascular necrosis. Orthopedic surgery is fine with outpatient.     Mary Holden,

## 2020-10-26 NOTE — DISCHARGE SUMMARY
CDU Discharge Summary        Patient:  Norberto Ugalde  YOB: 1970    MRN: [id number]   Acct: [de-identified]    Primary Care Physician: YEYO Schmidt NP    Admit date:  10/25/2020 12:32 PM  Discharge date: 10/26/2020    Discharge Diagnoses:     Acute abdominal pain due to unknown etiology  Improved with rest, pain medications, IV fluids, imaging    Follow-up:  Call today/tomorrow for a follow up appointment with Suleman BUCK, APRN - NP , or return to the Emergency Room with worsening symptoms    Stressed to patient the importance of following up with primary care doctor for further workup/management of symptoms. Pt verbalizes understanding and agrees with plan. Discharge Medications:  Changes to medications          Mae Guzman   Home Medication Instructions AYT:[id number]    Printed on:10/26/20 3979   Medication Information                      acetaminophen (TYLENOL) 500 MG tablet  Take 1,000 mg by mouth every 6 hours as needed for Pain             amLODIPine (NORVASC) 10 MG tablet               ammonium lactate (AMLACTIN) 12 % cream  Apply 385 g topically as needed. Apply topically as needed. ASPIRIN LOW DOSE 81 MG EC tablet  TAKE 1 TABLET BY MOUTH DAILY             atorvastatin (LIPITOR) 20 MG tablet  TAKE 1 TABLET BY MOUTH DAILY             bisacodyl (DULCOLAX) 5 MG EC tablet  TAKE 4 TABS AS DIRECTED BY PHYSICIAN OFFICE             clotrimazole (LOTRIMIN) 1 % cream  Apply  topically 2 times daily. diclofenac sodium (VOLTAREN) 1 % GEL  Apply 4 g topically 4 times daily             Elastic Bandages & Supports (KNEE BRACE/HINGED/LARGE) MISC  Use daily for knee pain, please provide according to size             fluticasone (FLONASE) 50 MCG/ACT nasal spray  1 spray by Nasal route daily.              HM CETIRIZINE HCL 10 MG tablet               hydrochlorothiazide (HYDRODIURIL) 12.5 MG tablet               lidocaine (LMX) 4 % cream  Apply topically every 8 hrs as needed for pain             lisinopril (PRINIVIL;ZESTRIL) 30 MG tablet  Take 40 mg by mouth daily              loratadine (CLARITIN) 10 MG tablet  Take 1 tablet by mouth daily             magnesium citrate solution  Take 296 mLs by mouth once for 1 dose             magnesium oxide (MAG-OX) 250 MG TABS tablet  Take 1 tablet by mouth daily             multivitamin (ANIMAL SHAPES) WITH C & FA CHEW chewable tablet  Take  by mouth daily. naproxen (NAPROSYN) 500 MG tablet  Take 1 tablet by mouth 2 times daily (with meals) for 10 days             naproxen (NAPROSYN) 500 MG tablet  Take 1 tablet by mouth 2 times daily             OLANZapine (ZYPREXA) 15 MG tablet  Take 15 mg by mouth nightly. omeprazole (PRILOSEC) 40 MG delayed release capsule  Take 1 capsule by mouth daily             omeprazole (PRILOSEC) 40 MG delayed release capsule  Take 1 capsule by mouth daily             polyethylene glycol (GLYCOLAX) 17 GM/SCOOP powder  Use as directed by following your patient instructions given by office. PRE-MOISTENED WITCH HAZEL 50 % PADS  Use after each bowel movement             PROAIR  (90 BASE) MCG/ACT inhaler  Inhale 1 puff into the lungs 4 times daily.              simethicone (MYLICON) 80 MG chewable tablet  Take 1 tablet by mouth 4 times daily as needed for Flatulence             traZODone (DESYREL) 100 MG tablet  Take 1 tablet by mouth nightly             triamcinolone (KENALOG) 0.025 % cream  Apply to rash twice daily             vitamin D3 (CHOLECALCIFEROL) 25 MCG (1000 UT) TABS tablet               zoster recombinant adjuvanted vaccine (SHINGRIX) 50 MCG/0.5ML SUSR injection  Inject 0.5 mLs into the muscle See Admin Instructions 1 dose now and repeat in 2-6 months                 Diet:  DIET GENERAL; , Advance as tolerated     Activity:  As tolerated    Consultants: None    Procedures:  Not indicated     Diagnostic Test:   Results for orders placed or performed during the hospital encounter of 10/25/20   CBC Auto Differential   Result Value Ref Range    WBC 6.6 3.5 - 11.3 k/uL    RBC 4.29 3.95 - 5.11 m/uL    Hemoglobin 12.6 11.9 - 15.1 g/dL    Hematocrit 40.1 36.3 - 47.1 %    MCV 93.5 82.6 - 102.9 fL    MCH 29.4 25.2 - 33.5 pg    MCHC 31.4 28.4 - 34.8 g/dL    RDW 12.4 11.8 - 14.4 %    Platelets 933 080 - 060 k/uL    MPV 9.7 8.1 - 13.5 fL    NRBC Automated 0.0 0.0 per 100 WBC    Differential Type NOT REPORTED     Seg Neutrophils 65 36 - 65 %    Lymphocytes 26 24 - 43 %    Monocytes 8 3 - 12 %    Eosinophils % 1 1 - 4 %    Basophils 0 0 - 2 %    Immature Granulocytes 0 0 %    Segs Absolute 4.32 1.50 - 8.10 k/uL    Absolute Lymph # 1.72 1.10 - 3.70 k/uL    Absolute Mono # 0.50 0.10 - 1.20 k/uL    Absolute Eos # 0.05 0.00 - 0.44 k/uL    Basophils Absolute <0.03 0.00 - 0.20 k/uL    Absolute Immature Granulocyte <0.03 0.00 - 0.30 k/uL    WBC Morphology NOT REPORTED     RBC Morphology NOT REPORTED     Platelet Estimate NOT REPORTED    Comprehensive Metabolic Panel   Result Value Ref Range    Glucose 124 (H) 70 - 99 mg/dL    BUN 9 6 - 20 mg/dL    CREATININE 0.68 0.50 - 0.90 mg/dL    Bun/Cre Ratio NOT REPORTED 9 - 20    Calcium 9.5 8.6 - 10.4 mg/dL    Sodium 139 135 - 144 mmol/L    Potassium 4.3 3.7 - 5.3 mmol/L    Chloride 103 98 - 107 mmol/L    CO2 22 20 - 31 mmol/L    Anion Gap 14 9 - 17 mmol/L    Alkaline Phosphatase 132 (H) 35 - 104 U/L    ALT 24 5 - 33 U/L    AST 30 <32 U/L    Total Bilirubin 0.17 (L) 0.3 - 1.2 mg/dL    Total Protein 7.3 6.4 - 8.3 g/dL    Alb 4.1 3.5 - 5.2 g/dL    Albumin/Globulin Ratio 1.3 1.0 - 2.5    GFR Non-African American >60 >60 mL/min    GFR African American >60 >60 mL/min    GFR Comment          GFR Staging NOT REPORTED    Lipase   Result Value Ref Range    Lipase 35 13 - 60 U/L   Troponin   Result Value Ref Range    Troponin, High Sensitivity 12 0 - 14 ng/L    Troponin T NOT REPORTED <0.03 ng/mL    Troponin Interp NOT REPORTED    Urinalysis with microscopic   Result Value Ref Range    Color, UA YELLOW YELLOW    Turbidity UA CLEAR CLEAR    Glucose, Ur NEGATIVE NEGATIVE    Bilirubin Urine NEGATIVE NEGATIVE    Ketones, Urine NEGATIVE NEGATIVE    Specific Gravity, UA 1.009 1.005 - 1.030    Urine Hgb NEGATIVE NEGATIVE    pH, UA 6.0 5.0 - 8.0    Protein, UA NEGATIVE NEGATIVE    Urobilinogen, Urine Normal Normal    Nitrite, Urine NEGATIVE NEGATIVE    Leukocyte Esterase, Urine NEGATIVE NEGATIVE    -          WBC, UA None 0 - 5 /HPF    RBC, UA None 0 - 4 /HPF    Casts UA NOT REPORTED 0 - 8 /LPF    Crystals, UA NOT REPORTED None /HPF    Epithelial Cells UA None 0 - 5 /HPF    Renal Epithelial, UA NOT REPORTED 0 /HPF    Bacteria, UA NOT REPORTED None    Mucus, UA NOT REPORTED None    Trichomonas, UA NOT REPORTED None    Amorphous, UA NOT REPORTED None    Other Observations UA NOT REPORTED NOT REQ. Yeast, UA NOT REPORTED None   EKG 12 Lead   Result Value Ref Range    Ventricular Rate 80 BPM    Atrial Rate 80 BPM    P-R Interval 154 ms    QRS Duration 82 ms    Q-T Interval 392 ms    QTc Calculation (Bazett) 452 ms    P Axis 55 degrees    R Axis 26 degrees    T Axis 43 degrees     Ct Abdomen Pelvis W Iv Contrast Additional Contrast? None    Result Date: 10/25/2020  EXAMINATION: CT OF THE ABDOMEN AND PELVIS WITH CONTRAST 10/25/2020 3:24 pm TECHNIQUE: CT of the abdomen and pelvis was performed with the administration of intravenous contrast. Multiplanar reformatted images are provided for review. Dose modulation, iterative reconstruction, and/or weight based adjustment of the mA/kV was utilized to reduce the radiation dose to as low as reasonably achievable. COMPARISON: 08/15/2020 HISTORY: ORDERING SYSTEM PROVIDED HISTORY: eval for obstruction TECHNOLOGIST PROVIDED HISTORY: eval for obstruction Reason for Exam: eval for obstruction Acuity: Acute Type of Exam: Initial FINDINGS: Lower Chest: Chronic ground-glass densities and linear scars in the lung bases, unchanged. Organs: Unremarkable liver, spleen, pancreas adrenals, and bilateral kidneys. GI/Bowel: Tiny layering stones in the gallbladder. Normal appendix. A few fluid-filled and somewhat dilated small bowel loops on the left side of the abdomen, nonspecific. No evidence of bowel obstruction. Pelvis: Status posthysterectomy. No adnexal mass. Incompletely distended urinary bladder. Peritoneum/Retroperitoneum: Vascular calcification with no abdominal aortic aneurysm. No free air or free fluid. No adenopathy. Bones/Soft Tissues: Mild multilevel thoracolumbar spondylosis. A vascular necrosis of the bilateral femoral heads. No subchondral collapse. Tiny layering stones in the gallbladder. A few fluid-filled and somewhat dilated small bowel loops on the left side of the abdomen, a nonspecific finding which can be seen with acute enteritis or focal ileus. No evidence of bowel obstruction. Normal appendix. AVN of the bilateral femoral heads. No subchondral collapse. Physical Exam:    General appearance - NAD, AOx 3   Lungs -CTAB, no R/R/R  Heart - RRR, no M/R/G  Abdomen - Soft, NT/ND  Neurological:  MAEx4, No focal motor deficit, sensory loss  Extremities - Cap refil <2 sec in all ext., no edema  Skin -warm, dry      Hospital Course:  Clinical course has improved, labs and imaging reviewed. Roberto Carlos Beckwith originally presented to the hospital on 10/25/2020 12:32 PM. with acute worsening of chronic abdominal pain. At that time it was determined that She required further observation and pain management, imaging. Patient had CT done of the abdomen and pelvis showing no acute bowel obstruction with minimal infiltration of small area of small bowel likely attributed to gastritis or focal ileus. Patient has had multiple bowel movements since admission and is feeling better. Diet has been advanced and has tolerated oral intake. Jaydon Cardenas She was admitted and labs and imaging were followed daily.   Imaging results as above.  She is medically stable to be discharged. Paulie plan with patient and she will follow-up with orthopedic team as there was incidental finding of avascular necrosis of bilateral femoral heads. Disposition: Home    Patient stated that they will not drive themselves home from the hospital if they have gotten pain killers/ narcotics earlier that day and that they will arrange for transportation on their own or work with the  for a ride. Patient counseled NOT to drive while under the influence of narcotics/ pain killers. Condition: Good    Patient stable and ready for discharge home. I have discussed plan of care with patient and they are in understanding. They were instructed to read discharge paperwork. All of their questions and concerns were addressed. Time Spent: 1 day      --  Amador Gil DO  Emergency Medicine Resident Physician    This dictation was generated by voice recognition computer software. Although all attempts are made to edit the dictation for accuracy, there may be errors in the transcription that are not intended.
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
## 2020-10-26 NOTE — PROGRESS NOTES
Stephen Schafer was evaluated today and a DME order was entered for a nebulizer compressor in order to administer Albuterol due to the diagnosis of asthma. The need for this equipment and treatment was discussed with the patient and she understands and is in agreement.

## 2020-10-26 NOTE — PLAN OF CARE
Problem: Infection:  Goal: Will remain free from infection  Description: Will remain free from infection  Outcome: Ongoing     Problem: Safety:  Goal: Free from accidental physical injury  Description: Free from accidental physical injury  Outcome: Ongoing  Goal: Free from intentional harm  Description: Free from intentional harm  Outcome: Ongoing     Problem: Daily Care:  Goal: Daily care needs are met  Description: Daily care needs are met  Outcome: Ongoing     Problem: Pain:  Goal: Patient's pain/discomfort is manageable  Description: Patient's pain/discomfort is manageable  Outcome: Ongoing

## 2020-10-26 NOTE — FLOWSHEET NOTE
Assessment: Jluis Llanes is a 45 y. o.yo female. Patient states that for the past 2 days she has been experiencing generalized abdominal pain. She states that she sometimes a struggle with constipation. Pt is uncomfortable and nervous about health condition. Intervention:  was rounding on floor. Pt was open to spiritual care.  offered words of comfort and support at bedside.  provided space for feelings and nurtured hope. Outcome: Pt was receptive and expressed gratitude.       10/25/20 2039   Encounter Summary   Services provided to: Patient   Referral/Consult From: [facility] Family members   Continue Visiting   (10/25/20)   Complexity of Encounter Low   Length of Encounter 15 minutes   Spiritual Assessment Completed Yes   Spiritual/Rastafari   Type Spiritual support   Assessment Approachable   Intervention Explored feelings, thoughts, concerns   Outcome Comfort   Advance Directives (For Healthcare)   Healthcare Directive No, patient does not have an advance directive for healthcare treatment   Values / Beliefs   Do you have any ethnic, cultural, sacramental, or spiritual Judaism needs you would like us to be aware of while you are in the hospital? No

## 2020-11-05 ENCOUNTER — VIRTUAL VISIT (OUTPATIENT)
Dept: GASTROENTEROLOGY | Age: 50
End: 2020-11-05
Payer: COMMERCIAL

## 2020-11-05 ENCOUNTER — VIRTUAL VISIT (OUTPATIENT)
Dept: FAMILY MEDICINE CLINIC | Age: 50
End: 2020-11-05
Payer: COMMERCIAL

## 2020-11-05 ENCOUNTER — TELEPHONE (OUTPATIENT)
Dept: GASTROENTEROLOGY | Age: 50
End: 2020-11-05

## 2020-11-05 PROBLEM — K44.9 HERNIA, PARAESOPHAGEAL: Status: ACTIVE | Noted: 2020-11-05

## 2020-11-05 PROCEDURE — 99443 PR PHYS/QHP TELEPHONE EVALUATION 21-30 MIN: CPT | Performed by: FAMILY MEDICINE

## 2020-11-05 PROCEDURE — G8419 CALC BMI OUT NRM PARAM NOF/U: HCPCS | Performed by: INTERNAL MEDICINE

## 2020-11-05 PROCEDURE — G8427 DOCREV CUR MEDS BY ELIG CLIN: HCPCS | Performed by: INTERNAL MEDICINE

## 2020-11-05 PROCEDURE — 99212 OFFICE O/P EST SF 10 MIN: CPT | Performed by: INTERNAL MEDICINE

## 2020-11-05 RX ORDER — DICYCLOMINE HYDROCHLORIDE 10 MG/1
10 CAPSULE ORAL
Qty: 120 CAPSULE | Refills: 3 | Status: SHIPPED | OUTPATIENT
Start: 2020-11-05 | End: 2021-02-09 | Stop reason: ALTCHOICE

## 2020-11-05 NOTE — TELEPHONE ENCOUNTER
Patient called office and is requesting a call back. Stated that she has been in the emergency room since her procedures and is in pain. Not very specific. Currently scheduled for December and offered a sooner appointment as well and she is unable to do this. Please call.

## 2020-11-05 NOTE — TELEPHONE ENCOUNTER
Writer called patient back. Patient stated that she wanted to talk to [facility] about her procedure and what is currently going on with her. Writer discussed her procedure pathology and informed her everything looked okay. Offered patient an appointment today. She refused appointment because of the diet she is on and stated she could not come in. Patient wanted soonest available appt. Writer made VV for this afternoon.

## 2020-11-05 NOTE — PROGRESS NOTES
VIRTUAL VISIT:  TELEPHONE    Delmis Claire is a 45 y.o. female evaluated via telephone on 11/5/2020. Consent:  She and/or health care decision maker is aware that that she may receive a bill for this telephone service, depending on her insurance coverage, and has provided verbal consent to proceed: Yes    Agree with ROS as documented and detailed by MA/LPN in separate note from today's encounter     Documentation:  I communicated with the patient and/or health care decision maker about . Details of this discussion including any medical advice provided:     Juan Carlos Gresham a 45 y.o. female with a past history remarkable for asthma, HTN, pre-DM, bowel obstruction s/p ?bowel resection performed approximately 10 years ago, chronic hepatitis C, treatment naïve, noncirrhotic based on prior imaging, referred for evaluation of chronic abdominal pain which appears to be secondary to her constipation, smoking habits, dietary habits. Patient underwent EGD and colonoscopy but had poor prep colonoscopy. She has had multiple visits to the emergency department for nonspecific abdominal pain    Used cocaine last night, incoherent speech currently during phone assessment unreliable historian. Advised to continue PPI therapy at this time which she agreed to do. Advise strongly to avoid illicit drugs which is likely contributing to the patient's ongoing chronic symptoms including abdominal pain and discomfort. Her most recent CT abdomen pelvis favor the diagnosis of gastroenteritis which appears to be nonspecific. If the pain worsens advised patient to present to the emergency department.     Due to her active illicit drug use the patient is advised that she is not an ideal candidate for hepatitis C treatment      I affirm this is a Patient Initiated Episode with an Established Patient who has not had a related appointment within my department in the past 7 days or scheduled within the next 24 hours. Total Time: minutes: 5-10 minutes    Norberto Ugalde is a 45 y.o. female being evaluated by a Virtual Visit (video visit) encounter to address concerns as mentioned above. A caregiver was present when appropriate. Due to this being a TeleHealth encounter ([facility]), evaluation of the following organ systems was limited: Vitals/Constitutional/EENT/Resp/CV/GI//MS/Neuro/Skin/Heme-Lymph-Imm. Pursuant to the emergency declaration under the [address] [place] [address], [address] [place] [address] authority and the [employer] and [employer] Act, this Virtual Visit was conducted with patient's (and/or legal guardian's) consent, to reduce the patient's risk of exposure to COVID-19 and provide necessary medical care. The patient (and/or legal guardian) has also been advised to contact this office for worsening conditions or problems, and seek emergency medical treatment and/or call 911 if deemed necessary. Services were provided through a telephone synchronous discussion virtually to substitute for in-person clinic visit. Patient and provider were located at their individual homes. --Joy Child MD on 11/5/2020 at 4:24 PM    An electronic signature was used to authenticate this note.        [address] [facility]

## 2020-11-05 NOTE — PROGRESS NOTES
Issac Purcell is a 45 y.o. female evaluated via telephone on 11/5/2020. Consent:  She and/or health care decision maker is aware that that she may receive a bill for this telephone service, depending on her insurance coverage, and has provided verbal consent to proceed: Yes    Patient was scheduled for office visit for follow-up on ER visit. Patient could not come to the office because she did not had any right. Patient was changed to telephone call. Patient was in the ER for chronic abdominal pain which has been evaluated by GI had colonoscopy and EGD that showed small hiatal hernia 1 cm. Patient has appointment with GI on December 2. Patient reports she was treated for constipation and she moves her bowels every day, but she was not given anything for pain. She gets abdominal cramping. She denies any weight loss denies any hematemesis any blood with stools. Patient also complains of chronic back pain, is on NSAIDs, Voltaren gel. Patient had x-ray done in August, which showed arthritis. Patient did not discuss further about her back pain, because she wanted to call GI. Patient dropped phone. Patient has history of pain seeking behavior in her chart. Documentation:  I communicated with the patient and/or health care decision maker about .    Details of this discussion including any medical advice provided:   Review of Systems - General ROS: negative for - chills, fatigue, fever, malaise, night sweats, sleep disturbance, weight gain or weight loss  Psychological ROS: positive for - anxiety, behavioral disorder and depression  negative for - sleep disturbances or suicidal ideation  Respiratory ROS: no cough, shortness of breath, or wheezing  Cardiovascular ROS:   negative for - chest pain or dyspnea on exertion  Gastrointestinal ROS: positive for - abdominal pain, constipation and gas/bloating  negative for - appetite loss, blood in stools or hematemesis  Genito-Urinary ROS: no dysuria, trouble voiding, or hematuria  Musculoskeletal ROS: positive for - joint stiffness, muscle pain and , back pain  negative for - gait disturbance    Assessment and plan:    1. Chronic idiopathic constipation    Patient is on multiple laxatives prescribed by GI. Bowel movements are normal.  Encouraged to follow-up with GI.  2. Generalized abdominal pain  Started on Bentyl as needed continue all other medications. - dicyclomine (BENTYL) 10 MG capsule; Take 1 capsule by mouth 4 times daily (before meals and nightly)  Dispense: 120 capsule; Refill: 3    3. Chronic midline low back pain without sciatica  Reviewed patient chart has lumbar degenerative disc disease, patient dropped phone in the middle of the conversation. Continue NSAIDs    4. Hernia, paraesophageal  Discussed about EGD results with patient. I affirm this is a Patient Initiated Episode with a Patient who has not had a related appointment within my department in the past 7 days or scheduled within the next 24 hours. Patient identification was verified at the start of the visit: Yes    Total Time: minutes: 21-30 minutes    Note: not billable if this call serves to triage the patient into an appointment for the relevant concern    This is a telehealth visit that was performed with the originating site at Patient Location: home and Provider Location of [place], [place]. Verbal consent to participate in video visit was obtained. Pursuant to the emergency declaration under the [facility], [facility] waiver authority and the [employer] Act, this Virtual Visit was conducted, with patient's consent, to reduce the patient's risk of exposure to COVID-19 and provide continuity of care for an established/new patient. Services were provided through a video synchronous discussion virtually to substitute for in-person clinic visit.  I discussed with the patient the nature of our telehealth visits via interactive/real-time audio/video that:  - I would evaluate the patient and recommend diagnostics and treatments based on my assessment  - Our sessions are not being recorded and that personal health information is protected  - Our team would provide follow up care in person if/when the patient needs it.     Jace Sainz

## 2020-11-30 RX ORDER — POLYETHYLENE GLYCOL 3350 17 G/17G
POWDER, FOR SOLUTION ORAL
Qty: 238 G | Refills: 0 | Status: ON HOLD | OUTPATIENT
Start: 2020-11-30 | End: 2022-02-17 | Stop reason: SDUPTHER

## 2020-11-30 NOTE — TELEPHONE ENCOUNTER
Please Approve or Refuse. Send to Pharmacy per Pt's Request:      Next Visit Date:  12/18/2020   Last Visit Date: 11/5/2020    Hemoglobin A1C (%)   Date Value   08/07/2020 6.1 (H)   01/02/2020 5.8   02/10/2014 5.6             ( goal A1C is < 7)   BP Readings from Last 3 Encounters:   10/26/20 124/81   10/15/20 129/89   10/15/20 104/70          (goal 120/80)  BUN   Date Value Ref Range Status   10/25/2020 9 6 - 20 mg/dL Final     CREATININE   Date Value Ref Range Status   10/25/2020 0.68 0.50 - 0.90 mg/dL Final     Potassium   Date Value Ref Range Status   10/25/2020 4.3 3.7 - 5.3 mmol/L Final     Comment:     SPECIMEN SLIGHTLY HEMOLYZED, RESULTS MAY BE ADVERSELY AFFECTED.

## 2020-11-30 NOTE — TELEPHONE ENCOUNTER
Pt would like a refill on the pending prescription and would like to have it sent to [address] [place] [address].      Polyethylene glycol 3050pw, 5-10G  [address] [place] [address] [phone]    LOV: 11/5  NOV: 12/18

## 2020-12-30 ENCOUNTER — HOSPITAL ENCOUNTER (EMERGENCY)
Age: 50
Discharge: HOME | End: 2020-12-30
Attending: EMERGENCY MEDICINE
Payer: COMMERCIAL

## 2020-12-30 VITALS
TEMPERATURE: 98.4 F | WEIGHT: 150 LBS | DIASTOLIC BLOOD PRESSURE: 70 MMHG | HEART RATE: 74 BPM | BODY MASS INDEX: 27.6 KG/M2 | HEIGHT: 62 IN | OXYGEN SATURATION: 98 % | SYSTOLIC BLOOD PRESSURE: 122 MMHG | RESPIRATION RATE: 16 BRPM

## 2020-12-30 LAB
-: ABNORMAL
ABSOLUTE EOS #: 0.1 K/UL (ref 0–0.4)
ABSOLUTE IMMATURE GRANULOCYTE: ABNORMAL K/UL (ref 0–0.3)
ABSOLUTE LYMPH #: 1.6 K/UL (ref 1–4.8)
ABSOLUTE MONO #: 0.5 K/UL (ref 0.1–1.3)
ALBUMIN SERPL-MCNC: 4 G/DL (ref 3.5–5.2)
ALBUMIN/GLOBULIN RATIO: ABNORMAL (ref 1–2.5)
ALP BLD-CCNC: 116 U/L (ref 35–104)
ALT SERPL-CCNC: 27 U/L (ref 5–33)
AMORPHOUS: ABNORMAL
ANION GAP SERPL CALCULATED.3IONS-SCNC: 11 MMOL/L (ref 9–17)
AST SERPL-CCNC: 25 U/L
BACTERIA: ABNORMAL
BASOPHILS # BLD: 1 % (ref 0–2)
BASOPHILS ABSOLUTE: 0 K/UL (ref 0–0.2)
BILIRUB SERPL-MCNC: 0.16 MG/DL (ref 0.3–1.2)
BILIRUBIN URINE: NEGATIVE
BUN BLDV-MCNC: 14 MG/DL (ref 6–20)
BUN/CREAT BLD: ABNORMAL (ref 9–20)
CALCIUM SERPL-MCNC: 9.6 MG/DL (ref 8.6–10.4)
CASTS UA: ABNORMAL /LPF
CHLORIDE BLD-SCNC: 103 MMOL/L (ref 98–107)
CO2: 25 MMOL/L (ref 20–31)
COLOR: YELLOW
COMMENT UA: ABNORMAL
CREAT SERPL-MCNC: 0.9 MG/DL (ref 0.5–0.9)
CRYSTALS, UA: ABNORMAL /HPF
DIFFERENTIAL TYPE: ABNORMAL
EOSINOPHILS RELATIVE PERCENT: 3 % (ref 0–4)
EPITHELIAL CELLS UA: ABNORMAL /HPF
GFR AFRICAN AMERICAN: >60 ML/MIN
GFR NON-AFRICAN AMERICAN: >60 ML/MIN
GFR SERPL CREATININE-BSD FRML MDRD: ABNORMAL ML/MIN/{1.73_M2}
GFR SERPL CREATININE-BSD FRML MDRD: ABNORMAL ML/MIN/{1.73_M2}
GLUCOSE BLD-MCNC: 115 MG/DL (ref 70–99)
GLUCOSE URINE: NEGATIVE
HCT VFR BLD CALC: 39.3 % (ref 36–46)
HEMOGLOBIN: 12.9 G/DL (ref 12–16)
IMMATURE GRANULOCYTES: ABNORMAL %
KETONES, URINE: NEGATIVE
LEUKOCYTE ESTERASE, URINE: ABNORMAL
LIPASE: 51 U/L (ref 13–60)
LYMPHOCYTES # BLD: 34 % (ref 24–44)
MCH RBC QN AUTO: 30 PG (ref 26–34)
MCHC RBC AUTO-ENTMCNC: 32.9 G/DL (ref 31–37)
MCV RBC AUTO: 91.3 FL (ref 80–100)
MONOCYTES # BLD: 12 % (ref 1–7)
MUCUS: ABNORMAL
NITRITE, URINE: NEGATIVE
NRBC AUTOMATED: ABNORMAL PER 100 WBC
OTHER OBSERVATIONS UA: ABNORMAL
PDW BLD-RTO: 13.5 % (ref 11.5–14.9)
PH UA: 6.5 (ref 5–8)
PLATELET # BLD: 307 K/UL (ref 150–450)
PLATELET ESTIMATE: ABNORMAL
PMV BLD AUTO: 7.6 FL (ref 6–12)
POTASSIUM SERPL-SCNC: 3.4 MMOL/L (ref 3.7–5.3)
PROTEIN UA: NEGATIVE
RBC # BLD: 4.3 M/UL (ref 4–5.2)
RBC # BLD: ABNORMAL 10*6/UL
RBC UA: ABNORMAL /HPF
RENAL EPITHELIAL, UA: ABNORMAL /HPF
SEG NEUTROPHILS: 50 % (ref 36–66)
SEGMENTED NEUTROPHILS ABSOLUTE COUNT: 2.3 K/UL (ref 1.3–9.1)
SODIUM BLD-SCNC: 139 MMOL/L (ref 135–144)
SPECIFIC GRAVITY UA: 1.02 (ref 1–1.03)
TOTAL PROTEIN: 7.3 G/DL (ref 6.4–8.3)
TRICHOMONAS: ABNORMAL
TURBIDITY: CLEAR
URINE HGB: NEGATIVE
UROBILINOGEN, URINE: NORMAL
WBC # BLD: 4.6 K/UL (ref 3.5–11)
WBC # BLD: ABNORMAL 10*3/UL
WBC UA: ABNORMAL /HPF
YEAST: ABNORMAL

## 2020-12-30 PROCEDURE — 81001 URINALYSIS AUTO W/SCOPE: CPT

## 2020-12-30 PROCEDURE — 2500000003 HC RX 250 WO HCPCS: Performed by: EMERGENCY MEDICINE

## 2020-12-30 PROCEDURE — 85025 COMPLETE CBC W/AUTO DIFF WBC: CPT

## 2020-12-30 PROCEDURE — 36415 COLL VENOUS BLD VENIPUNCTURE: CPT

## 2020-12-30 PROCEDURE — 6360000002 HC RX W HCPCS: Performed by: EMERGENCY MEDICINE

## 2020-12-30 PROCEDURE — 83690 ASSAY OF LIPASE: CPT

## 2020-12-30 PROCEDURE — 99285 EMERGENCY DEPT VISIT HI MDM: CPT

## 2020-12-30 PROCEDURE — 96374 THER/PROPH/DIAG INJ IV PUSH: CPT

## 2020-12-30 PROCEDURE — 96375 TX/PRO/DX INJ NEW DRUG ADDON: CPT

## 2020-12-30 PROCEDURE — 80053 COMPREHEN METABOLIC PANEL: CPT

## 2020-12-30 RX ORDER — MAGNESIUM HYDROXIDE/ALUMINUM HYDROXICE/SIMETHICONE 120; 1200; 1200 MG/30ML; MG/30ML; MG/30ML
30 SUSPENSION ORAL
Status: DISCONTINUED | OUTPATIENT
Start: 2020-12-30 | End: 2020-12-30 | Stop reason: HOSPADM

## 2020-12-30 RX ORDER — ONDANSETRON 4 MG/1
4 TABLET, ORALLY DISINTEGRATING ORAL EVERY 8 HOURS PRN
Qty: 10 TABLET | Refills: 0 | Status: ON HOLD | OUTPATIENT
Start: 2020-12-30 | End: 2022-02-17 | Stop reason: HOSPADM

## 2020-12-30 RX ORDER — FENTANYL CITRATE 50 UG/ML
50 INJECTION, SOLUTION INTRAMUSCULAR; INTRAVENOUS ONCE
Status: COMPLETED | OUTPATIENT
Start: 2020-12-30 | End: 2020-12-30

## 2020-12-30 RX ORDER — ONDANSETRON 2 MG/ML
4 INJECTION INTRAMUSCULAR; INTRAVENOUS ONCE
Status: COMPLETED | OUTPATIENT
Start: 2020-12-30 | End: 2020-12-30

## 2020-12-30 RX ORDER — SUCRALFATE ORAL 1 G/10ML
1 SUSPENSION ORAL 4 TIMES DAILY
Qty: 1200 ML | Refills: 3 | Status: ON HOLD | OUTPATIENT
Start: 2020-12-30 | End: 2022-06-03 | Stop reason: HOSPADM

## 2020-12-30 RX ADMIN — FENTANYL CITRATE 50 MCG: 50 INJECTION INTRAMUSCULAR; INTRAVENOUS at 13:07

## 2020-12-30 RX ADMIN — ONDANSETRON 4 MG: 2 INJECTION INTRAMUSCULAR; INTRAVENOUS at 13:05

## 2020-12-30 RX ADMIN — FAMOTIDINE 20 MG: 10 INJECTION, SOLUTION INTRAVENOUS at 13:09

## 2020-12-30 ASSESSMENT — ENCOUNTER SYMPTOMS
BACK PAIN: 0
NAUSEA: 0
TROUBLE SWALLOWING: 0
ABDOMINAL PAIN: 1
CHEST TIGHTNESS: 0
SORE THROAT: 0
EYE DISCHARGE: 0
BLOOD IN STOOL: 0
RHINORRHEA: 0
WHEEZING: 0
COUGH: 0
CONSTIPATION: 0
COLOR CHANGE: 0
FACIAL SWELLING: 0
SHORTNESS OF BREATH: 0
VOMITING: 0
EYE REDNESS: 0
SINUS PRESSURE: 0
EYE PAIN: 0
DIARRHEA: 0

## 2020-12-30 ASSESSMENT — PAIN DESCRIPTION - LOCATION
LOCATION: ABDOMEN
LOCATION: ABDOMEN

## 2020-12-30 ASSESSMENT — PAIN DESCRIPTION - PAIN TYPE
TYPE: ACUTE PAIN
TYPE: ACUTE PAIN

## 2020-12-30 ASSESSMENT — PAIN SCALES - GENERAL
PAINLEVEL_OUTOF10: 10

## 2020-12-30 ASSESSMENT — PAIN DESCRIPTION - PROGRESSION: CLINICAL_PROGRESSION: GRADUALLY IMPROVING

## 2020-12-30 ASSESSMENT — PAIN DESCRIPTION - DESCRIPTORS: DESCRIPTORS: SHARP;ACHING

## 2020-12-30 NOTE — ED TRIAGE NOTES
Mode of arrival (squad #, walk in, police, etc) : Walk In        Chief complaint(s): Abdominal pain        Arrival Note (brief scenario, treatment PTA, etc). : Pt arrives to ED c/o abdominal pain that has been ongoing for the last two days. Patient denies nausea, vomiting or diarrhea. Patient states that she has had problems with her stomach and bowels in the past and recently had to have a colonoscopy done. C= \"Have you ever felt that you should Cut down on your drinking? \"  No  A= \"Have people Annoyed you by criticizing your drinking? \"  No  G= \"Have you ever felt bad or Guilty about your drinking? \"  No  E= \"Have you ever had a drink as an Eye-opener first thing in the morning to steady your nerves or to help a hangover? \"  No      Deferred []      Reason for deferring: N/A    *If yes to two or more: probable alcohol abuse. *
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
## 2021-01-11 ENCOUNTER — HOSPITAL ENCOUNTER (EMERGENCY)
Age: 51
Discharge: LEFT BEFORE DISCHARGE | End: 2021-01-11
Payer: COMMERCIAL

## 2021-01-11 VITALS
SYSTOLIC BLOOD PRESSURE: 154 MMHG | OXYGEN SATURATION: 99 % | DIASTOLIC BLOOD PRESSURE: 97 MMHG | BODY MASS INDEX: 28.34 KG/M2 | HEART RATE: 76 BPM | TEMPERATURE: 97.5 F | HEIGHT: 62 IN | WEIGHT: 154 LBS | RESPIRATION RATE: 14 BRPM

## 2021-01-11 ASSESSMENT — PAIN DESCRIPTION - PAIN TYPE: TYPE: ACUTE PAIN

## 2021-01-11 NOTE — ED NOTES
Mode of arrival (squad #, walk in, police, etc) : walk in        Chief complaint(s): diffuse abd pain        Arrival Note (brief scenario, treatment PTA, etc). : Pt AOx4. RReasy, unlabored. Pt reports diffuse abd pain. Pt states she was seen in ED and given carafate but not helping. Pt reports constipation. C= \"Have you ever felt that you should Cut down on your drinking? \"  No  A= \"Have people Annoyed you by criticizing your drinking? \"  No  G= \"Have you ever felt bad or Guilty about your drinking? \"  No  E= \"Have you ever had a drink as an Eye-opener first thing in the morning to steady your nerves or to help a hangover? \"  No      Deferred []      Reason for deferring: N/A    *If yes to two or more: probable alcohol abuse. Adonis Guerrero RN  01/11/21 [id number]

## 2021-01-12 ENCOUNTER — HOSPITAL ENCOUNTER (OUTPATIENT)
Age: 51
Discharge: HOME | End: 2021-01-12
Payer: COMMERCIAL

## 2021-01-12 LAB
ABSOLUTE EOS #: 0.1 K/UL (ref 0–0.4)
ABSOLUTE IMMATURE GRANULOCYTE: ABNORMAL K/UL (ref 0–0.3)
ABSOLUTE LYMPH #: 1.6 K/UL (ref 1–4.8)
ABSOLUTE MONO #: 0.5 K/UL (ref 0.1–1.3)
ALBUMIN SERPL-MCNC: 4.4 G/DL (ref 3.5–5.2)
ALBUMIN/GLOBULIN RATIO: ABNORMAL (ref 1–2.5)
ALP BLD-CCNC: 128 U/L (ref 35–104)
ALT SERPL-CCNC: 34 U/L (ref 5–33)
ANION GAP SERPL CALCULATED.3IONS-SCNC: 11 MMOL/L (ref 9–17)
AST SERPL-CCNC: 38 U/L
BASOPHILS # BLD: 1 % (ref 0–2)
BASOPHILS ABSOLUTE: 0.1 K/UL (ref 0–0.2)
BILIRUB SERPL-MCNC: 0.2 MG/DL (ref 0.3–1.2)
BUN BLDV-MCNC: 12 MG/DL (ref 6–20)
BUN/CREAT BLD: ABNORMAL (ref 9–20)
CALCIUM SERPL-MCNC: 9.9 MG/DL (ref 8.6–10.4)
CHLORIDE BLD-SCNC: 104 MMOL/L (ref 98–107)
CHOLESTEROL, FASTING: 188 MG/DL
CHOLESTEROL/HDL RATIO: 2.4
CO2: 27 MMOL/L (ref 20–31)
CREAT SERPL-MCNC: 0.73 MG/DL (ref 0.5–0.9)
DIFFERENTIAL TYPE: ABNORMAL
EOSINOPHILS RELATIVE PERCENT: 2 % (ref 0–4)
GFR AFRICAN AMERICAN: >60 ML/MIN
GFR NON-AFRICAN AMERICAN: >60 ML/MIN
GFR SERPL CREATININE-BSD FRML MDRD: ABNORMAL ML/MIN/{1.73_M2}
GFR SERPL CREATININE-BSD FRML MDRD: ABNORMAL ML/MIN/{1.73_M2}
GLUCOSE BLD-MCNC: 94 MG/DL (ref 70–99)
HCT VFR BLD CALC: 42.9 % (ref 36–46)
HDLC SERPL-MCNC: 79 MG/DL
HEMOGLOBIN: 13.9 G/DL (ref 12–16)
HIV AG/AB: NONREACTIVE
IMMATURE GRANULOCYTES: ABNORMAL %
LDL CHOLESTEROL: 68 MG/DL (ref 0–130)
LYMPHOCYTES # BLD: 33 % (ref 24–44)
MCH RBC QN AUTO: 29.6 PG (ref 26–34)
MCHC RBC AUTO-ENTMCNC: 32.3 G/DL (ref 31–37)
MCV RBC AUTO: 91.8 FL (ref 80–100)
MONOCYTES # BLD: 11 % (ref 1–7)
NRBC AUTOMATED: ABNORMAL PER 100 WBC
PDW BLD-RTO: 13.4 % (ref 11.5–14.9)
PLATELET # BLD: 282 K/UL (ref 150–450)
PLATELET ESTIMATE: ABNORMAL
PMV BLD AUTO: 8.9 FL (ref 6–12)
POTASSIUM SERPL-SCNC: 4.1 MMOL/L (ref 3.7–5.3)
RBC # BLD: 4.67 M/UL (ref 4–5.2)
RBC # BLD: ABNORMAL 10*6/UL
SEG NEUTROPHILS: 53 % (ref 36–66)
SEGMENTED NEUTROPHILS ABSOLUTE COUNT: 2.5 K/UL (ref 1.3–9.1)
SODIUM BLD-SCNC: 142 MMOL/L (ref 135–144)
TOTAL PROTEIN: 7.7 G/DL (ref 6.4–8.3)
TRIGLYCERIDE, FASTING: 207 MG/DL
TSH SERPL DL<=0.05 MIU/L-ACNC: 0.59 MIU/L (ref 0.3–5)
VLDLC SERPL CALC-MCNC: ABNORMAL MG/DL (ref 1–30)
WBC # BLD: 4.7 K/UL (ref 3.5–11)
WBC # BLD: ABNORMAL 10*3/UL

## 2021-01-12 PROCEDURE — 36415 COLL VENOUS BLD VENIPUNCTURE: CPT

## 2021-01-12 PROCEDURE — 80053 COMPREHEN METABOLIC PANEL: CPT

## 2021-01-12 PROCEDURE — 87389 HIV-1 AG W/HIV-1&-2 AB AG IA: CPT

## 2021-01-12 PROCEDURE — 85025 COMPLETE CBC W/AUTO DIFF WBC: CPT

## 2021-01-12 PROCEDURE — 80061 LIPID PANEL: CPT

## 2021-01-12 PROCEDURE — 84443 ASSAY THYROID STIM HORMONE: CPT

## 2021-01-13 ENCOUNTER — HOSPITAL ENCOUNTER (OUTPATIENT)
Age: 51
Discharge: HOME | End: 2021-01-13
Payer: COMMERCIAL

## 2021-01-13 ENCOUNTER — HOSPITAL ENCOUNTER (OUTPATIENT)
Dept: GENERAL RADIOLOGY | Age: 51
Discharge: HOME | End: 2021-01-15
Payer: COMMERCIAL

## 2021-01-13 ENCOUNTER — APPOINTMENT (OUTPATIENT)
Dept: GENERAL RADIOLOGY | Age: 51
End: 2021-01-13
Payer: COMMERCIAL

## 2021-01-13 ENCOUNTER — HOSPITAL ENCOUNTER (EMERGENCY)
Age: 51
Discharge: HOME | End: 2021-01-13
Attending: EMERGENCY MEDICINE
Payer: COMMERCIAL

## 2021-01-13 ENCOUNTER — HOSPITAL ENCOUNTER (OUTPATIENT)
Age: 51
Discharge: HOME | End: 2021-01-15
Payer: COMMERCIAL

## 2021-01-13 VITALS
WEIGHT: 154 LBS | TEMPERATURE: 97.2 F | DIASTOLIC BLOOD PRESSURE: 79 MMHG | RESPIRATION RATE: 20 BRPM | HEIGHT: 62 IN | OXYGEN SATURATION: 98 % | BODY MASS INDEX: 28.34 KG/M2 | HEART RATE: 81 BPM | SYSTOLIC BLOOD PRESSURE: 115 MMHG

## 2021-01-13 DIAGNOSIS — I10 HYPERTENSION, UNSPECIFIED TYPE: ICD-10-CM

## 2021-01-13 DIAGNOSIS — R07.9 CHEST PAIN, UNSPECIFIED TYPE: Primary | ICD-10-CM

## 2021-01-13 LAB
ABSOLUTE EOS #: 0.13 K/UL (ref 0–0.44)
ABSOLUTE IMMATURE GRANULOCYTE: <0.03 K/UL (ref 0–0.3)
ABSOLUTE LYMPH #: 2.18 K/UL (ref 1.1–3.7)
ABSOLUTE MONO #: 0.66 K/UL (ref 0.1–1.2)
ANION GAP SERPL CALCULATED.3IONS-SCNC: 8 MMOL/L (ref 9–17)
BASOPHILS # BLD: 0 % (ref 0–2)
BASOPHILS ABSOLUTE: <0.03 K/UL (ref 0–0.2)
BUN BLDV-MCNC: 11 MG/DL (ref 6–20)
BUN/CREAT BLD: ABNORMAL (ref 9–20)
CALCIUM SERPL-MCNC: 10 MG/DL (ref 8.6–10.4)
CHLORIDE BLD-SCNC: 104 MMOL/L (ref 98–107)
CO2: 29 MMOL/L (ref 20–31)
CREAT SERPL-MCNC: 0.72 MG/DL (ref 0.5–0.9)
D-DIMER QUANTITATIVE: 0.31 MG/L FEU
DIFFERENTIAL TYPE: ABNORMAL
EOSINOPHILS RELATIVE PERCENT: 3 % (ref 1–4)
GFR AFRICAN AMERICAN: >60 ML/MIN
GFR NON-AFRICAN AMERICAN: >60 ML/MIN
GFR SERPL CREATININE-BSD FRML MDRD: ABNORMAL ML/MIN/{1.73_M2}
GFR SERPL CREATININE-BSD FRML MDRD: ABNORMAL ML/MIN/{1.73_M2}
GLUCOSE BLD-MCNC: 124 MG/DL (ref 70–99)
HCT VFR BLD CALC: 43.4 % (ref 36.3–47.1)
HEMOGLOBIN: 13.9 G/DL (ref 11.9–15.1)
IMMATURE GRANULOCYTES: 0 %
LYMPHOCYTES # BLD: 42 % (ref 24–43)
MCH RBC QN AUTO: 29.8 PG (ref 25.2–33.5)
MCHC RBC AUTO-ENTMCNC: 32 G/DL (ref 28.4–34.8)
MCV RBC AUTO: 92.9 FL (ref 82.6–102.9)
MONOCYTES # BLD: 13 % (ref 3–12)
NRBC AUTOMATED: 0 PER 100 WBC
PDW BLD-RTO: 13.1 % (ref 11.8–14.4)
PLATELET # BLD: 284 K/UL (ref 138–453)
PLATELET ESTIMATE: ABNORMAL
PMV BLD AUTO: 10.3 FL (ref 8.1–13.5)
POTASSIUM SERPL-SCNC: 3.8 MMOL/L (ref 3.7–5.3)
RBC # BLD: 4.67 M/UL (ref 3.95–5.11)
RBC # BLD: ABNORMAL 10*6/UL
SEG NEUTROPHILS: 42 % (ref 36–65)
SEGMENTED NEUTROPHILS ABSOLUTE COUNT: 2.18 K/UL (ref 1.5–8.1)
SODIUM BLD-SCNC: 141 MMOL/L (ref 135–144)
TROPONIN INTERP: NORMAL
TROPONIN INTERP: NORMAL
TROPONIN T: NORMAL NG/ML
TROPONIN T: NORMAL NG/ML
TROPONIN, HIGH SENSITIVITY: <6 NG/L (ref 0–14)
TROPONIN, HIGH SENSITIVITY: <6 NG/L (ref 0–14)
WBC # BLD: 5.2 K/UL (ref 3.5–11.3)
WBC # BLD: ABNORMAL 10*3/UL

## 2021-01-13 PROCEDURE — 93005 ELECTROCARDIOGRAM TRACING: CPT | Performed by: STUDENT IN AN ORGANIZED HEALTH CARE EDUCATION/TRAINING PROGRAM

## 2021-01-13 PROCEDURE — 96374 THER/PROPH/DIAG INJ IV PUSH: CPT

## 2021-01-13 PROCEDURE — 80048 BASIC METABOLIC PNL TOTAL CA: CPT

## 2021-01-13 PROCEDURE — 99284 EMERGENCY DEPT VISIT MOD MDM: CPT

## 2021-01-13 PROCEDURE — 6360000002 HC RX W HCPCS: Performed by: STUDENT IN AN ORGANIZED HEALTH CARE EDUCATION/TRAINING PROGRAM

## 2021-01-13 PROCEDURE — 85025 COMPLETE CBC W/AUTO DIFF WBC: CPT

## 2021-01-13 PROCEDURE — 93005 ELECTROCARDIOGRAM TRACING: CPT | Performed by: NURSE PRACTITIONER

## 2021-01-13 PROCEDURE — 71045 X-RAY EXAM CHEST 1 VIEW: CPT

## 2021-01-13 PROCEDURE — 71046 X-RAY EXAM CHEST 2 VIEWS: CPT

## 2021-01-13 PROCEDURE — 85379 FIBRIN DEGRADATION QUANT: CPT

## 2021-01-13 PROCEDURE — 84484 ASSAY OF TROPONIN QUANT: CPT

## 2021-01-13 RX ORDER — FENTANYL CITRATE 50 UG/ML
50 INJECTION, SOLUTION INTRAMUSCULAR; INTRAVENOUS ONCE
Status: COMPLETED | OUTPATIENT
Start: 2021-01-13 | End: 2021-01-13

## 2021-01-13 RX ADMIN — FENTANYL CITRATE 50 MCG: 50 INJECTION, SOLUTION INTRAMUSCULAR; INTRAVENOUS at 16:44

## 2021-01-13 ASSESSMENT — HEART SCORE: ECG: 1

## 2021-01-13 ASSESSMENT — PAIN DESCRIPTION - PAIN TYPE: TYPE: ACUTE PAIN

## 2021-01-13 ASSESSMENT — PAIN SCALES - GENERAL: PAINLEVEL_OUTOF10: 10

## 2021-01-13 ASSESSMENT — PAIN DESCRIPTION - LOCATION: LOCATION: CHEST

## 2021-01-13 ASSESSMENT — ENCOUNTER SYMPTOMS
ABDOMINAL PAIN: 0
COUGH: 0
VOMITING: 0
SHORTNESS OF BREATH: 1
NAUSEA: 0
BACK PAIN: 1
DIARRHEA: 0
COLOR CHANGE: 0

## 2021-01-13 NOTE — ED PROVIDER NOTES
[facility]     Emergency Department     Faculty Attestation    I performed a history and physical examination of the patient and discussed management with the resident. I reviewed the residents note and agree with the documented findings and plan of care. Any areas of disagreement are noted on the chart. I was personally present for the key portions of any procedures. I have documented in the chart those procedures where I was not present during the key portions. I have reviewed the emergency nurses triage note. I agree with the chief complaint, past medical history, past surgical history, allergies, medications, social and family history as documented unless otherwise noted below. For Physician Assistant/ Nurse Practitioner cases/documentation I have personally evaluated this patient and have completed at least one if not all key elements of the E/M (history, physical exam, and MDM). Additional findings are as noted. I have personally seen and evaluated the patient. I find the patient's history and physical exam are consistent with the NP/PA documentation. I agree with the care provided, treatment rendered, disposition and follow-up plan. Having shortness of breath also chest discomfort radiating to the back history of asthma currently still with some discomfort primarily in the back breath sounds are clear bilaterally. Critical Care     Maura Almaguer M.D.   Attending Emergency  Physician              Alexis Torrez MD  01/13/21 [id number]

## 2021-01-13 NOTE — PROGRESS NOTES
Clicked on this patient in error. I had no part in the treatment plan, work-up or disposition of this patient.

## 2021-01-13 NOTE — ED PROVIDER NOTES
[address]  ED  Emergency Department Encounter  Emergency Medicine Resident     Pt Name: Goldie Wilkinson  MRN: [id number]  [place] 1970  Date of evaluation: 21  PCP:  Leticia BANUELOSC, YEYO - NP    CHIEF COMPLAINT       Chief Complaint   Patient presents with    Shortness of Breath       HISTORY [facility]  (Location/Symptom, Timing/Onset, Context/Setting, Quality, Duration, Modifying Anita Rowe.)      Goldie Wilkinson is a 45 y.o. female with past medical history of asthma, hypertension who presents with complaint of shortness of breath and chest pain. Patient states the shortness of breath began while she sitting in her chair at rest approximately one prior to arrival.  Patient attempted to use her ProAir inhaler to alleviate her symptoms, however this provided her no relief. Patient also states states she has had substernal chest pain rated 10/10 with radiation into her back. She has not taken any medication for her chest pain. She states she did have chest x-ray and EKG performed today, however was not experiencing pain until after these tests occurred. No aggravating factors. Denies drug or alcohol use today. Denies cough, fever, chills, abdominal pain, nausea, vomiting. Patient states she is not had any long distance travel, no history of PE/DVT, no hormone therapy or history of malignancy. Patient did have EGD and colonoscopy performed 10/15, but has had no other surgical procedures in the previous 90 days. PAST MEDICAL / SURGICAL / SOCIAL / FAMILY HISTORY      has a past medical history of Asthma, Bowel obstruction (HCC), Chronic back pain, Chronic hepatitis ([address] History of anemia, Hyperlipidemia, Hypertension, Prediabetes, SI joint arthritis, Substance abuse ([address] Wears dentures, and Wears glasses. has a past surgical history that includes Hysterectomy;  section; other surgical history ( or earlier ?);  Tubal ligation; knee surgery (Right); brain surgery (N/A, age 5); Upper gastrointestinal endoscopy (N/A, 10/15/2020); and Colonoscopy (N/A, 10/15/2020). Social History     Socioeconomic History    Marital status: Single     Spouse name: Not on file    Number of children: Not on file    Years of education: Not on file    Highest education level: Not on file   Occupational History    Not on file   Social Needs    Financial resource strain: Somewhat hard    Food insecurity     Worry: Sometimes true     Inability: Sometimes true    Transportation needs     Medical: No     Non-medical: No   Tobacco Use    Smoking status: Current Every Day Smoker     Packs/day: 0.25     Years: 15.00     Pack years: 3.75     Types: Cigarettes    Smokeless tobacco: Never Used   Substance and Sexual Activity    Alcohol use: Yes     Comment: socially    Drug use: Not Currently     Types: Cocaine, Marijuana     Comment: last cocaine use 10/21/20    Sexual activity: Yes   Lifestyle    Physical activity     Days per week: Not on file     Minutes per session: Not on file    Stress: Not on file   Relationships    Social connections     Talks on phone: Not on file     Gets together: Not on file     Attends Shinto service: Not on file     Active member of club or organization: Not on file     Attends meetings of clubs or organizations: Not on file     Relationship status: Not on file    Intimate partner violence     Fear of current or ex partner: Not on file     Emotionally abused: Not on file     Physically abused: Not on file     Forced sexual activity: Not on file   Other Topics Concern    Not on file   Social History Narrative    Not on file       Family History   Problem Relation Age of Onset    High Blood Pressure Mother     High Blood Pressure Father        Allergies:  Latex and Ibuprofen    Home Medications:  Prior to Admission medications    Medication Sig Start Date End Date Taking?  Authorizing Provider   ondansetron (ZOFRAN ODT) 4 MG disintegrating tablet Take 1 tablet by mouth every 8 hours as needed for Nausea or Vomiting 12/30/20   Iliana Ontiveros MD   sucralfate (CARAFATE) 1 GM/10ML suspension Take 10 mLs by mouth 4 times daily 12/30/20   Iliana Ontiveros MD   polyethylene glycol [facility]) 17 GM/SCOOP powder Use as directed by following your patient instructions given by office.  11/30/20   Sarabjit Gage MD   dicyclomine (BENTYL) 10 MG capsule Take 1 capsule by mouth 4 times daily (before meals and nightly) 11/5/20   Sarabjit Gage MD   acetaminophen (TYLENOL) 500 MG tablet Take 1,000 mg by mouth every 6 hours as needed for Pain    Historical Provider, MD   omeprazole (PRILOSEC) 40 MG delayed release capsule Take 1 capsule by mouth daily 10/2/20   Sania Wei MD   bisacodyl (DULCOLAX) 5 MG EC tablet TAKE 4 TABS AS DIRECTED BY PHYSICIAN OFFICE 10/2/20   Sania Wei MD   magnesium citrate solution Take 296 mLs by mouth once for 1 dose 10/2/20 10/2/20  Sania Wei MD   naproxen (NAPROSYN) 500 MG tablet Take 1 tablet by mouth 2 times daily 9/22/20   Jagruti Bernal MD   diclofenac sodium (VOLTAREN) 1 % GEL Apply 4 g topically 4 times daily 9/22/20   Jagruti Bernal MD   simethicone (MYLICON) 80 MG chewable tablet Take 1 tablet by mouth 4 times daily as needed for Flatulence 9/15/20   Sania Wei MD   vitamin D3 (CHOLECALCIFEROL) 25 MCG (1000 UT) TABS tablet  8/12/20   Historical Provider, MD   zoster recombinant adjuvanted vaccine [facility]) 50 MCG/0.5ML SUSR injection Inject 0.5 mLs into the muscle See Admin Instructions 1 dose now and repeat in 2-6 months 8/5/20 2/1/21  Zion Gagnon, APRN - CNP   traZODone (DESYREL) 100 MG tablet Take 1 tablet by mouth nightly 7/13/20   Historical Provider, MD   omeprazole (PRILOSEC) 40 MG delayed release capsule Take 1 capsule by mouth daily 7/7/20   Historical Provider, MD   magnesium oxide (MAG-OX) 250 MG TABS tablet Take 1 tablet by mouth daily 7/16/20   Historical Provider, MD   ASPIRIN LOW DOSE 81 MG EC tablet TAKE 1 TABLET BY MOUTH DAILY 5/19/20   Temo Dunn MD   atorvastatin (LIPITOR) 20 MG tablet TAKE 1 TABLET BY MOUTH DAILY 3/23/20   Temo Dunn MD   amLODIPine (NORVASC) 10 MG tablet  12/19/19   Historical Provider, MD GUERIN CETIRIZINE HCL 10 MG tablet  12/19/19   Historical Provider, MD   hydrochlorothiazide (HYDRODIURIL) 12.5 MG tablet  12/16/19   Historical Provider, MD   PRE-MOISTENED WITCH HAZEL 50 % PADS Use after each bowel movement 12/26/19   Zion Gagnon, APRN - CNP   lidocaine (LMX) 4 % cream Apply topically every 8 hrs as needed for pain 12/11/19   Temo Dunn MD   Elastic Bandages & Supports (KNEE BRACE/HINGED/LARGE) MISC Use daily for knee pain, please provide according to size 12/11/19   Temo Dunn MD   loratadine (CLARITIN) 10 MG tablet Take 1 tablet by mouth daily 12/11/19   Temo Dunn MD   triamcinolone (KENALOG) 0.025 % cream Apply to rash twice daily 4/2/19   Kisha Tejeda MD   ammonium lactate (AMLACTIN) 12 % cream Apply 385 g topically as needed. Apply topically as needed. Historical Provider, MD   PROAIR  (90 BASE) MCG/ACT inhaler Inhale 1 puff into the lungs 4 times daily. 10/8/13   Historical Provider, MD   clotrimazole (LOTRIMIN) 1 % cream Apply  topically 2 times daily. 11/15/13   Historical Provider, MD   fluticasone (FLONASE) 50 MCG/ACT nasal spray 1 spray by Nasal route daily. 10/11/13   Historical Provider, MD   lisinopril (PRINIVIL;ZESTRIL) 30 MG tablet Take 40 mg by mouth daily  10/11/13   Historical Provider, MD   OLANZapine (ZYPREXA) 15 MG tablet Take 15 mg by mouth nightly. 11/18/13   Historical Provider, MD   multivitamin (ANIMAL SHAPES) WITH C & FA CHEW chewable tablet Take  by mouth daily. 11/15/13   Historical Provider, MD       REVIEW OF SYSTEMS    (2-9 systems for level 4, 10 or more for level 5)      Review of Systems   Constitutional: Negative for chills, fatigue and fever. HENT: Negative for congestion.     Eyes: Negative for visual disturbance. Respiratory: Positive for shortness of breath. Negative for cough. Cardiovascular: Positive for chest pain. Gastrointestinal: Negative for abdominal pain, diarrhea, nausea and vomiting. Genitourinary: Negative for dysuria. Musculoskeletal: Positive for back pain. Negative for myalgias and neck pain. Skin: Negative for color change and rash. Neurological: Negative for weakness, numbness and headaches. Psychiatric/Behavioral: Negative for confusion. PHYSICAL EXAM   (up to 7 for level 4, 8 or more for level 5)     INITIAL VITALS:    height is 5' 2\" (1.575 m) and weight is 154 lb (69.9 kg). Her temporal temperature is 97.2 °F (36.2 °C). Her blood pressure is 115/79 and her pulse is 81. Her respiration is 20 and oxygen saturation is 98%. Physical Exam  Constitutional:       General: She is not in acute distress. Appearance: Normal appearance. She is not ill-appearing or toxic-appearing. HENT:      Right Ear: Tympanic membrane and ear canal normal.      Left Ear: Tympanic membrane and ear canal normal.      Nose: Nose normal. No rhinorrhea. Mouth/Throat:      Mouth: Mucous membranes are moist.      Pharynx: Oropharynx is clear. No oropharyngeal exudate. Eyes:      Extraocular Movements: Extraocular movements intact. Pupils: Pupils are equal, round, and reactive to light. Cardiovascular:      Rate and Rhythm: Normal rate and regular rhythm. Pulses: Normal pulses. Heart sounds: No murmur. Pulmonary:      Effort: Pulmonary effort is normal. No respiratory distress. Breath sounds: Normal breath sounds. No decreased breath sounds or wheezing. Abdominal:      General: Abdomen is flat. There is no distension. Palpations: Abdomen is soft. Tenderness: There is no abdominal tenderness. There is no guarding. Musculoskeletal:         General: No tenderness. Right lower leg: No edema. Left lower leg: No edema. Skin:     General: Skin is warm and dry. Capillary Refill: Capillary refill takes less than 2 seconds. Findings: No rash. Neurological:      General: No focal deficit present. Mental Status: She is alert and oriented to person, place, and time. Gait: Gait normal.   Psychiatric:         Mood and Affect: Mood normal.         Behavior: Behavior normal.         DIFFERENTIAL  DIAGNOSIS     PLAN (LABS / IMAGING / EKG):  Orders Placed This Encounter   Procedures    XR CHEST PORTABLE    CBC WITH AUTO DIFFERENTIAL    BASIC METABOLIC PANEL    Troponin    D-DIMER, QUANTITATIVE    Troponin    EKG 12 Lead       MEDICATIONS ORDERED:  Orders Placed This Encounter   Medications    fentaNYL (SUBLIMAZE) injection 50 mcg       DDX: ACS, angina, musculoskeletal chest pain, PE, aortic dissection    Initial MDM/Plan: 45 y.o. female who presents with acute onset shortness of breath and chest pain while at rest approximately hour prior to arrival.  Patient seen and examined. Vital signs are normal, she is afebrile, she is well-appearing. She is speaking in few word sentences, oxygen saturation 99% on room air. Lung sounds clear to auscultation bilaterally. Radial and DP pulses equal and intact. Patient does have a history of asthma, however I cannot appreciate any wheezing or decreased lung sounds when auscultation so this point I do not suspect asthma exacerbation. Will perform cardiac work-up, D-dimer to evaluate for potential PE, EKG and chest x-ray.           DIAGNOSTIC RESULTS / EMERGENCY DEPARTMENT COURSE / MDM     LABS:  Labs Reviewed   CBC WITH AUTO DIFFERENTIAL - Abnormal; Notable for the following components:       Result Value    Monocytes 13 (*)     All other components within normal limits   BASIC METABOLIC PANEL - Abnormal; Notable for the following components:    Glucose 124 (*)     Anion Gap 8 (*)     All other components within normal limits   TROPONIN   D-DIMER, QUANTITATIVE   TROPONIN RADIOLOGY:  Xr Chest (2 Vw)    Result Date: 1/13/2021  EXAMINATION: TWO XRAY VIEWS OF THE CHEST 1/13/2021 1:31 pm COMPARISON: Chest August 30, 2020. HISTORY: ORDERING SYSTEM PROVIDED HISTORY: Hypertension, unspecified type TECHNOLOGIST PROVIDED HISTORY: Reason for Exam: HTN; pt was told she has a hole in her heart recently Acuity: Unknown Type of Exam: Unknown FINDINGS: Heart appears normal in size. Mild pleural thickening right costophrenic angle. No focal consolidation, pneumothorax or large pleural effusion. No free air. Osseous structures appear grossly intact. No acute process. Xr Chest Portable    Result Date: 1/13/2021  EXAMINATION: ONE XRAY VIEW OF THE CHEST 1/13/2021 1:45 pm COMPARISON: Earlier exam of same date 01/13/2021 HISTORY: ORDERING SYSTEM PROVIDED HISTORY: sob TECHNOLOGIST PROVIDED HISTORY: sob Acuity: Unknown Type of Exam: Ongoing FINDINGS: The lungs are without acute focal process. There is no effusion or pneumothorax. The cardiomediastinal silhouette is without acute process. The osseous structures are without acute process. No acute process. EKG  EKG Interpretation    Interpreted by me    Rhythm: normal sinus   Rate: normal  Axis: normal  Ectopy: none  Conduction: normal  ST Segments: Nonspecific changes  T Waves: no acute change  Q Waves: none    Clinical Impression: When compared to EKG performed 3 hours prior there are not T wave inversions present in leads V4 and V5 representing possible dynamic T wave changes. Nonspecific changes. Normal sinus rhythm    All EKG's are interpreted by the Emergency Department Physician who either signs or Co-signs this chart in the absence of a cardiologist.    EMERGENCY DEPARTMENT COURSE:  ED Course as of Jan 13 1848 Wed Jan 13, 2021   1738 CBC and BMP are unremarkable. Troponin less than 6. Chest x-ray unremarkable.   D-dimer is negative at 0.31 and as such no need for CT imaging at this time to evaluate for PE or aortic pathology. Repeat troponin drawn.    [DS]   1802 Second troponin also less than 6. With two negative troponins, I find ACS unlikely. However patient does have heart score of 5 and as such we will offer admission to observation unit for cardiology evaluation. [DS]   1427 Discussed with patient obs admission for cardiology evaluation. Patient declined admission at this time stating she wished to go home. Had shared decision-making conversation with patient regarding risks of major cardiac event given her risk factors, history, EKG if discharged without further cardiology work-up. Patient verbalized understanding and was willing to accept the risks. Encouraged patient to return for any new or worsening symptoms of chest pain, shortness of breath, nausea, vomiting, abdominal pain. Patient verbalized understanding and agreed to return if her symptoms should change or worsen. Patient discharged home in good condition. [DS]      ED Course User Index  [DS] Maureen Carbone DO       Heart Score  History: 1  EC  Patient Age: 1  *Risk factors for Atherosclerotic disease: Diabetes Mellitus; Hypercholesterolemia; Hypertension;Cocaine abuse  Risk Factors: 2  Troponin: 0  Heart Score Total: 5        PROCEDURES:  None    CONSULTS:  None    CRITICAL CARE:  Please see attending note    FINAL IMPRESSION      1.  Chest pain, unspecified type          DISPOSITION / PLAN     DISPOSITION Decision To Discharge 2021 06:26:50 PM        PATIENTREFERRED TO:  YEYO [place] - NP  [address]  [phone]    Schedule an appointment as soon as possible for a visit   for er follow up, As needed      DISCHARGE MEDICATIONS:  Discharge Medication List as of 2021  6:27 PM          Maureen Carbone DO  EmergencyMedicine Resident    (Please note that portions of this note were completed with a voice recognition program.  Efforts were made to edit the dictations but occasionally words are mis-transcribed.)        [facility],   Resident  01/13/21 [id number]

## 2021-01-13 NOTE — ED TRIAGE NOTES
Pt arrived to the ED with c/o shortness of breath. Pt states the shortness of breath started while she was sitting on the couch at home. Pt states the asthma meds at home did not give her relief. Pt states she feels like she needs a treatment. Pt is short of breath, alert and oriented x4. Pt states she needs water, her mouth is dry. Pt denies cough, states her ears are hurting her. EKG obtained, pt placed on monitor. Lungs are clear.

## 2021-01-14 LAB
EKG ATRIAL RATE: 76 BPM
EKG ATRIAL RATE: 79 BPM
EKG P AXIS: 56 DEGREES
EKG P AXIS: 60 DEGREES
EKG P-R INTERVAL: 154 MS
EKG P-R INTERVAL: 156 MS
EKG Q-T INTERVAL: 356 MS
EKG Q-T INTERVAL: 380 MS
EKG QRS DURATION: 74 MS
EKG QRS DURATION: 82 MS
EKG QTC CALCULATION (BAZETT): 400 MS
EKG QTC CALCULATION (BAZETT): 435 MS
EKG R AXIS: 27 DEGREES
EKG R AXIS: 48 DEGREES
EKG T AXIS: -35 DEGREES
EKG T AXIS: 70 DEGREES
EKG VENTRICULAR RATE: 76 BPM
EKG VENTRICULAR RATE: 79 BPM

## 2021-01-14 PROCEDURE — 93010 ELECTROCARDIOGRAM REPORT: CPT | Performed by: INTERNAL MEDICINE

## 2021-01-20 ENCOUNTER — TELEPHONE (OUTPATIENT)
Dept: GASTROENTEROLOGY | Age: 51
End: 2021-01-20

## 2021-01-20 NOTE — TELEPHONE ENCOUNTER
called patient to see if she would like to reschedule her her missed appointment from 1/18/2021 that she no showed for.  Thank You

## 2021-02-04 NOTE — TELEPHONE ENCOUNTER
Writer LVM for patient to see if she is able to move her OV from 2/9 to 2/11 at either 1pm or 3pm in [facility]. Writer asked patient to call our office.

## 2021-02-04 NOTE — TELEPHONE ENCOUNTER
Laura Akhtar returned call, she states that she does not want to move her 2/9/21 visit to a later date due to not feeling well and requested a sooner visit. No sooner appointments are available at this time. Laura Akhtar will keep appointment as scheduled on 2/9/21, [place] address given.

## 2021-02-09 ENCOUNTER — OFFICE VISIT (OUTPATIENT)
Dept: GASTROENTEROLOGY | Age: 51
End: 2021-02-09
Payer: COMMERCIAL

## 2021-02-09 ENCOUNTER — TELEPHONE (OUTPATIENT)
Dept: GASTROENTEROLOGY | Age: 51
End: 2021-02-09

## 2021-02-09 VITALS — WEIGHT: 154 LBS | SYSTOLIC BLOOD PRESSURE: 177 MMHG | BODY MASS INDEX: 28.17 KG/M2 | DIASTOLIC BLOOD PRESSURE: 98 MMHG

## 2021-02-09 DIAGNOSIS — R10.13 DYSPEPSIA: ICD-10-CM

## 2021-02-09 DIAGNOSIS — F19.10 SUBSTANCE ABUSE (HCC): ICD-10-CM

## 2021-02-09 DIAGNOSIS — K59.04 CHRONIC IDIOPATHIC CONSTIPATION: Primary | ICD-10-CM

## 2021-02-09 DIAGNOSIS — Z12.11 ENCOUNTER FOR SCREENING COLONOSCOPY: ICD-10-CM

## 2021-02-09 DIAGNOSIS — B18.2 CHRONIC HEPATITIS C WITHOUT HEPATIC COMA (HCC): ICD-10-CM

## 2021-02-09 PROCEDURE — G8484 FLU IMMUNIZE NO ADMIN: HCPCS | Performed by: INTERNAL MEDICINE

## 2021-02-09 PROCEDURE — 99215 OFFICE O/P EST HI 40 MIN: CPT | Performed by: INTERNAL MEDICINE

## 2021-02-09 PROCEDURE — 3017F COLORECTAL CA SCREEN DOC REV: CPT | Performed by: INTERNAL MEDICINE

## 2021-02-09 PROCEDURE — G8427 DOCREV CUR MEDS BY ELIG CLIN: HCPCS | Performed by: INTERNAL MEDICINE

## 2021-02-09 PROCEDURE — 4004F PT TOBACCO SCREEN RCVD TLK: CPT | Performed by: INTERNAL MEDICINE

## 2021-02-09 PROCEDURE — G8419 CALC BMI OUT NRM PARAM NOF/U: HCPCS | Performed by: INTERNAL MEDICINE

## 2021-02-09 RX ORDER — DOCUSATE SODIUM 100 MG/1
100 CAPSULE, LIQUID FILLED ORAL 2 TIMES DAILY
Qty: 60 CAPSULE | Refills: 0 | Status: SHIPPED | OUTPATIENT
Start: 2021-02-09 | End: 2021-03-08

## 2021-02-09 ASSESSMENT — ENCOUNTER SYMPTOMS
BACK PAIN: 1
ABDOMINAL DISTENTION: 1
WHEEZING: 0
CONSTIPATION: 1
COUGH: 0
ABDOMINAL PAIN: 1
SHORTNESS OF BREATH: 1

## 2021-02-09 NOTE — TELEPHONE ENCOUNTER
Patient called to switch appointment today at 2 pm to a VV due the amount of \"pain\" she is in and she cannot get into her cab. Writer infomred patient that Dr. Ty Do does VV on Friday mornings. Offered VV on 2/26 at 10:30 or 11:45, but the patient was upset and says that she is in a lot of pain and doesn't know what kind of pain she is in. Patient states that Dr. Ty Do know how much pain she is in and what she dealing with and writer does not know anything about what she is dealing with. Patient cancelled appointment today and wants a call back from Dr. Rishi Santillan nurse.

## 2021-02-09 NOTE — TELEPHONE ENCOUNTER
Writer called patient back. Informed patient that we can not do anything for her until she is seen in office since she has cancelled/no showed her last 4 appointments. Educated patient that we can not blindly treat a patient without evaluating her in office first.  Patient agreed and rescheduled todays appointment for tomorrow. Writer gave patient directions and information to get to the [facility] karla Irvin office. Patient thanked Ubaldo Kaba.
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
## 2021-03-23 ENCOUNTER — OFFICE VISIT (OUTPATIENT)
Dept: GASTROENTEROLOGY | Age: 51
End: 2021-03-23
Payer: COMMERCIAL

## 2021-03-23 ENCOUNTER — TELEPHONE (OUTPATIENT)
Dept: GASTROENTEROLOGY | Age: 51
End: 2021-03-23

## 2021-03-23 VITALS — DIASTOLIC BLOOD PRESSURE: 77 MMHG | SYSTOLIC BLOOD PRESSURE: 111 MMHG | BODY MASS INDEX: 28.17 KG/M2 | WEIGHT: 154 LBS

## 2021-03-23 DIAGNOSIS — K59.04 CHRONIC IDIOPATHIC CONSTIPATION: Primary | ICD-10-CM

## 2021-03-23 DIAGNOSIS — B18.2 CHRONIC HEPATITIS C WITHOUT HEPATIC COMA (HCC): ICD-10-CM

## 2021-03-23 PROCEDURE — 3017F COLORECTAL CA SCREEN DOC REV: CPT | Performed by: INTERNAL MEDICINE

## 2021-03-23 PROCEDURE — 99213 OFFICE O/P EST LOW 20 MIN: CPT | Performed by: INTERNAL MEDICINE

## 2021-03-23 PROCEDURE — G8484 FLU IMMUNIZE NO ADMIN: HCPCS | Performed by: INTERNAL MEDICINE

## 2021-03-23 PROCEDURE — G8419 CALC BMI OUT NRM PARAM NOF/U: HCPCS | Performed by: INTERNAL MEDICINE

## 2021-03-23 PROCEDURE — 4004F PT TOBACCO SCREEN RCVD TLK: CPT | Performed by: INTERNAL MEDICINE

## 2021-03-23 PROCEDURE — G8427 DOCREV CUR MEDS BY ELIG CLIN: HCPCS | Performed by: INTERNAL MEDICINE

## 2021-03-23 RX ORDER — DOCUSATE SODIUM 100 MG/1
100 CAPSULE, LIQUID FILLED ORAL 2 TIMES DAILY
Qty: 60 CAPSULE | Refills: 2 | Status: SHIPPED | OUTPATIENT
Start: 2021-03-23 | End: 2021-12-18

## 2021-03-23 RX ORDER — OMEPRAZOLE 40 MG/1
40 CAPSULE, DELAYED RELEASE ORAL DAILY
Qty: 30 CAPSULE | Refills: 3 | Status: SHIPPED | OUTPATIENT
Start: 2021-03-23

## 2021-03-23 ASSESSMENT — ENCOUNTER SYMPTOMS
CONSTIPATION: 1
RECTAL PAIN: 1
SHORTNESS OF BREATH: 1
WHEEZING: 0
BACK PAIN: 1
COUGH: 0
ABDOMINAL DISTENTION: 1
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
## 2021-09-06 ENCOUNTER — HOSPITAL ENCOUNTER (EMERGENCY)
Age: 51
Discharge: HOME | End: 2021-09-06
Attending: EMERGENCY MEDICINE
Payer: COMMERCIAL

## 2021-09-06 VITALS
RESPIRATION RATE: 16 BRPM | HEIGHT: 63 IN | WEIGHT: 156 LBS | SYSTOLIC BLOOD PRESSURE: 195 MMHG | HEART RATE: 78 BPM | OXYGEN SATURATION: 100 % | DIASTOLIC BLOOD PRESSURE: 102 MMHG | BODY MASS INDEX: 27.64 KG/M2 | TEMPERATURE: 97.5 F

## 2021-09-06 DIAGNOSIS — S39.012A BACK STRAIN, INITIAL ENCOUNTER: Primary | ICD-10-CM

## 2021-09-06 PROCEDURE — 6370000000 HC RX 637 (ALT 250 FOR IP): Performed by: STUDENT IN AN ORGANIZED HEALTH CARE EDUCATION/TRAINING PROGRAM

## 2021-09-06 PROCEDURE — 99283 EMERGENCY DEPT VISIT LOW MDM: CPT

## 2021-09-06 PROCEDURE — 6360000002 HC RX W HCPCS: Performed by: STUDENT IN AN ORGANIZED HEALTH CARE EDUCATION/TRAINING PROGRAM

## 2021-09-06 PROCEDURE — 96372 THER/PROPH/DIAG INJ SC/IM: CPT

## 2021-09-06 RX ORDER — LIDOCAINE 4 G/G
1 PATCH TOPICAL DAILY
Qty: 30 PATCH | Refills: 0 | Status: SHIPPED | OUTPATIENT
Start: 2021-09-06 | End: 2021-10-06

## 2021-09-06 RX ORDER — TIZANIDINE 4 MG/1
4 TABLET ORAL 3 TIMES DAILY PRN
Qty: 30 TABLET | Refills: 0 | Status: ON HOLD | OUTPATIENT
Start: 2021-09-06 | End: 2022-02-17 | Stop reason: SDUPTHER

## 2021-09-06 RX ORDER — LIDOCAINE 4 G/G
2 PATCH TOPICAL ONCE
Status: DISCONTINUED | OUTPATIENT
Start: 2021-09-06 | End: 2021-09-06 | Stop reason: HOSPADM

## 2021-09-06 RX ORDER — ACETAMINOPHEN 500 MG
1000 TABLET ORAL ONCE
Status: COMPLETED | OUTPATIENT
Start: 2021-09-06 | End: 2021-09-06

## 2021-09-06 RX ORDER — ORPHENADRINE CITRATE 30 MG/ML
60 INJECTION INTRAMUSCULAR; INTRAVENOUS ONCE
Status: COMPLETED | OUTPATIENT
Start: 2021-09-06 | End: 2021-09-06

## 2021-09-06 RX ADMIN — ORPHENADRINE CITRATE 60 MG: 30 INJECTION INTRAMUSCULAR; INTRAVENOUS at 11:34

## 2021-09-06 RX ADMIN — ACETAMINOPHEN 1000 MG: 500 TABLET ORAL at 11:33

## 2021-09-06 ASSESSMENT — PAIN DESCRIPTION - FREQUENCY: FREQUENCY: CONTINUOUS

## 2021-09-06 ASSESSMENT — PAIN SCALES - GENERAL
PAINLEVEL_OUTOF10: 10

## 2021-09-06 ASSESSMENT — PAIN DESCRIPTION - ORIENTATION
ORIENTATION: MID;LOWER;UPPER
ORIENTATION: MID

## 2021-09-06 ASSESSMENT — PAIN DESCRIPTION - LOCATION
LOCATION: BACK
LOCATION: BACK

## 2021-09-06 ASSESSMENT — PAIN DESCRIPTION - DESCRIPTORS
DESCRIPTORS: SPASM
DESCRIPTORS: BURNING;ACHING

## 2021-09-06 ASSESSMENT — PAIN DESCRIPTION - PAIN TYPE
TYPE: CHRONIC PAIN
TYPE: CHRONIC PAIN

## 2021-09-06 ASSESSMENT — PAIN DESCRIPTION - DIRECTION: RADIATING_TOWARDS: BILATERAL FLANKS

## 2021-09-06 ASSESSMENT — PAIN DESCRIPTION - ONSET: ONSET: ON-GOING

## 2021-09-06 ASSESSMENT — PAIN DESCRIPTION - PROGRESSION: CLINICAL_PROGRESSION: GRADUALLY WORSENING

## 2021-09-06 NOTE — ED NOTES
This patient was assessed by the doctor only. Nurse processed and completed the orders from this doctor ie labs, meds, and/or EKG.         Ari Marin RN  09/06/21 [id number]

## 2021-09-06 NOTE — ED PROVIDER NOTES
[facility]  ED  Emergency Department Encounter  EmergencyMedicine Resident     Pt Herlinda Eid  MRN: [id number]  Angelinagfautumn 1970  Date of evaluation: 21  PCP:  Rodrigo BANUELOSC, APRN - NP    [facility]       Chief Complaint   Patient presents with    Back Pain     x3-4 hours, spasming       HISTORY OF PRESENT ILLNESS  (Location/Symptom, Timing/Onset, Context/Setting, Quality, Duration, Modifying Factors, Severity.)      Roberto Carlos Beckwith is a 45 y.o. female who presents with chronic back pain. Patient dates that her back pain was worse today when she woke up. She has been taking tizanidine at home, but states she is out of this. She states she has been taking less of it. Has previously followed with pain management as well as her PCP, has annual appointment this coming week. Denies any radiculopathy, no numbness, tingling, weakness, no bowel or bladder incontinence, states she has had urinary frequency but this is an ongoing thing and has not changed the past several months. PAST MEDICAL / SURGICAL / SOCIAL / FAMILY HISTORY      has a past medical history of Asthma, Bowel obstruction (HCC), Chronic back pain, Chronic hepatitis ([address] History of anemia, Hyperlipidemia, Hypertension, Prediabetes, SI joint arthritis, Substance abuse ([address] Wears dentures, and Wears glasses. \     has a past surgical history that includes Hysterectomy;  section; other surgical history ( or earlier ?); Tubal ligation; knee surgery (Right); brain surgery (N/A, age 5); Upper gastrointestinal endoscopy (N/A, 10/15/2020); and Colonoscopy (N/A, 10/15/2020).   \    Social History     Socioeconomic History    Marital status: Single     Spouse name: Not on file    Number of children: Not on file    Years of education: Not on file    Highest education level: Not on file   Occupational History    Not on file   Tobacco Use    Smoking status: Current Every Day Smoker     Packs/day: 0.25     Years: 15.00     Pack years: 3.75     Types: Cigarettes, Cigars     Start date: 3/23/2006    Smokeless tobacco: Never Used    Tobacco comment: 4-5 cigars/day   Substance and Sexual Activity    Alcohol use: Not Currently    Drug use: Not Currently     Types: Cocaine, Marijuana     Comment: last cocaine use 10/21/20    Sexual activity: Yes   Other Topics Concern    Not on file   Social History Narrative    Not on file     Social Determinants of Health     Financial Resource Strain:     Difficulty of Paying Living Expenses:    Food Insecurity:     Worried About Running Out of Food in the Last Year:     [address] in the Last Year:    Transportation Needs:     Lack of Transportation (Medical):  Lack of Transportation (Non-Medical):    Physical Activity:     Days of Exercise per Week:     Minutes of Exercise per Session:    Stress:     Feeling of Stress :    Social Connections:     Frequency of Communication with Friends and Family:     Frequency of Social Gatherings with Friends and Family:     Attends Rastafari Services:     Active Member of Clubs or Organizations:     Attends Club or Organization Meetings:     Marital Status:    Intimate Partner Violence:     Fear of Current or Ex-Partner:     Emotionally Abused:     Physically Abused:     Sexually Abused:        Family History   Problem Relation Age of Onset    High Blood Pressure Mother     High Blood Pressure Father        Allergies:  Latex and Ibuprofen    Home Medications:  Prior to Admission medications    Medication Sig Start Date End Date Taking?  Authorizing Provider   tiZANidine (ZANAFLEX) 4 MG tablet Take 1 tablet by mouth 3 times daily as needed (spasms) 9/6/21  Yes Kimberley Dickerson DO   lidocaine 4 % external patch Place 1 patch onto the skin daily 9/6/21 10/6/21 Yes Ray Dickerson DO   docusate sodium (STOOL SOFTENER) 100 MG capsule Take 1 capsule by mouth 2 times daily 3/23/21   Hai Mcclendon MD omeprazole (PRILOSEC) 40 MG delayed release capsule Take 1 capsule by mouth daily 3/23/21   Rohit Aldrich MD   ondansetron (ZOFRAN ODT) 4 MG disintegrating tablet Take 1 tablet by mouth every 8 hours as needed for Nausea or Vomiting 12/30/20   Milena Molina MD   sucralfate (CARAFATE) 1 GM/10ML suspension Take 10 mLs by mouth 4 times daily 12/30/20   Milena Molina MD   polyethylene glycol [facility]) 17 GM/SCOOP powder Use as directed by following your patient instructions given by office.  11/30/20   Olamide Medellin MD   acetaminophen (TYLENOL) 500 MG tablet Take 1,000 mg by mouth every 6 hours as needed for Pain    Historical Provider, MD   bisacodyl (DULCOLAX) 5 MG EC tablet TAKE 4 TABS AS DIRECTED BY PHYSICIAN OFFICE 10/2/20   Rohit Aldrich MD   magnesium citrate solution Take 296 mLs by mouth once for 1 dose 10/2/20 10/2/20  Rohit Aldrich MD   naproxen (NAPROSYN) 500 MG tablet Take 1 tablet by mouth 2 times daily 9/22/20   Amari Barnes MD   diclofenac sodium (VOLTAREN) 1 % GEL Apply 4 g topically 4 times daily 9/22/20   Amari Barnes MD   simethicone (MYLICON) 80 MG chewable tablet Take 1 tablet by mouth 4 times daily as needed for Flatulence 9/15/20   Rohit Aldrich MD   vitamin D3 (CHOLECALCIFEROL) 25 MCG (1000 UT) TABS tablet  8/12/20   Historical Provider, MD   traZODone (DESYREL) 100 MG tablet Take 1 tablet by mouth nightly 7/13/20   Historical Provider, MD   omeprazole (PRILOSEC) 40 MG delayed release capsule Take 1 capsule by mouth daily 7/7/20   Historical Provider, MD   magnesium oxide (MAG-OX) 250 MG TABS tablet Take 1 tablet by mouth daily 7/16/20   Historical Provider, MD   ASPIRIN LOW DOSE 81 MG EC tablet TAKE 1 TABLET BY MOUTH DAILY 5/19/20   Olamide Medellin MD   atorvastatin (LIPITOR) 20 MG tablet TAKE 1 TABLET BY MOUTH DAILY 3/23/20   Olamide Medellin MD   amLODIPine (NORVASC) 10 MG tablet  12/19/19   Historical Provider, MD GUERIN CETIRIZINE HCL 10 MG tablet  12/19/19   Historical Provider, MD   hydrochlorothiazide (HYDRODIURIL) 12.5 MG tablet  12/16/19   Historical Provider, MD   PRE-MOISTENED WITCH HAZEL 50 % PADS Use after each bowel movement 12/26/19   YEYO Velasquez CNP   lidocaine (LMX) 4 % cream Apply topically every 8 hrs as needed for pain 12/11/19   Kassandra Aldridge MD   Elastic Bandages & Supports (KNEE BRACE/HINGED/LARGE) MISC Use daily for knee pain, please provide according to size 12/11/19   Kassandra Aldridge MD   loratadine (CLARITIN) 10 MG tablet Take 1 tablet by mouth daily 12/11/19   Kassandra Aldridge MD   triamcinolone (KENALOG) 0.025 % cream Apply to rash twice daily 4/2/19   Felicia Castro MD   ammonium lactate (AMLACTIN) 12 % cream Apply 385 g topically as needed. Apply topically as needed. Historical Provider, MD   PROAIR  (90 BASE) MCG/ACT inhaler Inhale 1 puff into the lungs 4 times daily. 10/8/13   Historical Provider, MD   clotrimazole (LOTRIMIN) 1 % cream Apply  topically 2 times daily. 11/15/13   Historical Provider, MD   fluticasone (FLONASE) 50 MCG/ACT nasal spray 1 spray by Nasal route daily. 10/11/13   Historical Provider, MD   lisinopril (PRINIVIL;ZESTRIL) 30 MG tablet Take 40 mg by mouth daily  10/11/13   Historical Provider, MD   OLANZapine (ZYPREXA) 15 MG tablet Take 15 mg by mouth nightly. 11/18/13   Historical Provider, MD   multivitamin (ANIMAL SHAPES) WITH C & FA CHEW chewable tablet Take  by mouth daily. 11/15/13   Historical Provider, MD       REVIEW OF SYSTEMS    (2-9 systems for level 4, 10 or more for level 5)      Review of Systems   Constitutional: Negative for chills, diaphoresis, fatigue and fever. Respiratory: Negative for cough, chest tightness, shortness of breath and wheezing. Cardiovascular: Negative for chest pain, palpitations and leg swelling. Gastrointestinal: Negative for abdominal pain, constipation, diarrhea, nausea and vomiting. Endocrine: Negative for polydipsia, polyphagia and polyuria.    Genitourinary: Positive for frequency (Chronic). Negative for difficulty urinating, dysuria and urgency. Musculoskeletal: Positive for back pain. Negative for arthralgias, neck pain and neck stiffness. Skin: Negative for color change, pallor and rash. Neurological: Negative for dizziness, weakness, light-headedness and headaches. PHYSICAL EXAM   (up to 7 for level 4, 8 or more for level 5)      INITIAL VITALS:   BP (!) 195/102   Pulse 78   Temp 97.5 °F (36.4 °C) (Oral)   Resp 16   Ht 5' 3\" (1.6 m)   Wt 156 lb (70.8 kg)   SpO2 100%   BMI 27.63 kg/m²     Physical Exam  Vitals and nursing note reviewed. Constitutional:       General: She is not in acute distress. Appearance: She is well-developed. She is not diaphoretic. HENT:      Head: Normocephalic and atraumatic. Mouth/Throat:      Mouth: Mucous membranes are moist.      Pharynx: Oropharynx is clear. Eyes:      General: No scleral icterus. Conjunctiva/sclera: Conjunctivae normal.      Pupils: Pupils are equal, round, and reactive to light. Neck:      Vascular: No JVD. Trachea: No tracheal deviation. Cardiovascular:      Rate and Rhythm: Normal rate and regular rhythm. Heart sounds: Normal heart sounds. No murmur heard. No friction rub. Pulmonary:      Effort: Pulmonary effort is normal. No respiratory distress. Breath sounds: Normal breath sounds. No wheezing. Chest:      Chest wall: No tenderness. Abdominal:      General: Bowel sounds are normal. There is no distension. Palpations: Abdomen is soft. Tenderness: There is no abdominal tenderness. There is no guarding. Musculoskeletal:      Cervical back: Normal range of motion and neck supple. Skin:     General: Skin is warm and dry. Capillary Refill: Capillary refill takes less than 2 seconds. Coloration: Skin is not pale. Findings: No erythema. Neurological:      General: No focal deficit present.       Mental Status: She is alert and oriented to person, place, and time. Cranial Nerves: No cranial nerve deficit. Sensory: No sensory deficit. Psychiatric:         Mood and Affect: Mood normal.         Behavior: Behavior normal.         DIFFERENTIAL  DIAGNOSIS     PLAN (LABS / IMAGING / EKG):  No orders of the defined types were placed in this encounter. MEDICATIONS ORDERED:  Orders Placed This Encounter   Medications    acetaminophen (TYLENOL) tablet 1,000 mg    DISCONTD: lidocaine 4 % external patch 2 patch    orphenadrine (NORFLEX) injection 60 mg    tiZANidine (ZANAFLEX) 4 MG tablet     Sig: Take 1 tablet by mouth 3 times daily as needed (spasms)     Dispense:  30 tablet     Refill:  0    lidocaine 4 % external patch     Sig: Place 1 patch onto the skin daily     Dispense:  30 patch     Refill:  0       DDX: Acute on chronic back pain, lumbar strain    MDM/IMPRESSION: This is a 58-year-old female with acute on chronic back pain. Out of her tizanidine at home. Will refill. No midline tenderness, no bowel incontinence, no numbness, tingling, weakness, paresthesias. Reproducible tenderness bilateral lower back, no CVA tenderness. No suprapubic tenderness. Chronic urinary frequency, no acute change. Will treat with medications, discharged with follow-up with PCP. Patient agreeable. Refilled tizanidine. DIAGNOSTIC RESULTS / EMERGENCY DEPARTMENT COURSE / MDM   LAB RESULTS:  No results found for this visit on 09/06/21. RADIOLOGY:  No orders to display        EKG      All EKG's are interpreted by the Emergency Department Physician who either signs or Co-signs this chart in the absence of a cardiologist.    EMERGENCY DEPARTMENT COURSE:    Patient given medications with improvement. Refilled tizanidine, to follow-up with PCP. PROCEDURES:      CONSULTS:  None    CRITICAL CARE:      FINAL IMPRESSION      1.  Back strain, initial encounter          DISPOSITION / PLAN     DISPOSITION Decision To Discharge 09/06/2021 11:29:54 AM      PATIENT REFERRED TO:  YEYO Simpson NP  [id number]  [phone]    Go in 3 days      [facility]  [address] [place] [address]  [phone]  Go to   If symptoms worsen      DISCHARGE MEDICATIONS:  Discharge Medication List as of 9/6/2021 11:40 AM      START taking these medications    Details   tiZANidine (ZANAFLEX) 4 MG tablet Take 1 tablet by mouth 3 times daily as needed (spasms), Disp-30 tablet, R-0Print      lidocaine 4 % external patch Place 1 patch onto the skin daily, TransDERmal, DAILY Starting Mon 9/6/2021, Until Wed 10/6/2021, For 30 days, Disp-30 patch, R-0, Print             Jose Duran DO  Emergency Medicine Resident    (Please note that portions of thisnote were completed with a voice recognition program.  Efforts were made to edit the dictations but occasionally words are mis-transcribed.)     Jose Duran DO  09/07/21 [id number]

## 2021-09-06 NOTE — ED TRIAGE NOTES
Pt co back pain over entire back, chronic problem, worsening last night. Pain 10/10, spasm quality. Denies numbness or tingling in extremities. Pt alert and oriented x 4, respirations even and unlabored, NAD noted at this time. Will continue to monitor.

## 2021-09-06 NOTE — ED NOTES
Bed: 36  Expected date:   Expected time:   Means of arrival:   Comments:     Siobhan Porter, CONCEPCION  09/06/21 1119

## 2021-09-06 NOTE — ED PROVIDER NOTES
[facility]     Emergency Department     Faculty Attestation    I performed a history and physical examination of the patient and discussed management with the resident. I reviewed the residents note and agree with the documented findings and plan of care. Any areas of disagreement are noted on the chart. I was personally present for the key portions of any procedures. I have documented in the chart those procedures where I was not present during the key portions. I have reviewed the emergency nurses triage note. I agree with the chief complaint, past medical history, past surgical history, allergies, medications, social and family history as documented unless otherwise noted below. For Physician Assistant/ Nurse Practitioner cases/documentation I have personally evaluated this patient and have completed at least one if not all key elements of the E/M (history, physical exam, and MDM). Additional findings are as noted. I have personally seen and evaluated the patient. I find the patient's history and physical exam are consistent with the NP/PA documentation. I agree with the care provided, treatment rendered, disposition and follow-up plan. Musculoskeletal back pain history of chronic back pain without acute injury or neurologic complaints      Critical Care     Julio Malin M.D.   Attending Emergency  Physician             Frankie Dubois MD  09/06/21 [id number]

## 2021-09-07 ASSESSMENT — ENCOUNTER SYMPTOMS
COLOR CHANGE: 0
WHEEZING: 0
DIARRHEA: 0
CONSTIPATION: 0
COUGH: 0
SHORTNESS OF BREATH: 0
ABDOMINAL PAIN: 0
CHEST TIGHTNESS: 0
BACK PAIN: 1
NAUSEA: 0
VOMITING: 0

## 2021-11-03 ENCOUNTER — HOSPITAL ENCOUNTER (EMERGENCY)
Age: 51
Discharge: HOME | End: 2021-11-03
Attending: EMERGENCY MEDICINE
Payer: COMMERCIAL

## 2021-11-03 VITALS
SYSTOLIC BLOOD PRESSURE: 182 MMHG | BODY MASS INDEX: 27.46 KG/M2 | TEMPERATURE: 97.2 F | RESPIRATION RATE: 16 BRPM | HEART RATE: 67 BPM | WEIGHT: 155 LBS | OXYGEN SATURATION: 98 % | DIASTOLIC BLOOD PRESSURE: 104 MMHG

## 2021-11-03 DIAGNOSIS — S16.1XXA ACUTE STRAIN OF NECK MUSCLE, INITIAL ENCOUNTER: Primary | ICD-10-CM

## 2021-11-03 PROCEDURE — 99283 EMERGENCY DEPT VISIT LOW MDM: CPT

## 2021-11-03 PROCEDURE — 6360000002 HC RX W HCPCS: Performed by: STUDENT IN AN ORGANIZED HEALTH CARE EDUCATION/TRAINING PROGRAM

## 2021-11-03 PROCEDURE — 96372 THER/PROPH/DIAG INJ SC/IM: CPT

## 2021-11-03 RX ORDER — KETOROLAC TROMETHAMINE 15 MG/ML
15 INJECTION, SOLUTION INTRAMUSCULAR; INTRAVENOUS ONCE
Status: COMPLETED | OUTPATIENT
Start: 2021-11-03 | End: 2021-11-03

## 2021-11-03 RX ORDER — ORPHENADRINE CITRATE 30 MG/ML
60 INJECTION INTRAMUSCULAR; INTRAVENOUS ONCE
Status: COMPLETED | OUTPATIENT
Start: 2021-11-03 | End: 2021-11-03

## 2021-11-03 RX ORDER — ACETAMINOPHEN 325 MG/1
650 TABLET ORAL EVERY 6 HOURS PRN
Qty: 60 TABLET | Refills: 0 | Status: SHIPPED | OUTPATIENT
Start: 2021-11-03 | End: 2021-12-18

## 2021-11-03 RX ORDER — LIDOCAINE 50 MG/G
1 PATCH TOPICAL DAILY
Qty: 10 PATCH | Refills: 0 | Status: SHIPPED | OUTPATIENT
Start: 2021-11-03 | End: 2021-11-13

## 2021-11-03 RX ADMIN — KETOROLAC TROMETHAMINE 15 MG: 15 INJECTION, SOLUTION INTRAMUSCULAR; INTRAVENOUS at 03:47

## 2021-11-03 RX ADMIN — ORPHENADRINE CITRATE 60 MG: 30 INJECTION INTRAMUSCULAR; INTRAVENOUS at 03:47

## 2021-11-03 ASSESSMENT — PAIN SCALES - GENERAL: PAINLEVEL_OUTOF10: 9

## 2021-11-03 ASSESSMENT — PAIN DESCRIPTION - FREQUENCY: FREQUENCY: CONTINUOUS

## 2021-11-03 ASSESSMENT — PAIN DESCRIPTION - PAIN TYPE: TYPE: ACUTE PAIN

## 2021-11-03 ASSESSMENT — PAIN DESCRIPTION - DESCRIPTORS: DESCRIPTORS: NUMBNESS

## 2021-11-03 ASSESSMENT — PAIN DESCRIPTION - LOCATION: LOCATION: ARM;LEG;NECK

## 2021-11-03 ASSESSMENT — ENCOUNTER SYMPTOMS
SORE THROAT: 0
DIARRHEA: 0
SHORTNESS OF BREATH: 0
BACK PAIN: 0
RHINORRHEA: 0
VOMITING: 0
NAUSEA: 0
ABDOMINAL PAIN: 0

## 2021-11-03 ASSESSMENT — PAIN DESCRIPTION - ORIENTATION: ORIENTATION: RIGHT

## 2021-11-03 NOTE — ED NOTES
ULYSSES set up patient's [employer] ride home form the ED. The confirmation number is #[id number]. Harley Valiente.  [address], [address]  11/03/21 [id number]

## 2021-11-03 NOTE — ED PROVIDER NOTES
[facility] ED  Emergency Department Encounter  EmergencyMedicine Resident     Pt Savanna Bowling  MRN: [id number]  [place] 1970  Date of evaluation: 11/3/21  PCP:  Gautam BUCK, YEYO Baldwin NP    This patient was evaluated in the Emergency Department for symptoms described in the history of present illness. The patient was evaluated in the context of the global COVID-19 pandemic, which necessitated consideration that the patient might be at risk for infection with the SARS-CoV-2 virus that causes COVID-19. Institutional protocols and algorithms that pertain to the evaluation of patients at risk for COVID-19 are in a state of rapid change based on information released by regulatory bodies including the CDC and federal and state organizations. These policies and algorithms were followed during the patient's care in the ED. CHIEF COMPLAINT       Chief Complaint   Patient presents with    Arm Pain    Neck Pain       HISTORY OF PRESENT ILLNESS  (Location/Symptom, Timing/Onset, Context/Setting, Quality, Duration, Modifying Factors, Severity.)      Sindy Niño is a 45 y.o. female who presents with neck pain. Patient reports that she slept on her neck wrong, woke up with a pain in her right side of her neck, paresthesias down her right arm, mild to moderate severity, no associated symptoms. Patient denies fevers, chills, cough, congestion, chest pain, shortness of breath, abdominal pain, nausea, vomiting, diarrhea. Patient has history of chronic back pain, takes Zanaflex, Advil, reports that she has not gotten relief of symptoms with these medications earlier. Patient has not taken any medications recently.     PAST MEDICAL / SURGICAL / SOCIAL / FAMILY HISTORY      has a past medical history of Asthma, Bowel obstruction (HCC), Chronic back pain, Chronic hepatitis ([address] History of anemia, Hyperlipidemia, Hypertension, Prediabetes, SI joint arthritis, Substance abuse ([facility] [address] Wears dentures, and Wears glasses. has a past surgical history that includes Hysterectomy;  section; other surgical history ( or earlier ?); Tubal ligation; knee surgery (Right); brain surgery (N/A, age 5); Upper gastrointestinal endoscopy (N/A, 10/15/2020); and Colonoscopy (N/A, 10/15/2020). Social History     Socioeconomic History    Marital status: Single     Spouse name: Not on file    Number of children: Not on file    Years of education: Not on file    Highest education level: Not on file   Occupational History    Not on file   Tobacco Use    Smoking status: Current Every Day Smoker     Packs/day: 0.25     Years: 15.00     Pack years: 3.75     Types: Cigarettes, Cigars     Start date: 3/23/2006    Smokeless tobacco: Never Used    Tobacco comment: 4-5 cigars/day   Substance and Sexual Activity    Alcohol use: Not Currently    Drug use: Not Currently     Types: Cocaine, Marijuana ([place])     Comment: last cocaine use 10/21/20    Sexual activity: Yes   Other Topics Concern    Not on file   Social History Narrative    Not on file     Social Determinants of Health     Financial Resource Strain:     Difficulty of Paying Living Expenses:    Food Insecurity:     Worried About Running Out of Food in the Last Year:     [address] in the Last Year:    Transportation Needs:     Lack of Transportation (Medical):      Lack of Transportation (Non-Medical):    Physical Activity:     Days of Exercise per Week:     Minutes of Exercise per Session:    Stress:     Feeling of Stress :    Social Connections:     Frequency of Communication with Friends and Family:     Frequency of Social Gatherings with Friends and Family:     Attends Religion Services:     Active Member of Clubs or Organizations:     Attends Club or Organization Meetings:     Marital Status:    Intimate Partner Violence:     Fear of Current or Ex-Partner:     Emotionally Abused:     Physically Abused:     Sexually Abused:        Family History   Problem Relation Age of Onset    High Blood Pressure Mother     High Blood Pressure Father        Allergies:  Latex and Ibuprofen    Home Medications:  Prior to Admission medications    Medication Sig Start Date End Date Taking? Authorizing Provider   lidocaine (LIDODERM) 5 % Place 1 patch onto the skin daily for 10 days 12 hours on, 12 hours off. 11/3/21 11/13/21 Yes James Sheikh MD   acetaminophen (TYLENOL) 325 MG tablet Take 2 tablets by mouth every 6 hours as needed for Pain 11/3/21  Yes James Sheikh MD   tiZANidine (ZANAFLEX) 4 MG tablet Take 1 tablet by mouth 3 times daily as needed (spasms) 9/6/21   Guillermo Gómez DO   docusate sodium (STOOL SOFTENER) 100 MG capsule Take 1 capsule by mouth 2 times daily 3/23/21   Rod Steele MD   omeprazole (PRILOSEC) 40 MG delayed release capsule Take 1 capsule by mouth daily 3/23/21   Rod Steele MD   ondansetron (ZOFRAN ODT) 4 MG disintegrating tablet Take 1 tablet by mouth every 8 hours as needed for Nausea or Vomiting 12/30/20   Deanna Blake MD   sucralfate (CARAFATE) 1 GM/10ML suspension Take 10 mLs by mouth 4 times daily 12/30/20   Deanna Blake MD   polyethylene glycol Ladarius Bilis) 17 GM/SCOOP powder Use as directed by following your patient instructions given by office.  11/30/20   Chanell Rice MD   bisacodyl (DULCOLAX) 5 MG EC tablet TAKE 4 TABS AS DIRECTED BY PHYSICIAN OFFICE 10/2/20   Rod Steele MD   magnesium citrate solution Take 296 mLs by mouth once for 1 dose 10/2/20 10/2/20  Rod Steele MD   naproxen (NAPROSYN) 500 MG tablet Take 1 tablet by mouth 2 times daily 9/22/20   Fede Martinez MD   diclofenac sodium (VOLTAREN) 1 % GEL Apply 4 g topically 4 times daily 9/22/20   Fede Martinez MD   simethicone (MYLICON) 80 MG chewable tablet Take 1 tablet by mouth 4 times daily as needed for Flatulence 9/15/20   Rod Steele MD   vitamin D3 (CHOLECALCIFEROL) 25 MCG (1000 UT) TABS tablet  8/12/20 Take  by mouth daily. 11/15/13   Historical Provider, MD       REVIEW OF SYSTEMS    (2-9 systems for level 4, 10 or more for level 5)      Review of Systems   Constitutional: Negative for chills, diaphoresis, fatigue and fever. HENT: Negative for congestion, rhinorrhea and sore throat. Eyes: Negative for visual disturbance. Respiratory: Negative for shortness of breath. Cardiovascular: Negative for chest pain. Gastrointestinal: Negative for abdominal pain, diarrhea, nausea and vomiting. Musculoskeletal: Positive for neck pain and neck stiffness. Negative for back pain. Skin: Negative for pallor, rash and wound. Neurological: Negative for dizziness, syncope, weakness, light-headedness and headaches. PHYSICAL EXAM   (up to 7 for level 4, 8 or more for level 5)      INITIAL VITALS:   BP (!) 182/104   Pulse 67   Temp 97.2 °F (36.2 °C)   Resp 16   Wt 155 lb (70.3 kg)   SpO2 98%   BMI 27.46 kg/m²     Physical Exam  Constitutional:       General: She is not in acute distress. Appearance: Normal appearance. She is well-developed. She is not ill-appearing, toxic-appearing or diaphoretic. HENT:      Head: Normocephalic and atraumatic. Right Ear: External ear normal.      Left Ear: External ear normal.   Eyes:      General:         Right eye: No discharge. Left eye: No discharge. Extraocular Movements: Extraocular movements intact. Pupils: Pupils are equal, round, and reactive to light. Neck:      Vascular: No JVD. Trachea: No tracheal deviation. Comments: Negative Spurling sign, patient does have tenderness to the right paracervical spinal muscles, involving the trapezius, rhomboids, radiating down her right arm, distal pulses intact equal bilaterally, full range of motion. Cardiovascular:      Rate and Rhythm: Normal rate and regular rhythm. Pulses: Normal pulses. Heart sounds: Normal heart sounds. No murmur heard. No friction rub. No gallop. Pulmonary:      Effort: Pulmonary effort is normal. No respiratory distress. Breath sounds: Normal breath sounds. No stridor. No wheezing, rhonchi or rales. Chest:      Chest wall: No tenderness. Abdominal:      General: There is no distension. Palpations: Abdomen is soft. There is no mass. Tenderness: There is no abdominal tenderness. There is no right CVA tenderness, left CVA tenderness or guarding. Musculoskeletal:         General: Normal range of motion. Cervical back: Normal range of motion. Rigidity and tenderness present. Skin:     General: Skin is warm. Capillary Refill: Capillary refill takes less than 2 seconds. Neurological:      General: No focal deficit present. Mental Status: She is alert and oriented to person, place, and time. Cranial Nerves: No cranial nerve deficit. Sensory: No sensory deficit. Motor: No weakness. Coordination: Coordination normal.      Gait: Gait normal.   Psychiatric:         Behavior: Behavior normal.         DIFFERENTIAL  DIAGNOSIS     PLAN (LABS / IMAGING / EKG):  No orders of the defined types were placed in this encounter. MEDICATIONS ORDERED:  Orders Placed This Encounter   Medications    ketorolac (TORADOL) injection 15 mg    orphenadrine (NORFLEX) injection 60 mg    lidocaine (LIDODERM) 5 %     Sig: Place 1 patch onto the skin daily for 10 days 12 hours on, 12 hours off. Dispense:  10 patch     Refill:  0    acetaminophen (TYLENOL) 325 MG tablet     Sig: Take 2 tablets by mouth every 6 hours as needed for Pain     Dispense:  60 tablet     Refill:  0       DDX:     DIAGNOSTIC RESULTS / EMERGENCY DEPARTMENT COURSE / MDM   LAB RESULTS:  No results found for this visit on 11/03/21.     IMPRESSION: 58-year-old female with history of chronic back pain, presenting with cervical strain of the right paracervical spinal muscles, negative Spurling test, will treat symptomatically with Norflex, Toradol, prescribe lidocaine patch, Tylenol, have patient continue to take her tizanidine and Advil and follow-up with her primary care provider. Patient has no systemic symptoms, normal vitals, no evidence of trauma, no indication for labs or imaging at this time. RADIOLOGY:      EKG      All EKG's are interpreted by the Emergency Department Physician who either signs or Co-signs this chart in the absence of a cardiologist.    EMERGENCY DEPARTMENT COURSE:  Patient came to emergency department, HPI and physical exam were conducted. All nursing notes were reviewed. Patient remained stable emergency room with slight hypertension and otherwise normal vital signs. Gave strict return precautions to the emergency department and discharge patient home. Recommended patient follow-up with primary care provider in the next few days for reevaluation. PROCEDURES:      CONSULTS:  None    CRITICAL CARE:      FINAL IMPRESSION      1. Acute strain of neck muscle, initial encounter          DISPOSITION / PLAN     DISPOSITION Decision To Discharge 11/03/2021 03:40:30 AM      PATIENT REFERRED TO:  YEYO Bro NP  [address]  [phone]    Schedule an appointment as soon as possible for a visit in 3 days  For reassessment    [facility]  [address] [address] [phone]  Go to   As needed, If symptoms worsen      DISCHARGE MEDICATIONS:  New Prescriptions    ACETAMINOPHEN (TYLENOL) 325 MG TABLET    Take 2 tablets by mouth every 6 hours as needed for Pain    LIDOCAINE (LIDODERM) 5 %    Place 1 patch onto the skin daily for 10 days 12 hours on, 12 hours off.        Db Guerrero MD  Emergency Medicine Resident    (Please note that portions of thisnote were completed with a voice recognition program.  Efforts were made to edit the dictations but occasionally words are mis-transcribed.)        Db Guerrero MD  Resident  11/03/21 0751

## 2021-11-03 NOTE — ED NOTES
Patient presents to ED with c/o right neck/shoulder spasm, states she woke up like that yesterday, worsens with movement. States she has not been taking her muscle relaxer due to interaction with other meds. Patient rates pain 10/10, alert and oriented x 3, resp e/u, skin PWD, resting in bed.      Jad Fabian RN  11/03/21 [id number]

## 2021-11-04 NOTE — ED PROVIDER NOTES
[address] [facility]   Emergency Department  Faculty Attestation       I performed a history and physical examination of the patient and discussed management with the resident. I reviewed the residents note and agree with the documented findings including all diagnostic interpretations and plan of care. Any areas of disagreement are noted on the chart. I was personally present for the key portions of any procedures. I have documented in the chart those procedures where I was not present during the key portions. I have reviewed the emergency nurses triage note. I agree with the chief complaint, past medical history, past surgical history, allergies, medications, social and family history as documented unless otherwise noted below. Documentation of the HPI, Physical Exam and Medical Decision Making performed by jorge is based on my personal performance of the HPI, PE and MDM. For Physician Assistant/ Nurse Practitioner cases/documentation I have personally evaluated this patient and have completed at least one if not all key elements of the E/M (history, physical exam, and MDM). Additional findings are as noted. Pertinent Comments     Primary Care Physician: Alvin Kaye NP-C, APRN - NP      ED Triage Vitals [11/03/21 0319]   BP Temp Temp src Pulse Resp SpO2 Height Weight   (!) 182/104 97.2 °F (36.2 °C) -- 67 16 98 % -- 155 lb (70.3 kg)        History/Physical: This is a 45 y.o. female who presents to the Emergency Department with complaint of right sided neck pain that goes down the right arm with intermittent tingling sensation. Initially started as a \"stiff neck\" and has not improved. Does have muscle relaxants at home but has not really used them. No falls. No fevers. No headahess. No IVDU. On exam patient is awake and alert in no acute distress. NC/AT with no facial droop. Pupils equal and reatctive with EOMI.   Neck with no midline tenderness there is right lateral tenderness with spasms down the trapezius down into the shoulder with obvious spasm. Normal ROM of the neck (just slowed due to pain). NOrmal ROM of the arm. Normal strenght and sensation to bilateral upper extremities     MDM/Plan:   Right sided neck pain with associated tenderness and spasm. No signs of trauma  No signs of meningitis  No red flag symptoms. Likely MSK. No indication for labs or imaging at this time. Symptomatic treatment  Ok for d/c   F/u PCP  Return if concerns arise.        Critical Care: None     Bhavana Madrigal MD  Attending Emergency Physician        Bhavana Madrigal MD  11/04/21 [id number]

## 2021-11-22 ENCOUNTER — HOSPITAL ENCOUNTER (OUTPATIENT)
Age: 51
Discharge: HOME | End: 2021-11-24
Payer: COMMERCIAL

## 2021-11-22 ENCOUNTER — HOSPITAL ENCOUNTER (OUTPATIENT)
Dept: GENERAL RADIOLOGY | Age: 51
Discharge: HOME | End: 2021-11-24
Payer: COMMERCIAL

## 2021-11-22 DIAGNOSIS — M62.838 TRAPEZIUS MUSCLE SPASM: ICD-10-CM

## 2021-11-22 DIAGNOSIS — M25.559 HIP PAIN: ICD-10-CM

## 2021-11-22 PROCEDURE — 73502 X-RAY EXAM HIP UNI 2-3 VIEWS: CPT

## 2021-11-22 PROCEDURE — 73030 X-RAY EXAM OF SHOULDER: CPT

## 2021-11-22 PROCEDURE — 72040 X-RAY EXAM NECK SPINE 2-3 VW: CPT

## 2021-12-18 ENCOUNTER — APPOINTMENT (OUTPATIENT)
Dept: ULTRASOUND IMAGING | Age: 51
DRG: 284 | End: 2021-12-18
Payer: COMMERCIAL

## 2021-12-18 ENCOUNTER — APPOINTMENT (OUTPATIENT)
Dept: NUCLEAR MEDICINE | Age: 51
DRG: 284 | End: 2021-12-18
Payer: COMMERCIAL

## 2021-12-18 ENCOUNTER — HOSPITAL ENCOUNTER (INPATIENT)
Age: 51
LOS: 2 days | Discharge: HOME | DRG: 284 | End: 2021-12-20
Attending: STUDENT IN AN ORGANIZED HEALTH CARE EDUCATION/TRAINING PROGRAM | Admitting: INTERNAL MEDICINE
Payer: COMMERCIAL

## 2021-12-18 ENCOUNTER — APPOINTMENT (OUTPATIENT)
Dept: CT IMAGING | Age: 51
DRG: 284 | End: 2021-12-18
Payer: COMMERCIAL

## 2021-12-18 DIAGNOSIS — J18.9 PNEUMONIA DUE TO INFECTIOUS ORGANISM, UNSPECIFIED LATERALITY, UNSPECIFIED PART OF LUNG: ICD-10-CM

## 2021-12-18 DIAGNOSIS — K80.20 SYMPTOMATIC CHOLELITHIASIS: ICD-10-CM

## 2021-12-18 DIAGNOSIS — K80.50 BILIARY COLIC: ICD-10-CM

## 2021-12-18 DIAGNOSIS — R10.9 ABDOMINAL PAIN, UNSPECIFIED ABDOMINAL LOCATION: Primary | ICD-10-CM

## 2021-12-18 DIAGNOSIS — K81.1 CHOLECYSTITIS, CHRONIC: ICD-10-CM

## 2021-12-18 PROBLEM — K59.00 CONSTIPATION: Status: ACTIVE | Noted: 2021-12-18

## 2021-12-18 PROBLEM — B37.2 CANDIDAL INTERTRIGO: Status: ACTIVE | Noted: 2021-12-18

## 2021-12-18 PROBLEM — K21.9 GERD (GASTROESOPHAGEAL REFLUX DISEASE): Status: ACTIVE | Noted: 2021-12-18

## 2021-12-18 PROBLEM — I16.0 HYPERTENSIVE URGENCY: Status: ACTIVE | Noted: 2021-12-18

## 2021-12-18 LAB
-: NORMAL
ABSOLUTE EOS #: 0 K/UL (ref 0–0.4)
ABSOLUTE IMMATURE GRANULOCYTE: ABNORMAL K/UL (ref 0–0.3)
ABSOLUTE LYMPH #: 1.7 K/UL (ref 1–4.8)
ABSOLUTE MONO #: 0.7 K/UL (ref 0.1–1.3)
ALBUMIN SERPL-MCNC: 4.6 G/DL (ref 3.5–5.2)
ALBUMIN/GLOBULIN RATIO: ABNORMAL (ref 1–2.5)
ALP BLD-CCNC: 127 U/L (ref 35–104)
ALT SERPL-CCNC: 40 U/L (ref 5–33)
ANION GAP SERPL CALCULATED.3IONS-SCNC: 15 MMOL/L (ref 9–17)
AST SERPL-CCNC: 35 U/L
BASOPHILS # BLD: 1 % (ref 0–2)
BASOPHILS ABSOLUTE: 0.1 K/UL (ref 0–0.2)
BILIRUB SERPL-MCNC: 0.33 MG/DL (ref 0.3–1.2)
BILIRUBIN URINE: NEGATIVE
BUN BLDV-MCNC: 10 MG/DL (ref 6–20)
BUN/CREAT BLD: ABNORMAL (ref 9–20)
CALCIUM SERPL-MCNC: 9.7 MG/DL (ref 8.6–10.4)
CHLORIDE BLD-SCNC: 99 MMOL/L (ref 98–107)
CO2: 25 MMOL/L (ref 20–31)
COLOR: YELLOW
COMMENT UA: NORMAL
CREAT SERPL-MCNC: 0.78 MG/DL (ref 0.5–0.9)
DIFFERENTIAL TYPE: ABNORMAL
EOSINOPHILS RELATIVE PERCENT: 1 % (ref 0–4)
GFR AFRICAN AMERICAN: >60 ML/MIN
GFR NON-AFRICAN AMERICAN: >60 ML/MIN
GFR SERPL CREATININE-BSD FRML MDRD: ABNORMAL ML/MIN/{1.73_M2}
GFR SERPL CREATININE-BSD FRML MDRD: ABNORMAL ML/MIN/{1.73_M2}
GLUCOSE BLD-MCNC: 120 MG/DL (ref 70–99)
GLUCOSE URINE: NEGATIVE
HCT VFR BLD CALC: 40 % (ref 36–46)
HEMOGLOBIN: 13.8 G/DL (ref 12–16)
IMMATURE GRANULOCYTES: ABNORMAL %
KETONES, URINE: NEGATIVE
LEUKOCYTE ESTERASE, URINE: NEGATIVE
LIPASE: 37 U/L (ref 13–60)
LYMPHOCYTES # BLD: 24 % (ref 24–44)
MCH RBC QN AUTO: 31 PG (ref 26–34)
MCHC RBC AUTO-ENTMCNC: 34.5 G/DL (ref 31–37)
MCV RBC AUTO: 89.8 FL (ref 80–100)
MONOCYTES # BLD: 9 % (ref 1–7)
NITRITE, URINE: NEGATIVE
NRBC AUTOMATED: ABNORMAL PER 100 WBC
PDW BLD-RTO: 13.2 % (ref 11.5–14.9)
PH UA: 6 (ref 5–8)
PLATELET # BLD: 253 K/UL (ref 150–450)
PLATELET ESTIMATE: ABNORMAL
PMV BLD AUTO: 8.6 FL (ref 6–12)
POTASSIUM SERPL-SCNC: 3.6 MMOL/L (ref 3.7–5.3)
PROTEIN UA: NEGATIVE
RBC # BLD: 4.46 M/UL (ref 4–5.2)
RBC # BLD: ABNORMAL 10*6/UL
REASON FOR REJECTION: NORMAL
SARS-COV-2, RAPID: NOT DETECTED
SEG NEUTROPHILS: 65 % (ref 36–66)
SEGMENTED NEUTROPHILS ABSOLUTE COUNT: 4.8 K/UL (ref 1.3–9.1)
SODIUM BLD-SCNC: 139 MMOL/L (ref 135–144)
SPECIFIC GRAVITY UA: 1.01 (ref 1–1.03)
SPECIMEN DESCRIPTION: NORMAL
TOTAL PROTEIN: 8 G/DL (ref 6.4–8.3)
TURBIDITY: CLEAR
URINE HGB: NEGATIVE
UROBILINOGEN, URINE: NORMAL
WBC # BLD: 7.2 K/UL (ref 3.5–11)
WBC # BLD: ABNORMAL 10*3/UL
ZZ NTE CLEAN UP: ORDERED TEST: NORMAL
ZZ NTE WITH NAME CLEAN UP: SPECIMEN SOURCE: NORMAL

## 2021-12-18 PROCEDURE — 6360000002 HC RX W HCPCS: Performed by: SURGERY

## 2021-12-18 PROCEDURE — 2580000003 HC RX 258: Performed by: EMERGENCY MEDICINE

## 2021-12-18 PROCEDURE — 85025 COMPLETE CBC W/AUTO DIFF WBC: CPT

## 2021-12-18 PROCEDURE — 6360000004 HC RX CONTRAST MEDICATION: Performed by: STUDENT IN AN ORGANIZED HEALTH CARE EDUCATION/TRAINING PROGRAM

## 2021-12-18 PROCEDURE — 99285 EMERGENCY DEPT VISIT HI MDM: CPT

## 2021-12-18 PROCEDURE — 2580000003 HC RX 258: Performed by: SURGERY

## 2021-12-18 PROCEDURE — 80053 COMPREHEN METABOLIC PANEL: CPT

## 2021-12-18 PROCEDURE — 83690 ASSAY OF LIPASE: CPT

## 2021-12-18 PROCEDURE — 6360000002 HC RX W HCPCS: Performed by: STUDENT IN AN ORGANIZED HEALTH CARE EDUCATION/TRAINING PROGRAM

## 2021-12-18 PROCEDURE — 81003 URINALYSIS AUTO W/O SCOPE: CPT

## 2021-12-18 PROCEDURE — 2580000003 HC RX 258: Performed by: STUDENT IN AN ORGANIZED HEALTH CARE EDUCATION/TRAINING PROGRAM

## 2021-12-18 PROCEDURE — 36415 COLL VENOUS BLD VENIPUNCTURE: CPT

## 2021-12-18 PROCEDURE — 6370000000 HC RX 637 (ALT 250 FOR IP): Performed by: STUDENT IN AN ORGANIZED HEALTH CARE EDUCATION/TRAINING PROGRAM

## 2021-12-18 PROCEDURE — 6360000002 HC RX W HCPCS: Performed by: EMERGENCY MEDICINE

## 2021-12-18 PROCEDURE — 96375 TX/PRO/DX INJ NEW DRUG ADDON: CPT

## 2021-12-18 PROCEDURE — 74174 CTA ABD&PLVS W/CONTRAST: CPT

## 2021-12-18 PROCEDURE — A9537 TC99M MEBROFENIN: HCPCS | Performed by: SURGERY

## 2021-12-18 PROCEDURE — 3430000000 HC RX DIAGNOSTIC RADIOPHARMACEUTICAL: Performed by: SURGERY

## 2021-12-18 PROCEDURE — 2060000000 HC ICU INTERMEDIATE R&B

## 2021-12-18 PROCEDURE — 87635 SARS-COV-2 COVID-19 AMP PRB: CPT

## 2021-12-18 PROCEDURE — 78226 HEPATOBILIARY SYSTEM IMAGING: CPT

## 2021-12-18 PROCEDURE — 96374 THER/PROPH/DIAG INJ IV PUSH: CPT

## 2021-12-18 PROCEDURE — 99223 1ST HOSP IP/OBS HIGH 75: CPT | Performed by: INTERNAL MEDICINE

## 2021-12-18 PROCEDURE — 76705 ECHO EXAM OF ABDOMEN: CPT

## 2021-12-18 RX ORDER — SODIUM CHLORIDE 0.9 % (FLUSH) 0.9 %
10 SYRINGE (ML) INJECTION PRN
Status: DISCONTINUED | OUTPATIENT
Start: 2021-12-18 | End: 2021-12-20 | Stop reason: HOSPADM

## 2021-12-18 RX ORDER — MORPHINE SULFATE 4 MG/ML
4 INJECTION, SOLUTION INTRAMUSCULAR; INTRAVENOUS ONCE
Status: COMPLETED | OUTPATIENT
Start: 2021-12-18 | End: 2021-12-18

## 2021-12-18 RX ORDER — PANTOPRAZOLE SODIUM 40 MG/1
40 TABLET, DELAYED RELEASE ORAL
Status: DISCONTINUED | OUTPATIENT
Start: 2021-12-19 | End: 2021-12-20 | Stop reason: HOSPADM

## 2021-12-18 RX ORDER — ATORVASTATIN CALCIUM 20 MG/1
20 TABLET, FILM COATED ORAL EVERY EVENING
Status: DISCONTINUED | OUTPATIENT
Start: 2021-12-18 | End: 2021-12-20 | Stop reason: HOSPADM

## 2021-12-18 RX ORDER — ASPIRIN 81 MG/1
81 TABLET ORAL DAILY
Status: DISCONTINUED | OUTPATIENT
Start: 2021-12-18 | End: 2021-12-20 | Stop reason: HOSPADM

## 2021-12-18 RX ORDER — ALBUTEROL SULFATE 90 UG/1
1 AEROSOL, METERED RESPIRATORY (INHALATION) EVERY 6 HOURS PRN
Status: DISCONTINUED | OUTPATIENT
Start: 2021-12-18 | End: 2021-12-20 | Stop reason: HOSPADM

## 2021-12-18 RX ORDER — SODIUM CHLORIDE 0.9 % (FLUSH) 0.9 %
5-40 SYRINGE (ML) INJECTION EVERY 12 HOURS SCHEDULED
Status: DISCONTINUED | OUTPATIENT
Start: 2021-12-18 | End: 2021-12-20 | Stop reason: HOSPADM

## 2021-12-18 RX ORDER — SODIUM CHLORIDE 9 MG/ML
INJECTION, SOLUTION INTRAVENOUS CONTINUOUS
Status: DISCONTINUED | OUTPATIENT
Start: 2021-12-18 | End: 2021-12-20 | Stop reason: HOSPADM

## 2021-12-18 RX ORDER — AMLODIPINE BESYLATE 5 MG/1
10 TABLET ORAL DAILY
Status: DISCONTINUED | OUTPATIENT
Start: 2021-12-18 | End: 2021-12-20 | Stop reason: HOSPADM

## 2021-12-18 RX ORDER — ACETAMINOPHEN 325 MG/1
650 TABLET ORAL EVERY 4 HOURS PRN
Status: DISCONTINUED | OUTPATIENT
Start: 2021-12-18 | End: 2021-12-19

## 2021-12-18 RX ORDER — POLYETHYLENE GLYCOL 3350 17 G/17G
17 POWDER, FOR SOLUTION ORAL DAILY PRN
Status: DISCONTINUED | OUTPATIENT
Start: 2021-12-18 | End: 2021-12-19

## 2021-12-18 RX ORDER — ONDANSETRON 2 MG/ML
4 INJECTION INTRAMUSCULAR; INTRAVENOUS EVERY 6 HOURS PRN
Status: DISCONTINUED | OUTPATIENT
Start: 2021-12-18 | End: 2021-12-20 | Stop reason: HOSPADM

## 2021-12-18 RX ORDER — HEPARIN SODIUM 5000 [USP'U]/ML
5000 INJECTION, SOLUTION INTRAVENOUS; SUBCUTANEOUS EVERY 8 HOURS SCHEDULED
Status: DISCONTINUED | OUTPATIENT
Start: 2021-12-18 | End: 2021-12-20 | Stop reason: HOSPADM

## 2021-12-18 RX ORDER — SODIUM CHLORIDE 0.9 % (FLUSH) 0.9 %
5-40 SYRINGE (ML) INJECTION PRN
Status: DISCONTINUED | OUTPATIENT
Start: 2021-12-18 | End: 2021-12-20 | Stop reason: HOSPADM

## 2021-12-18 RX ORDER — ONDANSETRON 4 MG/1
4 TABLET, ORALLY DISINTEGRATING ORAL EVERY 8 HOURS PRN
Status: DISCONTINUED | OUTPATIENT
Start: 2021-12-18 | End: 2021-12-20 | Stop reason: HOSPADM

## 2021-12-18 RX ORDER — HYDROCHLOROTHIAZIDE 25 MG/1
25 TABLET ORAL DAILY
Status: DISCONTINUED | OUTPATIENT
Start: 2021-12-18 | End: 2021-12-20 | Stop reason: HOSPADM

## 2021-12-18 RX ORDER — LISINOPRIL 20 MG/1
40 TABLET ORAL DAILY
Status: DISCONTINUED | OUTPATIENT
Start: 2021-12-18 | End: 2021-12-20 | Stop reason: HOSPADM

## 2021-12-18 RX ORDER — OLANZAPINE 10 MG/1
15 TABLET ORAL NIGHTLY
Status: DISCONTINUED | OUTPATIENT
Start: 2021-12-18 | End: 2021-12-20 | Stop reason: HOSPADM

## 2021-12-18 RX ORDER — FENTANYL CITRATE 50 UG/ML
100 INJECTION, SOLUTION INTRAMUSCULAR; INTRAVENOUS
Status: DISCONTINUED | OUTPATIENT
Start: 2021-12-18 | End: 2021-12-20

## 2021-12-18 RX ORDER — SODIUM CHLORIDE 9 MG/ML
25 INJECTION, SOLUTION INTRAVENOUS PRN
Status: DISCONTINUED | OUTPATIENT
Start: 2021-12-18 | End: 2021-12-20 | Stop reason: HOSPADM

## 2021-12-18 RX ORDER — 0.9 % SODIUM CHLORIDE 0.9 %
80 INTRAVENOUS SOLUTION INTRAVENOUS ONCE
Status: COMPLETED | OUTPATIENT
Start: 2021-12-18 | End: 2021-12-18

## 2021-12-18 RX ORDER — ONDANSETRON 2 MG/ML
4 INJECTION INTRAMUSCULAR; INTRAVENOUS ONCE
Status: COMPLETED | OUTPATIENT
Start: 2021-12-18 | End: 2021-12-18

## 2021-12-18 RX ORDER — SODIUM CHLORIDE 9 MG/ML
INJECTION, SOLUTION INTRAVENOUS CONTINUOUS
Status: DISCONTINUED | OUTPATIENT
Start: 2021-12-18 | End: 2021-12-19

## 2021-12-18 RX ORDER — FENTANYL CITRATE 50 UG/ML
50 INJECTION, SOLUTION INTRAMUSCULAR; INTRAVENOUS
Status: DISCONTINUED | OUTPATIENT
Start: 2021-12-18 | End: 2021-12-20

## 2021-12-18 RX ADMIN — Medication 4 MG: at 04:00

## 2021-12-18 RX ADMIN — Medication 5.1 MILLICURIE: at 16:22

## 2021-12-18 RX ADMIN — SODIUM CHLORIDE, PRESERVATIVE FREE 10 ML: 5 INJECTION INTRAVENOUS at 15:03

## 2021-12-18 RX ADMIN — PIPERACILLIN AND TAZOBACTAM 4500 MG: 4; .5 INJECTION, POWDER, LYOPHILIZED, FOR SOLUTION INTRAVENOUS; PARENTERAL at 11:08

## 2021-12-18 RX ADMIN — PIPERACILLIN SODIUM AND TAZOBACTAM SODIUM 3375 MG: 3; .375 INJECTION, POWDER, LYOPHILIZED, FOR SOLUTION INTRAVENOUS at 20:06

## 2021-12-18 RX ADMIN — MORPHINE SULFATE 4 MG: 4 INJECTION, SOLUTION INTRAMUSCULAR; INTRAVENOUS at 08:54

## 2021-12-18 RX ADMIN — IOPAMIDOL 75 ML: 755 INJECTION, SOLUTION INTRAVENOUS at 06:35

## 2021-12-18 RX ADMIN — ONDANSETRON 4 MG: 2 INJECTION, SOLUTION INTRAMUSCULAR; INTRAVENOUS at 04:00

## 2021-12-18 RX ADMIN — FENTANYL CITRATE 100 MCG: 0.05 INJECTION, SOLUTION INTRAMUSCULAR; INTRAVENOUS at 18:57

## 2021-12-18 RX ADMIN — HEPARIN SODIUM 5000 UNITS: 5000 INJECTION INTRAVENOUS; SUBCUTANEOUS at 21:22

## 2021-12-18 RX ADMIN — FENTANYL CITRATE 100 MCG: 0.05 INJECTION, SOLUTION INTRAMUSCULAR; INTRAVENOUS at 21:22

## 2021-12-18 RX ADMIN — SODIUM CHLORIDE 80 ML: 9 INJECTION, SOLUTION INTRAVENOUS at 06:34

## 2021-12-18 RX ADMIN — ATORVASTATIN CALCIUM 20 MG: 20 TABLET, FILM COATED ORAL at 18:57

## 2021-12-18 RX ADMIN — SODIUM CHLORIDE: 9 INJECTION, SOLUTION INTRAVENOUS at 11:07

## 2021-12-18 RX ADMIN — HYDROMORPHONE HYDROCHLORIDE 0.5 MG: 1 INJECTION, SOLUTION INTRAMUSCULAR; INTRAVENOUS; SUBCUTANEOUS at 07:08

## 2021-12-18 RX ADMIN — SODIUM CHLORIDE, PRESERVATIVE FREE 10 ML: 5 INJECTION INTRAVENOUS at 16:22

## 2021-12-18 RX ADMIN — SODIUM CHLORIDE, PRESERVATIVE FREE 10 ML: 5 INJECTION INTRAVENOUS at 06:36

## 2021-12-18 ASSESSMENT — PAIN DESCRIPTION - PROGRESSION: CLINICAL_PROGRESSION: GRADUALLY WORSENING

## 2021-12-18 ASSESSMENT — ENCOUNTER SYMPTOMS
RHINORRHEA: 0
FACIAL SWELLING: 0
COLOR CHANGE: 0
DIARRHEA: 0
SHORTNESS OF BREATH: 0
CONSTIPATION: 1
COUGH: 0
ABDOMINAL PAIN: 1
SORE THROAT: 0
EYE ITCHING: 0
SHORTNESS OF BREATH: 1
NAUSEA: 0
PHOTOPHOBIA: 0
NAUSEA: 1
EYE REDNESS: 0
VOMITING: 0

## 2021-12-18 ASSESSMENT — PAIN SCALES - GENERAL
PAINLEVEL_OUTOF10: 10
PAINLEVEL_OUTOF10: 9
PAINLEVEL_OUTOF10: 8
PAINLEVEL_OUTOF10: 10
PAINLEVEL_OUTOF10: 10
PAINLEVEL_OUTOF10: 8
PAINLEVEL_OUTOF10: 10
PAINLEVEL_OUTOF10: 7
PAINLEVEL_OUTOF10: 8
PAINLEVEL_OUTOF10: 10
PAINLEVEL_OUTOF10: 9

## 2021-12-18 ASSESSMENT — PAIN DESCRIPTION - FREQUENCY: FREQUENCY: CONTINUOUS

## 2021-12-18 ASSESSMENT — PAIN DESCRIPTION - LOCATION
LOCATION: FLANK
LOCATION: ABDOMEN

## 2021-12-18 ASSESSMENT — PAIN DESCRIPTION - PAIN TYPE: TYPE: ACUTE PAIN

## 2021-12-18 ASSESSMENT — PAIN DESCRIPTION - ONSET: ONSET: ON-GOING

## 2021-12-18 ASSESSMENT — PAIN DESCRIPTION - ORIENTATION: ORIENTATION: UPPER;LEFT;RIGHT

## 2021-12-18 ASSESSMENT — PAIN DESCRIPTION - DESCRIPTORS: DESCRIPTORS: SHARP
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
## 2021-12-18 NOTE — PROGRESS NOTES
Patient seen in the emergency room, discussed case with family medicine resident team  Admitted with abdominal pain, nausea symptoms are going on for last 4 to 5 days  Patient poor historian  History of hypertension, hepatitis C, GERD, history of bowel surgery did not take her medications, blood pressure was very high during presentation  Had CTA abdomen pelvis in emergency room suggestive of stool burden in colon, concern for gallstones  Ultrasound gallbladder done  Positive Garcia sign clinical exam  General surgery consulted  Plan for HIDA scan  We will start on Colace twice a day as needed, Dulcolax suppository  Complaining of redness in left inguinal fold, concern for candidal intertrigo, will start on nystatin powder  Full dictation to follow
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
## 2021-12-18 NOTE — ED PROVIDER NOTES
[address] ED  eMERGENCY dEPARTMENT eNCOUnter      Pt Name: Genesis Flores  MRN: [id number]  YOB: 1970  Date of evaluation: 12/18/21  PCP: Jaren Montana NPDennyC, YEYO - NP    ADDENDUM       Care of this patient was assumed from Dr. Morenita Black. The patient was seen for Abdominal Pain  . The patient's initial evaluation and plan have been discussed with the prior provider who initially evaluated the patient. Nursing Notes, Past Medical Hx, Past Surgical Hx, Social Hx, Allergies, and Family Hx were all reviewed. Plan: Follow-up CTA chest abdomen pelvis      ED Course     The patient was given the following medications:  Orders Placed This Encounter   Medications    morphine sulfate (PF) injection 4 mg    ondansetron (ZOFRAN) injection 4 mg    iopamidol (ISOVUE-370) 76 % injection 75 mL    0.9 % sodium chloride bolus    sodium chloride flush 0.9 % injection 10 mL    HYDROmorphone (DILAUDID) injection 0.5 mg    morphine sulfate (PF) injection 4 mg    piperacillin-tazobactam (ZOSYN) 4,500 mg in dextrose 5 % 100 mL IVPB (mini-bag)     Order Specific Question:   Antimicrobial Indications     Answer:   Intra-Abdominal Infection     Order Specific Question:   Antimicrobial Indications     Answer:   Pneumonia (CAP)       RECENT VITALS:  BP: (!) 144/105, Temp: 98.6 °F (37 °C), Pulse: 85, Resp: 17     RADIOLOGY:All plain film, CT, MRI, and formal ultrasound images (except ED bedside ultrasound) are read by the radiologist and the images and interpretations are directly viewed by the emergency physician. US GALLBLADDER RUQ   Final Result   Cholelithiasis. Mildly distended gallbladder. No significant gallbladder   wall thickening or pericholecystic free fluid to suggest acute cholecystitis. No choledocholithiasis or biliary ductal dilation. If there is a persistent clinical concern for acute cholecystitis, a nuclear   medicine hepatobiliary scan is recommended.          CTA CHEST ABDOMEN PELVIS W CONTRAST   Final Result   No aortic dissection or aneurysm. Distended mildly thick walled gallbladder; assess clinically for possible   cholecystitis. No calcified stones identified. Correlation with   ultrasound/HIDA scan suggested. Bronchitis with probable atelectatic changes lung bases; minimal   infiltrate/bronchopneumonia possible, especially on the left. Correlate   clinically. Findings are not typical for COVID-19 pneumonia. Probable hepatic steatosis. No focal abnormality. Scattered colonic diverticula without CT evidence diverticulitis. Some   liquid stool suggesting degree of enteritis/diarrhea; correlate clinically. No colitis or bowel obstruction. Additional likely incidental findings, as detailed in the body of the report   above. LABS: All lab results were reviewed by myself, and all abnormals are listed below. Labs Reviewed   CBC WITH AUTO DIFFERENTIAL - Abnormal; Notable for the following components:       Result Value    Monocytes 9 (*)     All other components within normal limits   COMPREHENSIVE METABOLIC PANEL W/ REFLEX TO MG FOR LOW K - Abnormal; Notable for the following components:    Glucose 120 (*)     Potassium 3.6 (*)     Alkaline Phosphatase 127 (*)     ALT 40 (*)     AST 35 (*)     All other components within normal limits   COVID-19, RAPID   URINALYSIS   SPECIMEN REJECTION   LIPASE     9:55 AM EST  CTA chest abdomen pelvis shows questionable gallbladder wall thickening concerning for cholecystitis. On reexamination she does have pain in her upper abdomen. Her LFTs are mildly elevated. No white count however. CT does show questionable consolidation in her lungs. Will start on antibiotics. Ultrasound shows gallstones but no evidence of acute cholecystitis. With her symptoms will admit for further work-up. I spoke with Dr. Sheila Huitron who accepted patient for admission.     Consult also placed to Dr. Varela Officer general surgery. Disposition     DISPOSITION:    DISPOSITION Admitted 12/18/2021 09:49:48 AM      CLINICAL IMPRESSION:  1. Abdominal pain, unspecified abdominal location    2. Biliary colic    3. Pneumonia due to infectious organism, unspecified laterality, unspecified part of lung        PATIENT REFERRED TO:  No follow-up provider specified.     DISCHARGE MEDICATIONS:  New Prescriptions    No medications on file           DO Trent Garcia,   12/18/21 Navid Walsh DO  12/18/21 [id number]

## 2021-12-19 LAB
ABSOLUTE EOS #: 0 K/UL (ref 0–0.4)
ABSOLUTE IMMATURE GRANULOCYTE: ABNORMAL K/UL (ref 0–0.3)
ABSOLUTE LYMPH #: 1.2 K/UL (ref 1–4.8)
ABSOLUTE MONO #: 0.7 K/UL (ref 0.1–1.3)
ALBUMIN SERPL-MCNC: 4.2 G/DL (ref 3.5–5.2)
ALBUMIN/GLOBULIN RATIO: ABNORMAL (ref 1–2.5)
ALP BLD-CCNC: 113 U/L (ref 35–104)
ALT SERPL-CCNC: 35 U/L (ref 5–33)
ANION GAP SERPL CALCULATED.3IONS-SCNC: 9 MMOL/L (ref 9–17)
AST SERPL-CCNC: 26 U/L
BASOPHILS # BLD: 0 % (ref 0–2)
BASOPHILS ABSOLUTE: 0 K/UL (ref 0–0.2)
BILIRUB SERPL-MCNC: 0.37 MG/DL (ref 0.3–1.2)
BILIRUBIN DIRECT: 0.14 MG/DL
BILIRUBIN, INDIRECT: 0.23 MG/DL (ref 0–1)
BUN BLDV-MCNC: 12 MG/DL (ref 6–20)
BUN/CREAT BLD: ABNORMAL (ref 9–20)
CALCIUM SERPL-MCNC: 9.4 MG/DL (ref 8.6–10.4)
CHLORIDE BLD-SCNC: 99 MMOL/L (ref 98–107)
CO2: 30 MMOL/L (ref 20–31)
CREAT SERPL-MCNC: 1.04 MG/DL (ref 0.5–0.9)
DIFFERENTIAL TYPE: ABNORMAL
EOSINOPHILS RELATIVE PERCENT: 1 % (ref 0–4)
GFR AFRICAN AMERICAN: >60 ML/MIN
GFR NON-AFRICAN AMERICAN: 56 ML/MIN
GFR SERPL CREATININE-BSD FRML MDRD: ABNORMAL ML/MIN/{1.73_M2}
GFR SERPL CREATININE-BSD FRML MDRD: ABNORMAL ML/MIN/{1.73_M2}
GLOBULIN: ABNORMAL G/DL (ref 1.5–3.8)
GLUCOSE BLD-MCNC: 126 MG/DL (ref 70–99)
HCT VFR BLD CALC: 39.8 % (ref 36–46)
HEMOGLOBIN: 13.2 G/DL (ref 12–16)
IMMATURE GRANULOCYTES: ABNORMAL %
LIPASE: 57 U/L (ref 13–60)
LYMPHOCYTES # BLD: 18 % (ref 24–44)
MCH RBC QN AUTO: 29.8 PG (ref 26–34)
MCHC RBC AUTO-ENTMCNC: 33.1 G/DL (ref 31–37)
MCV RBC AUTO: 89.9 FL (ref 80–100)
MONOCYTES # BLD: 11 % (ref 1–7)
NRBC AUTOMATED: ABNORMAL PER 100 WBC
PDW BLD-RTO: 13.2 % (ref 11.5–14.9)
PLATELET # BLD: 251 K/UL (ref 150–450)
PLATELET ESTIMATE: ABNORMAL
PMV BLD AUTO: 8.6 FL (ref 6–12)
POTASSIUM SERPL-SCNC: 4.1 MMOL/L (ref 3.7–5.3)
RBC # BLD: 4.43 M/UL (ref 4–5.2)
RBC # BLD: ABNORMAL 10*6/UL
SEG NEUTROPHILS: 70 % (ref 36–66)
SEGMENTED NEUTROPHILS ABSOLUTE COUNT: 4.8 K/UL (ref 1.3–9.1)
SODIUM BLD-SCNC: 138 MMOL/L (ref 135–144)
TOTAL PROTEIN: 7.8 G/DL (ref 6.4–8.3)
WBC # BLD: 6.7 K/UL (ref 3.5–11)
WBC # BLD: ABNORMAL 10*3/UL

## 2021-12-19 PROCEDURE — 6370000000 HC RX 637 (ALT 250 FOR IP): Performed by: STUDENT IN AN ORGANIZED HEALTH CARE EDUCATION/TRAINING PROGRAM

## 2021-12-19 PROCEDURE — 2500000003 HC RX 250 WO HCPCS: Performed by: STUDENT IN AN ORGANIZED HEALTH CARE EDUCATION/TRAINING PROGRAM

## 2021-12-19 PROCEDURE — 2060000000 HC ICU INTERMEDIATE R&B

## 2021-12-19 PROCEDURE — 80048 BASIC METABOLIC PNL TOTAL CA: CPT

## 2021-12-19 PROCEDURE — 99232 SBSQ HOSP IP/OBS MODERATE 35: CPT | Performed by: INTERNAL MEDICINE

## 2021-12-19 PROCEDURE — 85025 COMPLETE CBC W/AUTO DIFF WBC: CPT

## 2021-12-19 PROCEDURE — 6360000002 HC RX W HCPCS: Performed by: SURGERY

## 2021-12-19 PROCEDURE — 2580000003 HC RX 258: Performed by: SURGERY

## 2021-12-19 PROCEDURE — 6360000002 HC RX W HCPCS: Performed by: STUDENT IN AN ORGANIZED HEALTH CARE EDUCATION/TRAINING PROGRAM

## 2021-12-19 PROCEDURE — 6360000002 HC RX W HCPCS: Performed by: INTERNAL MEDICINE

## 2021-12-19 PROCEDURE — 36415 COLL VENOUS BLD VENIPUNCTURE: CPT

## 2021-12-19 PROCEDURE — 6370000000 HC RX 637 (ALT 250 FOR IP): Performed by: SURGERY

## 2021-12-19 PROCEDURE — 83690 ASSAY OF LIPASE: CPT

## 2021-12-19 PROCEDURE — 2580000003 HC RX 258: Performed by: STUDENT IN AN ORGANIZED HEALTH CARE EDUCATION/TRAINING PROGRAM

## 2021-12-19 PROCEDURE — 80076 HEPATIC FUNCTION PANEL: CPT

## 2021-12-19 RX ORDER — DIPHENHYDRAMINE HYDROCHLORIDE 50 MG/ML
25 INJECTION INTRAMUSCULAR; INTRAVENOUS EVERY 8 HOURS PRN
Status: DISCONTINUED | OUTPATIENT
Start: 2021-12-19 | End: 2021-12-20 | Stop reason: HOSPADM

## 2021-12-19 RX ORDER — BISACODYL 10 MG
10 SUPPOSITORY, RECTAL RECTAL DAILY
Status: DISCONTINUED | OUTPATIENT
Start: 2021-12-19 | End: 2021-12-20 | Stop reason: HOSPADM

## 2021-12-19 RX ORDER — HYDRALAZINE HYDROCHLORIDE 20 MG/ML
10 INJECTION INTRAMUSCULAR; INTRAVENOUS EVERY 6 HOURS PRN
Status: DISCONTINUED | OUTPATIENT
Start: 2021-12-19 | End: 2021-12-19

## 2021-12-19 RX ORDER — POLYETHYLENE GLYCOL 3350 17 G/17G
17 POWDER, FOR SOLUTION ORAL 2 TIMES DAILY
Status: DISCONTINUED | OUTPATIENT
Start: 2021-12-19 | End: 2021-12-20 | Stop reason: HOSPADM

## 2021-12-19 RX ORDER — OXYCODONE HYDROCHLORIDE AND ACETAMINOPHEN 5; 325 MG/1; MG/1
1 TABLET ORAL EVERY 6 HOURS PRN
Status: DISCONTINUED | OUTPATIENT
Start: 2021-12-19 | End: 2021-12-19

## 2021-12-19 RX ORDER — HYDRALAZINE HYDROCHLORIDE 25 MG/1
25 TABLET, FILM COATED ORAL EVERY 8 HOURS SCHEDULED
Status: DISCONTINUED | OUTPATIENT
Start: 2021-12-19 | End: 2021-12-20

## 2021-12-19 RX ORDER — METOPROLOL TARTRATE 5 MG/5ML
5 INJECTION INTRAVENOUS EVERY 6 HOURS PRN
Status: DISCONTINUED | OUTPATIENT
Start: 2021-12-19 | End: 2021-12-20

## 2021-12-19 RX ORDER — OXYCODONE HYDROCHLORIDE AND ACETAMINOPHEN 5; 325 MG/1; MG/1
1 TABLET ORAL EVERY 6 HOURS PRN
Status: DISCONTINUED | OUTPATIENT
Start: 2021-12-19 | End: 2021-12-20 | Stop reason: HOSPADM

## 2021-12-19 RX ADMIN — FENTANYL CITRATE 100 MCG: 0.05 INJECTION, SOLUTION INTRAMUSCULAR; INTRAVENOUS at 16:23

## 2021-12-19 RX ADMIN — HEPARIN SODIUM 5000 UNITS: 5000 INJECTION INTRAVENOUS; SUBCUTANEOUS at 21:59

## 2021-12-19 RX ADMIN — OXYCODONE HYDROCHLORIDE AND ACETAMINOPHEN 1 TABLET: 5; 325 TABLET ORAL at 18:45

## 2021-12-19 RX ADMIN — DIPHENHYDRAMINE HYDROCHLORIDE 25 MG: 50 INJECTION INTRAMUSCULAR; INTRAVENOUS at 21:59

## 2021-12-19 RX ADMIN — POLYETHYLENE GLYCOL 3350 17 G: 17 POWDER, FOR SOLUTION ORAL at 14:55

## 2021-12-19 RX ADMIN — SODIUM CHLORIDE, PRESERVATIVE FREE 10 ML: 5 INJECTION INTRAVENOUS at 20:04

## 2021-12-19 RX ADMIN — FENTANYL CITRATE 100 MCG: 0.05 INJECTION, SOLUTION INTRAMUSCULAR; INTRAVENOUS at 12:10

## 2021-12-19 RX ADMIN — ATORVASTATIN CALCIUM 20 MG: 20 TABLET, FILM COATED ORAL at 17:21

## 2021-12-19 RX ADMIN — HYDROCHLOROTHIAZIDE 25 MG: 25 TABLET ORAL at 07:53

## 2021-12-19 RX ADMIN — ANTI-FUNGAL POWDER MICONAZOLE NITRATE TALC FREE: 1.42 POWDER TOPICAL at 12:04

## 2021-12-19 RX ADMIN — HYDRALAZINE HYDROCHLORIDE 25 MG: 25 TABLET, FILM COATED ORAL at 14:47

## 2021-12-19 RX ADMIN — FENTANYL CITRATE 100 MCG: 0.05 INJECTION, SOLUTION INTRAMUSCULAR; INTRAVENOUS at 07:52

## 2021-12-19 RX ADMIN — PIPERACILLIN SODIUM AND TAZOBACTAM SODIUM 3375 MG: 3; .375 INJECTION, POWDER, LYOPHILIZED, FOR SOLUTION INTRAVENOUS at 17:22

## 2021-12-19 RX ADMIN — ANTI-FUNGAL POWDER MICONAZOLE NITRATE TALC FREE: 1.42 POWDER TOPICAL at 20:03

## 2021-12-19 RX ADMIN — AMLODIPINE BESYLATE 10 MG: 5 TABLET ORAL at 07:53

## 2021-12-19 RX ADMIN — POLYETHYLENE GLYCOL 3350 17 G: 17 POWDER, FOR SOLUTION ORAL at 19:56

## 2021-12-19 RX ADMIN — FENTANYL CITRATE 100 MCG: 0.05 INJECTION, SOLUTION INTRAMUSCULAR; INTRAVENOUS at 10:00

## 2021-12-19 RX ADMIN — BISACODYL 10 MG: 10 SUPPOSITORY RECTAL at 10:06

## 2021-12-19 RX ADMIN — HEPARIN SODIUM 5000 UNITS: 5000 INJECTION INTRAVENOUS; SUBCUTANEOUS at 14:46

## 2021-12-19 RX ADMIN — FENTANYL CITRATE 100 MCG: 0.05 INJECTION, SOLUTION INTRAMUSCULAR; INTRAVENOUS at 14:46

## 2021-12-19 RX ADMIN — FENTANYL CITRATE 100 MCG: 0.05 INJECTION, SOLUTION INTRAMUSCULAR; INTRAVENOUS at 19:59

## 2021-12-19 RX ADMIN — DIPHENHYDRAMINE HYDROCHLORIDE 25 MG: 50 INJECTION INTRAMUSCULAR; INTRAVENOUS at 01:18

## 2021-12-19 RX ADMIN — PIPERACILLIN SODIUM AND TAZOBACTAM SODIUM 3375 MG: 3; .375 INJECTION, POWDER, LYOPHILIZED, FOR SOLUTION INTRAVENOUS at 09:25

## 2021-12-19 RX ADMIN — FENTANYL CITRATE 100 MCG: 0.05 INJECTION, SOLUTION INTRAMUSCULAR; INTRAVENOUS at 04:57

## 2021-12-19 RX ADMIN — FENTANYL CITRATE 100 MCG: 0.05 INJECTION, SOLUTION INTRAMUSCULAR; INTRAVENOUS at 02:12

## 2021-12-19 RX ADMIN — PIPERACILLIN SODIUM AND TAZOBACTAM SODIUM 3375 MG: 3; .375 INJECTION, POWDER, LYOPHILIZED, FOR SOLUTION INTRAVENOUS at 01:19

## 2021-12-19 RX ADMIN — ALBUTEROL SULFATE 1 PUFF: 90 AEROSOL, METERED RESPIRATORY (INHALATION) at 09:48

## 2021-12-19 RX ADMIN — HYDRALAZINE HYDROCHLORIDE 25 MG: 25 TABLET, FILM COATED ORAL at 21:59

## 2021-12-19 RX ADMIN — HYDRALAZINE HYDROCHLORIDE 25 MG: 25 TABLET, FILM COATED ORAL at 09:16

## 2021-12-19 RX ADMIN — SODIUM CHLORIDE, PRESERVATIVE FREE 10 ML: 5 INJECTION INTRAVENOUS at 10:01

## 2021-12-19 RX ADMIN — HYDRALAZINE HYDROCHLORIDE 10 MG: 20 INJECTION INTRAMUSCULAR; INTRAVENOUS at 09:16

## 2021-12-19 RX ADMIN — HEPARIN SODIUM 5000 UNITS: 5000 INJECTION INTRAVENOUS; SUBCUTANEOUS at 05:34

## 2021-12-19 RX ADMIN — SODIUM CHLORIDE: 9 INJECTION, SOLUTION INTRAVENOUS at 09:22

## 2021-12-19 RX ADMIN — FENTANYL CITRATE 100 MCG: 0.05 INJECTION, SOLUTION INTRAMUSCULAR; INTRAVENOUS at 00:09

## 2021-12-19 RX ADMIN — SODIUM CHLORIDE: 9 INJECTION, SOLUTION INTRAVENOUS at 21:29

## 2021-12-19 ASSESSMENT — PAIN SCALES - GENERAL
PAINLEVEL_OUTOF10: 10
PAINLEVEL_OUTOF10: 8
PAINLEVEL_OUTOF10: 10
PAINLEVEL_OUTOF10: 9
PAINLEVEL_OUTOF10: 8
PAINLEVEL_OUTOF10: 10
PAINLEVEL_OUTOF10: 6
PAINLEVEL_OUTOF10: 10
PAINLEVEL_OUTOF10: 9
PAINLEVEL_OUTOF10: 8
PAINLEVEL_OUTOF10: 10
PAINLEVEL_OUTOF10: 8
PAINLEVEL_OUTOF10: 10
PAINLEVEL_OUTOF10: 8
PAINLEVEL_OUTOF10: 10

## 2021-12-19 ASSESSMENT — ENCOUNTER SYMPTOMS
COUGH: 0
CHEST TIGHTNESS: 0
CONSTIPATION: 1
WHEEZING: 0
SHORTNESS OF BREATH: 0
ABDOMINAL PAIN: 1

## 2021-12-19 ASSESSMENT — PAIN DESCRIPTION - LOCATION: LOCATION: ABDOMEN

## 2021-12-19 ASSESSMENT — PAIN DESCRIPTION - PAIN TYPE: TYPE: ACUTE PAIN;CHRONIC PAIN

## 2021-12-19 ASSESSMENT — PAIN DESCRIPTION - ORIENTATION: ORIENTATION: UPPER;MID

## 2021-12-19 NOTE — PROGRESS NOTES
Patient was seen and examined. Hypertension being treated by internal medicine service. Afebrile. Vital signs are otherwise stable. Voiding well. Still has some nonspecific pain. Abdomen is soft. Mild tenderness right upper quadrant. No peritoneal signs. Surgical scars are well-healed. Extremity nontender. Blood work was reviewed. CBC BMP normal.  LFTs essentially unremarkable. Lipase normal.    For HIDA scan tomorrow. Diet as tolerated today. Continue IV antibiotics. Discussed with Dr. Yasmin Khanna. Management of hypertension per internal medicine service.

## 2021-12-19 NOTE — PROGRESS NOTES
Physical Therapy          Physical Therapy Cancel Note      DATE: 2021    NAME: Mathew Parks  MRN: [id number]   : 1970      Patient not seen this date for Physical Therapy due to: Other: Pt independent for mobility, also ambulates on her in the hallway, per CONCEPCION Sanchez. no need for skilled therapy.        Electronically signed by Elana Queen PT on 2021 at 2:01 PM

## 2021-12-19 NOTE — PROGRESS NOTES
Pt seen by resident; pt relates having abd \"spasms RLQ/LLQ; pt has agreed to take rectal suppository to induce BM

## 2021-12-19 NOTE — PROGRESS NOTES
[address] [facility] [employer]    PROGRESS NOTE             12/19/2021    8:13 AM    Name:   Terrie Edmonds  MRN:     [id number]     [place]:      [de-identified]   Room:   2093/2093-01  IP Day:  1  Admit Date:  12/18/2021  3:10 AM    PCP:  Keyana Chi NP-C, APRN - NP  Code Status:  Full Code    Subjective:     C/C:   Chief Complaint   Patient presents with    Abdominal Pain     Interval History Status: not changed. Patient seen and examined at bedside. Patient states she is in a lot of pain despite fentanyl 100 mcg q2h prn. Will add perocet 5mg q6. Patient has also not had a bowel movement - nurse reports patient was declining suppository. While at bedside patient is now agreeable to dulcolax suppository. Will reassess later today. HIDA scan showed delayed and incomplete gallbladder visualization, some concern for chronic cholecystitis. Will wait for surgery recommendations. BP elevated this morning, 208/117, after 1 dose of IV hydralazine it is now 138/91 per nurse. Review of Systems:     Review of Systems   Constitutional: Negative for fatigue and fever. Eyes: Negative for visual disturbance. Respiratory: Negative for cough, chest tightness, shortness of breath and wheezing. Cardiovascular: Negative for chest pain and leg swelling. Gastrointestinal: Positive for abdominal pain and constipation. Endocrine: Negative for polyuria. Genitourinary: Negative for difficulty urinating. Neurological: Negative for dizziness. Medications: Allergies:     Allergies   Allergen Reactions    Latex Itching    Ibuprofen Hives and Nausea Only       Current Meds:   Scheduled Meds:    hydrALAZINE  25 mg Oral 3 times per day    bisacodyl  10 mg Rectal Daily    miconazole   Topical BID    amLODIPine  10 mg Oral Daily    [Held by provider] aspirin  81 mg Oral Daily    atorvastatin  20 mg Oral QPM    [Held by provider] hydroCHLOROthiazide  25 mg Oral Daily    [Held by provider] lisinopril  40 mg Oral Daily    [Held by provider] OLANZapine  15 mg Oral Nightly    sodium chloride flush  5-40 mL IntraVENous 2 times per day    heparin (porcine)  5,000 Units SubCUTAneous 3 times per day    pantoprazole  40 mg Oral QAM AC    piperacillin-tazobactam  3,375 mg IntraVENous Q8H     Continuous Infusions:    sodium chloride 125 mL/hr at 21 1107    sodium chloride      sodium chloride       PRN Meds: diphenhydrAMINE, metoprolol, sodium chloride flush, albuterol sulfate HFA, sodium chloride flush, sodium chloride, ondansetron **OR** ondansetron, polyethylene glycol, ondansetron, fentanNYL, fentanNYL, acetaminophen, sodium chloride flush    Data:     Past Medical History:   has a past medical history of Asthma, Bowel obstruction (HCC), Chronic back pain, Chronic hepatitis ([facility] [address] History of anemia, Hyperlipidemia, Hypertension, Prediabetes, SI joint arthritis, Substance abuse ([facility] [address] Wears dentures, and Wears glasses. Social History:   reports that she has been smoking cigarettes and cigars. She started smoking about 15 years ago. She has a 3.75 pack-year smoking history. She has never used smokeless tobacco. She reports previous alcohol use. She reports previous drug use. Drugs: Cocaine and Marijuana (Arlis Blunt). Family History:   Family History   Problem Relation Age of Onset    High Blood Pressure Mother     High Blood Pressure Father        Vitals:  BP (!) 208/117 Comment: per VS machine  Pulse 69   Temp 99.3 °F (37.4 °C) (Oral)   Resp 20   Ht 5' 3\" (1.6 m)   Wt 152 lb 12.5 oz (69.3 kg)   SpO2 96%   BMI 27.06 kg/m²   Temp (24hrs), Av.4 °F (36.9 °C), Min:97.7 °F (36.5 °C), Max:99.3 °F (37.4 °C)    No results for input(s): POCGLU in the last 72 hours. I/O(24Hr):     Intake/Output Summary (Last 24 hours) at 2021 0813  Last data filed at 2021 0730  Gross per 24 hour   Intake 57.04 ml   Output 100 ml   Net -42.96 ml Labs:  [unfilled]    Lab Results   Component Value Date/Time    SPECIAL NOT REPORTED 09/24/2020 04:33 PM     Lab Results   Component Value Date/Time    CULTURE NO SIGNIFICANT GROWTH 06/21/2018 10:01 PM       [unfilled]    Radiology:    XR CERVICAL SPINE (2-3 VIEWS)    Result Date: 11/23/2021  EXAMINATION: THREE XRAY VIEWS OF THE RIGHT SHOULDER; 3 XRAY VIEWS OF THE CERVICAL SPINE 11/22/2021 5:53 pm COMPARISON: None. HISTORY: ORDERING SYSTEM PROVIDED HISTORY: Trapezius muscle spasm TECHNOLOGIST PROVIDED HISTORY: muscle spasm Reason for Exam: c/o woke up with muscle spasms 48-year-old female with trapezius muscle spasm FINDINGS: Cervical spine: Cervical spine is imaged from skull base to the superior endplate of T1 on the lateral view. Gross preservation of the vertebral body heights. Mild disc space narrowing at C3-C4 and C5-C6. Moderate disc space narrowing at C4-C5. No prevertebral soft tissue swelling. Alignment well maintained. Patient is edentulous. Odontoid appears intact. Lateral masses symmetric in appearance. Visualized ribs and lung apices grossly unremarkable. Bilateral carotid vascular calcifications. Right shoulder: Visualized right-sided ribs appear intact. No acute fracture or dislocation. Right AC and glenohumeral joints grossly unremarkable. Cervical spine: 1. Mild-to-moderate degenerative changes within the cervical spine. 2. No acute vertebral body height loss or malalignment within the cervical spine. 3. Bilateral carotid vascular calcifications. Right shoulder: No acute fracture or dislocation. XR SHOULDER RIGHT (MIN 2 VIEWS)    Result Date: 11/23/2021  EXAMINATION: THREE XRAY VIEWS OF THE RIGHT SHOULDER; 3 XRAY VIEWS OF THE CERVICAL SPINE 11/22/2021 5:53 pm COMPARISON: None.  HISTORY: ORDERING SYSTEM PROVIDED HISTORY: Trapezius muscle spasm TECHNOLOGIST PROVIDED HISTORY: muscle spasm Reason for Exam: c/o woke up with muscle spasms 48-year-old female with trapezius muscle spasm FINDINGS: Cervical spine: Cervical spine is imaged from skull base to the superior endplate of T1 on the lateral view. Gross preservation of the vertebral body heights. Mild disc space narrowing at C3-C4 and C5-C6. Moderate disc space narrowing at C4-C5. No prevertebral soft tissue swelling. Alignment well maintained. Patient is edentulous. Odontoid appears intact. Lateral masses symmetric in appearance. Visualized ribs and lung apices grossly unremarkable. Bilateral carotid vascular calcifications. Right shoulder: Visualized right-sided ribs appear intact. No acute fracture or dislocation. Right AC and glenohumeral joints grossly unremarkable. Cervical spine: 1. Mild-to-moderate degenerative changes within the cervical spine. 2. No acute vertebral body height loss or malalignment within the cervical spine. 3. Bilateral carotid vascular calcifications. Right shoulder: No acute fracture or dislocation. XR HIP RIGHT (2-3 VIEWS)    Result Date: 11/22/2021  EXAMINATION: TWO XRAY VIEWS OF THE RIGHT HIP 11/22/2021 5:52 pm COMPARISON: September 24, 2020. HISTORY: ORDERING SYSTEM PROVIDED HISTORY: Hip pain TECHNOLOGIST PROVIDED HISTORY: hi pain Reason for Exam: c/o woke up with muscle spasms FINDINGS: Right hip is in anatomic alignment. No significant degenerative change. Cortical margins intact. Soft tissues unremarkable. Normal right hip     NM HEPATOBILIARY    Result Date: 12/18/2021  EXAMINATION: NUCLEAR MEDICINE HEPATOBILIARY SCINTIGRAPHY (HIDA SCAN). 12/18/2021 4:25 pm TECHNIQUE: Approximately 5.1 mCi Tc-99m Mebrofenin (Choletec) was administered IV. Then, dynamic images of the abdomen were obtained in the anterior projection for 60 min(s). A right lateral view was also obtained at 60 min(s).  COMPARISON: CTA of the chest abdomen and pelvis and ultrasound gallbladder from earlier the same day HISTORY: ORDERING SYSTEM PROVIDED HISTORY: Acute cholecystitis TECHNOLOGIST PROVIDED HISTORY: Acute cholecystitis Is the patient pregnant?->No Reason for Exam: acute cholecystis FINDINGS: Prompt, homogenous uptake by the liver is noted with normal appearance of radiotracer excretion into the biliary system. Clearance of blood pool activity appears appropriate. Small bowel is visualized in appropriate sequence, followed by partial filling of a distended appearing gallbladder (as noted on CT and ultrasound). On 2 hour delayed views, there is partial emptying of the gallbladder. Somewhat delayed and incomplete gallbladder visualization after small bowel is non-specific but may seen in chronic cholecystitis. US GALLBLADDER RUQ    Result Date: 12/18/2021  EXAMINATION: RIGHT UPPER QUADRANT ULTRASOUND 12/18/2021 7:46 am COMPARISON: Abdominal CT study from earlier the same day. HISTORY: ORDERING SYSTEM PROVIDED HISTORY: ?cholecystitis TECHNOLOGIST PROVIDED HISTORY: ?cholecystitis FINDINGS: A right upper quadrant ultrasound study was performed. The liver appears normal in echotexture and morphology without discrete mass. There is no intrahepatic or extrahepatic biliary ductal dilation. The common bile duct is within normal limits. The gallbladder appears mildly distended, containing a 2-3 cm stone within the gallbladder neck. However, there is no gallbladder wall thickening or pericholecystic free fluid. There was no reported focal tenderness over the gallbladder during the course of the examination. The right kidney is normal in size and echogenicity with normal renal cortical thickness. No renal calculi. No hydronephrosis or perinephric fluid collections. The visualized portions of the abdominal aorta and IVC are unremarkable. Cholelithiasis. Mildly distended gallbladder. No significant gallbladder wall thickening or pericholecystic free fluid to suggest acute cholecystitis. No choledocholithiasis or biliary ductal dilation.  If there is a persistent clinical concern for acute cholecystitis, a nuclear medicine hepatobiliary scan is recommended. CTA CHEST ABDOMEN PELVIS W CONTRAST    Result Date: 12/18/2021  EXAMINATION: CTA OF THE CHEST, ABDOMEN AND PELVIS WITH CONTRAST 12/18/2021 6:13 am: TECHNIQUE: CTA of the chest, abdomen and pelvis was performed after the administration of intravenous contrast.  Multiplanar reformatted images are provided for review. MIP images are provided for review. Dose modulation, iterative reconstruction, and/or weight based adjustment of the mA/kV was utilized to reduce the radiation dose to as low as reasonably achievable. COMPARISON: CT scan of the abdomen and pelvis from 10/25/2020 HISTORY: ORDERING SYSTEM PROVIDED HISTORY: abdominal pain TECHNOLOGIST PROVIDED HISTORY: abdominal pain Decision Support Exception - unselect if not a suspected or confirmed emergency medical condition->Emergency Medical Condition (MA) Reason for Exam: RUQ pain x3 days. Relevant Medical/Surgical History: HTN FINDINGS: CTA CHEST: Thoracic aorta: No evidence of thoracic aortic aneurysm or dissection. Bovine arch. Mild elongation thoracic aorta. No significant plaque disease. No acute abnormality of the aorta. Mediastinum: No evidence of mediastinal lymphadenopathy. The heart and pericardium demonstrate no acute abnormality. Main pulmonary artery caliber WNL. Lungs/Pleura: Bibasilar atelectatic appearing changes with some subsegmental atelectasis and possible minimal ground-glass/infiltrate, especially left base. Bronchial wall thickening, best seen lower lobes. No large focal consolidation or pulmonary edema. No pleural effusion or pneumothorax. Soft Tissues/Bones: No acute bone or soft tissue abnormality. CTA ABDOMEN: Abdominal aorta/Branches: No aortic aneurysm or dissection. Mild calcific plaque disease, mostly distal abdominal aorta. Major visceral branch vessels widely patent. Organs: Diminished appearing hepatic attenuation without obvious focal abnormality.   Markedly distended gallbladder with mural prominence. No calcified stones appreciated on CT. No dilatation biliary tree. Pancreas, spleen, adrenal glands, and kidneys unremarkable. GI/Bowel: Moderate retained stool throughout large bowel, some of which appears liquid, with a few scattered diverticula but no CT evidence diverticulitis, abnormal dilatation or significant wall thickening/pericolonic fat stranding. Unremarkable terminal ileum/remainder small-bowel. Appendix poorly visualized but no right lower quadrant/right pelvic inflammatory process. Probable small hiatus hernia. Nondistended unremarkable appearing stomach. Peritoneum/Retroperitoneum: No bulky lymphadenopathy. No pathologic free fluid or free air. Bones/Soft Tissues: No acute finding. Mild degenerative changes LSS, best seen facet joints lower spine. CTA PELVIS: Aorta/Iliacs: Unremarkable mildly elongated but widely patent iliac arteries; no dissection or aneurysm. No marked atherosclerotic disease. Other: Surgical absence uterus. No pelvic fluid collection or mass. Unremarkable urinary bladder. Bones/Soft Tissues: No acute abnormality. Vacuum phenomenon and mild degenerative changes SI joints. No aortic dissection or aneurysm. Distended mildly thick walled gallbladder; assess clinically for possible cholecystitis. No calcified stones identified. Correlation with ultrasound/HIDA scan suggested. Bronchitis with probable atelectatic changes lung bases; minimal infiltrate/bronchopneumonia possible, especially on the left. Correlate clinically. Findings are not typical for COVID-19 pneumonia. Probable hepatic steatosis. No focal abnormality. Scattered colonic diverticula without CT evidence diverticulitis. Some liquid stool suggesting degree of enteritis/diarrhea; correlate clinically. No colitis or bowel obstruction. Additional likely incidental findings, as detailed in the body of the report above.          Physical Examination:        Physical Exam  Constitutional:       Appearance: She is ill-appearing. HENT:      Head: Normocephalic. Cardiovascular:      Rate and Rhythm: Normal rate and regular rhythm. Pulses: Normal pulses. Heart sounds: Normal heart sounds. Pulmonary:      Effort: Pulmonary effort is normal.      Breath sounds: Normal breath sounds. Abdominal:      Palpations: Abdomen is soft. Tenderness: There is abdominal tenderness in the right upper quadrant and epigastric area. There is right CVA tenderness. Musculoskeletal:         General: No swelling. Right lower leg: No edema. Left lower leg: No edema. Skin:     General: Skin is warm. Neurological:      General: No focal deficit present. Mental Status: She is alert.          Assessment:        Primary Problem  Symptomatic cholelithiasis    Active Hospital Problems    Diagnosis Date Noted    Symptomatic cholelithiasis [K80.20] 12/18/2021    Hypertensive urgency [I16.0] 12/18/2021    Constipation [K59.00] 12/18/2021    Candidal intertrigo [B37.2] 12/18/2021    GERD (gastroesophageal reflux disease) [K21.9] 12/18/2021    Hypertension [I10]        Plan:        Symptomatic cholelithiasis  RUQ abdominal pain, (+) Garcia's sign on PE  GB US: Cholelithiasis, distended GB, 2-3 cm stone within GB neck   CTA abd pelvis: Distended mildly thick-walled GB, possible cholecystitis  On Zosyn   HIDA: delayed and incomplete gallbladder visualization, may be seen in chronic cholecystitis   General surgery consulted     Hypertensive Urgency - improving  /117 this morning, reduced to 138/91 after Hydralazine added  Continue home amlodipine 10 mg p.o. daily  Continue Hydralazine 25mg   Hold Lisinopril due to SHERIE     Constipation  Pt reports constipation, hx of bowel obstruction in past  CT abd/pelvis: Retained stool throughout large bowel  Colace twice daily as needed  Dulcolax suppository    SHERIE likely 2/2 contrast induced nephropathy   - Cr 0.78-> 1.04  - IV NS 75cc/hr  - monitor BMP     Possible candidal intertrigo  Nystatin powder     Comorbid Conditions:  · Hx of prior MI: ASA 81 mg daily  · Psych disorders: pt follows OP psychiatrist but noncompliant with meds; reports has not taken meds in months  · GERD: Pantoprazole 40 mg p.o. daily AC in lieu of home omeprazole  · Dyslipidemia: Continue home atorvastatin 20 mg p.o. daily     Code: Full  DVT prophylaxis: Heparin (chosen over Lovenox d/t possible cholecystectomy and SHERIE)  GI prophylaxis: On PPI  Diet: N.p.o.; will switch to regular cardiac diet after HIDA scan if no procedures planned and okay with general surgery  Activity: As tolerated      Frederick Jurado MD  12/19/2021  8:13 AM       I have discussed the care of Genesis Flores , including pertinent history and exam findings,    today with the resident. I have seen and examined the patient and the key elements of all parts of the encounter have been performed by me . I agree with the assessment, plan and orders as documented by the resident. Principal Problem:    Symptomatic cholelithiasis  Active Problems:    Hypertension    Hypertensive urgency    Constipation    Candidal intertrigo    GERD (gastroesophageal reflux disease)  Resolved Problems:    * No resolved hospital problems. *        Overall  course ;                                   are improving over time.         Underwent HIDA scan, concern of possible chronic cholecystitis  Discussed with general surgery, may need IR guided cholecystostomy tube placement     Patient is constipated, not passing stool     Electronically signed by Anitra Celis MD

## 2021-12-19 NOTE — FLOWSHEET NOTE
Patient arrived to RM 2093 from ER. VSS as charted. Patient oriented to room. POC discussed. Call light within reach.

## 2021-12-19 NOTE — PLAN OF CARE
Problem: Pain:  Description: Pain management should include both nonpharmacologic and pharmacologic interventions. Goal: Pain level will decrease  Description: Pain level will decrease  12/19/2021 1800 by Tima Meek RN  Outcome: Ongoing  12/19/2021 0454 by Taina Bustamante RN  Outcome: Ongoing  Goal: Control of acute pain  Description: Control of acute pain  12/19/2021 1800 by Tima Meek RN  Outcome: Ongoing  12/19/2021 0454 by Taina Bustamante RN  Outcome: Ongoing  Goal: Control of chronic pain  Description: Control of chronic pain  12/19/2021 1800 by Tima Meek RN  Outcome: Ongoing  Note: Medicated ATC for pain  12/19/2021 0454 by Taina Bustamante RN  Outcome: Ongoing     Problem: Activity:  Goal: Risk for activity intolerance will decrease  Description: Risk for activity intolerance will decrease  12/19/2021 1800 by Tima Meek RN  Outcome: Met This Shift  12/19/2021 0454 by Taina Bustamante RN  Outcome: Ongoing     Problem:  Bowel/Gastric:  Goal: Bowel function will improve  Description: Bowel function will improve  12/19/2021 1800 by Tima Meek RN  Outcome: Not Met This Shift  Note: Has been given suppository and miralax with not results of BM yet  12/19/2021 0454 by Taina Bustamante RN  Outcome: Ongoing  Goal: Diagnostic test results will improve  Description: Diagnostic test results will improve  12/19/2021 1800 by Tima Meek RN  Outcome: Ongoing  12/19/2021 0454 by Taina Bustamante RN  Outcome: Ongoing  Goal: Occurrences of nausea will decrease  Description: Occurrences of nausea will decrease  12/19/2021 1800 by Tima Meek RN  Outcome: Met This Shift  Note: Now able to eat solid food  12/19/2021 0454 by Taina Bustamante RN  Outcome: Ongoing  Goal: Occurrences of vomiting will decrease  Description: Occurrences of vomiting will decrease  12/19/2021 1800 by Tima Meek RN  Outcome: Met This Shift  12/19/2021 0454 by Taina Bustamante RN  Outcome: Ongoing Problem: Sensory:  Goal: Pain level will decrease  Description: Pain level will decrease  12/19/2021 1800 by Katlyn Barragan RN  Outcome: Ongoing  12/19/2021 0454 by Jose Nicholas RN  Outcome: Ongoing  Goal: Ability to identify factors that increase the pain will improve  Description: Ability to identify factors that increase the pain will improve  12/19/2021 1800 by Katlyn Barragan RN  Outcome: Ongoing  12/19/2021 0454 by Jose Nicholas RN  Outcome: Ongoing  Goal: Ability to notify healthcare provider of pain before it becomes unmanageable or unbearable will improve  Description: Ability to notify healthcare provider of pain before it becomes unmanageable or unbearable will improve  12/19/2021 1800 by Katlyn Barragan RN  Outcome: Ongoing  12/19/2021 0454 by Jose Nicholas RN  Outcome: Ongoing

## 2021-12-19 NOTE — PROGRESS NOTES
pt was in chair;assited to bed per WILLIAM Tavares; dozing in bed; BP much improved; down to 138/91 post Hydralizine 10 IV and Apresoline 25mg po;intermitanly awakens and rates abd  pain 8;pt was instructed to call for assistance tobathroom  And that bed alarm is engaged as she has recv'd a dulcolax  suppository  Has been medicated for pain HORTENSIA BP lower; medicated for pain as ordered

## 2021-12-19 NOTE — PROGRESS NOTES
Pt irritable , \"I shoudda had my pain med at 7 not at no 8! \"  Writer attempting to help pt realize concern and informed pt that her BP is dangerously high and that it needs to be addressed with doctor; pt more compliant after pt teaching on HTN; Dr Styles Other was notified of above ; see orders

## 2021-12-19 NOTE — PROGRESS NOTES
Pt c/o itching. RN notified Dr. Yaritza Loredo, new orders for Benadryl 25 mg q 8 hrs PRN for itching.

## 2021-12-19 NOTE — PLAN OF CARE
Problem: Pain:  Goal: Pain level will decrease  Description: Pain level will decrease  Outcome: Ongoing  Goal: Control of acute pain  Description: Control of acute pain  Outcome: Ongoing  Goal: Control of chronic pain  Description: Control of chronic pain  Outcome: Ongoing     Problem: Activity:  Goal: Risk for activity intolerance will decrease  Description: Risk for activity intolerance will decrease  Outcome: Ongoing     Problem:  Bowel/Gastric:  Goal: Bowel function will improve  Description: Bowel function will improve  Outcome: Ongoing  Goal: Diagnostic test results will improve  Description: Diagnostic test results will improve  Outcome: Ongoing  Goal: Occurrences of nausea will decrease  Description: Occurrences of nausea will decrease  Outcome: Ongoing  Goal: Occurrences of vomiting will decrease  Description: Occurrences of vomiting will decrease  Outcome: Ongoing     Problem: Sensory:  Goal: Pain level will decrease  Description: Pain level will decrease  Outcome: Ongoing  Goal: Ability to identify factors that increase the pain will improve  Description: Ability to identify factors that increase the pain will improve  Outcome: Ongoing  Goal: Ability to notify healthcare provider of pain before it becomes unmanageable or unbearable will improve  Description: Ability to notify healthcare provider of pain before it becomes unmanageable or unbearable will improve  Outcome: Ongoing

## 2021-12-20 VITALS
OXYGEN SATURATION: 93 % | WEIGHT: 152.78 LBS | DIASTOLIC BLOOD PRESSURE: 78 MMHG | RESPIRATION RATE: 16 BRPM | HEIGHT: 63 IN | HEART RATE: 89 BPM | BODY MASS INDEX: 27.07 KG/M2 | TEMPERATURE: 99.1 F | SYSTOLIC BLOOD PRESSURE: 143 MMHG

## 2021-12-20 PROBLEM — K81.1 CHOLECYSTITIS, CHRONIC: Status: ACTIVE | Noted: 2021-12-20

## 2021-12-20 LAB
ABSOLUTE EOS #: 0.1 K/UL (ref 0–0.4)
ABSOLUTE IMMATURE GRANULOCYTE: ABNORMAL K/UL (ref 0–0.3)
ABSOLUTE LYMPH #: 2.4 K/UL (ref 1–4.8)
ABSOLUTE MONO #: 1.1 K/UL (ref 0.1–1.3)
ALBUMIN SERPL-MCNC: 3.9 G/DL (ref 3.5–5.2)
ALBUMIN/GLOBULIN RATIO: ABNORMAL (ref 1–2.5)
ALP BLD-CCNC: 105 U/L (ref 35–104)
ALT SERPL-CCNC: 27 U/L (ref 5–33)
ANION GAP SERPL CALCULATED.3IONS-SCNC: 13 MMOL/L (ref 9–17)
AST SERPL-CCNC: 22 U/L
BASOPHILS # BLD: 1 % (ref 0–2)
BASOPHILS ABSOLUTE: 0.1 K/UL (ref 0–0.2)
BILIRUB SERPL-MCNC: 0.68 MG/DL (ref 0.3–1.2)
BILIRUBIN DIRECT: 0.26 MG/DL
BILIRUBIN, INDIRECT: 0.42 MG/DL (ref 0–1)
BUN BLDV-MCNC: 8 MG/DL (ref 6–20)
BUN/CREAT BLD: ABNORMAL (ref 9–20)
CALCIUM SERPL-MCNC: 9.4 MG/DL (ref 8.6–10.4)
CHLORIDE BLD-SCNC: 100 MMOL/L (ref 98–107)
CO2: 25 MMOL/L (ref 20–31)
CREAT SERPL-MCNC: 0.92 MG/DL (ref 0.5–0.9)
DIFFERENTIAL TYPE: ABNORMAL
EOSINOPHILS RELATIVE PERCENT: 1 % (ref 0–4)
GFR AFRICAN AMERICAN: >60 ML/MIN
GFR NON-AFRICAN AMERICAN: >60 ML/MIN
GFR SERPL CREATININE-BSD FRML MDRD: ABNORMAL ML/MIN/{1.73_M2}
GFR SERPL CREATININE-BSD FRML MDRD: ABNORMAL ML/MIN/{1.73_M2}
GLOBULIN: ABNORMAL G/DL (ref 1.5–3.8)
GLUCOSE BLD-MCNC: 125 MG/DL (ref 70–99)
HCT VFR BLD CALC: 39 % (ref 36–46)
HEMOGLOBIN: 13.1 G/DL (ref 12–16)
IMMATURE GRANULOCYTES: ABNORMAL %
LYMPHOCYTES # BLD: 28 % (ref 24–44)
MAGNESIUM: 1.9 MG/DL (ref 1.6–2.6)
MCH RBC QN AUTO: 29.8 PG (ref 26–34)
MCHC RBC AUTO-ENTMCNC: 33.5 G/DL (ref 31–37)
MCV RBC AUTO: 88.8 FL (ref 80–100)
MONOCYTES # BLD: 13 % (ref 1–7)
NRBC AUTOMATED: ABNORMAL PER 100 WBC
PDW BLD-RTO: 13 % (ref 11.5–14.9)
PLATELET # BLD: 246 K/UL (ref 150–450)
PLATELET ESTIMATE: ABNORMAL
PMV BLD AUTO: 8.6 FL (ref 6–12)
POTASSIUM SERPL-SCNC: 3.3 MMOL/L (ref 3.7–5.3)
RBC # BLD: 4.39 M/UL (ref 4–5.2)
RBC # BLD: ABNORMAL 10*6/UL
SEG NEUTROPHILS: 57 % (ref 36–66)
SEGMENTED NEUTROPHILS ABSOLUTE COUNT: 4.9 K/UL (ref 1.3–9.1)
SODIUM BLD-SCNC: 138 MMOL/L (ref 135–144)
TOTAL PROTEIN: 7.5 G/DL (ref 6.4–8.3)
WBC # BLD: 8.6 K/UL (ref 3.5–11)
WBC # BLD: ABNORMAL 10*3/UL

## 2021-12-20 PROCEDURE — 85025 COMPLETE CBC W/AUTO DIFF WBC: CPT

## 2021-12-20 PROCEDURE — 6360000002 HC RX W HCPCS: Performed by: STUDENT IN AN ORGANIZED HEALTH CARE EDUCATION/TRAINING PROGRAM

## 2021-12-20 PROCEDURE — 6370000000 HC RX 637 (ALT 250 FOR IP): Performed by: STUDENT IN AN ORGANIZED HEALTH CARE EDUCATION/TRAINING PROGRAM

## 2021-12-20 PROCEDURE — 6360000002 HC RX W HCPCS: Performed by: SURGERY

## 2021-12-20 PROCEDURE — 80076 HEPATIC FUNCTION PANEL: CPT

## 2021-12-20 PROCEDURE — 83735 ASSAY OF MAGNESIUM: CPT

## 2021-12-20 PROCEDURE — 6370000000 HC RX 637 (ALT 250 FOR IP): Performed by: SURGERY

## 2021-12-20 PROCEDURE — 80048 BASIC METABOLIC PNL TOTAL CA: CPT

## 2021-12-20 PROCEDURE — 99239 HOSP IP/OBS DSCHRG MGMT >30: CPT | Performed by: INTERNAL MEDICINE

## 2021-12-20 PROCEDURE — 36415 COLL VENOUS BLD VENIPUNCTURE: CPT

## 2021-12-20 PROCEDURE — 2580000003 HC RX 258: Performed by: SURGERY

## 2021-12-20 PROCEDURE — 6370000000 HC RX 637 (ALT 250 FOR IP)

## 2021-12-20 RX ORDER — POTASSIUM CHLORIDE 7.45 MG/ML
10 INJECTION INTRAVENOUS
Status: DISCONTINUED | OUTPATIENT
Start: 2021-12-20 | End: 2021-12-20

## 2021-12-20 RX ORDER — OXYCODONE HYDROCHLORIDE AND ACETAMINOPHEN 5; 325 MG/1; MG/1
1 TABLET ORAL EVERY 8 HOURS PRN
Qty: 9 TABLET | Refills: 0 | Status: SHIPPED | OUTPATIENT
Start: 2021-12-20 | End: 2021-12-23

## 2021-12-20 RX ORDER — HYDROCHLOROTHIAZIDE 25 MG/1
25 TABLET ORAL DAILY
Qty: 30 TABLET | Refills: 3 | Status: SHIPPED | OUTPATIENT
Start: 2021-12-21

## 2021-12-20 RX ORDER — SENNA AND DOCUSATE SODIUM 50; 8.6 MG/1; MG/1
2 TABLET, FILM COATED ORAL DAILY
Status: DISCONTINUED | OUTPATIENT
Start: 2021-12-20 | End: 2021-12-20 | Stop reason: HOSPADM

## 2021-12-20 RX ORDER — METRONIDAZOLE 500 MG/1
500 TABLET ORAL 3 TIMES DAILY
Qty: 12 TABLET | Refills: 0 | Status: SHIPPED | OUTPATIENT
Start: 2021-12-20 | End: 2021-12-24

## 2021-12-20 RX ORDER — POLYETHYLENE GLYCOL 3350 17 G/17G
17 POWDER, FOR SOLUTION ORAL DAILY PRN
Qty: 1530 G | Refills: 0 | Status: ON HOLD | OUTPATIENT
Start: 2021-12-20 | End: 2022-02-17 | Stop reason: HOSPADM

## 2021-12-20 RX ORDER — FENTANYL CITRATE 50 UG/ML
50 INJECTION, SOLUTION INTRAMUSCULAR; INTRAVENOUS
Status: DISCONTINUED | OUTPATIENT
Start: 2021-12-20 | End: 2021-12-20

## 2021-12-20 RX ORDER — CIPROFLOXACIN 500 MG/1
500 TABLET, FILM COATED ORAL 2 TIMES DAILY
Qty: 8 TABLET | Refills: 0 | Status: SHIPPED | OUTPATIENT
Start: 2021-12-20 | End: 2021-12-24

## 2021-12-20 RX ORDER — POTASSIUM CHLORIDE 20 MEQ/1
40 TABLET, EXTENDED RELEASE ORAL 2 TIMES DAILY WITH MEALS
Status: COMPLETED | OUTPATIENT
Start: 2021-12-20 | End: 2021-12-20

## 2021-12-20 RX ADMIN — POTASSIUM CHLORIDE 40 MEQ: 1500 TABLET, EXTENDED RELEASE ORAL at 17:42

## 2021-12-20 RX ADMIN — POLYETHYLENE GLYCOL 3350 17 G: 17 POWDER, FOR SOLUTION ORAL at 14:52

## 2021-12-20 RX ADMIN — OXYCODONE HYDROCHLORIDE AND ACETAMINOPHEN 1 TABLET: 5; 325 TABLET ORAL at 14:50

## 2021-12-20 RX ADMIN — HEPARIN SODIUM 5000 UNITS: 5000 INJECTION INTRAVENOUS; SUBCUTANEOUS at 06:04

## 2021-12-20 RX ADMIN — OXYCODONE HYDROCHLORIDE AND ACETAMINOPHEN 1 TABLET: 5; 325 TABLET ORAL at 01:05

## 2021-12-20 RX ADMIN — HYDROCHLOROTHIAZIDE 25 MG: 25 TABLET ORAL at 14:50

## 2021-12-20 RX ADMIN — FENTANYL CITRATE 50 MCG: 0.05 INJECTION, SOLUTION INTRAMUSCULAR; INTRAVENOUS at 09:24

## 2021-12-20 RX ADMIN — BISACODYL 10 MG: 10 SUPPOSITORY RECTAL at 09:24

## 2021-12-20 RX ADMIN — PANTOPRAZOLE SODIUM 40 MG: 40 TABLET, DELAYED RELEASE ORAL at 06:04

## 2021-12-20 RX ADMIN — AMLODIPINE BESYLATE 10 MG: 5 TABLET ORAL at 14:50

## 2021-12-20 RX ADMIN — LISINOPRIL 40 MG: 20 TABLET ORAL at 14:50

## 2021-12-20 RX ADMIN — PIPERACILLIN SODIUM AND TAZOBACTAM SODIUM 3375 MG: 3; .375 INJECTION, POWDER, LYOPHILIZED, FOR SOLUTION INTRAVENOUS at 01:04

## 2021-12-20 RX ADMIN — HYDRALAZINE HYDROCHLORIDE 25 MG: 25 TABLET, FILM COATED ORAL at 06:04

## 2021-12-20 RX ADMIN — HEPARIN SODIUM 5000 UNITS: 5000 INJECTION INTRAVENOUS; SUBCUTANEOUS at 14:18

## 2021-12-20 RX ADMIN — ANTI-FUNGAL POWDER MICONAZOLE NITRATE TALC FREE: 1.42 POWDER TOPICAL at 09:24

## 2021-12-20 RX ADMIN — PIPERACILLIN SODIUM AND TAZOBACTAM SODIUM 3375 MG: 3; .375 INJECTION, POWDER, LYOPHILIZED, FOR SOLUTION INTRAVENOUS at 09:23

## 2021-12-20 RX ADMIN — DOCUSATE SODIUM 50MG AND SENNOSIDES 8.6MG 2 TABLET: 8.6; 5 TABLET, FILM COATED ORAL at 17:47

## 2021-12-20 ASSESSMENT — ENCOUNTER SYMPTOMS
BACK PAIN: 1
VOMITING: 0
COUGH: 0
ABDOMINAL PAIN: 1
NAUSEA: 0
SHORTNESS OF BREATH: 0
CONSTIPATION: 1
EYE DISCHARGE: 0

## 2021-12-20 ASSESSMENT — PAIN SCALES - GENERAL
PAINLEVEL_OUTOF10: 10
PAINLEVEL_OUTOF10: 0
PAINLEVEL_OUTOF10: 8
PAINLEVEL_OUTOF10: 9
PAINLEVEL_OUTOF10: 9

## 2021-12-20 NOTE — PROGRESS NOTES
HTN - Controlled   Abdominal Pain is better   HIDA is positive for Chronic Cholelithiasis and Cholecystitis   Awaiting Surgery Recs   DC planning   Full dictation to follow

## 2021-12-20 NOTE — PROGRESS NOTES
x3 attempts to give SSE unsuccessful as tubing will not advance and curls in rectum; pt is now refusing any further enemas and want to go home; Dr Zak Barriga paged; await response

## 2021-12-20 NOTE — PROGRESS NOTES
Pt has expelled material rectally x2; she states they were BMs but material is mucous white, resembeling melted rectal suppository which writer did see

## 2021-12-20 NOTE — DISCHARGE INSTR - DIET
Good nutrition is important when healing from an illness, injury, or surgery. Follow any nutrition recommendations given to you during your hospital      stay. If you were given an oral nutrition supplement while in the hospital, continue to take this supplement at home. You can take it with meals, in-between meals, and/or before bedtime. These supplements can be purchased at most local grocery stores, pharmacies, and chain [employer]-stores. If you have any questions about your diet or nutrition, call the hospital and ask for the dietitian.         Regular low fat low cholesterol high fiber diet

## 2021-12-20 NOTE — PROGRESS NOTES
[facility] [address]                 PATIENT NAME: Sindy Niño     TODAY'S DATE: 12/20/2021, 1:24 PM    SUBJECTIVE:    Pt seen and examined. Afebrile, VSS. No leukocytosis, hemoglobin stable. LFT's trending down. Patient states RUQ abdominal pain is unchanged. Passing flatus, no BM. No N/V. Was NPO for HIDA scan. HIDA results reviewed; somewhat delayed/incomplete gallbladder visualization. Likely chronic calculus cholecystitis. OBJECTIVE:   VITALS:  /76   Pulse 84   Temp 98.1 °F (36.7 °C) (Oral)   Resp 14   Ht 5' 3\" (1.6 m)   Wt 152 lb 12.5 oz (69.3 kg)   SpO2 92%   BMI 27.06 kg/m²      INTAKE/OUTPUT:      Intake/Output Summary (Last 24 hours) at 12/20/2021 1324  Last data filed at 12/20/2021 2594  Gross per 24 hour   Intake 824.74 ml   Output 700 ml   Net 124.74 ml                 CONSTITUTIONAL:  awake and alert.   No acute distress  HEART:   RRR  LUNGS:   CTA  ABDOMEN:   Abdomen soft, RUQ and epigastric region tender, non-distended  EXTREMITIES:   No pedal edema    Data:  CBC:   Lab Results   Component Value Date    WBC 8.6 12/20/2021    RBC 4.39 12/20/2021    HGB 13.1 12/20/2021    HCT 39.0 12/20/2021    MCV 88.8 12/20/2021    MCH 29.8 12/20/2021    MCHC 33.5 12/20/2021    RDW 13.0 12/20/2021     12/20/2021    MPV 8.6 12/20/2021     BMP:    Lab Results   Component Value Date     12/20/2021    K 3.3 12/20/2021     12/20/2021    CO2 25 12/20/2021    BUN 8 12/20/2021    LABALBU 3.9 12/20/2021    LABALBU 4.1 04/23/2012    CREATININE 0.92 12/20/2021    CALCIUM 9.4 12/20/2021    GFRAA >60 12/20/2021    LABGLOM >60 12/20/2021    GLUCOSE 125 12/20/2021    GLUCOSE 95 04/23/2012     Hepatic Function Panel:    Lab Results   Component Value Date    ALKPHOS 105 12/20/2021    ALT 27 12/20/2021    AST 22 12/20/2021    PROT 7.5 12/20/2021    BILITOT 0.68 12/20/2021    BILIDIR 0.26 12/20/2021    IBILI 0.42 12/20/2021    LABALBU 3.9 12/20/2021    LABALBU 4.1 unremarkable alkaline phosphatase ALT AST and bilirubin. ALT AST and bilirubin all unremarkable. Alkaline phosphatase is barely elevated. Lipase was normal yesterday. WBC count hemoglobin adequate. Surgically stable for discharge. Elective cholecystectomy discussed with Dr. Don Flores.       Electronically signed by Rinku Hull, [address] [place] [address]\

## 2021-12-20 NOTE — PLAN OF CARE
of vomiting will decrease  12/20/2021 1749 by Sarah Owen RN  Outcome: Completed  12/20/2021 0410 by Thelma Hansen RN  Outcome: Ongoing     Problem: Sensory:  Goal: Pain level will decrease  Description: Pain level will decrease  12/20/2021 1749 by Sarah Owen RN  Outcome: Completed  12/20/2021 0410 by Thelma Hansen RN  Outcome: Ongoing  Note: Pt medicated with pain medication prn. Assessed all pain characteristics including level, type, location, frequency, and onset. Non-pharmacologic interventions offered to pt as well. Pt states pain is tolerable at this time. Will continue to monitor. Goal: Ability to identify factors that increase the pain will improve  Description: Ability to identify factors that increase the pain will improve  12/20/2021 1749 by Sarah Owen RN  Outcome: Completed  12/20/2021 0410 by Thelma Hansen RN  Outcome: Ongoing  Goal: Ability to notify healthcare provider of pain before it becomes unmanageable or unbearable will improve  Description: Ability to notify healthcare provider of pain before it becomes unmanageable or unbearable will improve  12/20/2021 1749 by Sarah Owen RN  Outcome: Completed  12/20/2021 0410 by Thelma Hansen RN  Outcome: Ongoing     Problem: Falls - Risk of:  Goal: Will remain free from falls  Description: Will remain free from falls  12/20/2021 1749 by Sarah Owen RN  Outcome: Completed  12/20/2021 0410 by Thelma Hansen RN  Outcome: Ongoing  Note: No falls noted this shift. Patient ambulates with x1 staff assistance without difficulty. Bed kept in low position. Safe environment maintained. Bedside table & call light in reach. Uses call light appropriately when needing assistance.      Goal: Absence of physical injury  Description: Absence of physical injury  12/20/2021 1749 by Sarah Owen RN  Outcome: Completed  12/20/2021 0410 by Thelma Hansen RN  Outcome: Ongoing

## 2021-12-20 NOTE — CARE COORDINATION
ONGOING DISCHARGE PLAN:    Patient is alert and oriented x4. Spoke with patient regarding discharge plan and patient confirms that plan is still home without needs. Declined VNS. Per general surgery, pt is okay for discharge. Elective cholecystectomy. Will continue to follow for additional discharge needs.     Electronically signed by Devyn Velásquez RN on 12/20/2021 at 3:44 PM

## 2021-12-20 NOTE — PLAN OF CARE
Problem: Pain:  Goal: Pain level will decrease  Description: Pain level will decrease  12/20/2021 0410 by Nader Cole RN  Outcome: Ongoing  Note: Pt medicated with pain medication prn. Assessed all pain characteristics including level, type, location, frequency, and onset. Non-pharmacologic interventions offered to pt as well. Pt states pain is tolerable at this time. Will continue to monitor. Problem: Pain:  Goal: Control of chronic pain  Description: Control of chronic pain  12/20/2021 0410 by Nader Cole RN  Outcome: Ongoing     Problem: Sensory:  Goal: Pain level will decrease  Description: Pain level will decrease  12/20/2021 0410 by Nader Cole RN  Outcome: Ongoing  Note: Pt medicated with pain medication prn. Assessed all pain characteristics including level, type, location, frequency, and onset. Non-pharmacologic interventions offered to pt as well. Pt states pain is tolerable at this time. Will continue to monitor. Problem: Sensory:  Goal: Ability to notify healthcare provider of pain before it becomes unmanageable or unbearable will improve  Description: Ability to notify healthcare provider of pain before it becomes unmanageable or unbearable will improve  12/20/2021 0410 by Nader Cole RN  Outcome: Ongoing     Problem: Falls - Risk of:  Goal: Will remain free from falls  Description: Will remain free from falls  Outcome: Ongoing  Note: No falls noted this shift. Patient ambulates with x1 staff assistance without difficulty. Bed kept in low position. Safe environment maintained. Bedside table & call light in reach. Uses call light appropriately when needing assistance.

## 2021-12-20 NOTE — PROGRESS NOTES
[address]    PROGRESS NOTE             12/20/2021    7:54 AM    Name:   Sindy Niño  MRN:     [id number]     Acct:      [de-identified]   Room:   2093/2093-01   Day:  2  Admit Date:  12/18/2021  3:10 AM    PCP:  Gautam Marie NP-C, YEYO - NP  Code Status:  Full Code    Subjective:     C/C:   Chief Complaint   Patient presents with    Abdominal Pain     Interval History Status: not changed. Patient was seen and examined. States abdominal pain is still 9 out of 10;   however patient is not in any distress and appears comfortable on physical exam.  No BM x5 days; Suppository given this a.m.;  Will monitor & if still no BM,  Administer enema this afternoon. Review of Systems:     Review of Systems   Constitutional: Positive for chills. Negative for diaphoresis. Eyes: Positive for visual disturbance (blurry vision, improving from prior). Negative for discharge. Respiratory: Negative for cough and shortness of breath. Cardiovascular: Negative for chest pain and palpitations. Gastrointestinal: Positive for abdominal pain and constipation. Negative for nausea and vomiting. Genitourinary: Negative for dysuria and hematuria. Musculoskeletal: Positive for back pain. Negative for myalgias. Neurological: Negative for numbness and headaches. Medications: Allergies:     Allergies   Allergen Reactions    Latex Itching    Ibuprofen Hives and Nausea Only       Current Meds:   Scheduled Meds:    bisacodyl  10 mg Rectal Daily    miconazole   Topical BID    polyethylene glycol  17 g Oral BID    amLODIPine  10 mg Oral Daily    [Held by provider] aspirin  81 mg Oral Daily    atorvastatin  20 mg Oral QPM    hydroCHLOROthiazide  25 mg Oral Daily    lisinopril  40 mg Oral Daily    [Held by provider] OLANZapine  15 mg Oral Nightly    sodium chloride flush  5-40 mL IntraVENous 2 times per day    heparin (porcine) 5,000 Units SubCUTAneous 3 times per day    pantoprazole  40 mg Oral QAM AC    piperacillin-tazobactam  3,375 mg IntraVENous Q8H     Continuous Infusions:    sodium chloride      sodium chloride 75 mL/hr at 21     PRN Meds: fentanNYL, diphenhydrAMINE, metoprolol, oxyCODONE-acetaminophen, sodium chloride flush, albuterol sulfate HFA, sodium chloride flush, sodium chloride, ondansetron **OR** ondansetron, ondansetron, sodium chloride flush    Data:     Past Medical History:   has a past medical history of Asthma, Bowel obstruction (HCC), Chronic back pain, Chronic hepatitis ([facility] [address] History of anemia, Hyperlipidemia, Hypertension, Prediabetes, SI joint arthritis, Substance abuse ([facility] [address] Wears dentures, and Wears glasses. Social History:   reports that she has been smoking cigarettes and cigars. She started smoking about 15 years ago. She has a 3.75 pack-year smoking history. She has never used smokeless tobacco. She reports previous alcohol use. She reports previous drug use. Drugs: Cocaine and Marijuana ([facility]). Family History:   Family History   Problem Relation Age of Onset    High Blood Pressure Mother     High Blood Pressure Father        Vitals:  /76   Pulse 84   Temp 98.1 °F (36.7 °C) (Oral)   Resp 14   Ht 5' 3\" (1.6 m)   Wt 152 lb 12.5 oz (69.3 kg)   SpO2 92%   BMI 27.06 kg/m²   Temp (24hrs), Av.3 °F (36.8 °C), Min:98.1 °F (36.7 °C), Max:98.6 °F (37 °C)    No results for input(s): POCGLU in the last 72 hours. Vitals:    21 1300 21 1915 21 0110 21 0716   BP: 133/82 (!) 130/94 (!) 140/96 113/76   Pulse: 87 88 79 84   Resp:     Temp: 98.1 °F (36.7 °C) 98.6 °F (37 °C) 98.2 °F (36.8 °C) 98.1 °F (36.7 °C)   TempSrc: Oral Oral Oral Oral   SpO2: 93% 95% 98% 92%   Weight:   152 lb 12.5 oz (69.3 kg)    Height:           I/O(24Hr):     Intake/Output Summary (Last 24 hours) at 2021 0754  Last data filed at 2021 1376  Gross per 24 hour Intake 824.74 ml   Output 1100 ml   Net -275.26 ml       Labs:  Recent Results (from the past 24 hour(s))   Basic Metabolic Panel w/ Reflex to MG    Collection Time: 12/20/21  5:52 AM   Result Value Ref Range    Glucose 125 (H) 70 - 99 mg/dL    BUN 8 6 - 20 mg/dL    CREATININE 0.92 (H) 0.50 - 0.90 mg/dL    Bun/Cre Ratio NOT REPORTED 9 - 20    Calcium 9.4 8.6 - 10.4 mg/dL    Sodium 138 135 - 144 mmol/L    Potassium 3.3 (L) 3.7 - 5.3 mmol/L    Chloride 100 98 - 107 mmol/L    CO2 25 20 - 31 mmol/L    Anion Gap 13 9 - 17 mmol/L    GFR Non-African American >60 >60 mL/min    GFR African American >60 >60 mL/min    GFR Comment          GFR Staging NOT REPORTED    CBC auto differential    Collection Time: 12/20/21  5:52 AM   Result Value Ref Range    WBC 8.6 3.5 - 11.0 k/uL    RBC 4.39 4.0 - 5.2 m/uL    Hemoglobin 13.1 12.0 - 16.0 g/dL    Hematocrit 39.0 36 - 46 %    MCV 88.8 80 - 100 fL    MCH 29.8 26 - 34 pg    MCHC 33.5 31 - 37 g/dL    RDW 13.0 11.5 - 14.9 %    Platelets 688 455 - 266 k/uL    MPV 8.6 6.0 - 12.0 fL    NRBC Automated NOT REPORTED per 100 WBC    Differential Type NOT REPORTED     Seg Neutrophils 57 36 - 66 %    Lymphocytes 28 24 - 44 %    Monocytes 13 (H) 1 - 7 %    Eosinophils % 1 0 - 4 %    Basophils 1 0 - 2 %    Immature Granulocytes NOT REPORTED 0 %    Segs Absolute 4.90 1.3 - 9.1 k/uL    Absolute Lymph # 2.40 1.0 - 4.8 k/uL    Absolute Mono # 1.10 0.1 - 1.3 k/uL    Absolute Eos # 0.10 0.0 - 0.4 k/uL    Basophils Absolute 0.10 0.0 - 0.2 k/uL    Absolute Immature Granulocyte NOT REPORTED 0.00 - 0.30 k/uL    WBC Morphology NOT REPORTED     RBC Morphology NOT REPORTED     Platelet Estimate NOT REPORTED    Hepatic Function Panel    Collection Time: 12/20/21  5:52 AM   Result Value Ref Range    Albumin 3.9 3.5 - 5.2 g/dL    Alkaline Phosphatase 105 (H) 35 - 104 U/L    ALT 27 5 - 33 U/L    AST 22 <32 U/L    Total Bilirubin 0.68 0.3 - 1.2 mg/dL    Bilirubin, Direct 0.26 <0.31 mg/dL    Bilirubin, Indirect 0.42 0.00 - 1.00 mg/dL    Total Protein 7.5 6.4 - 8.3 g/dL    Globulin NOT REPORTED 1.5 - 3.8 g/dL    Albumin/Globulin Ratio NOT REPORTED 1.0 - 2.5   Magnesium    Collection Time: 12/20/21  5:52 AM   Result Value Ref Range    Magnesium 1.9 1.6 - 2.6 mg/dL         Lab Results   Component Value Date/Time    SPECIAL NOT REPORTED 09/24/2020 04:33 PM     Lab Results   Component Value Date/Time    CULTURE NO SIGNIFICANT GROWTH 06/21/2018 10:01 PM         Radiology:    XR CERVICAL SPINE (2-3 VIEWS)    Result Date: 11/23/2021  Cervical spine: 1. Mild-to-moderate degenerative changes within the cervical spine. 2. No acute vertebral body height loss or malalignment within the cervical spine. 3. Bilateral carotid vascular calcifications. Right shoulder: No acute fracture or dislocation. XR SHOULDER RIGHT (MIN 2 VIEWS)    Result Date: 11/23/2021  Cervical spine: 1. Mild-to-moderate degenerative changes within the cervical spine. 2. No acute vertebral body height loss or malalignment within the cervical spine. 3. Bilateral carotid vascular calcifications. Right shoulder: No acute fracture or dislocation. XR HIP RIGHT (2-3 VIEWS)    Result Date: 11/22/2021  Normal right hip     NM HEPATOBILIARY    Result Date: 12/18/2021  Somewhat delayed and incomplete gallbladder visualization after small bowel is non-specific but may seen in chronic cholecystitis. US GALLBLADDER RUQ    Result Date: 12/18/2021  Cholelithiasis. Mildly distended gallbladder. No significant gallbladder wall thickening or pericholecystic free fluid to suggest acute cholecystitis. No choledocholithiasis or biliary ductal dilation. If there is a persistent clinical concern for acute cholecystitis, a nuclear medicine hepatobiliary scan is recommended. CTA CHEST ABDOMEN PELVIS W CONTRAST    Result Date: 12/18/2021  No aortic dissection or aneurysm. Distended mildly thick walled gallbladder; assess clinically for possible cholecystitis.   No calcified stones identified. Correlation with ultrasound/HIDA scan suggested. Bronchitis with probable atelectatic changes lung bases; minimal infiltrate/bronchopneumonia possible, especially on the left. Correlate clinically. Findings are not typical for COVID-19 pneumonia. Probable hepatic steatosis. No focal abnormality. Scattered colonic diverticula without CT evidence diverticulitis. Some liquid stool suggesting degree of enteritis/diarrhea; correlate clinically. No colitis or bowel obstruction. Additional likely incidental findings, as detailed in the body of the report above. Physical Examination:        Physical Exam  Constitutional:       General: She is not in acute distress. Appearance: Normal appearance. She is not ill-appearing or diaphoretic. Comments: Pt found lying in bed, appears comfortable   HENT:      Head: Normocephalic and atraumatic. Nose: Nose normal.   Eyes:      General: No scleral icterus. Conjunctiva/sclera: Conjunctivae normal.   Cardiovascular:      Rate and Rhythm: Normal rate and regular rhythm. Pulmonary:      Effort: Pulmonary effort is normal. No respiratory distress. Breath sounds: No wheezing. Abdominal:      Tenderness: There is abdominal tenderness. Comments: Hypoactive bowel sounds in bilateral upper quadrants and right lower quadrants. Tender to palpation in abdomen everywhere except right lower quadrant. Musculoskeletal:         General: No tenderness. Right lower leg: No edema. Left lower leg: No edema. Skin:     General: Skin is warm and dry. Neurological:      Mental Status: She is alert. Mental status is at baseline.       Comments: Oriented         Assessment:        Primary Problem  Symptomatic cholelithiasis    Active Hospital Problems    Diagnosis Date Noted    Symptomatic cholelithiasis [K80.20] 12/18/2021    Hypertensive urgency [I16.0] 12/18/2021    Constipation [K59.00] 12/18/2021    Candidal intertrigo [B37.2] 12/18/2021    GERD (gastroesophageal reflux disease) [K21.9] 12/18/2021    Hypertension [I10]        Plan:        Symptomatic cholelithiasis & probable chronic cholecystitis  abd pain stable; suspect constipation also contributing to pain  HIDA: delayed and incomplete gallbladder visualization, may be seen in chronic cholecystitis   Piperacillin tazobactam 3.375 g IV every 8 hours (day 3)  General surgery consulted; appreciate recommendations   Oxycodone acetaminophen 5-325 mg every 6 hours as needed     Constipation  No BM x5 days  CT abd/pelvis: Retained stool throughout large bowel  Given HIDA scan showing cholecystitis is chronic, it is possible constipation is a major contributor of the abdominal pain  Colace twice daily as needed  Dulcolax suppository  If no BM by afternoon; give soap enema today     Essential HTN  Hypertensive Urgency has resolved;  /76 this a.m.   Continue home amlodipine 10 mg p.o. daily  Continue Hydralazine 25 mg p.o. daily  Lisinopril 40 mg p.o. daily     SHERIE likely 2/2 contrast induced nephropathy - improving  Cr 0.78 > 1.04 > 0.92  IV NS 75cc/hr  Monitor with daily BMP     Possible candidal intertrigo  Nystatin powder     Comorbid Conditions:  · Hx of prior MI: ASA 81 mg daily - held d/t possible procedure  · Psych disorders: pt follows OP psychiatrist but noncompliant with meds; reports has not taken meds in months  · GERD: Pantoprazole 40 mg p.o. daily AC in lieu of home omeprazole  · Dyslipidemia: Continue home atorvastatin 20 mg p.o. daily     Code: Full  DVT prophylaxis: Heparin (chosen over Lovenox d/t possible cholecystectomy and SHERIE)  GI prophylaxis: On PPI  Diet: N.p.o.; will switch to regular cardiac diet if no procedures planned pending general surgery recomendations  Activity: As tolerated    Dispo: Give enema if no BM s/p suppository, anticipate discharge this p.m. after BM and if no inpatient procedures planned by surgery    Please note: Use of a speech recognition software was used in the creation of portions of this note and dictation errors, including those of syntax and sound alike word substitutions, may have escaped proofreading.        Tin Tom DO  12/20/2021  7:54 AM

## 2021-12-21 NOTE — DISCHARGE SUMMARY
[address]    Discharge Summary     Specified type ID: Jumana Quiñonez  :  1970   MRN: [id number]     ACCOUNT:  [de-identified]   Patient's PCP: Rommel BUCK, YEYO Baldwin NP  Admit Date: 2021   Discharge Date: 2021     Length of Stay: 2  Code Status:  Prior  Admitting Physician: Jose Prjaapati MD  Discharge Physician: Judith Zurita, DO     Active Discharge Diagnoses:       Primary Problem  Symptomatic cholelithiasis      [facility] Problems    Diagnosis Date Noted    Cholecystitis, chronic [K81.1] 2021    Symptomatic cholelithiasis [K80.20] 2021    Hypertensive urgency [I16.0] 2021    Constipation [K59.00] 2021    Candidal intertrigo [B37.2] 2021    GERD (gastroesophageal reflux disease) [K21.9] 2021    Hypertension [I10]        Admission Condition:  fair     Discharged Condition: good    Hospital Stay:       Hospital Course:  Jumana Quiñonez is a 45 y.o. female who was admitted for the management of  Symptomatic cholelithiasis , presented to ER with progressively worsening *Abdominal Pain  In epigastric and bilateral upper abdominal quadrants. She denied N/V but complained of blurry vision and constipation. On initial presentation, patient found to be in hypertensive urgency with blood pressure 214/112. Of note, patient on 3 antihypertensive medications at home but had not taken them on the morning of admission. Initial labs remarkable for alk phos 127, ALT 40, AST 35. WBC, bilirubin, and lipase all within normal limits. WBC, bilirubin, and lipase all within normal limits. Ultrasound gallbladder positive for cholelithiasis. CT abdomen pelvis remarkable for retained stool and distended mildly thick gallbladder wall. Patient started on IV antibiotics,  surgery was consulted, pain & hypertension management done.  HIDA scan done & showing evidence of chronic cholecystitis. General surgery evaluated patient and determined patient surgically stable for discharge follow-up as for elective cholecystectomy as outpatient. On day of discharge, patient had bowel movement, abdominal pain is improved, blood pressure normalized, and patient is medically stable for discharge. Plans discussed with patient & patient agreeable and eager to go home at time of discharge. Referral to [facility] given due to patient requesting establishment with new PCP.       Significant therapeutic interventions: HIDA scan, antihypertensives, pain management, IV antibiotics    Significant Diagnostic Studies:   Labs / Micro:  CBC:   Lab Results   Component Value Date    WBC 8.6 12/20/2021    RBC 4.39 12/20/2021    HGB 13.1 12/20/2021    HCT 39.0 12/20/2021    MCV 88.8 12/20/2021    MCH 29.8 12/20/2021    MCHC 33.5 12/20/2021    RDW 13.0 12/20/2021     12/20/2021     BMP:    Lab Results   Component Value Date    GLUCOSE 125 12/20/2021    GLUCOSE 95 04/23/2012     12/20/2021    K 3.3 12/20/2021     12/20/2021    CO2 25 12/20/2021    ANIONGAP 13 12/20/2021    BUN 8 12/20/2021    CREATININE 0.92 12/20/2021    BUNCRER NOT REPORTED 12/20/2021    CALCIUM 9.4 12/20/2021    LABGLOM >60 12/20/2021    GFRAA >60 12/20/2021    GFR      12/20/2021    GFR NOT REPORTED 12/20/2021     HFP:    Lab Results   Component Value Date    PROT 7.5 12/20/2021     CMP:    Lab Results   Component Value Date    GLUCOSE 125 12/20/2021    GLUCOSE 95 04/23/2012     12/20/2021    K 3.3 12/20/2021     12/20/2021    CO2 25 12/20/2021    BUN 8 12/20/2021    CREATININE 0.92 12/20/2021    ANIONGAP 13 12/20/2021    ALKPHOS 105 12/20/2021    ALT 27 12/20/2021    AST 22 12/20/2021    BILITOT 0.68 12/20/2021    LABALBU 3.9 12/20/2021    LABALBU 4.1 04/23/2012    ALBUMIN NOT REPORTED 12/20/2021    LABGLOM >60 12/20/2021    GFRAA >60 12/20/2021    GFR      12/20/2021    GFR NOT REPORTED 12/20/2021 Cervical spine: 1. Mild-to-moderate degenerative changes within the cervical spine. 2. No acute vertebral body height loss or malalignment within the cervical spine. 3. Bilateral carotid vascular calcifications. Right shoulder: No acute fracture or dislocation. XR SHOULDER RIGHT (MIN 2 VIEWS)    Result Date: 11/23/2021  EXAMINATION: THREE XRAY VIEWS OF THE RIGHT SHOULDER; 3 XRAY VIEWS OF THE CERVICAL SPINE 11/22/2021 5:53 pm COMPARISON: None. HISTORY: ORDERING SYSTEM PROVIDED HISTORY: Trapezius muscle spasm TECHNOLOGIST PROVIDED HISTORY: muscle spasm Reason for Exam: c/o woke up with muscle spasms 62-year-old female with trapezius muscle spasm FINDINGS: Cervical spine: Cervical spine is imaged from skull base to the superior endplate of T1 on the lateral view. Gross preservation of the vertebral body heights. Mild disc space narrowing at C3-C4 and C5-C6. Moderate disc space narrowing at C4-C5. No prevertebral soft tissue swelling. Alignment well maintained. Patient is edentulous. Odontoid appears intact. Lateral masses symmetric in appearance. Visualized ribs and lung apices grossly unremarkable. Bilateral carotid vascular calcifications. Right shoulder: Visualized right-sided ribs appear intact. No acute fracture or dislocation. Right AC and glenohumeral joints grossly unremarkable. Cervical spine: 1. Mild-to-moderate degenerative changes within the cervical spine. 2. No acute vertebral body height loss or malalignment within the cervical spine. 3. Bilateral carotid vascular calcifications. Right shoulder: No acute fracture or dislocation. XR HIP RIGHT (2-3 VIEWS)    Result Date: 11/22/2021  EXAMINATION: TWO XRAY VIEWS OF THE RIGHT HIP 11/22/2021 5:52 pm COMPARISON: September 24, 2020. HISTORY: ORDERING SYSTEM PROVIDED HISTORY: Hip pain TECHNOLOGIST PROVIDED HISTORY: hi pain Reason for Exam: c/o woke up with muscle spasms FINDINGS: Right hip is in anatomic alignment.   No significant degenerative change. Cortical margins intact. Soft tissues unremarkable. Normal right hip     NM HEPATOBILIARY    Result Date: 12/18/2021  EXAMINATION: NUCLEAR MEDICINE HEPATOBILIARY SCINTIGRAPHY (HIDA SCAN). 12/18/2021 4:25 pm TECHNIQUE: Approximately 5.1 mCi Tc-99m Mebrofenin (Choletec) was administered IV. Then, dynamic images of the abdomen were obtained in the anterior projection for 60 min(s). A right lateral view was also obtained at 60 min(s). COMPARISON: CTA of the chest abdomen and pelvis and ultrasound gallbladder from earlier the same day HISTORY: ORDERING SYSTEM PROVIDED HISTORY: Acute cholecystitis TECHNOLOGIST PROVIDED HISTORY: Acute cholecystitis Is the patient pregnant?->No Reason for Exam: acute cholecystis FINDINGS: Prompt, homogenous uptake by the liver is noted with normal appearance of radiotracer excretion into the biliary system. Clearance of blood pool activity appears appropriate. Small bowel is visualized in appropriate sequence, followed by partial filling of a distended appearing gallbladder (as noted on CT and ultrasound). On 2 hour delayed views, there is partial emptying of the gallbladder. Somewhat delayed and incomplete gallbladder visualization after small bowel is non-specific but may seen in chronic cholecystitis. US GALLBLADDER RUQ    Result Date: 12/18/2021  EXAMINATION: RIGHT UPPER QUADRANT ULTRASOUND 12/18/2021 7:46 am COMPARISON: Abdominal CT study from earlier the same day. HISTORY: ORDERING SYSTEM PROVIDED HISTORY: ?cholecystitis TECHNOLOGIST PROVIDED HISTORY: ?cholecystitis FINDINGS: A right upper quadrant ultrasound study was performed. The liver appears normal in echotexture and morphology without discrete mass. There is no intrahepatic or extrahepatic biliary ductal dilation. The common bile duct is within normal limits. The gallbladder appears mildly distended, containing a 2-3 cm stone within the gallbladder neck.   However, there is no gallbladder wall thickening or pericholecystic free fluid. There was no reported focal tenderness over the gallbladder during the course of the examination. The right kidney is normal in size and echogenicity with normal renal cortical thickness. No renal calculi. No hydronephrosis or perinephric fluid collections. The visualized portions of the abdominal aorta and IVC are unremarkable. Cholelithiasis. Mildly distended gallbladder. No significant gallbladder wall thickening or pericholecystic free fluid to suggest acute cholecystitis. No choledocholithiasis or biliary ductal dilation. If there is a persistent clinical concern for acute cholecystitis, a nuclear medicine hepatobiliary scan is recommended. CTA CHEST ABDOMEN PELVIS W CONTRAST    Result Date: 12/18/2021  EXAMINATION: CTA OF THE CHEST, ABDOMEN AND PELVIS WITH CONTRAST 12/18/2021 6:13 am: TECHNIQUE: CTA of the chest, abdomen and pelvis was performed after the administration of intravenous contrast.  Multiplanar reformatted images are provided for review. MIP images are provided for review. Dose modulation, iterative reconstruction, and/or weight based adjustment of the mA/kV was utilized to reduce the radiation dose to as low as reasonably achievable. COMPARISON: CT scan of the abdomen and pelvis from 10/25/2020 HISTORY: ORDERING SYSTEM PROVIDED HISTORY: abdominal pain TECHNOLOGIST PROVIDED HISTORY: abdominal pain Decision Support Exception - unselect if not a suspected or confirmed emergency medical condition->Emergency Medical Condition (MA) Reason for Exam: RUQ pain x3 days. Relevant Medical/Surgical History: HTN FINDINGS: CTA CHEST: Thoracic aorta: No evidence of thoracic aortic aneurysm or dissection. Bovine arch. Mild elongation thoracic aorta. No significant plaque disease. No acute abnormality of the aorta. Mediastinum: No evidence of mediastinal lymphadenopathy. The heart and pericardium demonstrate no acute abnormality.   Main pulmonary artery caliber WNL. Lungs/Pleura: Bibasilar atelectatic appearing changes with some subsegmental atelectasis and possible minimal ground-glass/infiltrate, especially left base. Bronchial wall thickening, best seen lower lobes. No large focal consolidation or pulmonary edema. No pleural effusion or pneumothorax. Soft Tissues/Bones: No acute bone or soft tissue abnormality. CTA ABDOMEN: Abdominal aorta/Branches: No aortic aneurysm or dissection. Mild calcific plaque disease, mostly distal abdominal aorta. Major visceral branch vessels widely patent. Organs: Diminished appearing hepatic attenuation without obvious focal abnormality. Markedly distended gallbladder with mural prominence. No calcified stones appreciated on CT. No dilatation biliary tree. Pancreas, spleen, adrenal glands, and kidneys unremarkable. GI/Bowel: Moderate retained stool throughout large bowel, some of which appears liquid, with a few scattered diverticula but no CT evidence diverticulitis, abnormal dilatation or significant wall thickening/pericolonic fat stranding. Unremarkable terminal ileum/remainder small-bowel. Appendix poorly visualized but no right lower quadrant/right pelvic inflammatory process. Probable small hiatus hernia. Nondistended unremarkable appearing stomach. Peritoneum/Retroperitoneum: No bulky lymphadenopathy. No pathologic free fluid or free air. Bones/Soft Tissues: No acute finding. Mild degenerative changes LSS, best seen facet joints lower spine. CTA PELVIS: Aorta/Iliacs: Unremarkable mildly elongated but widely patent iliac arteries; no dissection or aneurysm. No marked atherosclerotic disease. Other: Surgical absence uterus. No pelvic fluid collection or mass. Unremarkable urinary bladder. Bones/Soft Tissues: No acute abnormality. Vacuum phenomenon and mild degenerative changes SI joints. No aortic dissection or aneurysm.  Distended mildly thick walled gallbladder; assess clinically for possible cholecystitis. No calcified stones identified. Correlation with ultrasound/HIDA scan suggested. Bronchitis with probable atelectatic changes lung bases; minimal infiltrate/bronchopneumonia possible, especially on the left. Correlate clinically. Findings are not typical for COVID-19 pneumonia. Probable hepatic steatosis. No focal abnormality. Scattered colonic diverticula without CT evidence diverticulitis. Some liquid stool suggesting degree of enteritis/diarrhea; correlate clinically. No colitis or bowel obstruction. Additional likely incidental findings, as detailed in the body of the report above. Consultations:    Consults:     Final Specialist Recommendations/Findings:   IP CONSULT TO GENERAL SURGERY  IP CONSULT TO INTERNAL MEDICINE  IP CONSULT TO SOCIAL WORK      The patient was seen and examined on day of discharge and this discharge summary is in conjunction with any daily progress note from day of discharge. Discharge plan:       Disposition: Home    Physician Follow Up:     Mercy Saint, APRN - NP  [address] [place] [address] Colin  [phone]    Schedule an appointment as soon as possible for a visit in 2 weeks      Hilario Rojas MD  [address] [place] [address] Dr Amato [id number]  [phone]    Schedule an appointment as soon as possible for a visit in 2 weeks      [address] [address]  Schedule an appointment as soon as possible for a visit in 1 week  hospital follow-up; pt asking to establish care with new PCP. F/u chronic cholecystitis, constipation, and HTN. Requiring Further Evaluation/Follow Up POST HOSPITALIZATION/Incidental Findings: Chronic cholecystitis, colonic diverticula without diverticulitis    Diet: low fat cardiac diet    Activity: As tolerated    Instructions to Patient:  Take all medications as prescribed. If symptoms worsen or recur, please return to the ED. Follow-up with PCP.   Follow-up with surgeon; likely to benefit from elective cholecystectomy. Discharge Medications:      Medication List      START taking these medications    ciprofloxacin 500 MG tablet  Commonly known as: CIPRO  Take 1 tablet by mouth 2 times daily for 4 days  Notes to patient: DO NOT TAKE YOU TIZANIDINE WHILE ON THIS MEDICATION BECAUSE IT MAY INTERACT WITH IT. Resume tisanidine once done with Cipro. metroNIDAZOLE 500 MG tablet  Commonly known as: FLAGYL  Take 1 tablet by mouth 3 times daily for 4 days     miconazole 2 % powder  Commonly known as: MICOTIN  Apply topically 2 times daily. oxyCODONE-acetaminophen 5-325 MG per tablet  Commonly known as: PERCOCET  Take 1 tablet by mouth every 8 hours as needed (take as needed for severe abdominal pain) for up to 3 days. CHANGE how you take these medications    hydroCHLOROthiazide 25 MG tablet  Commonly known as: HYDRODIURIL  Take 1 tablet by mouth daily  Start taking on: December 21, 2021  What changed:   · medication strength  · how much to take  · how to take this  · when to take this     omeprazole 40 MG delayed release capsule  Commonly known as: PRILOSEC  Take 1 capsule by mouth daily  What changed: Another medication with the same name was removed. Continue taking this medication, and follow the directions you see here. * polyethylene glycol 17 GM/SCOOP powder  Commonly known as: GLYCOLAX  Use as directed by following your patient instructions given by office. What changed: Another medication with the same name was added. Make sure you understand how and when to take each. * polyethylene glycol 17 GM/SCOOP powder  Commonly known as: GLYCOLAX  Take 17 g by mouth daily as needed (take as needed for constipation)  What changed: You were already taking a medication with the same name, and this prescription was added. Make sure you understand how and when to take each. * This list has 2 medication(s) that are the same as other medications prescribed for you. Read the directions carefully, and ask your doctor or other care provider to review them with you.             CONTINUE taking these medications    amLODIPine 10 MG tablet  Commonly known as: NORVASC     ammonium lactate 12 % cream  Commonly known as: AMLACTIN     Aspirin Low Dose 81 MG EC tablet  Generic drug: aspirin  TAKE 1 TABLET BY MOUTH DAILY     atorvastatin 20 MG tablet  Commonly known as: LIPITOR  TAKE 1 TABLET BY MOUTH DAILY     bisacodyl 5 MG EC tablet  Commonly known as: DULCOLAX  TAKE 4 TABS AS DIRECTED BY PHYSICIAN OFFICE  Notes to patient: For constipation     clotrimazole 1 % cream  Commonly known as: LOTRIMIN     diclofenac sodium 1 % Gel  Commonly known as: VOLTAREN  Apply 4 g topically 4 times daily     HM Cetirizine HCl 10 MG tablet  Generic drug: cetirizine  Notes to patient: Resume home regime     Knee Brace/Hinged/Large Misc  Use daily for knee pain, please provide according to size     lisinopril 30 MG tablet  Commonly known as: PRINIVIL;ZESTRIL     magnesium citrate solution  Take 296 mLs by mouth once for 1 dose  Notes to patient: For constipation     magnesium oxide 250 MG Tabs tablet  Commonly known as: MAG-OX     multivitamin with C & FA Chew chewable tablet     naproxen 500 MG tablet  Commonly known as: Naprosyn  Take 1 tablet by mouth 2 times daily     OLANZapine 15 MG tablet  Commonly known as: ZYPREXA     ondansetron 4 MG disintegrating tablet  Commonly known as: Zofran ODT  Take 1 tablet by mouth every 8 hours as needed for Nausea or Vomiting     Pre-Moistened Witch Hazel 50 % Pads  Use after each bowel movement     ProAir  (90 Base) MCG/ACT inhaler  Generic drug: albuterol sulfate HFA     simethicone 80 MG chewable tablet  Commonly known as: MYLICON  Take 1 tablet by mouth 4 times daily as needed for Flatulence     sucralfate 1 GM/10ML suspension  Commonly known as: Carafate  Take 10 mLs by mouth 4 times daily     tiZANidine 4 MG tablet  Commonly known as: ZANAFLEX  Take 1 tablet by mouth 3 times daily as needed (spasms)  Notes to patient: Hold for three days until after Cipro is completed     traZODone 100 MG tablet  Commonly known as: DESYREL     triamcinolone 0.025 % cream  Commonly known as: KENALOG  Apply to rash twice daily     vitamin D3 25 MCG (1000 UT) Tabs tablet  Commonly known as: CHOLECALCIFEROL        STOP taking these medications    acetaminophen 325 MG tablet  Commonly known as: Tylenol           Where to Get Your Medications      These medications were sent to [address] [place] [address] [address] [facility] [address]    Phone: [phone]   · hydroCHLOROthiazide 25 MG tablet  · metroNIDAZOLE 500 MG tablet  · miconazole 2 % powder  · polyethylene glycol 17 GM/SCOOP powder     You can get these medications from any pharmacy    Bring a paper prescription for each of these medications  · ciprofloxacin 500 MG tablet  · oxyCODONE-acetaminophen 5-325 MG per tablet         Electronically signed by   Sandra Mckeon DO  12/20/2021  10:07 PM      Thank you Dr. Sonja Gleason NP-C, APRN - NP for the opportunity to be involved in this patient's care.

## 2021-12-21 NOTE — PLAN OF CARE
PLAN OF CARE    Pt seen and examined. Continued to c/o abd pain & had still had no BM despite laxative medications. Enema attempts were unsuccessful. Osteopathic manipulation of abdomen done by myself after obtaining pt consent. Pt requests to see me about 10 minutes after end of encounter; She has now had BM and feels better;  Stool witnessed in toilet by myself. Pt tolerating p.o. diet without n/v & has now had BM. Pt seen by Gen surgery; no inpatient procedures planned & pt deemed surgically stable for discharge with OP follow-up. Pt medically stable; will discharge home. Pt states she wishes to establish care with a new PCP at [facility]; will give referral on discharge. Electronically signed by Radha Domínguez DO on 12/20/2021.

## 2022-01-31 ENCOUNTER — HOSPITAL ENCOUNTER (OUTPATIENT)
Dept: LAB | Age: 52
Setting detail: SPECIMEN
Discharge: HOME | End: 2022-01-31
Payer: COMMERCIAL

## 2022-01-31 ENCOUNTER — HOSPITAL ENCOUNTER (OUTPATIENT)
Age: 52
Setting detail: SPECIMEN
Discharge: HOME | End: 2022-01-31
Payer: COMMERCIAL

## 2022-01-31 ENCOUNTER — HOSPITAL ENCOUNTER (OUTPATIENT)
Dept: PREADMISSION TESTING | Age: 52
Discharge: HOME | End: 2022-02-04
Payer: COMMERCIAL

## 2022-01-31 VITALS
BODY MASS INDEX: 27.46 KG/M2 | HEART RATE: 75 BPM | SYSTOLIC BLOOD PRESSURE: 133 MMHG | TEMPERATURE: 98.5 F | DIASTOLIC BLOOD PRESSURE: 63 MMHG | RESPIRATION RATE: 20 BRPM | HEIGHT: 63 IN | OXYGEN SATURATION: 99 % | WEIGHT: 155 LBS

## 2022-01-31 DIAGNOSIS — Z01.818 PREOP EXAMINATION: ICD-10-CM

## 2022-01-31 DIAGNOSIS — Z01.818 PRE-OP TESTING: Primary | ICD-10-CM

## 2022-01-31 LAB
ALBUMIN SERPL-MCNC: 4 G/DL (ref 3.5–5.2)
ALBUMIN/GLOBULIN RATIO: ABNORMAL (ref 1–2.5)
ALP BLD-CCNC: 119 U/L (ref 35–104)
ALT SERPL-CCNC: 32 U/L (ref 5–33)
AMYLASE: 146 U/L (ref 28–100)
ANION GAP SERPL CALCULATED.3IONS-SCNC: 12 MMOL/L (ref 9–17)
ANION GAP SERPL CALCULATED.3IONS-SCNC: 12 MMOL/L (ref 9–17)
AST SERPL-CCNC: 36 U/L
BILIRUB SERPL-MCNC: 0.16 MG/DL (ref 0.3–1.2)
BILIRUBIN DIRECT: <0.08 MG/DL
BILIRUBIN, INDIRECT: ABNORMAL MG/DL (ref 0–1)
BUN BLDV-MCNC: 9 MG/DL (ref 6–20)
BUN BLDV-MCNC: 9 MG/DL (ref 6–20)
BUN/CREAT BLD: ABNORMAL (ref 9–20)
BUN/CREAT BLD: ABNORMAL (ref 9–20)
CALCIUM SERPL-MCNC: 9 MG/DL (ref 8.6–10.4)
CALCIUM SERPL-MCNC: 9 MG/DL (ref 8.6–10.4)
CHLORIDE BLD-SCNC: 102 MMOL/L (ref 98–107)
CHLORIDE BLD-SCNC: 102 MMOL/L (ref 98–107)
CO2: 25 MMOL/L (ref 20–31)
CO2: 25 MMOL/L (ref 20–31)
CREAT SERPL-MCNC: 0.73 MG/DL (ref 0.5–0.9)
CREAT SERPL-MCNC: 0.73 MG/DL (ref 0.5–0.9)
GFR AFRICAN AMERICAN: >60 ML/MIN
GFR AFRICAN AMERICAN: >60 ML/MIN
GFR NON-AFRICAN AMERICAN: >60 ML/MIN
GFR NON-AFRICAN AMERICAN: >60 ML/MIN
GFR SERPL CREATININE-BSD FRML MDRD: ABNORMAL ML/MIN/{1.73_M2}
GLOBULIN: ABNORMAL G/DL (ref 1.5–3.8)
GLUCOSE BLD-MCNC: 112 MG/DL (ref 70–99)
GLUCOSE BLD-MCNC: 112 MG/DL (ref 70–99)
HCT VFR BLD CALC: 37.4 % (ref 36–46)
HCT VFR BLD CALC: 37.4 % (ref 36–46)
HEMOGLOBIN: 12 G/DL (ref 12–16)
HEMOGLOBIN: 12 G/DL (ref 12–16)
LIPASE: 86 U/L (ref 13–60)
MCH RBC QN AUTO: 29.1 PG (ref 26–34)
MCH RBC QN AUTO: 29.1 PG (ref 26–34)
MCHC RBC AUTO-ENTMCNC: 32.1 G/DL (ref 31–37)
MCHC RBC AUTO-ENTMCNC: 32.1 G/DL (ref 31–37)
MCV RBC AUTO: 90.8 FL (ref 80–100)
MCV RBC AUTO: 90.8 FL (ref 80–100)
NRBC AUTOMATED: NORMAL PER 100 WBC
NRBC AUTOMATED: NORMAL PER 100 WBC
PDW BLD-RTO: 13.5 % (ref 11.5–14.9)
PDW BLD-RTO: 13.5 % (ref 11.5–14.9)
PLATELET # BLD: 251 K/UL (ref 150–450)
PLATELET # BLD: 251 K/UL (ref 150–450)
PMV BLD AUTO: 9.4 FL (ref 6–12)
PMV BLD AUTO: 9.4 FL (ref 6–12)
POTASSIUM SERPL-SCNC: 3.7 MMOL/L (ref 3.7–5.3)
POTASSIUM SERPL-SCNC: 3.7 MMOL/L (ref 3.7–5.3)
RBC # BLD: 4.12 M/UL (ref 4–5.2)
RBC # BLD: 4.12 M/UL (ref 4–5.2)
SODIUM BLD-SCNC: 139 MMOL/L (ref 135–144)
SODIUM BLD-SCNC: 139 MMOL/L (ref 135–144)
TOTAL PROTEIN: 6.9 G/DL (ref 6.4–8.3)
WBC # BLD: 4.2 K/UL (ref 3.5–11)
WBC # BLD: 4.2 K/UL (ref 3.5–11)

## 2022-01-31 PROCEDURE — 80076 HEPATIC FUNCTION PANEL: CPT

## 2022-01-31 PROCEDURE — 83690 ASSAY OF LIPASE: CPT

## 2022-01-31 PROCEDURE — U0003 INFECTIOUS AGENT DETECTION BY NUCLEIC ACID (DNA OR RNA); SEVERE ACUTE RESPIRATORY SYNDROME CORONAVIRUS 2 (SARS-COV-2) (CORONAVIRUS DISEASE [COVID-19]), AMPLIFIED PROBE TECHNIQUE, MAKING USE OF HIGH THROUGHPUT TECHNOLOGIES AS DESCRIBED BY CMS-2020-01-R: HCPCS

## 2022-01-31 PROCEDURE — 36415 COLL VENOUS BLD VENIPUNCTURE: CPT

## 2022-01-31 PROCEDURE — 80048 BASIC METABOLIC PNL TOTAL CA: CPT

## 2022-01-31 PROCEDURE — 82150 ASSAY OF AMYLASE: CPT

## 2022-01-31 PROCEDURE — U0005 INFEC AGEN DETEC AMPLI PROBE: HCPCS

## 2022-01-31 PROCEDURE — 85027 COMPLETE CBC AUTOMATED: CPT

## 2022-01-31 RX ORDER — ATENOLOL 50 MG/1
50 TABLET ORAL NIGHTLY
COMMUNITY

## 2022-01-31 ASSESSMENT — ENCOUNTER SYMPTOMS
WHEEZING: 0
NAUSEA: 0
COUGH: 0
VOMITING: 0
CONSTIPATION: 1
ABDOMINAL PAIN: 1
SHORTNESS OF BREATH: 1
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
## 2022-02-01 LAB
SARS-COV-2: NORMAL
SARS-COV-2: NOT DETECTED
SOURCE: NORMAL

## 2022-02-14 ENCOUNTER — HOSPITAL ENCOUNTER (OUTPATIENT)
Age: 52
Setting detail: OBSERVATION
Discharge: HOME | End: 2022-02-17
Attending: EMERGENCY MEDICINE | Admitting: INTERNAL MEDICINE
Payer: COMMERCIAL

## 2022-02-14 ENCOUNTER — APPOINTMENT (OUTPATIENT)
Dept: ULTRASOUND IMAGING | Age: 52
End: 2022-02-14
Payer: COMMERCIAL

## 2022-02-14 DIAGNOSIS — K80.20 CALCULUS OF GALLBLADDER WITHOUT CHOLECYSTITIS WITHOUT OBSTRUCTION: Primary | ICD-10-CM

## 2022-02-14 DIAGNOSIS — I16.0 HYPERTENSIVE URGENCY: ICD-10-CM

## 2022-02-14 DIAGNOSIS — Z90.49 HX LAPAROSCOPIC CHOLECYSTECTOMY: ICD-10-CM

## 2022-02-14 PROBLEM — K81.0 ACUTE CHOLECYSTITIS: Status: ACTIVE | Noted: 2022-02-14

## 2022-02-14 LAB
ABSOLUTE EOS #: 0.14 K/UL (ref 0–0.44)
ABSOLUTE IMMATURE GRANULOCYTE: 0.03 K/UL (ref 0–0.3)
ABSOLUTE LYMPH #: 1.69 K/UL (ref 1.1–3.7)
ABSOLUTE MONO #: 0.46 K/UL (ref 0.1–1.2)
ALBUMIN SERPL-MCNC: 4.3 G/DL (ref 3.5–5.2)
ALBUMIN/GLOBULIN RATIO: 1.2 (ref 1–2.5)
ALP BLD-CCNC: 135 U/L (ref 35–104)
ALT SERPL-CCNC: 32 U/L (ref 5–33)
ANION GAP SERPL CALCULATED.3IONS-SCNC: 19 MMOL/L (ref 9–17)
AST SERPL-CCNC: 39 U/L
BASOPHILS # BLD: 0 % (ref 0–2)
BASOPHILS ABSOLUTE: <0.03 K/UL (ref 0–0.2)
BILIRUB SERPL-MCNC: 0.22 MG/DL (ref 0.3–1.2)
BILIRUBIN URINE: NEGATIVE
BUN BLDV-MCNC: 8 MG/DL (ref 6–20)
BUN/CREAT BLD: ABNORMAL (ref 9–20)
CALCIUM SERPL-MCNC: 9.8 MG/DL (ref 8.6–10.4)
CHLORIDE BLD-SCNC: 101 MMOL/L (ref 98–107)
CO2: 20 MMOL/L (ref 20–31)
COLOR: YELLOW
COMMENT UA: NORMAL
CREAT SERPL-MCNC: 0.65 MG/DL (ref 0.5–0.9)
DIFFERENTIAL TYPE: ABNORMAL
EOSINOPHILS RELATIVE PERCENT: 2 % (ref 1–4)
GFR AFRICAN AMERICAN: >60 ML/MIN
GFR NON-AFRICAN AMERICAN: >60 ML/MIN
GFR SERPL CREATININE-BSD FRML MDRD: ABNORMAL ML/MIN/{1.73_M2}
GFR SERPL CREATININE-BSD FRML MDRD: ABNORMAL ML/MIN/{1.73_M2}
GLUCOSE BLD-MCNC: 118 MG/DL (ref 70–99)
GLUCOSE URINE: NEGATIVE
HCT VFR BLD CALC: 41.9 % (ref 36.3–47.1)
HEMOGLOBIN: 13.7 G/DL (ref 11.9–15.1)
IMMATURE GRANULOCYTES: 1 %
KETONES, URINE: NEGATIVE
LEUKOCYTE ESTERASE, URINE: NEGATIVE
LIPASE: 49 U/L (ref 13–60)
LYMPHOCYTES # BLD: 26 % (ref 24–43)
MCH RBC QN AUTO: 30.2 PG (ref 25.2–33.5)
MCHC RBC AUTO-ENTMCNC: 32.7 G/DL (ref 28.4–34.8)
MCV RBC AUTO: 92.5 FL (ref 82.6–102.9)
MONOCYTES # BLD: 7 % (ref 3–12)
NITRITE, URINE: NEGATIVE
NRBC AUTOMATED: 0 PER 100 WBC
PDW BLD-RTO: 13.5 % (ref 11.8–14.4)
PH UA: 8 (ref 5–8)
PLATELET # BLD: 278 K/UL (ref 138–453)
PLATELET ESTIMATE: ABNORMAL
PMV BLD AUTO: 10.5 FL (ref 8.1–13.5)
POTASSIUM SERPL-SCNC: 4.2 MMOL/L (ref 3.7–5.3)
PROTEIN UA: NEGATIVE
RBC # BLD: 4.53 M/UL (ref 3.95–5.11)
RBC # BLD: ABNORMAL 10*6/UL
SEG NEUTROPHILS: 64 % (ref 36–65)
SEGMENTED NEUTROPHILS ABSOLUTE COUNT: 4.25 K/UL (ref 1.5–8.1)
SODIUM BLD-SCNC: 140 MMOL/L (ref 135–144)
SPECIFIC GRAVITY UA: 1.01 (ref 1–1.03)
TOTAL PROTEIN: 7.8 G/DL (ref 6.4–8.3)
TROPONIN INTERP: NORMAL
TROPONIN INTERP: NORMAL
TROPONIN T: NORMAL NG/ML
TROPONIN T: NORMAL NG/ML
TROPONIN, HIGH SENSITIVITY: 7 NG/L (ref 0–14)
TROPONIN, HIGH SENSITIVITY: <6 NG/L (ref 0–14)
TURBIDITY: CLEAR
URINE HGB: NEGATIVE
UROBILINOGEN, URINE: NORMAL
WBC # BLD: 6.6 K/UL (ref 3.5–11.3)
WBC # BLD: ABNORMAL 10*3/UL

## 2022-02-14 PROCEDURE — 99285 EMERGENCY DEPT VISIT HI MDM: CPT

## 2022-02-14 PROCEDURE — 6370000000 HC RX 637 (ALT 250 FOR IP): Performed by: STUDENT IN AN ORGANIZED HEALTH CARE EDUCATION/TRAINING PROGRAM

## 2022-02-14 PROCEDURE — 96361 HYDRATE IV INFUSION ADD-ON: CPT

## 2022-02-14 PROCEDURE — 6370000000 HC RX 637 (ALT 250 FOR IP): Performed by: NURSE PRACTITIONER

## 2022-02-14 PROCEDURE — 96375 TX/PRO/DX INJ NEW DRUG ADDON: CPT

## 2022-02-14 PROCEDURE — 83690 ASSAY OF LIPASE: CPT

## 2022-02-14 PROCEDURE — 96374 THER/PROPH/DIAG INJ IV PUSH: CPT

## 2022-02-14 PROCEDURE — G0378 HOSPITAL OBSERVATION PER HR: HCPCS

## 2022-02-14 PROCEDURE — 93005 ELECTROCARDIOGRAM TRACING: CPT | Performed by: STUDENT IN AN ORGANIZED HEALTH CARE EDUCATION/TRAINING PROGRAM

## 2022-02-14 PROCEDURE — 80053 COMPREHEN METABOLIC PANEL: CPT

## 2022-02-14 PROCEDURE — 2500000003 HC RX 250 WO HCPCS: Performed by: NURSE PRACTITIONER

## 2022-02-14 PROCEDURE — 99219 PR INITIAL OBSERVATION CARE/DAY 50 MINUTES: CPT | Performed by: NURSE PRACTITIONER

## 2022-02-14 PROCEDURE — 85025 COMPLETE CBC W/AUTO DIFF WBC: CPT

## 2022-02-14 PROCEDURE — 76705 ECHO EXAM OF ABDOMEN: CPT

## 2022-02-14 PROCEDURE — 81003 URINALYSIS AUTO W/O SCOPE: CPT

## 2022-02-14 PROCEDURE — 84484 ASSAY OF TROPONIN QUANT: CPT

## 2022-02-14 PROCEDURE — 6360000002 HC RX W HCPCS: Performed by: NURSE PRACTITIONER

## 2022-02-14 PROCEDURE — 6360000002 HC RX W HCPCS: Performed by: STUDENT IN AN ORGANIZED HEALTH CARE EDUCATION/TRAINING PROGRAM

## 2022-02-14 RX ORDER — FENTANYL CITRATE 50 UG/ML
50 INJECTION, SOLUTION INTRAMUSCULAR; INTRAVENOUS ONCE
Status: COMPLETED | OUTPATIENT
Start: 2022-02-14 | End: 2022-02-14

## 2022-02-14 RX ORDER — ACETAMINOPHEN 650 MG/1
650 SUPPOSITORY RECTAL EVERY 6 HOURS PRN
Status: DISCONTINUED | OUTPATIENT
Start: 2022-02-14 | End: 2022-02-17 | Stop reason: HOSPADM

## 2022-02-14 RX ORDER — HYDRALAZINE HYDROCHLORIDE 20 MG/ML
10 INJECTION INTRAMUSCULAR; INTRAVENOUS ONCE
Status: COMPLETED | OUTPATIENT
Start: 2022-02-14 | End: 2022-02-14

## 2022-02-14 RX ORDER — DICYCLOMINE HYDROCHLORIDE 10 MG/1
10 CAPSULE ORAL
Status: DISCONTINUED | OUTPATIENT
Start: 2022-02-14 | End: 2022-02-16

## 2022-02-14 RX ORDER — SUCRALFATE 1 G/1
1 TABLET ORAL EVERY 6 HOURS SCHEDULED
Status: DISCONTINUED | OUTPATIENT
Start: 2022-02-15 | End: 2022-02-17 | Stop reason: HOSPADM

## 2022-02-14 RX ORDER — POTASSIUM CHLORIDE 7.45 MG/ML
10 INJECTION INTRAVENOUS PRN
Status: DISCONTINUED | OUTPATIENT
Start: 2022-02-14 | End: 2022-02-17 | Stop reason: HOSPADM

## 2022-02-14 RX ORDER — LISINOPRIL 10 MG/1
40 TABLET ORAL ONCE
Status: COMPLETED | OUTPATIENT
Start: 2022-02-14 | End: 2022-02-14

## 2022-02-14 RX ORDER — LISINOPRIL 20 MG/1
40 TABLET ORAL DAILY
Status: DISCONTINUED | OUTPATIENT
Start: 2022-02-15 | End: 2022-02-17 | Stop reason: HOSPADM

## 2022-02-14 RX ORDER — ONDANSETRON 2 MG/ML
4 INJECTION INTRAMUSCULAR; INTRAVENOUS EVERY 6 HOURS PRN
Status: DISCONTINUED | OUTPATIENT
Start: 2022-02-14 | End: 2022-02-17 | Stop reason: HOSPADM

## 2022-02-14 RX ORDER — ONDANSETRON 4 MG/1
4 TABLET, ORALLY DISINTEGRATING ORAL EVERY 8 HOURS PRN
Status: DISCONTINUED | OUTPATIENT
Start: 2022-02-14 | End: 2022-02-17 | Stop reason: HOSPADM

## 2022-02-14 RX ORDER — ATENOLOL 50 MG/1
50 TABLET ORAL NIGHTLY
Status: DISCONTINUED | OUTPATIENT
Start: 2022-02-14 | End: 2022-02-17 | Stop reason: HOSPADM

## 2022-02-14 RX ORDER — DEXTROSE, SODIUM CHLORIDE, AND POTASSIUM CHLORIDE 5; .45; .15 G/100ML; G/100ML; G/100ML
INJECTION INTRAVENOUS CONTINUOUS
Status: DISCONTINUED | OUTPATIENT
Start: 2022-02-14 | End: 2022-02-16

## 2022-02-14 RX ORDER — SODIUM CHLORIDE 9 MG/ML
25 INJECTION, SOLUTION INTRAVENOUS PRN
Status: DISCONTINUED | OUTPATIENT
Start: 2022-02-14 | End: 2022-02-17 | Stop reason: HOSPADM

## 2022-02-14 RX ORDER — SODIUM CHLORIDE 0.9 % (FLUSH) 0.9 %
5-40 SYRINGE (ML) INJECTION EVERY 12 HOURS SCHEDULED
Status: DISCONTINUED | OUTPATIENT
Start: 2022-02-14 | End: 2022-02-17 | Stop reason: HOSPADM

## 2022-02-14 RX ORDER — SODIUM CHLORIDE 0.9 % (FLUSH) 0.9 %
10 SYRINGE (ML) INJECTION PRN
Status: DISCONTINUED | OUTPATIENT
Start: 2022-02-14 | End: 2022-02-17 | Stop reason: HOSPADM

## 2022-02-14 RX ORDER — ATORVASTATIN CALCIUM 20 MG/1
20 TABLET, FILM COATED ORAL DAILY
Status: DISCONTINUED | OUTPATIENT
Start: 2022-02-15 | End: 2022-02-17 | Stop reason: HOSPADM

## 2022-02-14 RX ORDER — HYDROCHLOROTHIAZIDE 25 MG/1
25 TABLET ORAL ONCE
Status: COMPLETED | OUTPATIENT
Start: 2022-02-14 | End: 2022-02-14

## 2022-02-14 RX ORDER — HYDROCHLOROTHIAZIDE 25 MG/1
25 TABLET ORAL DAILY
Status: DISCONTINUED | OUTPATIENT
Start: 2022-02-15 | End: 2022-02-17 | Stop reason: HOSPADM

## 2022-02-14 RX ORDER — PANTOPRAZOLE SODIUM 40 MG/1
40 TABLET, DELAYED RELEASE ORAL
Status: DISCONTINUED | OUTPATIENT
Start: 2022-02-15 | End: 2022-02-17 | Stop reason: HOSPADM

## 2022-02-14 RX ORDER — TRAZODONE HYDROCHLORIDE 100 MG/1
100 TABLET ORAL NIGHTLY
Status: DISCONTINUED | OUTPATIENT
Start: 2022-02-14 | End: 2022-02-17 | Stop reason: HOSPADM

## 2022-02-14 RX ORDER — DROPERIDOL 2.5 MG/ML
0.62 INJECTION, SOLUTION INTRAMUSCULAR; INTRAVENOUS ONCE
Status: COMPLETED | OUTPATIENT
Start: 2022-02-14 | End: 2022-02-14

## 2022-02-14 RX ORDER — MORPHINE SULFATE 4 MG/ML
4 INJECTION, SOLUTION INTRAMUSCULAR; INTRAVENOUS ONCE
Status: COMPLETED | OUTPATIENT
Start: 2022-02-14 | End: 2022-02-14

## 2022-02-14 RX ORDER — POTASSIUM CHLORIDE 20 MEQ/1
40 TABLET, EXTENDED RELEASE ORAL PRN
Status: DISCONTINUED | OUTPATIENT
Start: 2022-02-14 | End: 2022-02-17 | Stop reason: HOSPADM

## 2022-02-14 RX ORDER — POLYETHYLENE GLYCOL 3350 17 G/17G
17 POWDER, FOR SOLUTION ORAL DAILY PRN
Status: DISCONTINUED | OUTPATIENT
Start: 2022-02-14 | End: 2022-02-16

## 2022-02-14 RX ORDER — MAGNESIUM SULFATE 1 G/100ML
1000 INJECTION INTRAVENOUS PRN
Status: DISCONTINUED | OUTPATIENT
Start: 2022-02-14 | End: 2022-02-17 | Stop reason: HOSPADM

## 2022-02-14 RX ORDER — ACETAMINOPHEN 325 MG/1
650 TABLET ORAL EVERY 6 HOURS PRN
Status: DISCONTINUED | OUTPATIENT
Start: 2022-02-14 | End: 2022-02-17 | Stop reason: HOSPADM

## 2022-02-14 RX ORDER — ONDANSETRON 2 MG/ML
4 INJECTION INTRAMUSCULAR; INTRAVENOUS ONCE
Status: COMPLETED | OUTPATIENT
Start: 2022-02-14 | End: 2022-02-14

## 2022-02-14 RX ORDER — OLANZAPINE 15 MG/1
15 TABLET ORAL NIGHTLY
Status: DISCONTINUED | OUTPATIENT
Start: 2022-02-14 | End: 2022-02-16

## 2022-02-14 RX ADMIN — HYDROMORPHONE HYDROCHLORIDE 1 MG: 1 INJECTION, SOLUTION INTRAMUSCULAR; INTRAVENOUS; SUBCUTANEOUS at 23:30

## 2022-02-14 RX ADMIN — ONDANSETRON 4 MG: 2 INJECTION INTRAMUSCULAR; INTRAVENOUS at 15:30

## 2022-02-14 RX ADMIN — POTASSIUM CHLORIDE, DEXTROSE MONOHYDRATE AND SODIUM CHLORIDE: 150; 5; 450 INJECTION, SOLUTION INTRAVENOUS at 23:35

## 2022-02-14 RX ADMIN — HYDRALAZINE HYDROCHLORIDE 10 MG: 20 INJECTION INTRAMUSCULAR; INTRAVENOUS at 23:39

## 2022-02-14 RX ADMIN — HYDROCHLOROTHIAZIDE 25 MG: 25 TABLET ORAL at 20:02

## 2022-02-14 RX ADMIN — ACETAMINOPHEN 650 MG: 325 TABLET ORAL at 20:53

## 2022-02-14 RX ADMIN — FENTANYL CITRATE 50 MCG: 50 INJECTION, SOLUTION INTRAMUSCULAR; INTRAVENOUS at 15:30

## 2022-02-14 RX ADMIN — DROPERIDOL 0.62 MG: 2.5 INJECTION, SOLUTION INTRAMUSCULAR; INTRAVENOUS at 17:25

## 2022-02-14 RX ADMIN — LISINOPRIL 40 MG: 10 TABLET ORAL at 20:02

## 2022-02-14 RX ADMIN — MORPHINE SULFATE 4 MG: 4 INJECTION, SOLUTION INTRAMUSCULAR; INTRAVENOUS at 18:54

## 2022-02-14 ASSESSMENT — ENCOUNTER SYMPTOMS
DIARRHEA: 0
PHOTOPHOBIA: 0
CONSTIPATION: 0
ABDOMINAL PAIN: 1
WHEEZING: 0
EYE REDNESS: 0
SINUS PAIN: 0
COUGH: 0
NAUSEA: 1
VOMITING: 0
BACK PAIN: 0
SHORTNESS OF BREATH: 0

## 2022-02-14 ASSESSMENT — PAIN SCALES - GENERAL
PAINLEVEL_OUTOF10: 10

## 2022-02-14 NOTE — ED PROVIDER NOTES
Handoff taken on the following patient from prior Attending Physician:    Pt Name: Fernanda Mack    PCP:  Lamine Deleon NP-C, APRN - NP         Attestation    I was available and discussed any additional care issues that arose and coordinated the management plans with the resident(s) caring for the patient during my duty period. Any areas of disagreement with residents documentation of care or procedures are noted on the chart. I was personally present for the key portions of any/all procedures during my duty period. I have documented in the chart those procedures where I was not present during the key portions.     Patient is a 50-year-old female with abdominal pain scheduled for cholecystectomy having pain issues at this time repeat gallbladder ultrasound at this time awaiting results      Rosalee Huffman DO  02/14/22 [id number]

## 2022-02-14 NOTE — ED PROVIDER NOTES
[facility]     Emergency Department     Faculty Note/ Attestation      Pt Name: Justen Quintero                                       MRN: [id number]  [place] 1970  Date of evaluation: 2/14/2022  Patients PCP:    Ada Church NPDennyC, YEYO - NP    Attestation  I performed a history and physical examination of the patient/ or directly observed  and discussed management with the resident. I reviewed the residents note and agree with the documented findings and plan of care. Any areas of disagreement are noted on the chart. I was personally present for the key portions of any procedures. I have documented in the chart those procedures where I was not present during the key portions. I have reviewed the emergency nurses triage note. I agree with the chief complaint, past medical history, past surgical history, allergies, medications, social and family history as documented unless otherwise noted below. For Physician Assistant/ Nurse Practitioner cases/documentation I have personally evaluated this patient and have completed at least one if not all key elements of the E/M (history, physical exam, and MDM). Additional findings are as noted. This patient was evaluated in the Emergency Department for symptoms described in the history of present illness. The patient was evaluated in the context of the global COVID-19 pandemic, which necessitated consideration that the patient might be at risk for infection with the SARS-CoV-2 virus that causes COVID-19. Institutional protocols and algorithms that pertain to the evaluation of patients at risk for COVID-19 are in a state of rapid change based on information released by regulatory bodies including the CDC and federal and state organizations. These policies and algorithms were followed during the patient's care in the ED.      Initial Screens:        [place] Coma Scale  Eye Opening: Spontaneous  Best Verbal Response: Oriented  Best Motor Response: Obeys commands  [place] Coma Scale Score: 15    Vitals:    Vitals:    02/14/22 1452 02/14/22 1453   BP: (!) 230/119    Pulse:  82   Resp: 16    Temp: 98.2 °F (36.8 °C)    SpO2: 95%    Weight: 155 lb (70.3 kg)    Height: 5' 2\" (1.575 m)        Chief Complaint      Chief Complaint   Patient presents with    Abdominal Pain     The patient C/O RUQ pain radiating through her back and states she is scheduled for choleycystectomy later this week. height is 5' 2\" (1.575 m) and weight is 155 lb (70.3 kg). Her temperature is 98.2 °F (36.8 °C). Her blood pressure is 230/119 (abnormal) and her pulse is 82. Her respiration is 16 and oxygen saturation is 95%.             DIAGNOSTIC RESULTS       RADIOLOGY:   US GALLBLADDER RUQ    (Results Pending)         LABS:  Labs Reviewed   CBC WITH AUTO DIFFERENTIAL - Abnormal; Notable for the following components:       Result Value    Immature Granulocytes 1 (*)     All other components within normal limits   COMPREHENSIVE METABOLIC PANEL W/ REFLEX TO MG FOR LOW K   LIPASE   TROPONIN   TROPONIN   URINE RT REFLEX TO CULTURE         EMERGENCY DEPARTMENT COURSE:     -------------------------      BP: (!) 230/119, Temp: 98.2 °F (36.8 °C), Pulse: 82, Resp: 16    System Problem List     Patient Active Problem List   Diagnosis    Hypertension    Substance abuse (HCC)    Chronic back pain    Chronic idiopathic constipation    Eczema    Mixed hyperlipidemia    Personal history of tobacco use    Major depressive disorder with single episode, in full remission (HCC)    Acute pain of left knee    Nicotine dependence, cigarettes, uncomplicated    Incisional hernia, without obstruction or gangrene    Vitamin D deficiency    Chronic fatigue    Cocaine dependence (HCC)    Hyperopia with presbyopia, bilateral    Nuclear sclerotic cataract of both eyes    SI joint arthritis    Prediabetes    Elevated liver enzymes    Rib pain on right side    Abnormal liver scan (Lymph nodes)    Dyspepsia    Abdominal pain    Hernia, paraesophageal    Symptomatic cholelithiasis    Hypertensive urgency    Constipation    Candidal intertrigo    GERD (gastroesophageal reflux disease)    Cholecystitis, chronic    Preop examination         Comments  Chronic Prob List noted          Bishop Larry MD,, MD, F.A.C.E.P.   Attending Emergency Physician         Bishop Larry MD  02/14/22 [id number]
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
## 2022-02-14 NOTE — ED NOTES
Bed: 09  Expected date:   Expected time:   Means of arrival:   Comments:  [facility] [address], RN  02/14/22 7544

## 2022-02-14 NOTE — Clinical Note
Patient Class: Inpatient [101]   REQUIRED: Diagnosis: Acute cholecystitis [575. 0. ICD-9-CM]   Estimated Length of Stay: Estimated stay of more than 2 midnights   Admitting Provider: Brandyn Simpson [[id number]]   Telemetry/Cardiac Monitoring Required?: Yes

## 2022-02-15 ENCOUNTER — ANESTHESIA EVENT (OUTPATIENT)
Dept: OPERATING ROOM | Age: 52
End: 2022-02-15
Payer: COMMERCIAL

## 2022-02-15 ENCOUNTER — ANESTHESIA (OUTPATIENT)
Dept: OPERATING ROOM | Age: 52
End: 2022-02-15
Payer: COMMERCIAL

## 2022-02-15 VITALS — DIASTOLIC BLOOD PRESSURE: 74 MMHG | OXYGEN SATURATION: 100 % | SYSTOLIC BLOOD PRESSURE: 109 MMHG | TEMPERATURE: 98.5 F

## 2022-02-15 PROBLEM — J45.20 MILD INTERMITTENT ASTHMA WITHOUT COMPLICATION: Status: ACTIVE | Noted: 2022-02-15

## 2022-02-15 PROBLEM — Z72.0 TOBACCO ABUSE: Status: ACTIVE | Noted: 2019-11-13

## 2022-02-15 PROBLEM — J30.9 ALLERGIC RHINITIS: Status: ACTIVE | Noted: 2022-02-15

## 2022-02-15 PROBLEM — R10.11 RIGHT UPPER QUADRANT ABDOMINAL PAIN: Status: ACTIVE | Noted: 2022-02-15

## 2022-02-15 LAB
ALBUMIN SERPL-MCNC: 4.5 G/DL (ref 3.5–5.2)
ALBUMIN/GLOBULIN RATIO: 1.3 (ref 1–2.5)
ALP BLD-CCNC: 128 U/L (ref 35–104)
ALT SERPL-CCNC: 32 U/L (ref 5–33)
AMYLASE: 66 U/L (ref 28–100)
ANION GAP SERPL CALCULATED.3IONS-SCNC: 15 MMOL/L (ref 9–17)
AST SERPL-CCNC: 36 U/L
BILIRUB SERPL-MCNC: 0.27 MG/DL (ref 0.3–1.2)
BUN BLDV-MCNC: 6 MG/DL (ref 6–20)
BUN/CREAT BLD: ABNORMAL (ref 9–20)
CALCIUM SERPL-MCNC: 9.3 MG/DL (ref 8.6–10.4)
CHLORIDE BLD-SCNC: 98 MMOL/L (ref 98–107)
CO2: 25 MMOL/L (ref 20–31)
CREAT SERPL-MCNC: 0.68 MG/DL (ref 0.5–0.9)
EKG ATRIAL RATE: 74 BPM
EKG P AXIS: 50 DEGREES
EKG P-R INTERVAL: 162 MS
EKG Q-T INTERVAL: 404 MS
EKG QRS DURATION: 78 MS
EKG QTC CALCULATION (BAZETT): 448 MS
EKG R AXIS: 13 DEGREES
EKG T AXIS: 20 DEGREES
EKG VENTRICULAR RATE: 74 BPM
ESTIMATED AVERAGE GLUCOSE: 126 MG/DL
GFR AFRICAN AMERICAN: >60 ML/MIN
GFR NON-AFRICAN AMERICAN: >60 ML/MIN
GFR SERPL CREATININE-BSD FRML MDRD: ABNORMAL ML/MIN/{1.73_M2}
GFR SERPL CREATININE-BSD FRML MDRD: ABNORMAL ML/MIN/{1.73_M2}
GLUCOSE BLD-MCNC: 140 MG/DL (ref 70–99)
HBA1C MFR BLD: 6 % (ref 4–6)
HCT VFR BLD CALC: 44.2 % (ref 36.3–47.1)
HEMOGLOBIN: 14.8 G/DL (ref 11.9–15.1)
LIPASE: 37 U/L (ref 13–60)
MAGNESIUM: 1.6 MG/DL (ref 1.6–2.6)
MCH RBC QN AUTO: 29.7 PG (ref 25.2–33.5)
MCHC RBC AUTO-ENTMCNC: 33.5 G/DL (ref 28.4–34.8)
MCV RBC AUTO: 88.6 FL (ref 82.6–102.9)
NRBC AUTOMATED: 0 PER 100 WBC
PDW BLD-RTO: 13.4 % (ref 11.8–14.4)
PHOSPHORUS: 4.1 MG/DL (ref 2.6–4.5)
PLATELET # BLD: 285 K/UL (ref 138–453)
PMV BLD AUTO: 10.6 FL (ref 8.1–13.5)
POTASSIUM SERPL-SCNC: 3.8 MMOL/L (ref 3.7–5.3)
RBC # BLD: 4.99 M/UL (ref 3.95–5.11)
SARS-COV-2, RAPID: NOT DETECTED
SODIUM BLD-SCNC: 138 MMOL/L (ref 135–144)
SPECIMEN DESCRIPTION: NORMAL
TOTAL PROTEIN: 7.9 G/DL (ref 6.4–8.3)
WBC # BLD: 9.5 K/UL (ref 3.5–11.3)

## 2022-02-15 PROCEDURE — 93005 ELECTROCARDIOGRAM TRACING: CPT | Performed by: INTERNAL MEDICINE

## 2022-02-15 PROCEDURE — 2500000003 HC RX 250 WO HCPCS: Performed by: SURGERY

## 2022-02-15 PROCEDURE — 2500000003 HC RX 250 WO HCPCS: Performed by: NURSE PRACTITIONER

## 2022-02-15 PROCEDURE — 6360000002 HC RX W HCPCS: Performed by: NURSE PRACTITIONER

## 2022-02-15 PROCEDURE — 96361 HYDRATE IV INFUSION ADD-ON: CPT

## 2022-02-15 PROCEDURE — 83690 ASSAY OF LIPASE: CPT

## 2022-02-15 PROCEDURE — 6370000000 HC RX 637 (ALT 250 FOR IP): Performed by: NURSE PRACTITIONER

## 2022-02-15 PROCEDURE — S2900 ROBOTIC SURGICAL SYSTEM: HCPCS | Performed by: SURGERY

## 2022-02-15 PROCEDURE — 2709999900 HC NON-CHARGEABLE SUPPLY: Performed by: SURGERY

## 2022-02-15 PROCEDURE — 93010 ELECTROCARDIOGRAM REPORT: CPT | Performed by: INTERNAL MEDICINE

## 2022-02-15 PROCEDURE — 99223 1ST HOSP IP/OBS HIGH 75: CPT | Performed by: SURGERY

## 2022-02-15 PROCEDURE — 3600000019 HC SURGERY ROBOT ADDTL 15MIN: Performed by: SURGERY

## 2022-02-15 PROCEDURE — 85027 COMPLETE CBC AUTOMATED: CPT

## 2022-02-15 PROCEDURE — 6360000002 HC RX W HCPCS: Performed by: NURSE ANESTHETIST, CERTIFIED REGISTERED

## 2022-02-15 PROCEDURE — 7100000000 HC PACU RECOVERY - FIRST 15 MIN: Performed by: SURGERY

## 2022-02-15 PROCEDURE — 36415 COLL VENOUS BLD VENIPUNCTURE: CPT

## 2022-02-15 PROCEDURE — 6370000000 HC RX 637 (ALT 250 FOR IP): Performed by: STUDENT IN AN ORGANIZED HEALTH CARE EDUCATION/TRAINING PROGRAM

## 2022-02-15 PROCEDURE — 2500000003 HC RX 250 WO HCPCS: Performed by: NURSE ANESTHETIST, CERTIFIED REGISTERED

## 2022-02-15 PROCEDURE — A4217 STERILE WATER/SALINE, 500 ML: HCPCS | Performed by: SURGERY

## 2022-02-15 PROCEDURE — 87635 SARS-COV-2 COVID-19 AMP PRB: CPT

## 2022-02-15 PROCEDURE — 76937 US GUIDE VASCULAR ACCESS: CPT

## 2022-02-15 PROCEDURE — 7100000001 HC PACU RECOVERY - ADDTL 15 MIN: Performed by: SURGERY

## 2022-02-15 PROCEDURE — 82150 ASSAY OF AMYLASE: CPT

## 2022-02-15 PROCEDURE — 2580000003 HC RX 258: Performed by: NURSE PRACTITIONER

## 2022-02-15 PROCEDURE — 83036 HEMOGLOBIN GLYCOSYLATED A1C: CPT

## 2022-02-15 PROCEDURE — 99232 SBSQ HOSP IP/OBS MODERATE 35: CPT | Performed by: INTERNAL MEDICINE

## 2022-02-15 PROCEDURE — 3700000001 HC ADD 15 MINUTES (ANESTHESIA): Performed by: SURGERY

## 2022-02-15 PROCEDURE — 6360000002 HC RX W HCPCS: Performed by: STUDENT IN AN ORGANIZED HEALTH CARE EDUCATION/TRAINING PROGRAM

## 2022-02-15 PROCEDURE — 2720000010 HC SURG SUPPLY STERILE: Performed by: SURGERY

## 2022-02-15 PROCEDURE — 80053 COMPREHEN METABOLIC PANEL: CPT

## 2022-02-15 PROCEDURE — 84100 ASSAY OF PHOSPHORUS: CPT

## 2022-02-15 PROCEDURE — 2500000003 HC RX 250 WO HCPCS: Performed by: STUDENT IN AN ORGANIZED HEALTH CARE EDUCATION/TRAINING PROGRAM

## 2022-02-15 PROCEDURE — 96375 TX/PRO/DX INJ NEW DRUG ADDON: CPT

## 2022-02-15 PROCEDURE — 2580000003 HC RX 258: Performed by: NURSE ANESTHETIST, CERTIFIED REGISTERED

## 2022-02-15 PROCEDURE — G0378 HOSPITAL OBSERVATION PER HR: HCPCS

## 2022-02-15 PROCEDURE — 83735 ASSAY OF MAGNESIUM: CPT

## 2022-02-15 PROCEDURE — 88304 TISSUE EXAM BY PATHOLOGIST: CPT

## 2022-02-15 PROCEDURE — 3600000009 HC SURGERY ROBOT BASE: Performed by: SURGERY

## 2022-02-15 PROCEDURE — 96376 TX/PRO/DX INJ SAME DRUG ADON: CPT

## 2022-02-15 PROCEDURE — 47562 LAPAROSCOPIC CHOLECYSTECTOMY: CPT | Performed by: SURGERY

## 2022-02-15 PROCEDURE — 3700000000 HC ANESTHESIA ATTENDED CARE: Performed by: SURGERY

## 2022-02-15 PROCEDURE — 2580000003 HC RX 258: Performed by: SURGERY

## 2022-02-15 RX ORDER — ONDANSETRON 2 MG/ML
4 INJECTION INTRAMUSCULAR; INTRAVENOUS
Status: DISCONTINUED | OUTPATIENT
Start: 2022-02-15 | End: 2022-02-15

## 2022-02-15 RX ORDER — ONDANSETRON 2 MG/ML
INJECTION INTRAMUSCULAR; INTRAVENOUS PRN
Status: DISCONTINUED | OUTPATIENT
Start: 2022-02-15 | End: 2022-02-15 | Stop reason: SDUPTHER

## 2022-02-15 RX ORDER — OXYCODONE HYDROCHLORIDE AND ACETAMINOPHEN 5; 325 MG/1; MG/1
1 TABLET ORAL PRN
Status: DISCONTINUED | OUTPATIENT
Start: 2022-02-15 | End: 2022-02-15

## 2022-02-15 RX ORDER — ROCURONIUM BROMIDE 10 MG/ML
INJECTION, SOLUTION INTRAVENOUS PRN
Status: DISCONTINUED | OUTPATIENT
Start: 2022-02-15 | End: 2022-02-15 | Stop reason: SDUPTHER

## 2022-02-15 RX ORDER — INDOCYANINE GREEN AND WATER 25 MG
5 KIT INJECTION ONCE
Status: COMPLETED | OUTPATIENT
Start: 2022-02-15 | End: 2022-02-15

## 2022-02-15 RX ORDER — MORPHINE SULFATE 2 MG/ML
2 INJECTION, SOLUTION INTRAMUSCULAR; INTRAVENOUS EVERY 5 MIN PRN
Status: DISCONTINUED | OUTPATIENT
Start: 2022-02-15 | End: 2022-02-15

## 2022-02-15 RX ORDER — SODIUM CHLORIDE, SODIUM LACTATE, POTASSIUM CHLORIDE, CALCIUM CHLORIDE 600; 310; 30; 20 MG/100ML; MG/100ML; MG/100ML; MG/100ML
INJECTION, SOLUTION INTRAVENOUS CONTINUOUS PRN
Status: DISCONTINUED | OUTPATIENT
Start: 2022-02-15 | End: 2022-02-15 | Stop reason: SDUPTHER

## 2022-02-15 RX ORDER — LABETALOL HYDROCHLORIDE 5 MG/ML
5 INJECTION, SOLUTION INTRAVENOUS EVERY 10 MIN PRN
Status: DISCONTINUED | OUTPATIENT
Start: 2022-02-15 | End: 2022-02-15

## 2022-02-15 RX ORDER — MORPHINE SULFATE 10 MG/ML
INJECTION, SOLUTION INTRAMUSCULAR; INTRAVENOUS PRN
Status: DISCONTINUED | OUTPATIENT
Start: 2022-02-15 | End: 2022-02-15 | Stop reason: SDUPTHER

## 2022-02-15 RX ORDER — MAGNESIUM HYDROXIDE 1200 MG/15ML
LIQUID ORAL CONTINUOUS PRN
Status: COMPLETED | OUTPATIENT
Start: 2022-02-15 | End: 2022-02-15

## 2022-02-15 RX ORDER — BUPIVACAINE HYDROCHLORIDE 2.5 MG/ML
INJECTION, SOLUTION INFILTRATION; PERINEURAL PRN
Status: DISCONTINUED | OUTPATIENT
Start: 2022-02-15 | End: 2022-02-15 | Stop reason: ALTCHOICE

## 2022-02-15 RX ORDER — GABAPENTIN 300 MG/1
300 CAPSULE ORAL 3 TIMES DAILY
Status: DISCONTINUED | OUTPATIENT
Start: 2022-02-15 | End: 2022-02-17 | Stop reason: HOSPADM

## 2022-02-15 RX ORDER — PHENYLEPHRINE HCL IN 0.9% NACL 1 MG/10 ML
SYRINGE (ML) INTRAVENOUS PRN
Status: DISCONTINUED | OUTPATIENT
Start: 2022-02-15 | End: 2022-02-15 | Stop reason: SDUPTHER

## 2022-02-15 RX ORDER — DEXAMETHASONE SODIUM PHOSPHATE 10 MG/ML
INJECTION INTRAMUSCULAR; INTRAVENOUS PRN
Status: DISCONTINUED | OUTPATIENT
Start: 2022-02-15 | End: 2022-02-15 | Stop reason: SDUPTHER

## 2022-02-15 RX ORDER — LIDOCAINE HYDROCHLORIDE 10 MG/ML
INJECTION, SOLUTION EPIDURAL; INFILTRATION; INTRACAUDAL; PERINEURAL PRN
Status: DISCONTINUED | OUTPATIENT
Start: 2022-02-15 | End: 2022-02-15 | Stop reason: SDUPTHER

## 2022-02-15 RX ORDER — MIDAZOLAM HYDROCHLORIDE 1 MG/ML
INJECTION INTRAMUSCULAR; INTRAVENOUS PRN
Status: DISCONTINUED | OUTPATIENT
Start: 2022-02-15 | End: 2022-02-15 | Stop reason: SDUPTHER

## 2022-02-15 RX ORDER — METHOCARBAMOL 750 MG/1
750 TABLET, FILM COATED ORAL 4 TIMES DAILY
Status: DISCONTINUED | OUTPATIENT
Start: 2022-02-15 | End: 2022-02-17 | Stop reason: HOSPADM

## 2022-02-15 RX ORDER — DIPHENHYDRAMINE HYDROCHLORIDE 50 MG/ML
12.5 INJECTION INTRAMUSCULAR; INTRAVENOUS
Status: DISCONTINUED | OUTPATIENT
Start: 2022-02-15 | End: 2022-02-15

## 2022-02-15 RX ORDER — CEFAZOLIN SODIUM 1 G/3ML
INJECTION, POWDER, FOR SOLUTION INTRAMUSCULAR; INTRAVENOUS PRN
Status: DISCONTINUED | OUTPATIENT
Start: 2022-02-15 | End: 2022-02-15 | Stop reason: SDUPTHER

## 2022-02-15 RX ORDER — INDOCYANINE GREEN AND WATER 25 MG
KIT INJECTION PRN
Status: DISCONTINUED | OUTPATIENT
Start: 2022-02-15 | End: 2022-02-15 | Stop reason: SDUPTHER

## 2022-02-15 RX ORDER — FENTANYL CITRATE 50 UG/ML
INJECTION, SOLUTION INTRAMUSCULAR; INTRAVENOUS PRN
Status: DISCONTINUED | OUTPATIENT
Start: 2022-02-15 | End: 2022-02-15 | Stop reason: SDUPTHER

## 2022-02-15 RX ORDER — PROPOFOL 10 MG/ML
INJECTION, EMULSION INTRAVENOUS PRN
Status: DISCONTINUED | OUTPATIENT
Start: 2022-02-15 | End: 2022-02-15 | Stop reason: SDUPTHER

## 2022-02-15 RX ORDER — ESMOLOL HYDROCHLORIDE 10 MG/ML
INJECTION INTRAVENOUS PRN
Status: DISCONTINUED | OUTPATIENT
Start: 2022-02-15 | End: 2022-02-15 | Stop reason: SDUPTHER

## 2022-02-15 RX ORDER — OXYCODONE HYDROCHLORIDE AND ACETAMINOPHEN 5; 325 MG/1; MG/1
2 TABLET ORAL PRN
Status: DISCONTINUED | OUTPATIENT
Start: 2022-02-15 | End: 2022-02-15

## 2022-02-15 RX ORDER — FENTANYL CITRATE 50 UG/ML
25 INJECTION, SOLUTION INTRAMUSCULAR; INTRAVENOUS EVERY 5 MIN PRN
Status: DISCONTINUED | OUTPATIENT
Start: 2022-02-15 | End: 2022-02-15

## 2022-02-15 RX ADMIN — Medication 50 MCG: at 16:07

## 2022-02-15 RX ADMIN — HYDROMORPHONE HYDROCHLORIDE 1 MG: 1 INJECTION, SOLUTION INTRAMUSCULAR; INTRAVENOUS; SUBCUTANEOUS at 09:00

## 2022-02-15 RX ADMIN — DEXAMETHASONE SODIUM PHOSPHATE 10 MG: 10 INJECTION INTRAMUSCULAR; INTRAVENOUS at 15:56

## 2022-02-15 RX ADMIN — SODIUM CHLORIDE, PRESERVATIVE FREE 10 ML: 5 INJECTION INTRAVENOUS at 00:26

## 2022-02-15 RX ADMIN — GABAPENTIN 300 MG: 300 CAPSULE ORAL at 21:05

## 2022-02-15 RX ADMIN — MORPHINE SULFATE 5 MG: 10 INJECTION, SOLUTION INTRAMUSCULAR; INTRAVENOUS at 16:26

## 2022-02-15 RX ADMIN — ROCURONIUM BROMIDE 20 MG: 10 INJECTION INTRAVENOUS at 16:47

## 2022-02-15 RX ADMIN — FENTANYL CITRATE 100 MCG: 50 INJECTION, SOLUTION INTRAMUSCULAR; INTRAVENOUS at 15:45

## 2022-02-15 RX ADMIN — MIDAZOLAM HYDROCHLORIDE 2 MG: 1 INJECTION, SOLUTION INTRAMUSCULAR; INTRAVENOUS at 15:34

## 2022-02-15 RX ADMIN — INDOCYANINE GREEN AND WATER 7.5 MG: KIT at 16:45

## 2022-02-15 RX ADMIN — POTASSIUM CHLORIDE, DEXTROSE MONOHYDRATE AND SODIUM CHLORIDE: 150; 5; 450 INJECTION, SOLUTION INTRAVENOUS at 11:25

## 2022-02-15 RX ADMIN — CEFAZOLIN 2000 MG: 330 INJECTION, POWDER, FOR SOLUTION INTRAMUSCULAR; INTRAVENOUS at 16:00

## 2022-02-15 RX ADMIN — ESMOLOL HYDROCHLORIDE 20 MG: 10 INJECTION INTRAVENOUS at 15:51

## 2022-02-15 RX ADMIN — SUCRALFATE 1 G: 1 TABLET ORAL at 23:06

## 2022-02-15 RX ADMIN — ATORVASTATIN CALCIUM 20 MG: 20 TABLET, FILM COATED ORAL at 08:33

## 2022-02-15 RX ADMIN — PROPOFOL 200 MG: 10 INJECTION, EMULSION INTRAVENOUS at 15:40

## 2022-02-15 RX ADMIN — ROCURONIUM BROMIDE 10 MG: 10 INJECTION INTRAVENOUS at 17:17

## 2022-02-15 RX ADMIN — SODIUM CHLORIDE, POTASSIUM CHLORIDE, SODIUM LACTATE AND CALCIUM CHLORIDE: 600; 310; 30; 20 INJECTION, SOLUTION INTRAVENOUS at 16:35

## 2022-02-15 RX ADMIN — DICYCLOMINE HYDROCHLORIDE 10 MG: 10 CAPSULE ORAL at 08:33

## 2022-02-15 RX ADMIN — HYDROMORPHONE HYDROCHLORIDE 1 MG: 1 INJECTION, SOLUTION INTRAMUSCULAR; INTRAVENOUS; SUBCUTANEOUS at 21:06

## 2022-02-15 RX ADMIN — FENTANYL CITRATE 100 MCG: 50 INJECTION, SOLUTION INTRAMUSCULAR; INTRAVENOUS at 16:14

## 2022-02-15 RX ADMIN — SODIUM CHLORIDE, POTASSIUM CHLORIDE, SODIUM LACTATE AND CALCIUM CHLORIDE: 600; 310; 30; 20 INJECTION, SOLUTION INTRAVENOUS at 17:40

## 2022-02-15 RX ADMIN — ROCURONIUM BROMIDE 40 MG: 10 INJECTION INTRAVENOUS at 15:40

## 2022-02-15 RX ADMIN — POTASSIUM CHLORIDE, DEXTROSE MONOHYDRATE AND SODIUM CHLORIDE: 150; 5; 450 INJECTION, SOLUTION INTRAVENOUS at 21:15

## 2022-02-15 RX ADMIN — ROCURONIUM BROMIDE 20 MG: 10 INJECTION INTRAVENOUS at 16:27

## 2022-02-15 RX ADMIN — LIDOCAINE HYDROCHLORIDE 50 MG: 10 INJECTION, SOLUTION EPIDURAL; INFILTRATION; INTRACAUDAL; PERINEURAL at 15:40

## 2022-02-15 RX ADMIN — HYDROMORPHONE HYDROCHLORIDE 1 MG: 1 INJECTION, SOLUTION INTRAMUSCULAR; INTRAVENOUS; SUBCUTANEOUS at 05:51

## 2022-02-15 RX ADMIN — OLANZAPINE 15 MG: 15 TABLET, FILM COATED ORAL at 21:05

## 2022-02-15 RX ADMIN — MAGNESIUM GLUCONATE 500 MG ORAL TABLET 200 MG: 500 TABLET ORAL at 08:33

## 2022-02-15 RX ADMIN — LISINOPRIL 40 MG: 20 TABLET ORAL at 12:17

## 2022-02-15 RX ADMIN — HYDROCHLOROTHIAZIDE 25 MG: 25 TABLET ORAL at 08:33

## 2022-02-15 RX ADMIN — ESMOLOL HYDROCHLORIDE 20 MG: 10 INJECTION INTRAVENOUS at 16:29

## 2022-02-15 RX ADMIN — PANTOPRAZOLE SODIUM 40 MG: 40 TABLET, DELAYED RELEASE ORAL at 08:32

## 2022-02-15 RX ADMIN — HYDROMORPHONE HYDROCHLORIDE 1 MG: 1 INJECTION, SOLUTION INTRAMUSCULAR; INTRAVENOUS; SUBCUTANEOUS at 02:41

## 2022-02-15 RX ADMIN — SODIUM CHLORIDE, POTASSIUM CHLORIDE, SODIUM LACTATE AND CALCIUM CHLORIDE: 600; 310; 30; 20 INJECTION, SOLUTION INTRAVENOUS at 15:33

## 2022-02-15 RX ADMIN — SUGAMMADEX 141 MG: 100 INJECTION, SOLUTION INTRAVENOUS at 18:00

## 2022-02-15 RX ADMIN — ATENOLOL 50 MG: 50 TABLET ORAL at 21:05

## 2022-02-15 RX ADMIN — INDOCYANINE GREEN AND WATER 5 MG: KIT at 14:27

## 2022-02-15 RX ADMIN — ONDANSETRON 4 MG: 2 INJECTION INTRAMUSCULAR; INTRAVENOUS at 18:00

## 2022-02-15 RX ADMIN — METHOCARBAMOL TABLETS 750 MG: 750 TABLET, COATED ORAL at 21:05

## 2022-02-15 ASSESSMENT — PULMONARY FUNCTION TESTS
PIF_VALUE: 18
PIF_VALUE: 20
PIF_VALUE: 23
PIF_VALUE: 19
PIF_VALUE: 26
PIF_VALUE: 26
PIF_VALUE: 25
PIF_VALUE: 18
PIF_VALUE: 26
PIF_VALUE: 25
PIF_VALUE: 27
PIF_VALUE: 25
PIF_VALUE: 24
PIF_VALUE: 24
PIF_VALUE: 28
PIF_VALUE: 21
PIF_VALUE: 24
PIF_VALUE: 28
PIF_VALUE: 26
PIF_VALUE: 28
PIF_VALUE: 27
PIF_VALUE: 29
PIF_VALUE: 18
PIF_VALUE: 25
PIF_VALUE: 18
PIF_VALUE: 27
PIF_VALUE: 17
PIF_VALUE: 26
PIF_VALUE: 24
PIF_VALUE: 25
PIF_VALUE: 20
PIF_VALUE: 23
PIF_VALUE: 25
PIF_VALUE: 25
PIF_VALUE: 28
PIF_VALUE: 25
PIF_VALUE: 26
PIF_VALUE: 28
PIF_VALUE: 26
PIF_VALUE: 20
PIF_VALUE: 25
PIF_VALUE: 28
PIF_VALUE: 15
PIF_VALUE: 3
PIF_VALUE: 25
PIF_VALUE: 17
PIF_VALUE: 14
PIF_VALUE: 0
PIF_VALUE: 26
PIF_VALUE: 28
PIF_VALUE: 28
PIF_VALUE: 25
PIF_VALUE: 26
PIF_VALUE: 25
PIF_VALUE: 0
PIF_VALUE: 23
PIF_VALUE: 25
PIF_VALUE: 25
PIF_VALUE: 20
PIF_VALUE: 18
PIF_VALUE: 25
PIF_VALUE: 26
PIF_VALUE: 5
PIF_VALUE: 25
PIF_VALUE: 21
PIF_VALUE: 14
PIF_VALUE: 25
PIF_VALUE: 18
PIF_VALUE: 29
PIF_VALUE: 24
PIF_VALUE: 26
PIF_VALUE: 25
PIF_VALUE: 26
PIF_VALUE: 24
PIF_VALUE: 14
PIF_VALUE: 0
PIF_VALUE: 1
PIF_VALUE: 23
PIF_VALUE: 2
PIF_VALUE: 25
PIF_VALUE: 26
PIF_VALUE: 29
PIF_VALUE: 28
PIF_VALUE: 27
PIF_VALUE: 27
PIF_VALUE: 23
PIF_VALUE: 28
PIF_VALUE: 20
PIF_VALUE: 25
PIF_VALUE: 22
PIF_VALUE: 25
PIF_VALUE: 26
PIF_VALUE: 26
PIF_VALUE: 24
PIF_VALUE: 28
PIF_VALUE: 15
PIF_VALUE: 27
PIF_VALUE: 25
PIF_VALUE: 19
PIF_VALUE: 25
PIF_VALUE: 25
PIF_VALUE: 2
PIF_VALUE: 25
PIF_VALUE: 16
PIF_VALUE: 18
PIF_VALUE: 26
PIF_VALUE: 14
PIF_VALUE: 24
PIF_VALUE: 27
PIF_VALUE: 28
PIF_VALUE: 26
PIF_VALUE: 14
PIF_VALUE: 28
PIF_VALUE: 15
PIF_VALUE: 24
PIF_VALUE: 28
PIF_VALUE: 26
PIF_VALUE: 26
PIF_VALUE: 19
PIF_VALUE: 25
PIF_VALUE: 20
PIF_VALUE: 12
PIF_VALUE: 20
PIF_VALUE: 25
PIF_VALUE: 25
PIF_VALUE: 29
PIF_VALUE: 26
PIF_VALUE: 21
PIF_VALUE: 26
PIF_VALUE: 18
PIF_VALUE: 0
PIF_VALUE: 1
PIF_VALUE: 24
PIF_VALUE: 26
PIF_VALUE: 3
PIF_VALUE: 26
PIF_VALUE: 28
PIF_VALUE: 18
PIF_VALUE: 25
PIF_VALUE: 3
PIF_VALUE: 25
PIF_VALUE: 1
PIF_VALUE: 25
PIF_VALUE: 27
PIF_VALUE: 22
PIF_VALUE: 25
PIF_VALUE: 25
PIF_VALUE: 26
PIF_VALUE: 20
PIF_VALUE: 6
PIF_VALUE: 26

## 2022-02-15 ASSESSMENT — PAIN SCALES - GENERAL
PAINLEVEL_OUTOF10: 10

## 2022-02-15 ASSESSMENT — PAIN DESCRIPTION - DESCRIPTORS: DESCRIPTORS: SHARP;SHOOTING

## 2022-02-15 ASSESSMENT — LIFESTYLE VARIABLES: SMOKING_STATUS: 1

## 2022-02-15 ASSESSMENT — PAIN - FUNCTIONAL ASSESSMENT: PAIN_FUNCTIONAL_ASSESSMENT: 0-10

## 2022-02-15 NOTE — PROGRESS NOTES
General Surgery:  Daily Progress Note                PATIENT NAME: Wicho Lopez     TODAY'S DATE: 2/15/2022, 6:35 AM  CC: Abdominal pain    SUBJECTIVE:     Pt seen and examined at bedside. No overnight events. Patient reports continued right upper quadrant abdominal pain. Voiding without difficulty. Ambulating to the restroom. Took a shower this morning. N.p.o. since midnight, IVF. VSS, afebrile      OBJECTIVE:   VITALS:  BP (!) 140/92   Pulse 67   Temp 97.2 °F (36.2 °C) (Oral)   Resp 18   Ht 5' 2\" (1.575 m)   Wt 155 lb (70.3 kg)   SpO2 98%   BMI 28.35 kg/m²      INTAKE/OUTPUT:    No intake or output data in the 24 hours ending 02/15/22 0635    PHYSICAL EXAM:  General Appearance: awake, alert, oriented, in no acute distress  HEENT:  Normocephalic, atraumatic, mucus membranes moist   Heart: Heart regular rate and rhythm  Lungs: Unlabored breathing on RA  Abdomen: Soft, nondistended, TTP RUQ. Scars consistent with surgical history  Extremities: No cyanosis, pitting edema, rashes noted. Skin: Skin color, texture, turgor normal. No rashes or lesions.     Data:  CBC with Differential:    Lab Results   Component Value Date    WBC 9.5 02/15/2022    RBC 4.99 02/15/2022    HGB 14.8 02/15/2022    HCT 44.2 02/15/2022     02/15/2022    MCV 88.6 02/15/2022    MCH 29.7 02/15/2022    MCHC 33.5 02/15/2022    RDW 13.4 02/15/2022    LYMPHOPCT 26 02/14/2022    MONOPCT 7 02/14/2022    BASOPCT 0 02/14/2022    MONOSABS 0.46 02/14/2022    LYMPHSABS 1.69 02/14/2022    EOSABS 0.14 02/14/2022    BASOSABS <0.03 02/14/2022    DIFFTYPE NOT REPORTED 02/14/2022     BMP:    Lab Results   Component Value Date     02/14/2022    K 4.2 02/14/2022     02/14/2022    CO2 20 02/14/2022    BUN 8 02/14/2022    LABALBU 4.3 02/14/2022    LABALBU 4.1 04/23/2012    CREATININE 0.65 02/14/2022    CALCIUM 9.8 02/14/2022    GFRAA >60 02/14/2022    LABGLOM >60 02/14/2022    GLUCOSE 118 02/14/2022    GLUCOSE 95 04/23/2012 Radiology Review:    US GALLBLADDER RUQ    Result Date: 2/14/2022  Cholelithiasis. Large stone in the neck of the gallbladder. No ultrasound findings to suggest cholecystitis. ASSESSMENT:  Active Hospital Problems    Diagnosis Date Noted    GERD (gastroesophageal reflux disease) [K21.9] 12/18/2021    Symptomatic cholelithiasis [K80.20] 12/18/2021    Hypertension [I10]        45 y.o. female with symptomatic cholelithiasis    Plan:  1. Diet: N.p.o.  2. Possible OR today for laparoscopic cholecystectomy, timing TBD  3. Analgesia: Per primary  4. GI prophylaxis: Protonix  5. DVT prophylaxis: Lovenox  6. Activity:  Continue to encourage ambulation/activity w/ PT/OT  7. Follow-up a.m. labs  8. DC planning: Determine surgical timing.  Continue medical management per primary      Electronically signed by Viky Vicente DO  on 2/15/2022 at 6:35 AM
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
## 2022-02-15 NOTE — PROGRESS NOTES
Pt requesting that left forearm IV be removed as it is sore. IV is flushing and dripping good with no swelling but pt still requesting that it be removed. IV removed and new IV being attempted.
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
## 2022-02-16 LAB
ABSOLUTE EOS #: <0.03 K/UL (ref 0–0.44)
ABSOLUTE IMMATURE GRANULOCYTE: 0.05 K/UL (ref 0–0.3)
ABSOLUTE LYMPH #: 0.82 K/UL (ref 1.1–3.7)
ABSOLUTE MONO #: 1.04 K/UL (ref 0.1–1.2)
ALBUMIN SERPL-MCNC: 3.8 G/DL (ref 3.5–5.2)
ALBUMIN/GLOBULIN RATIO: 1.2 (ref 1–2.5)
ALP BLD-CCNC: 94 U/L (ref 35–104)
ALT SERPL-CCNC: 54 U/L (ref 5–33)
ANION GAP SERPL CALCULATED.3IONS-SCNC: 14 MMOL/L (ref 9–17)
AST SERPL-CCNC: 112 U/L
BASOPHILS # BLD: 0 % (ref 0–2)
BASOPHILS ABSOLUTE: <0.03 K/UL (ref 0–0.2)
BILIRUB SERPL-MCNC: 0.39 MG/DL (ref 0.3–1.2)
BILIRUBIN DIRECT: 0.17 MG/DL
BILIRUBIN, INDIRECT: 0.22 MG/DL (ref 0–1)
BUN BLDV-MCNC: 9 MG/DL (ref 6–20)
BUN/CREAT BLD: ABNORMAL (ref 9–20)
CALCIUM SERPL-MCNC: 9 MG/DL (ref 8.6–10.4)
CHLORIDE BLD-SCNC: 102 MMOL/L (ref 98–107)
CO2: 25 MMOL/L (ref 20–31)
CREAT SERPL-MCNC: 0.86 MG/DL (ref 0.5–0.9)
DIFFERENTIAL TYPE: ABNORMAL
EKG ATRIAL RATE: 90 BPM
EKG P AXIS: 49 DEGREES
EKG P-R INTERVAL: 148 MS
EKG Q-T INTERVAL: 438 MS
EKG QRS DURATION: 84 MS
EKG QTC CALCULATION (BAZETT): 535 MS
EKG R AXIS: 34 DEGREES
EKG T AXIS: 56 DEGREES
EKG VENTRICULAR RATE: 90 BPM
EOSINOPHILS RELATIVE PERCENT: 0 % (ref 1–4)
GFR AFRICAN AMERICAN: >60 ML/MIN
GFR NON-AFRICAN AMERICAN: >60 ML/MIN
GFR SERPL CREATININE-BSD FRML MDRD: ABNORMAL ML/MIN/{1.73_M2}
GFR SERPL CREATININE-BSD FRML MDRD: ABNORMAL ML/MIN/{1.73_M2}
GLOBULIN: ABNORMAL G/DL (ref 1.5–3.8)
GLUCOSE BLD-MCNC: 150 MG/DL (ref 70–99)
HCT VFR BLD CALC: 39.3 % (ref 36.3–47.1)
HEMOGLOBIN: 12.8 G/DL (ref 11.9–15.1)
IMMATURE GRANULOCYTES: 0 %
LYMPHOCYTES # BLD: 6 % (ref 24–43)
MCH RBC QN AUTO: 29.9 PG (ref 25.2–33.5)
MCHC RBC AUTO-ENTMCNC: 32.6 G/DL (ref 28.4–34.8)
MCV RBC AUTO: 91.8 FL (ref 82.6–102.9)
MONOCYTES # BLD: 8 % (ref 3–12)
NRBC AUTOMATED: 0 PER 100 WBC
PDW BLD-RTO: 14 % (ref 11.8–14.4)
PLATELET # BLD: 231 K/UL (ref 138–453)
PLATELET ESTIMATE: ABNORMAL
PMV BLD AUTO: 11.1 FL (ref 8.1–13.5)
POTASSIUM SERPL-SCNC: 4.7 MMOL/L (ref 3.7–5.3)
RBC # BLD: 4.28 M/UL (ref 3.95–5.11)
RBC # BLD: ABNORMAL 10*6/UL
SEG NEUTROPHILS: 85 % (ref 36–65)
SEGMENTED NEUTROPHILS ABSOLUTE COUNT: 11.09 K/UL (ref 1.5–8.1)
SODIUM BLD-SCNC: 141 MMOL/L (ref 135–144)
TOTAL PROTEIN: 6.9 G/DL (ref 6.4–8.3)
WBC # BLD: 13 K/UL (ref 3.5–11.3)
WBC # BLD: ABNORMAL 10*3/UL

## 2022-02-16 PROCEDURE — 6370000000 HC RX 637 (ALT 250 FOR IP): Performed by: INTERNAL MEDICINE

## 2022-02-16 PROCEDURE — 99232 SBSQ HOSP IP/OBS MODERATE 35: CPT | Performed by: INTERNAL MEDICINE

## 2022-02-16 PROCEDURE — 6360000002 HC RX W HCPCS: Performed by: STUDENT IN AN ORGANIZED HEALTH CARE EDUCATION/TRAINING PROGRAM

## 2022-02-16 PROCEDURE — 6370000000 HC RX 637 (ALT 250 FOR IP): Performed by: STUDENT IN AN ORGANIZED HEALTH CARE EDUCATION/TRAINING PROGRAM

## 2022-02-16 PROCEDURE — 93010 ELECTROCARDIOGRAM REPORT: CPT | Performed by: INTERNAL MEDICINE

## 2022-02-16 PROCEDURE — 85025 COMPLETE CBC W/AUTO DIFF WBC: CPT

## 2022-02-16 PROCEDURE — 80076 HEPATIC FUNCTION PANEL: CPT

## 2022-02-16 PROCEDURE — 2580000003 HC RX 258: Performed by: STUDENT IN AN ORGANIZED HEALTH CARE EDUCATION/TRAINING PROGRAM

## 2022-02-16 PROCEDURE — 96372 THER/PROPH/DIAG INJ SC/IM: CPT

## 2022-02-16 PROCEDURE — 96361 HYDRATE IV INFUSION ADD-ON: CPT

## 2022-02-16 PROCEDURE — G0378 HOSPITAL OBSERVATION PER HR: HCPCS

## 2022-02-16 PROCEDURE — 96376 TX/PRO/DX INJ SAME DRUG ADON: CPT

## 2022-02-16 PROCEDURE — 36415 COLL VENOUS BLD VENIPUNCTURE: CPT

## 2022-02-16 PROCEDURE — 2700000000 HC OXYGEN THERAPY PER DAY

## 2022-02-16 PROCEDURE — 80048 BASIC METABOLIC PNL TOTAL CA: CPT

## 2022-02-16 PROCEDURE — 2500000003 HC RX 250 WO HCPCS: Performed by: STUDENT IN AN ORGANIZED HEALTH CARE EDUCATION/TRAINING PROGRAM

## 2022-02-16 RX ORDER — POLYETHYLENE GLYCOL 3350 17 G/17G
17 POWDER, FOR SOLUTION ORAL DAILY
Status: DISCONTINUED | OUTPATIENT
Start: 2022-02-16 | End: 2022-02-17 | Stop reason: HOSPADM

## 2022-02-16 RX ORDER — OXYCODONE HYDROCHLORIDE 5 MG/1
10 TABLET ORAL EVERY 4 HOURS PRN
Status: DISCONTINUED | OUTPATIENT
Start: 2022-02-16 | End: 2022-02-17 | Stop reason: HOSPADM

## 2022-02-16 RX ADMIN — POTASSIUM CHLORIDE, DEXTROSE MONOHYDRATE AND SODIUM CHLORIDE: 150; 5; 450 INJECTION, SOLUTION INTRAVENOUS at 05:57

## 2022-02-16 RX ADMIN — OXYCODONE 10 MG: 5 TABLET ORAL at 23:46

## 2022-02-16 RX ADMIN — ENOXAPARIN SODIUM 40 MG: 100 INJECTION SUBCUTANEOUS at 09:05

## 2022-02-16 RX ADMIN — GABAPENTIN 300 MG: 300 CAPSULE ORAL at 15:36

## 2022-02-16 RX ADMIN — ATORVASTATIN CALCIUM 20 MG: 20 TABLET, FILM COATED ORAL at 09:09

## 2022-02-16 RX ADMIN — POTASSIUM CHLORIDE, DEXTROSE MONOHYDRATE AND SODIUM CHLORIDE: 150; 5; 450 INJECTION, SOLUTION INTRAVENOUS at 15:36

## 2022-02-16 RX ADMIN — SODIUM CHLORIDE, PRESERVATIVE FREE 10 ML: 5 INJECTION INTRAVENOUS at 09:13

## 2022-02-16 RX ADMIN — HYDROMORPHONE HYDROCHLORIDE 1 MG: 1 INJECTION, SOLUTION INTRAMUSCULAR; INTRAVENOUS; SUBCUTANEOUS at 06:16

## 2022-02-16 RX ADMIN — GABAPENTIN 300 MG: 300 CAPSULE ORAL at 09:09

## 2022-02-16 RX ADMIN — LISINOPRIL 40 MG: 20 TABLET ORAL at 09:09

## 2022-02-16 RX ADMIN — ACETAMINOPHEN 650 MG: 325 TABLET ORAL at 14:42

## 2022-02-16 RX ADMIN — PANTOPRAZOLE SODIUM 40 MG: 40 TABLET, DELAYED RELEASE ORAL at 06:12

## 2022-02-16 RX ADMIN — DICYCLOMINE HYDROCHLORIDE 10 MG: 10 CAPSULE ORAL at 11:14

## 2022-02-16 RX ADMIN — MAGNESIUM GLUCONATE 500 MG ORAL TABLET 200 MG: 500 TABLET ORAL at 09:08

## 2022-02-16 RX ADMIN — METHOCARBAMOL TABLETS 750 MG: 750 TABLET, COATED ORAL at 09:08

## 2022-02-16 RX ADMIN — SUCRALFATE 1 G: 1 TABLET ORAL at 11:26

## 2022-02-16 RX ADMIN — OXYCODONE 10 MG: 5 TABLET ORAL at 18:28

## 2022-02-16 RX ADMIN — POLYETHYLENE GLYCOL 3350 17 G: 17 POWDER, FOR SOLUTION ORAL at 18:28

## 2022-02-16 RX ADMIN — SUCRALFATE 1 G: 1 TABLET ORAL at 06:12

## 2022-02-16 RX ADMIN — SUCRALFATE 1 G: 1 TABLET ORAL at 18:28

## 2022-02-16 RX ADMIN — METHOCARBAMOL TABLETS 750 MG: 750 TABLET, COATED ORAL at 15:36

## 2022-02-16 RX ADMIN — METHOCARBAMOL TABLETS 750 MG: 750 TABLET, COATED ORAL at 18:28

## 2022-02-16 RX ADMIN — METHOCARBAMOL TABLETS 750 MG: 750 TABLET, COATED ORAL at 21:20

## 2022-02-16 RX ADMIN — HYDROMORPHONE HYDROCHLORIDE 1 MG: 1 INJECTION, SOLUTION INTRAMUSCULAR; INTRAVENOUS; SUBCUTANEOUS at 03:29

## 2022-02-16 RX ADMIN — GABAPENTIN 300 MG: 300 CAPSULE ORAL at 21:20

## 2022-02-16 RX ADMIN — PIPERACILLIN AND TAZOBACTAM 3375 MG: 3; .375 INJECTION, POWDER, FOR SOLUTION INTRAVENOUS at 11:22

## 2022-02-16 RX ADMIN — SODIUM CHLORIDE, PRESERVATIVE FREE 10 ML: 5 INJECTION INTRAVENOUS at 21:20

## 2022-02-16 RX ADMIN — DICYCLOMINE HYDROCHLORIDE 10 MG: 10 CAPSULE ORAL at 06:12

## 2022-02-16 RX ADMIN — PIPERACILLIN AND TAZOBACTAM 3375 MG: 3; .375 INJECTION, POWDER, FOR SOLUTION INTRAVENOUS at 03:33

## 2022-02-16 RX ADMIN — HYDROCHLOROTHIAZIDE 25 MG: 25 TABLET ORAL at 09:09

## 2022-02-16 ASSESSMENT — PAIN DESCRIPTION - PAIN TYPE
TYPE: ACUTE PAIN

## 2022-02-16 ASSESSMENT — PAIN DESCRIPTION - DESCRIPTORS
DESCRIPTORS: SHARP
DESCRIPTORS: SHARP;TENDER

## 2022-02-16 ASSESSMENT — PAIN DESCRIPTION - PROGRESSION
CLINICAL_PROGRESSION: NOT CHANGED

## 2022-02-16 ASSESSMENT — PAIN SCALES - GENERAL
PAINLEVEL_OUTOF10: 5
PAINLEVEL_OUTOF10: 5
PAINLEVEL_OUTOF10: 10
PAINLEVEL_OUTOF10: 9
PAINLEVEL_OUTOF10: 10
PAINLEVEL_OUTOF10: 10
PAINLEVEL_OUTOF10: 9
PAINLEVEL_OUTOF10: 10
PAINLEVEL_OUTOF10: 3
PAINLEVEL_OUTOF10: 10
PAINLEVEL_OUTOF10: 10

## 2022-02-16 ASSESSMENT — PAIN DESCRIPTION - ORIENTATION: ORIENTATION: RIGHT;UPPER

## 2022-02-16 ASSESSMENT — PAIN DESCRIPTION - FREQUENCY
FREQUENCY: CONTINUOUS

## 2022-02-16 ASSESSMENT — PAIN DESCRIPTION - ONSET: ONSET: ON-GOING

## 2022-02-16 ASSESSMENT — PAIN DESCRIPTION - LOCATION
LOCATION: ABDOMEN

## 2022-02-16 ASSESSMENT — PAIN - FUNCTIONAL ASSESSMENT: PAIN_FUNCTIONAL_ASSESSMENT: ACTIVITIES ARE NOT PREVENTED

## 2022-02-16 NOTE — CARE COORDINATION
Case Management Initial Discharge Plan  Wicho Lopez,     Met with:patient to discuss discharge plans. Information verified: address, contacts, phone number, , insurance Yes    Insurance Provider: [address] [place] [address]    Emergency Contact/Next of Román Lozano name & number: pt's grand daughter Judd Rogel [phone], pt's daughter Lemuel Lopes [phone] or [phone]    Who are involved in patient's support system? Pt's grand daughter    PCP: Josafat Day NP-C, YEYO - NP  Date of last visit: 2 weeks ago    Discharge Planning    Living Arrangements:        Home is an apartment and pt lives on the 3rd floor  Pt uses elevator to get into front door    Patient able to perform ADL's:Independent    Current Services (outpatient & in home) none  DME equipment: cane  DME provider: n/a    Is patient receiving oral anticoagulation therapy? No    If indicated:   Physician managing anticoagulation treatment: n/a  Where does patient obtain lab work for ATC treatment? n/a      Potential Assistance Needed:       Patient agreeable to home care: Yes  Freedom of choice provided:  yes    Prior SNF/Rehab Placement and Facility: none  Agreeable to SNF/Rehab: No  [place] of choice provided: n/a     Evaluation: n/a    Expected Discharge date:       Patient expects to be discharged to: If home: is the family and/or caregiver wiling & able to provide support at home? no  Who will be providing this support? Follow Up Appointment: Best Day/ Time:      Transportation provider: grand daughter  Transportation arrangements needed for discharge: No    Readmission Risk              Risk of Unplanned Readmission:  0             Does patient have a readmission risk score greater than 14?: No  If yes, follow-up appointment must be made within 7 days of discharge.      Goals of Care: safety      Educated pt on transitional options, provided freedom of choice and she is  agreeable with plan      Discharge Plan: Home - requesting HC, referral sent to [facility]., grand daughter will transport. has established PCP- monitor for Home O2 needs. [facility] with [address] [facility] called, they can accept pt for [facility]., call when pt is discharge.          Electronically signed by Prasanth Jiang RN on 2/16/22 at 3:05 PM EST
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
## 2022-02-16 NOTE — ANESTHESIA POSTPROCEDURE EVALUATION
POST- ANESTHESIA EVALUATION       Pt Name: Adan Espinosa  MRN: [id number]  [place]: 1970  Date of evaluation: 2/15/2022  Time:  8:26 PM      BP (!) 130/90   Pulse 84   Temp 97.8 °F (36.6 °C)   Resp 15   Ht 5' 2\" (1.575 m)   Wt 155 lb (70.3 kg)   SpO2 95%   BMI 28.35 kg/m²      Consciousness Level  Awake  Cardiopulmonary Status  Stable  Pain Adequately Treated YES  Nausea / Vomiting  NO  Adequate Hydration  YES  Anesthesia Related Complications NONE      Electronically signed by Kendra Duenas MD on 2/15/2022 at 8:26 PM       Department of Anesthesiology  Postprocedure Note    Patient: Adan Espinosa  MRN: [id number]  YOB: 1970  Date of evaluation: 2/15/2022  Time:  8:26 PM     Procedure Summary     Date: 02/15/22 Room / Location: [address]    Anesthesia Start: 1475 Anesthesia Stop: 3327    Procedure: XI ROBOTIC LAPAROSCOPIC CHOLECYSTECTOMY (N/A Abdomen) Diagnosis: (SYMPTOMATIC CHOLELITHIASIS)    Surgeons: Broderick Allen DO Responsible Provider: Kendra Duenas MD    Anesthesia Type: general ASA Status: 4          Anesthesia Type: general    Apollo Phase I: Apollo Score: 4    Apollo Phase II:      Last vitals: Reviewed and per EMR flowsheets.        Anesthesia Post Evaluation

## 2022-02-16 NOTE — PROGRESS NOTES
General Surgery:  Daily Progress Note                PATIENT NAME: Rohan Mane     TODAY'S DATE: 2/16/2022, 6:58 AM  CC: Abdominal pain    SUBJECTIVE:     Pt seen and examined at bedside. No overnight events. Patient very sleepy this morning and difficult to arouse. However, she just received some IV Dilaudid. Reportedly, patient has been reporting 10/10 pain overnight, however now appears comfortable this morning. Ate a half a turkey sandwich without difficulty last night. Ambulated initially, but eventually pure wick was attached because patient reported she was too tired to walk. Good urine output. VSS, afebrile    OBJECTIVE:   VITALS:  BP (!) 150/95   Pulse 73   Temp 98.6 °F (37 °C) (Oral)   Resp 16   Ht 5' 2\" (1.575 m)   Wt 155 lb (70.3 kg)   SpO2 92%   BMI 28.35 kg/m²      INTAKE/OUTPUT:      Intake/Output Summary (Last 24 hours) at 2/16/2022 6353  Last data filed at 2/16/2022 0503  Gross per 24 hour   Intake 4100.78 ml   Output 905 ml   Net 3195.78 ml       PHYSICAL EXAM:  General Appearance: awake, alert, oriented, in no acute distress  HEENT:  Normocephalic, atraumatic, mucus membranes moist   Heart: Heart regular rate and rhythm  Lungs: Unlabored breathing on RA  Abdomen: Soft, nondistended, TTP RUQ. Scars consistent with surgical history  Incision: Clean/dry/intact, covered with surgical glue without drainage or erythema  Extremities: No cyanosis, pitting edema, rashes noted. Skin: Skin color, texture, turgor normal. No rashes or lesions.     Data:  CBC with Differential:    Lab Results   Component Value Date    WBC 9.5 02/15/2022    RBC 4.99 02/15/2022    HGB 14.8 02/15/2022    HCT 44.2 02/15/2022     02/15/2022    MCV 88.6 02/15/2022    MCH 29.7 02/15/2022    MCHC 33.5 02/15/2022    RDW 13.4 02/15/2022    LYMPHOPCT 26 02/14/2022    MONOPCT 7 02/14/2022    BASOPCT 0 02/14/2022    MONOSABS 0.46 02/14/2022    LYMPHSABS 1.69 02/14/2022    EOSABS 0.14 02/14/2022    BASOSABS <0.03 02/14/2022    DIFFTYPE NOT REPORTED 02/14/2022     BMP:    Lab Results   Component Value Date     02/15/2022    K 3.8 02/15/2022    CL 98 02/15/2022    CO2 25 02/15/2022    BUN 6 02/15/2022    LABALBU 4.5 02/15/2022    LABALBU 4.1 04/23/2012    CREATININE 0.68 02/15/2022    CALCIUM 9.3 02/15/2022    GFRAA >60 02/15/2022    LABGLOM >60 02/15/2022    GLUCOSE 140 02/15/2022    GLUCOSE 95 04/23/2012       Radiology Review:    US GALLBLADDER RUQ    Result Date: 2/14/2022  Cholelithiasis. Large stone in the neck of the gallbladder. No ultrasound findings to suggest cholecystitis. ASSESSMENT:  Active Hospital Problems    Diagnosis Date Noted    Right upper quadrant abdominal pain [R10.11] 02/15/2022    Allergic rhinitis [J30.9] 02/15/2022    Mild intermittent asthma without complication [Z13.08] 21/94/7934    Preop examination [Z01.818] 01/31/2022    Cholecystitis, chronic [K81.1] 12/20/2021    GERD (gastroesophageal reflux disease) [K21.9] 12/18/2021    Symptomatic cholelithiasis [K80.20] 12/18/2021    Prediabetes [R73.03] 08/07/2020    Vitamin D deficiency [E55.9] 08/05/2020    Tobacco abuse [Z72.0] 11/13/2019    Mixed hyperlipidemia [E78.2] 11/13/2019    Primary hypertension [I10]        45 y.o. female with symptomatic cholelithiasis  -POD #1 laparoscopic cholecystectomy  -Purulence and pus in gallbladder wall    Plan:  1. Diet: Low-fat diet  2. Analgesia: Per primary, decrease IV pain medications as tolerated  3. GI prophylaxis: Protonix  4. DVT prophylaxis: Lovenox  5. Activity:  Continue to encourage ambulation/activity w/ PT/OT  6. Encourage I-S  7. Follow-up a.m. labs, LFTs  8. DC planning: Possibly today if patient becomes more awake and pain well controlled on p.o. medication.       Electronically signed by Ginna Sloan DO  on 2/16/2022 at 6:58 AM
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
## 2022-02-16 NOTE — OP NOTE
Operative Note      Patient: Fernanda Mack  YOB: 1970  MRN: [id number]    Date of Procedure: 2/15/2022    Pre-Op Diagnosis: ACUTE CHOLECYSTITIS WITH CALCULOUS    Post-Op Diagnosis: Same       Procedure(s):  XI ROBOTIC LAPAROSCOPIC CHOLECYSTECTOMY    Surgeon(s):  Connie Coughlin DO    Assistant:   Resident: Vick Barrera DO    Anesthesia: General    Estimated Blood Loss (mL): Minimal    Complications: None    Specimens:   ID Type Source Tests Collected by Time Destination   A : GALLBLADDER AND CONTENTS Tissue Gallbladder SURGICAL PATHOLOGY Connie Coughlin DO 2/15/2022 1648        Implants:  * No implants in log *      Drains:   Closed/Suction Drain RUQ Bulb 19 Ma[place] (Active)   Site Description Other (Comment) 02/15/22 1816   Dressing Status Clean;Dry; Intact 02/15/22 1816   Drainage Appearance Bloody 02/15/22 1816   Status To bulb suction 02/15/22 1816       Findings:   ACUTE ON CHRONIC CHOLECYSTITIS  PURULENCE AND PUS IN GALLBLADDER WALL  ICG USED TO CONFIRM ANATOMY  GALLBLADDER FOSSA HEMOSTATIC     Detailed Description of Procedure:   Operative narrative:  The risks and benefits of the procedure were explained in detail to the patient who agreed and consented with the procedure.  Patient was taken to the operative suite and administered general anesthetic by the anesthetic team.       Using an optical access trocar and a 12 mm port, the peritoneal cavity was entered under direct visualization from Fermin's point.  Pneumoperitoneum was established at that time without complication.  Next, three 8 mm ports were then placed in standard fashion for robotic assisted cholecystectomy.  All ports placed under direct laparoscopic visualization.  The robot docked.  We surveyed our entry site into the abdomen at Fermin's point.  No evidence of underlying injury to the viscera noted.       The Gallbladder was then elevated up and over the liver to expose the gallbladder in it's entirety.  Blunt dissection as well as electrocautery was used to skeletonize the cystic duct.  Appropriate lateral retraction was undertaken.  Other dissection was then undertaken to expose the cystic duct and cystic artery further. Critical view obtained.  Of note there was severe hemorrhagic/purulent acute inflammation around the cystic duct and gallbladder wall edema.  Cystic duct was noted to travel up into the gallbladder and down into the common bile duct on ICG. We could not dissect lower than the neck of the gallbladder for safety concerns.  The cystic artery was then clipped using robotic hemo-lock clips.  The anatomy confirmed with ICG.  The cystic artery was also cut with the vessel sealer and appropriate hemostasis noted. The neck junction with the cystic duct was then transected with a Lafontaine green load stapler due to the inflammation.    The gallbladder was then dissected from the liver bed using electrocautery and proper countertraction.  The liver bed was noted to be hemostatic.  The laparoscopic clips were reinspected and noted be intact on the cystic duct and cystic artery.  An Endo Catch bag was advanced into the abdomen and the gallbladder placed within the bag.  Gallbladder was then removed from the left upper quadrant port site under direct laparoscopic visualization.  Proper hemostasis again noted along the liver bed.  The robot undocked. I placed a 19 [place] WEN drain due to the purulence noted in the case.  This was taken out the lateral right port.     Counts reported to me as correct.  We closed the 12 mm port with 0-vicryl sutures under direct visualization with a suture passer.  All other 8 mm ports removed under direct visualization.  The 8 mm ports in the right upper quadrant were then removed under direct visualization and hemostasis noted.  Pneumoperitoneum was then released from the left upper quadrant port site.  Hemostasis noted.  The skin was then closed using interrupted 4-0 Monocryl sutures in subcuticular interrupted fashion.  The region was cleaned with sterile normal saline. Skin glue applied. Counts reported to me as correct.     Electronically signed by Jayesh Ortiz DO on 2/15/2022 at 7:12 PM

## 2022-02-17 VITALS
OXYGEN SATURATION: 99 % | SYSTOLIC BLOOD PRESSURE: 130 MMHG | DIASTOLIC BLOOD PRESSURE: 74 MMHG | BODY MASS INDEX: 28.52 KG/M2 | WEIGHT: 155 LBS | HEART RATE: 81 BPM | RESPIRATION RATE: 16 BRPM | HEIGHT: 62 IN | TEMPERATURE: 98 F

## 2022-02-17 LAB — SURGICAL PATHOLOGY REPORT: NORMAL

## 2022-02-17 PROCEDURE — 97166 OT EVAL MOD COMPLEX 45 MIN: CPT

## 2022-02-17 PROCEDURE — G0378 HOSPITAL OBSERVATION PER HR: HCPCS

## 2022-02-17 PROCEDURE — 99239 HOSP IP/OBS DSCHRG MGMT >30: CPT | Performed by: INTERNAL MEDICINE

## 2022-02-17 PROCEDURE — 6370000000 HC RX 637 (ALT 250 FOR IP): Performed by: STUDENT IN AN ORGANIZED HEALTH CARE EDUCATION/TRAINING PROGRAM

## 2022-02-17 PROCEDURE — 2580000003 HC RX 258: Performed by: STUDENT IN AN ORGANIZED HEALTH CARE EDUCATION/TRAINING PROGRAM

## 2022-02-17 PROCEDURE — 6370000000 HC RX 637 (ALT 250 FOR IP): Performed by: INTERNAL MEDICINE

## 2022-02-17 PROCEDURE — 6360000002 HC RX W HCPCS: Performed by: STUDENT IN AN ORGANIZED HEALTH CARE EDUCATION/TRAINING PROGRAM

## 2022-02-17 PROCEDURE — 97535 SELF CARE MNGMENT TRAINING: CPT

## 2022-02-17 RX ORDER — SIMETHICONE 80 MG
80 TABLET,CHEWABLE ORAL 4 TIMES DAILY PRN
Qty: 180 TABLET | Refills: 3 | Status: ON HOLD | OUTPATIENT
Start: 2022-02-17 | End: 2022-06-03 | Stop reason: HOSPADM

## 2022-02-17 RX ORDER — POLYETHYLENE GLYCOL 3350 17 G/17G
POWDER, FOR SOLUTION ORAL
Qty: 238 G | Refills: 0 | Status: SHIPPED | OUTPATIENT
Start: 2022-02-17

## 2022-02-17 RX ORDER — OXYCODONE HYDROCHLORIDE 10 MG/1
10 TABLET ORAL EVERY 4 HOURS PRN
Qty: 18 TABLET | Refills: 0 | Status: SHIPPED | OUTPATIENT
Start: 2022-02-17 | End: 2022-02-20

## 2022-02-17 RX ORDER — TIZANIDINE 4 MG/1
4 TABLET ORAL 3 TIMES DAILY PRN
Qty: 9 TABLET | Refills: 0 | Status: SHIPPED | OUTPATIENT
Start: 2022-02-17 | End: 2022-02-20

## 2022-02-17 RX ADMIN — METHOCARBAMOL TABLETS 750 MG: 750 TABLET, COATED ORAL at 08:30

## 2022-02-17 RX ADMIN — OXYCODONE 10 MG: 5 TABLET ORAL at 06:24

## 2022-02-17 RX ADMIN — HYDROCHLOROTHIAZIDE 25 MG: 25 TABLET ORAL at 08:28

## 2022-02-17 RX ADMIN — LISINOPRIL 40 MG: 20 TABLET ORAL at 08:26

## 2022-02-17 RX ADMIN — MAGNESIUM GLUCONATE 500 MG ORAL TABLET 200 MG: 500 TABLET ORAL at 08:27

## 2022-02-17 RX ADMIN — POLYETHYLENE GLYCOL 3350 17 G: 17 POWDER, FOR SOLUTION ORAL at 08:28

## 2022-02-17 RX ADMIN — GABAPENTIN 300 MG: 300 CAPSULE ORAL at 08:27

## 2022-02-17 RX ADMIN — SUCRALFATE 1 G: 1 TABLET ORAL at 00:02

## 2022-02-17 RX ADMIN — SODIUM CHLORIDE, PRESERVATIVE FREE 10 ML: 5 INJECTION INTRAVENOUS at 08:26

## 2022-02-17 RX ADMIN — SUCRALFATE 1 G: 1 TABLET ORAL at 06:23

## 2022-02-17 RX ADMIN — PANTOPRAZOLE SODIUM 40 MG: 40 TABLET, DELAYED RELEASE ORAL at 06:24

## 2022-02-17 RX ADMIN — OXYCODONE 10 MG: 5 TABLET ORAL at 12:08

## 2022-02-17 RX ADMIN — ATORVASTATIN CALCIUM 20 MG: 20 TABLET, FILM COATED ORAL at 08:27

## 2022-02-17 ASSESSMENT — PAIN DESCRIPTION - PROGRESSION

## 2022-02-17 ASSESSMENT — PAIN DESCRIPTION - LOCATION: LOCATION: ABDOMEN

## 2022-02-17 ASSESSMENT — PAIN DESCRIPTION - ORIENTATION: ORIENTATION: RIGHT;UPPER

## 2022-02-17 ASSESSMENT — PAIN SCALES - GENERAL
PAINLEVEL_OUTOF10: 10
PAINLEVEL_OUTOF10: 0
PAINLEVEL_OUTOF10: 10
PAINLEVEL_OUTOF10: 10

## 2022-02-17 ASSESSMENT — PAIN DESCRIPTION - PAIN TYPE: TYPE: ACUTE PAIN

## 2022-02-17 ASSESSMENT — PAIN DESCRIPTION - DESCRIPTORS: DESCRIPTORS: ACHING;SHARP

## 2022-02-17 ASSESSMENT — PAIN DESCRIPTION - FREQUENCY: FREQUENCY: CONTINUOUS
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
## 2022-02-17 NOTE — CARE COORDINATION
Janneth Colunga was evaluated today and a DME order was entered for a wheeled walker because she requires this to successfully complete daily living tasks of eating, bathing, toileting, personal cares, ambulating, grooming, hygiene, dressing upper body, dressing lower body, meal preparation and taking own medications. A wheeled walker is necessary due to the patient's unsteady gait, upper body weakness, and inability to  an ambulation device; and she can ambulate only by pushing a walker instead of a lesser assistive device such as a cane, crutch, or standard walker. The need for this equipment was discussed with the patient and she understands and is in agreement.

## 2022-02-17 NOTE — CARE COORDINATION
Transitional planning. Spoke to pt's grand daughter, Jose Carvalho, she informed CM that pt will be staying with her when discharged @[address] [facility] [address], [address], Her contact information is:  [phone] or [phone]  She has a walker that her grand mother can use temporarily and asked that CM have walker delivered to her home rather than the hospital     [phone] faxed DME RW order to [facility] [place] with instructions to deliver to pt's daughter's address above    1326 [facility] Tom [facility] [address] [facility]  Informed her that pt is discharged. Informed her that pt will be going to grand daughter's address listed above and provided her with address and contact information. They will gather what they need in Epic and call pt for start of care. 26 Confirmed with Sophie with [facility] that they received DME walker order and supporting documentation. They will deliver to requested address.

## 2022-02-17 NOTE — PROGRESS NOTES
CLINICAL PHARMACY NOTE: MEDS TO BEDS    Total # of Prescriptions Filled: 5   The following medications were delivered to the patient:  · Tizanidine  · Gas relief  · Oxycodone  · clearlax  · bisacodyl    Additional Documentation:
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
## 2022-02-17 NOTE — PROGRESS NOTES
General Surgery:  Daily Progress Note                PATIENT NAME: Edwina Meyers     TODAY'S DATE: 2/17/2022, 7:16 AM  CC: Abdominal pain    SUBJECTIVE:     Pt seen and examined at bedside. No overnight events. This morning, patient was walking around room with her walker. She reports her worst pain is actually her arthritis in her hips. Abdominal pain much improved, incisions do hurt. Tolerating low-fat diet. Urinating without difficulty. VSS, afebrile. OBJECTIVE:   VITALS:  BP 92/65   Pulse 76   Temp 97.9 °F (36.6 °C) (Oral)   Resp 18   Ht 5' 2\" (1.575 m)   Wt 155 lb (70.3 kg)   SpO2 92%   BMI 28.35 kg/m²      INTAKE/OUTPUT:      Intake/Output Summary (Last 24 hours) at 2/17/2022 0716  Last data filed at 2/17/2022 0099  Gross per 24 hour   Intake 1413.12 ml   Output 1755 ml   Net -341.88 ml       PHYSICAL EXAM:  General Appearance: awake, alert, oriented, in no acute distress  HEENT:  Normocephalic, atraumatic, mucus membranes moist   Heart: Heart regular rate and rhythm  Lungs: Unlabored breathing on RA  Abdomen: Soft, nondistended, minimal TTP throughout. Scars consistent with surgical history  Incision: Clean/dry/intact, covered with surgical glue without drainage or erythema. WEN drain in place on the right with minimal serosanguineous fluid  Extremities: No cyanosis, pitting edema, rashes noted. Skin: Skin color, texture, turgor normal. No rashes or lesions.     Data:  CBC with Differential:    Lab Results   Component Value Date    WBC 13.0 02/16/2022    RBC 4.28 02/16/2022    HGB 12.8 02/16/2022    HCT 39.3 02/16/2022     02/16/2022    MCV 91.8 02/16/2022    MCH 29.9 02/16/2022    MCHC 32.6 02/16/2022    RDW 14.0 02/16/2022    LYMPHOPCT 6 02/16/2022    MONOPCT 8 02/16/2022    BASOPCT 0 02/16/2022    MONOSABS 1.04 02/16/2022    LYMPHSABS 0.82 02/16/2022    EOSABS <0.03 02/16/2022    BASOSABS <0.03 02/16/2022    DIFFTYPE NOT REPORTED 02/16/2022     BMP:    Lab Results   Component Value Date     02/16/2022    K 4.7 02/16/2022     02/16/2022    CO2 25 02/16/2022    BUN 9 02/16/2022    LABALBU 3.8 02/16/2022    LABALBU 4.1 04/23/2012    CREATININE 0.86 02/16/2022    CALCIUM 9.0 02/16/2022    GFRAA >60 02/16/2022    LABGLOM >60 02/16/2022    GLUCOSE 150 02/16/2022    GLUCOSE 95 04/23/2012       Radiology Review:    US GALLBLADDER RUQ    Result Date: 2/14/2022  Cholelithiasis. Large stone in the neck of the gallbladder. No ultrasound findings to suggest cholecystitis. ASSESSMENT:  Active Hospital Problems    Diagnosis Date Noted    Right upper quadrant abdominal pain [R10.11] 02/15/2022    Allergic rhinitis [J30.9] 02/15/2022    Mild intermittent asthma without complication [Z26.65] 64/86/3023    Preop examination [Z01.818] 01/31/2022    Cholecystitis, chronic [K81.1] 12/20/2021    GERD (gastroesophageal reflux disease) [K21.9] 12/18/2021    Symptomatic cholelithiasis [K80.20] 12/18/2021    Prediabetes [R73.03] 08/07/2020    Vitamin D deficiency [E55.9] 08/05/2020    Tobacco abuse [Z72.0] 11/13/2019    Mixed hyperlipidemia [E78.2] 11/13/2019    Primary hypertension [I10]        45 y.o. female with symptomatic cholelithiasis  -POD #2 laparoscopic cholecystectomy  -Purulence and pus in gallbladder wall    Plan:  1. Diet: Low-fat diet, tolerating  2. Analgesia: Per primary, decrease IV pain medications as tolerated  3. GI prophylaxis: Protonix  4. DVT prophylaxis: Lovenox  5. Activity:  Continue to encourage ambulation/activity w/ PT/OT  6. Encourage I-S  7. Okay for discharge from a general surgery perspective. Patient follow-up with Dr. Ginny Smith in clinic in 1 week for postop check and WEN drain removal.  8. General surgery to sign off at this time. Call with any questions.   9. Continue medical management per primary      Electronically signed by Rubi Tamayo DO  on 2/17/2022 at 7:16 AM

## 2022-02-17 NOTE — DISCHARGE SUMMARY
[facility]  Office: [address], DO, Lavelle Ordonez, DO, Asia Miguel, DO, Kodak Tran Blood, DO, Yolanda Henley MD, Rach Hasa MD, Radha Steele MD, Reina Mclean MD, Sanket Pardo MD, Ambar Moran MD, Kristen Tabares MD, Graeme Garcia, DO, Divine Corona, DO, Cordelia Barthel, MD,  Samson Ugalde, DO, Yasemin Casanova MD, Kye Camilo MD, Carole Lopez MD, Bharath Joe MD, Binu Starkey MD, Viraj Gordon, [facility], [facility], CNP, Shari Conner, CNP, Ayleen Kuo, CNS, Beverley Lanier, CNP, Ge Winchester, CNP, Madie Villarreal, CNP, Hui Grace, CNP, Lilian Funes, CNP, JENNIFER SunC, Alfreda Steele, [facility], Cal Chang, [facility], Forrest Pal, CNP, Chuy Gudino, CNP, Abigail Valerio, CNP, Kasey Vasquez, CNP, Héctor Buenrostro, CNP, Kisha Messer, [address] [place] [address]    Discharge Summary     Patient ID: Stacey Schulz  :  1970   MRN: [id number]     ACCOUNT:  [de-identified]   Patient's PCP: Juan BANUELOSC, YEYO Baldwin NP  Admit Date: 2022   Discharge Date: 2022     Length of Stay: 0  Code Status:  Full Code  Admitting Physician: Nahum Arce DO  Discharge Physician: Nahum Arce DO     Active Discharge Diagnoses:     Hospital Problem Lists:  Principal Problem:    Symptomatic cholelithiasis  Active Problems:    Cholecystitis, chronic    Right upper quadrant abdominal pain    Primary hypertension    Mixed hyperlipidemia    Tobacco abuse    Vitamin D deficiency    Prediabetes    GERD (gastroesophageal reflux disease)    Preop examination    Allergic rhinitis    Mild intermittent asthma without complication  Resolved Problems:    * No resolved hospital problems. *      Admission Condition:  stable     Discharged Condition: stable    Hospital Stay:     Hospital Course:  Stacey Schulz is a 45 y.o. female who initially presented to hospital complaints of right upper quadrant abdominal pain.   Patient has a known history of cholelithiasis which was thought to be the source of her pain. Right upper quadrant ultrasound showed cholelithiasis with a large stone in the neck of the gallbladder with no ultrasound findings consistent with cholecystitis. Patient was evaluated by general surgery and underwent laparoscopic cholecystectomy on 2/15/22. Patient tolerated the procedure well. She will be discharged with a WEN drain and will need to follow-up with Dr. Oly Juarez 1 week after discharge in clinic for postop wound check and for removal of drain. Patient started [facility] for developing worsening abdominal pain, nausea, vomiting diarrhea. Upon discharge, patient was able to ambulate, was tolerating her diet, and having regular bowel/bladder function. She will be discharged on bowel regimen and instructed to start to develop any watery stools. Significant therapeutic interventions:   See above    Significant Diagnostic Studies:   Labs / Micro:  CBC:   Lab Results   Component Value Date    WBC 13.0 02/16/2022    RBC 4.28 02/16/2022    HGB 12.8 02/16/2022    HCT 39.3 02/16/2022    MCV 91.8 02/16/2022    MCH 29.9 02/16/2022    MCHC 32.6 02/16/2022    RDW 14.0 02/16/2022     02/16/2022     BMP:    Lab Results   Component Value Date    GLUCOSE 150 02/16/2022    GLUCOSE 95 04/23/2012     02/16/2022    K 4.7 02/16/2022     02/16/2022    CO2 25 02/16/2022    ANIONGAP 14 02/16/2022    BUN 9 02/16/2022    CREATININE 0.86 02/16/2022    BUNCRER NOT REPORTED 02/16/2022    CALCIUM 9.0 02/16/2022    LABGLOM >60 02/16/2022    GFRAA >60 02/16/2022    GFR      02/16/2022    GFR NOT REPORTED 02/16/2022        Radiology:  US GALLBLADDER RUQ    Result Date: 2/14/2022  Cholelithiasis. Large stone in the neck of the gallbladder. No ultrasound findings to suggest cholecystitis.        Consultations:    Consults:     Final Specialist Recommendations/Findings:   IP CONSULT TO HOSPITALIST  IP CONSULT TO GENERAL SURGERY  IP CONSULT TO IV TEAM  IP CONSULT TO HOME CARE NEEDS      The patient was seen and examined on day of discharge and this discharge summary is in conjunction with any daily progress note from day of discharge. Discharge plan:     Disposition: Home    Physician Follow Up:     Roberta Wyatt DO  [address] [place]  [phone]      post op haider, [address], APRN - NP  [address] [facility]  [phone]    Schedule an appointment as soon as possible for a visit in 1 week  Hospital follow up       Requiring Further Evaluation/Follow Up POST HOSPITALIZATION/Incidental Findings:     HYGEINE: Ok to shower, no soaking in a tub/pool until seen at your follow up appointment. Clean incision daily with gentle soap and water, do not use alcohol/peroxide or any harsh cleansers. DRIVING: No driving while taking narcotic medications or while in pain    ACTIVITY: No lifting greater than 15lbs for 6 weeks or any strenuous activity. DIET: Resume your normal diet as advised by your PCP. MEDICATIONS: Take all medications as prescribed. Follow up with PCP In one week     SPECIAL INSTRUCTIONS:     Follow up with Dr. Jorden Knapp in 7-10 days, call the clinic for appointment. Call sooner if fever above 100.4 degrees Farenheit, increase in swelling or redness, thick purulent discharge or pain not controlled with medications. Diet: regular diet    Activity: As tolerated    Instructions to Patient:     Discharge Medications:      Medication List      START taking these medications    oxyCODONE HCl 10 MG immediate release tablet  Commonly known as: OXY-IR  Take 1 tablet by mouth every 4 hours as needed for Pain for up to 3 days. CHANGE how you take these medications    polyethylene glycol 17 GM/SCOOP powder  Commonly known as: GLYCOLAX  Use as directed by following your patient instructions given by office.   What changed: Another medication with the same name was removed. Continue taking this medication, and follow the directions you see here. CONTINUE taking these medications    ammonium lactate 12 % cream  Commonly known as: AMLACTIN     atenolol 50 MG tablet  Commonly known as: TENORMIN     atorvastatin 20 MG tablet  Commonly known as: LIPITOR  TAKE 1 TABLET BY MOUTH DAILY     bisacodyl 5 MG EC tablet  Commonly known as: DULCOLAX  TAKE 4 TABS AS DIRECTED BY PHYSICIAN OFFICE     clotrimazole 1 % cream  Commonly known as: LOTRIMIN     diclofenac sodium 1 % Gel  Commonly known as: VOLTAREN  Apply 4 g topically 4 times daily     HM Cetirizine HCl 10 MG tablet  Generic drug: cetirizine     hydroCHLOROthiazide 25 MG tablet  Commonly known as: HYDRODIURIL  Take 1 tablet by mouth daily     Knee Brace/Hinged/Large Misc  Use daily for knee pain, please provide according to size     lisinopril 30 MG tablet  Commonly known as: PRINIVIL;ZESTRIL     miconazole 2 % powder  Commonly known as: MICOTIN  Apply topically 2 times daily.      multivitamin with C & FA Chew chewable tablet     naproxen 500 MG tablet  Commonly known as: Naprosyn  Take 1 tablet by mouth 2 times daily     omeprazole 40 MG delayed release capsule  Commonly known as: PRILOSEC  Take 1 capsule by mouth daily     Pre-Moistened Witch Hazel 50 % Pads  Use after each bowel movement     ProAir  (90 Base) MCG/ACT inhaler  Generic drug: albuterol sulfate HFA     simethicone 80 MG chewable tablet  Commonly known as: MYLICON  Take 1 tablet by mouth 4 times daily as needed for Flatulence     sucralfate 1 GM/10ML suspension  Commonly known as: Carafate  Take 10 mLs by mouth 4 times daily     tiZANidine 4 MG tablet  Commonly known as: ZANAFLEX  Take 1 tablet by mouth 3 times daily as needed (spasms)     traZODone 100 MG tablet  Commonly known as: DESYREL     triamcinolone 0.025 % cream  Commonly known as: KENALOG  Apply to rash twice daily     vitamin D3 25 MCG (1000 UT) Tabs tablet  Commonly known as: CHOLECALCIFEROL        STOP taking these medications    magnesium citrate solution     magnesium oxide 250 MG Tabs tablet  Commonly known as: MAG-OX     OLANZapine 15 MG tablet  Commonly known as: ZYPREXA     ondansetron 4 MG disintegrating tablet  Commonly known as: Zofran ODT           Where to Get Your Medications      These medications were sent to [facility] [address] [facility], [address], [place] [address]    Phone: [phone]   · bisacodyl 5 MG EC tablet  · polyethylene glycol 17 GM/SCOOP powder  · simethicone 80 MG chewable tablet  · tiZANidine 4 MG tablet     You can get these medications from any pharmacy    Bring a paper prescription for each of these medications  · oxyCODONE HCl 10 MG immediate release tablet         No discharge procedures on file. Time Spent on discharge is  37 mins in patient examination, evaluation, counseling as well as medication reconciliation, prescriptions for required medications, discharge plan and follow up. Electronically signed by   Nahum Arce DO  2/17/2022  1:06 PM      Thank you Dr. Juan Mejias NP-C, APRN - NP for the opportunity to be involved in this patient's care.

## 2022-02-21 NOTE — CARE COORDINATION
02/21/2022 [address] [facility] with [facility] called, she is with pt now and pt did not receive walker. Spoke to Sophie with [facility], she will call [facility] back at [phone] and arrange delivery of DME RW.

## 2022-02-22 ENCOUNTER — TELEPHONE (OUTPATIENT)
Dept: BARIATRICS/WEIGHT MGMT | Age: 52
End: 2022-02-22

## 2022-02-22 RX ORDER — CYCLOBENZAPRINE HCL 5 MG
5 TABLET ORAL 2 TIMES DAILY PRN
Qty: 10 TABLET | Refills: 0 | Status: SHIPPED | OUTPATIENT
Start: 2022-02-22 | End: 2022-03-04

## 2022-02-22 NOTE — TELEPHONE ENCOUNTER
Patient is requesting pain medications    Next Visit Date:  2/25/2022    Patient Surgery Date  02/14/22    Type of  Surgery  cholecystectomy    Patient calls complaining of  Pain from gallbladder    Onset: 3 day(s) ago  Timing: constant, intermittent  Severity: Severe    Progression: increasing    Associated Symptoms: off and on fevers (has not been taking temp - feels feverish), pain on right side, pain is sharp, constant. Any allergy  To medications     latex    Current  Pharmacy   [facility].    Patient advised:   If  Symptoms worsen seek treatment at  Emergency Room. You will receive a call back from the office within 24-48 hours.     Is it ok to leave a message if we call back yes

## 2022-02-23 NOTE — TELEPHONE ENCOUNTER
Pt. Wanting to know if they are able to take 10mg of flexeril prescribed by Dr. Allen Us instead of the 5mg prescribed by the surgeon. Pt. States that they took 15mg of flexeril today due to pain. pt also states they are having muscle spasm at simon drain site, Please advise

## 2022-02-24 ENCOUNTER — OFFICE VISIT (OUTPATIENT)
Dept: BARIATRICS/WEIGHT MGMT | Age: 52
End: 2022-02-24

## 2022-02-24 VITALS
BODY MASS INDEX: 27.6 KG/M2 | DIASTOLIC BLOOD PRESSURE: 80 MMHG | SYSTOLIC BLOOD PRESSURE: 130 MMHG | HEIGHT: 62 IN | HEART RATE: 80 BPM | WEIGHT: 150 LBS | TEMPERATURE: 98.2 F

## 2022-02-24 DIAGNOSIS — Z90.49 S/P CHOLECYSTECTOMY: Primary | ICD-10-CM

## 2022-02-24 PROCEDURE — 99024 POSTOP FOLLOW-UP VISIT: CPT | Performed by: SURGERY

## 2022-03-02 NOTE — PROGRESS NOTES
[address] [facility] MIN INVASIVE BARIATRIC SURG  [address] [facility] [address]  [address]  [address] [place] [id number]  Dept: [phone]    2/24/2022    CC: Post Cholecystectomy    45year old female 1 week robotic cholecystectomy. Tolerating clears. No nausea, vomiting. Had a BM. Pain controlled. Having right shoulder pain and unable to sit still.   Patient acting very erratic    Review of Systems:  General  Negative for: [] Weight Change   [x] Fatigue   [x] Fevers & Chills    [] Appetite change [] Other:    Positive for: [x] Weight Change   [] Fatigue   [] Fevers & Chills    [] Appetite change [] Other:   Cardiac  Negative for: [x] Chest Pain   [x] Difficulty Breathing   [] Leg Cramps [x] Edema of Lower Extremeties    [] Left   [] Right      Positive for: [] Chest Pain   [] Difficulty Breathing   [] Leg Cramps [] Edema of Lower Extremeties    [] Left   [] Right   Pulmonary  Negative for: [x] Shortness of Breath [] Wheeze [x] Cough  [] Calf Pain     Positive for: [] Shortness of Breath [] Wheeze [] Cough  [] Calf Pain   Gastro-Intestinal Negative for: [] Heartburn   [x] Reflux   [x] Dysphagia   [x] Melena   [x] BRBPR  [x] Vomiting   [x] Abdominal Pain   [x] Diarrhea     [x] Constipation  [] Other:     Positive for: [] Heartburn   [] Reflux   [] Dysphagia   [] Melena   [] BRBPR  [] Vomiting   [] Abdominal Pain   [] Diarrhea     [] Constipation  [] Other:    Muskuloskeletal Negative for: [] Joint pain   [] Back pain   [] Knee Pain   [x] Muscle weakness [x] Hernia   [] Edema [] Other:     Positive for: [] Joint pain   [] Back pain   [] Knee Pain   [] Muscle weakness [] Hernia   [] Edema [] Other:    Neurologic Negative for: [x] Syncope   [x] Insomnia   [] Being treated for depression  [] Other:     Positive for: [] Syncope   [] Insomnia   [] Being treated for depression  [] Other:    Skin Negative for: [] Wound:   [] Open   [] Draining   [] Incisional     [x] Rash   [x] Hair Loss  [] Other: Positive for: [] Wound:   [] Open   [] Draining    [] Incisional  [] Rash   [] Hair Loss  [] Other:        Physical Exam:  /80 (Site: Right Upper Arm, Position: Sitting, Cuff Size: Large Adult)   Pulse 80   Temp 98.2 °F (36.8 °C)   Ht 5' 2\" (1.575 m)   Wt 150 lb (68 kg)   BMI 27.44 kg/m²     Constitutional:  Vital signs are normal. The patient appears well-developed and well-nourished. HEENT:   Head: Normocephalic. Atraumatic  Eyes: pupils are equal and reactive. No scleral icterus is present. Neck: No mass and no thyromegaly present. Cardiovascular: Normal rate, regular rhythm, S1 normal and S2 normal.  Radial pulses present   Pulmonary/Chest: Effort normal and breath sounds normal. No retractions  Abdominal: Soft. Normal appearance. There is no organomegaly. No tenderness. There is no rigidity, no rebound, no guarding and no Garcia's sign. Musculoskeletal:        Right lower leg: Normal. No tenderness and no edema. Left lower leg: Normal. No tenderness and no edema. Neurological: The patient is alert and oriented. Moving all 4 extremities, sensation grossly intact bilateral  Skin: Skin is warm, dry and intact. Psychiatric: The patient has a normal mood and affect.  Speech is normal and behavior is normal. Judgment and thought content normal. Cognition and memory are normal.     Assessment:  1 week post haider    Plan:  Regular diet  Complaining of right shoulder pain  Pathology reviewed with patient  WEN removed in entirety  Ambulation  Sent to ER as she is complaining of right shoulder pain, discussed importance of going to rule out pathology

## 2022-03-17 PROBLEM — Z01.818 PREOP EXAMINATION: Status: RESOLVED | Noted: 2022-01-31 | Resolved: 2022-03-17

## 2022-04-11 RX ORDER — LIDOCAINE 5 %
ADHESIVE PATCH, MEDICATED TOPICAL
Qty: 10 PATCH | Refills: 0 | OUTPATIENT
Start: 2022-04-11

## 2022-04-25 LAB
LEFT EYE DM RETINOPATHY: NEGATIVE
RIGHT EYE DM RETINOPATHY: NEGATIVE

## 2022-06-01 ENCOUNTER — HOSPITAL ENCOUNTER (INPATIENT)
Age: 52
LOS: 3 days | Discharge: HOME | DRG: 753 | End: 2022-06-04
Attending: PSYCHIATRY & NEUROLOGY | Admitting: PSYCHIATRY & NEUROLOGY
Payer: COMMERCIAL

## 2022-06-01 PROBLEM — F32.A DEPRESSION WITH SUICIDAL IDEATION: Status: ACTIVE | Noted: 2022-06-01

## 2022-06-01 PROBLEM — R45.851 DEPRESSION WITH SUICIDAL IDEATION: Status: ACTIVE | Noted: 2022-06-01

## 2022-06-01 PROBLEM — F31.63 SEVERE MIXED BIPOLAR 1 DISORDER WITHOUT PSYCHOSIS (HCC): Status: ACTIVE | Noted: 2022-06-01

## 2022-06-01 PROCEDURE — 6370000000 HC RX 637 (ALT 250 FOR IP): Performed by: PSYCHIATRY & NEUROLOGY

## 2022-06-01 PROCEDURE — 99223 1ST HOSP IP/OBS HIGH 75: CPT | Performed by: INTERNAL MEDICINE

## 2022-06-01 PROCEDURE — 90792 PSYCH DIAG EVAL W/MED SRVCS: CPT | Performed by: PSYCHIATRY & NEUROLOGY

## 2022-06-01 PROCEDURE — 6370000000 HC RX 637 (ALT 250 FOR IP): Performed by: NURSE PRACTITIONER

## 2022-06-01 PROCEDURE — 1240000000 HC EMOTIONAL WELLNESS R&B

## 2022-06-01 PROCEDURE — APPSS60 APP SPLIT SHARED TIME 46-60 MINUTES: Performed by: NURSE PRACTITIONER

## 2022-06-01 RX ORDER — OLANZAPINE 5 MG/1
5 TABLET ORAL NIGHTLY
Status: DISCONTINUED | OUTPATIENT
Start: 2022-06-01 | End: 2022-06-02

## 2022-06-01 RX ORDER — POLYETHYLENE GLYCOL 3350 17 G/17G
17 POWDER, FOR SOLUTION ORAL DAILY PRN
Status: DISCONTINUED | OUTPATIENT
Start: 2022-06-01 | End: 2022-06-04 | Stop reason: HOSPADM

## 2022-06-01 RX ORDER — HYDROXYZINE 50 MG/1
50 TABLET, FILM COATED ORAL 3 TIMES DAILY PRN
Status: DISCONTINUED | OUTPATIENT
Start: 2022-06-01 | End: 2022-06-04 | Stop reason: HOSPADM

## 2022-06-01 RX ORDER — ATENOLOL 50 MG/1
50 TABLET ORAL NIGHTLY
Status: DISCONTINUED | OUTPATIENT
Start: 2022-06-01 | End: 2022-06-04 | Stop reason: HOSPADM

## 2022-06-01 RX ORDER — CETIRIZINE HYDROCHLORIDE 10 MG/1
5 TABLET ORAL DAILY
Status: DISCONTINUED | OUTPATIENT
Start: 2022-06-01 | End: 2022-06-04 | Stop reason: HOSPADM

## 2022-06-01 RX ORDER — TRAZODONE HYDROCHLORIDE 50 MG/1
50 TABLET ORAL NIGHTLY PRN
Status: DISCONTINUED | OUTPATIENT
Start: 2022-06-01 | End: 2022-06-01

## 2022-06-01 RX ORDER — M-VIT,TX,IRON,MINS/CALC/FOLIC 27MG-0.4MG
1 TABLET ORAL DAILY
Status: DISCONTINUED | OUTPATIENT
Start: 2022-06-01 | End: 2022-06-04 | Stop reason: HOSPADM

## 2022-06-01 RX ORDER — MAGNESIUM HYDROXIDE/ALUMINUM HYDROXICE/SIMETHICONE 120; 1200; 1200 MG/30ML; MG/30ML; MG/30ML
30 SUSPENSION ORAL EVERY 6 HOURS PRN
Status: DISCONTINUED | OUTPATIENT
Start: 2022-06-01 | End: 2022-06-04 | Stop reason: HOSPADM

## 2022-06-01 RX ORDER — ATORVASTATIN CALCIUM 20 MG/1
20 TABLET, FILM COATED ORAL NIGHTLY
Status: DISCONTINUED | OUTPATIENT
Start: 2022-06-01 | End: 2022-06-04 | Stop reason: HOSPADM

## 2022-06-01 RX ORDER — PANTOPRAZOLE SODIUM 40 MG/1
40 TABLET, DELAYED RELEASE ORAL
Status: DISCONTINUED | OUTPATIENT
Start: 2022-06-02 | End: 2022-06-04 | Stop reason: HOSPADM

## 2022-06-01 RX ORDER — ACETAMINOPHEN 325 MG/1
650 TABLET ORAL EVERY 4 HOURS PRN
Status: DISCONTINUED | OUTPATIENT
Start: 2022-06-01 | End: 2022-06-04 | Stop reason: HOSPADM

## 2022-06-01 RX ORDER — HYDROCHLOROTHIAZIDE 25 MG/1
25 TABLET ORAL DAILY
Status: DISCONTINUED | OUTPATIENT
Start: 2022-06-01 | End: 2022-06-04 | Stop reason: HOSPADM

## 2022-06-01 RX ORDER — TRAZODONE HYDROCHLORIDE 100 MG/1
100 TABLET ORAL NIGHTLY PRN
Status: DISCONTINUED | OUTPATIENT
Start: 2022-06-01 | End: 2022-06-04 | Stop reason: HOSPADM

## 2022-06-01 RX ADMIN — HYDROXYZINE HYDROCHLORIDE 50 MG: 50 TABLET, FILM COATED ORAL at 01:09

## 2022-06-01 RX ADMIN — ATORVASTATIN CALCIUM 20 MG: 20 TABLET, FILM COATED ORAL at 22:00

## 2022-06-01 RX ADMIN — MULTIPLE VITAMINS W/ MINERALS TAB 1 TABLET: TAB at 12:22

## 2022-06-01 RX ADMIN — TRAZODONE HYDROCHLORIDE 50 MG: 50 TABLET ORAL at 01:09

## 2022-06-01 RX ADMIN — OLANZAPINE 5 MG: 5 TABLET, FILM COATED ORAL at 22:00

## 2022-06-01 RX ADMIN — CETIRIZINE HYDROCHLORIDE 5 MG: 10 TABLET, FILM COATED ORAL at 12:22

## 2022-06-01 RX ADMIN — HYDROCHLOROTHIAZIDE 25 MG: 25 TABLET ORAL at 12:21

## 2022-06-01 RX ADMIN — ATENOLOL 50 MG: 50 TABLET ORAL at 22:00

## 2022-06-01 RX ADMIN — ACETAMINOPHEN 650 MG: 325 TABLET ORAL at 01:10

## 2022-06-01 ASSESSMENT — PAIN DESCRIPTION - LOCATION
LOCATION: HIP
LOCATION: HIP

## 2022-06-01 ASSESSMENT — PATIENT HEALTH QUESTIONNAIRE - PHQ9: SUM OF ALL RESPONSES TO PHQ QUESTIONS 1-9: 22

## 2022-06-01 ASSESSMENT — SLEEP AND FATIGUE QUESTIONNAIRES
DO YOU HAVE DIFFICULTY SLEEPING: YES
DO YOU USE A SLEEP AID: NO
SLEEP PATTERN: DIFFICULTY FALLING ASLEEP;INSOMNIA;DISTURBED/INTERRUPTED SLEEP
AVERAGE NUMBER OF SLEEP HOURS: 4

## 2022-06-01 ASSESSMENT — PAIN DESCRIPTION - PAIN TYPE: TYPE: CHRONIC PAIN

## 2022-06-01 ASSESSMENT — LIFESTYLE VARIABLES
HOW OFTEN DO YOU HAVE A DRINK CONTAINING ALCOHOL: NEVER
HOW MANY STANDARD DRINKS CONTAINING ALCOHOL DO YOU HAVE ON A TYPICAL DAY: 1 OR 2

## 2022-06-01 ASSESSMENT — PAIN SCALES - GENERAL
PAINLEVEL_OUTOF10: 3
PAINLEVEL_OUTOF10: 1

## 2022-06-01 NOTE — BH NOTE
Pt was approached by RT to complete assessment. Pt was in bed, lethargic, avoidant on approach and refused to complete assessment at this time. RT will seek out pt to complete assessment later during this shift. Uterine Irritability
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
## 2022-06-01 NOTE — GROUP NOTE
Group Therapy Note    Date: 6/1/2022    Group Start Time: 1330  Group End Time: 4151  Group Topic: Music Therapy    FLORENCIA Dimas        Group Therapy Note    Pt did not attend music therapy group d/t resting in room despite staff invitation to attend. 1:1 talk time offered as alternative to group session, pt declined.
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
## 2022-06-01 NOTE — PLAN OF CARE
Problem: Depression/Self Harm  Goal: Effect of psychiatric condition will be minimized and patient will be protected from self harm  Description: INTERVENTIONS:  1. Assess impact of patient's symptoms on level of functioning, self care needs and offer support as indicated  2. Assess patient/family knowledge of depression, impact on illness and need for teaching  3. Provide emotional support, presence and reassurance  4. Assess for possible suicidal thoughts or ideation. If patient expresses suicidal thoughts or statements do not leave alone, initiate Suicide Precautions, move to a room close to the nursing station and obtain sitter  5. Initiate consults as appropriate with Mental Health Professional, Spiritual Care, Psychosocial CNS, and consider a recommendation to the LIP for a Psychiatric Consultation  Outcome: Progressing     Patient denied thoughts of suicidal ideation. Patient reported thoughts of homicidal ideation. Patient stated that its to no one specifically. Patient stated that she had no plan for homicidal ideation. Patient denied having anxiety. Patient reported that her depression level is a 10 out of 10. Patient stated \"I don't know what causes my depression. \" Patient reported that she is having auditory hallucinations. Patient stated that the auditory hallucinations are mumbles and that she can't understand the voices. Patient declined delusions. Patient declined paranoia. Patient reports that appetite is adequate. Patient reports that sleep is adequate. Patient stated that she wanted to take a shower today. Patient stated that she \"doesn't interact with peers. \" Patient is isolative in her room. Patient declined to talk with RN about attending groups. Patient stated \"I've already talked with someone earlier about it, so I'm not going to say it again. \" Patient was nonverbal when asked are medications effective. Will continue to monitor.

## 2022-06-01 NOTE — GROUP NOTE
Group Therapy Note    Date: 6/1/2022    Group Start Time: 1430  Group End Time: 4674  Group Topic: Cognitive Skills    FLORENCIA Jimenes, CTRS        Group Therapy Note    Attendees: 8/11         Pt did not participate in Cognitive Skills Group at 1430 when encouraged by RT due to resting in room. Pt was offered talk time as an alternative to group but declined.      Discipline Responsible: Psychoeducational Specialist     Signature:  Xiang Pino
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
## 2022-06-01 NOTE — PROGRESS NOTES
Patient given tobacco quitline number 10814158375 at this time, refusing to call at this time, states \" I just dont want to quit now\"- patient given information as to the dangers of long term tobacco use. Continue to reinforce the importance of tobacco cessation.

## 2022-06-01 NOTE — PROGRESS NOTES
Behavioral Services  Medicare Certification Upon Admission    I certify that this patient's inpatient psychiatric hospital admission is medically necessary for:    [x] (1) Treatment which could reasonably be expected to improve this patient's condition,       [x] (2) Or for diagnostic study;     AND     [x](2) The inpatient psychiatric services are provided while the individual is under the care of a physician and are included in the individualized plan of care.     Estimated length of stay/service 3-5 days    Plan for post-hospital care hc    Electronically signed by David Walters MD on 6/1/2022 at 1:27 PM

## 2022-06-01 NOTE — PROGRESS NOTES

## 2022-06-02 PROBLEM — B18.2 CHRONIC HEPATITIS C WITHOUT HEPATIC COMA (HCC): Status: ACTIVE | Noted: 2022-06-02

## 2022-06-02 LAB
ABSOLUTE EOS #: 0.17 K/UL (ref 0–0.4)
ABSOLUTE LYMPH #: 1.81 K/UL (ref 1–4.8)
ABSOLUTE MONO #: 0.29 K/UL (ref 0.1–1.3)
ALBUMIN SERPL-MCNC: 3.9 G/DL (ref 3.5–5.2)
ALP BLD-CCNC: 122 U/L (ref 35–104)
ALT SERPL-CCNC: 21 U/L (ref 5–33)
ANION GAP SERPL CALCULATED.3IONS-SCNC: 14 MMOL/L (ref 9–17)
AST SERPL-CCNC: 32 U/L
BASOPHILS # BLD: 0 % (ref 0–2)
BASOPHILS ABSOLUTE: 0 K/UL (ref 0–0.2)
BILIRUB SERPL-MCNC: <0.15 MG/DL (ref 0.3–1.2)
BUN BLDV-MCNC: 9 MG/DL (ref 6–20)
CALCIUM SERPL-MCNC: 9.2 MG/DL (ref 8.6–10.4)
CHLORIDE BLD-SCNC: 102 MMOL/L (ref 98–107)
CHOLESTEROL/HDL RATIO: 1.8
CHOLESTEROL: 167 MG/DL
CO2: 24 MMOL/L (ref 20–31)
CREAT SERPL-MCNC: 0.7 MG/DL (ref 0.5–0.9)
EOSINOPHILS RELATIVE PERCENT: 4 % (ref 0–4)
ESTIMATED AVERAGE GLUCOSE: 123 MG/DL
GFR AFRICAN AMERICAN: >60 ML/MIN
GFR NON-AFRICAN AMERICAN: >60 ML/MIN
GFR SERPL CREATININE-BSD FRML MDRD: ABNORMAL ML/MIN/{1.73_M2}
GLUCOSE BLD-MCNC: 170 MG/DL (ref 70–99)
HBA1C MFR BLD: 5.9 % (ref 4–6)
HCT VFR BLD CALC: 41.5 % (ref 36–46)
HDLC SERPL-MCNC: 95 MG/DL
HEMOGLOBIN: 13.5 G/DL (ref 12–16)
LDL CHOLESTEROL: 50 MG/DL (ref 0–130)
LYMPHOCYTES # BLD: 43 % (ref 24–44)
MCH RBC QN AUTO: 29.9 PG (ref 26–34)
MCHC RBC AUTO-ENTMCNC: 32.7 G/DL (ref 31–37)
MCV RBC AUTO: 91.4 FL (ref 80–100)
MONOCYTES # BLD: 7 % (ref 1–7)
MORPHOLOGY: NORMAL
PDW BLD-RTO: 13.8 % (ref 11.5–14.9)
PLATELET # BLD: 248 K/UL (ref 150–450)
PMV BLD AUTO: 8 FL (ref 6–12)
POTASSIUM SERPL-SCNC: 3.6 MMOL/L (ref 3.7–5.3)
RBC # BLD: 4.54 M/UL (ref 4–5.2)
SEG NEUTROPHILS: 46 % (ref 36–66)
SEGMENTED NEUTROPHILS ABSOLUTE COUNT: 1.93 K/UL (ref 1.3–9.1)
SODIUM BLD-SCNC: 140 MMOL/L (ref 135–144)
TOTAL PROTEIN: 7.2 G/DL (ref 6.4–8.3)
TRIGL SERPL-MCNC: 110 MG/DL
WBC # BLD: 4.2 K/UL (ref 3.5–11)

## 2022-06-02 PROCEDURE — 6370000000 HC RX 637 (ALT 250 FOR IP): Performed by: PSYCHIATRY & NEUROLOGY

## 2022-06-02 PROCEDURE — 6370000000 HC RX 637 (ALT 250 FOR IP): Performed by: NURSE PRACTITIONER

## 2022-06-02 PROCEDURE — 99232 SBSQ HOSP IP/OBS MODERATE 35: CPT | Performed by: PSYCHIATRY & NEUROLOGY

## 2022-06-02 PROCEDURE — 80061 LIPID PANEL: CPT

## 2022-06-02 PROCEDURE — 6370000000 HC RX 637 (ALT 250 FOR IP): Performed by: INTERNAL MEDICINE

## 2022-06-02 PROCEDURE — 99232 SBSQ HOSP IP/OBS MODERATE 35: CPT | Performed by: INTERNAL MEDICINE

## 2022-06-02 PROCEDURE — 1240000000 HC EMOTIONAL WELLNESS R&B

## 2022-06-02 PROCEDURE — 80053 COMPREHEN METABOLIC PANEL: CPT

## 2022-06-02 PROCEDURE — 83036 HEMOGLOBIN GLYCOSYLATED A1C: CPT

## 2022-06-02 PROCEDURE — 85025 COMPLETE CBC W/AUTO DIFF WBC: CPT

## 2022-06-02 PROCEDURE — 36415 COLL VENOUS BLD VENIPUNCTURE: CPT

## 2022-06-02 RX ORDER — OLANZAPINE 7.5 MG/1
7.5 TABLET ORAL NIGHTLY
Status: DISCONTINUED | OUTPATIENT
Start: 2022-06-02 | End: 2022-06-04 | Stop reason: HOSPADM

## 2022-06-02 RX ORDER — POTASSIUM CHLORIDE 20 MEQ/1
40 TABLET, EXTENDED RELEASE ORAL ONCE
Status: COMPLETED | OUTPATIENT
Start: 2022-06-02 | End: 2022-06-02

## 2022-06-02 RX ADMIN — OLANZAPINE 7.5 MG: 7.5 TABLET, FILM COATED ORAL at 21:19

## 2022-06-02 RX ADMIN — MULTIPLE VITAMINS W/ MINERALS TAB 1 TABLET: TAB at 08:16

## 2022-06-02 RX ADMIN — PANTOPRAZOLE SODIUM 40 MG: 40 TABLET, DELAYED RELEASE ORAL at 08:16

## 2022-06-02 RX ADMIN — ATENOLOL 50 MG: 50 TABLET ORAL at 21:19

## 2022-06-02 RX ADMIN — ACETAMINOPHEN 650 MG: 325 TABLET ORAL at 23:35

## 2022-06-02 RX ADMIN — CETIRIZINE HYDROCHLORIDE 5 MG: 10 TABLET, FILM COATED ORAL at 08:16

## 2022-06-02 RX ADMIN — HYDROCHLOROTHIAZIDE 25 MG: 25 TABLET ORAL at 08:17

## 2022-06-02 RX ADMIN — ATORVASTATIN CALCIUM 20 MG: 20 TABLET, FILM COATED ORAL at 21:19

## 2022-06-02 RX ADMIN — POTASSIUM CHLORIDE 40 MEQ: 20 TABLET, EXTENDED RELEASE ORAL at 13:52

## 2022-06-02 RX ADMIN — HYDROXYZINE HYDROCHLORIDE 50 MG: 50 TABLET, FILM COATED ORAL at 21:19

## 2022-06-02 RX ADMIN — TRAZODONE HYDROCHLORIDE 100 MG: 100 TABLET ORAL at 21:19

## 2022-06-02 ASSESSMENT — PAIN SCALES - GENERAL
PAINLEVEL_OUTOF10: 3
PAINLEVEL_OUTOF10: 0
PAINLEVEL_OUTOF10: 0

## 2022-06-02 ASSESSMENT — PAIN DESCRIPTION - LOCATION: LOCATION: HIP

## 2022-06-02 ASSESSMENT — PAIN - FUNCTIONAL ASSESSMENT: PAIN_FUNCTIONAL_ASSESSMENT: ACTIVITIES ARE NOT PREVENTED

## 2022-06-02 ASSESSMENT — PAIN DESCRIPTION - ORIENTATION: ORIENTATION: RIGHT

## 2022-06-02 ASSESSMENT — PAIN DESCRIPTION - DESCRIPTORS: DESCRIPTORS: ACHING

## 2022-06-02 NOTE — GROUP NOTE
Group Therapy Note    Date: 6/2/2022    Group Start Time: 1330  Group End Time: 8235  Group Topic: Music Therapy    [facility]    Rosie Dural        Group Therapy Note    Pt did not attend music therapy group d/t resting in room despite staff invitation to attend. 1:1 talk time offered as alternative to group session, pt declined.

## 2022-06-02 NOTE — GROUP NOTE
Group Therapy Note    Date: 6/2/2022    Group Start Time: 1105  Group End Time: 3462  Group Topic: Recreational    [facility]    Tilda Blocker        Group Therapy Note    Pt did not attend recreational group d/t resting in room despite staff invitation to attend. 1:1 talk time offered as alternative to group session, pt declined.

## 2022-06-02 NOTE — PROGRESS NOTES
Endorses vague homicidal ideation. Suicidal Ideation: Reports improvement in suicidal ideations, without current plan or intent, contracts for safety on the unit. Delusions: No evidence of delusions. Endorses paranoia. Perceptual Disturbance: Patient does not appear to be responding to internal stimuli. Endorses auditory hallucinations. Denies visual hallucinations. Cognition: Oriented to self, location, time, and situation. Memory: Intact. Insight & Judgement: Poor. Data   height is 5' 2\" (1.575 m) and weight is 152 lb (68.9 kg). Her oral temperature is 98 °F (36.7 °C). Her blood pressure is 120/76 and her pulse is 70. Her respiration is 16. Labs:   Admission on 06/01/2022   Component Date Value Ref Range Status    Glucose 06/02/2022 170* 70 - 99 mg/dL Final    BUN 06/02/2022 9  6 - 20 mg/dL Final    CREATININE 06/02/2022 0.70  0.50 - 0.90 mg/dL Final    Calcium 06/02/2022 9.2  8.6 - 10.4 mg/dL Final    Sodium 06/02/2022 140  135 - 144 mmol/L Final    Potassium 06/02/2022 3.6* 3.7 - 5.3 mmol/L Final    Chloride 06/02/2022 102  98 - 107 mmol/L Final    CO2 06/02/2022 24  20 - 31 mmol/L Final    Anion Gap 06/02/2022 14  9 - 17 mmol/L Final    Alkaline Phosphatase 06/02/2022 122* 35 - 104 U/L Final    ALT 06/02/2022 21  5 - 33 U/L Final    AST 06/02/2022 32* <32 U/L Final    Total Bilirubin 06/02/2022 <0.15* 0.3 - 1.2 mg/dL Final    Total Protein 06/02/2022 7.2  6.4 - 8.3 g/dL Final    Albumin 06/02/2022 3.9  3.5 - 5.2 g/dL Final    GFR Non- 06/02/2022 >60  >60 mL/min Final    GFR  06/02/2022 >60  >60 mL/min Final    GFR Comment 06/02/2022        Final    Comment: Average GFR for 52-63 years old:   80 mL/min/1.73sq m  Chronic Kidney Disease:   <60 mL/min/1.73sq m  Kidney failure:   <15 mL/min/1.73sq m              eGFR calculated using average adult body mass.  Additional eGFR calculator available at: [email or web address]            WBC 06/02/2022 4.2  3.5 - 11.0 k/uL Final    RBC 06/02/2022 4.54  4.0 - 5.2 m/uL Final    Hemoglobin 06/02/2022 13.5  12.0 - 16.0 g/dL Final    Hematocrit 06/02/2022 41.5  36 - 46 % Final    MCV 06/02/2022 91.4  80 - 100 fL Final    MCH 06/02/2022 29.9  26 - 34 pg Final    MCHC 06/02/2022 32.7  31 - 37 g/dL Final    RDW 06/02/2022 13.8  11.5 - 14.9 % Final    Platelets 90/64/9025 248  150 - 450 k/uL Final    MPV 06/02/2022 8.0  6.0 - 12.0 fL Final    Seg Neutrophils 06/02/2022 46  36 - 66 % Final    Lymphocytes 06/02/2022 43  24 - 44 % Final    Monocytes 06/02/2022 7  1 - 7 % Final    Eosinophils % 06/02/2022 4  0 - 4 % Final    Basophils 06/02/2022 0  0 - 2 % Final    Segs Absolute 06/02/2022 1.93  1.3 - 9.1 k/uL Final    Absolute Lymph # 06/02/2022 1.81  1.0 - 4.8 k/uL Final    Absolute Mono # 06/02/2022 0.29  0.1 - 1.3 k/uL Final    Absolute Eos # 06/02/2022 0.17  0.0 - 0.4 k/uL Final    Basophils Absolute 06/02/2022 0.00  0.0 - 0.2 k/uL Final    Morphology 06/02/2022 ANISOCYTOSIS PRESENT   Final    Morphology 06/02/2022 1+ TEARDROPS   Final    Morphology 06/02/2022 1+ ELLIPTOCYTES   Final    Morphology 06/02/2022 HYPOCHROMIA PRESENT   Final    Hemoglobin A1C 06/02/2022 5.9  4.0 - 6.0 % Final    Estimated Avg Glucose 06/02/2022 123  mg/dL Final    Comment: The ADA and AACC recommend providing the estimated average glucose result to permit better   patient understanding of their HBA1c result.       Cholesterol 06/02/2022 167  <200 mg/dL Final    Comment:    Cholesterol Guidelines:      <200  Desirable   200-240  Borderline      >240  Undesirable         HDL 06/02/2022 95  >40 mg/dL Final    Comment:    HDL Guidelines:    <40     Undesirable   40-59    Borderline    >59     Desirable         LDL Cholesterol 06/02/2022 50  0 - 130 mg/dL Final    Comment:    LDL Guidelines:     <100    Desirable   100-129   Near to/above Desirable   130-159   Borderline      >159   Undesirable     Direct (measured) LDL and calculated LDL are not interchangeable tests.  Chol/HDL Ratio 06/02/2022 1.8  <5 Final            Triglycerides 06/02/2022 110  <150 mg/dL Final    Comment:    Triglyceride Guidelines:     <150   Desirable   150-199  Borderline   200-499  High     >499   Very high   Based on AHA Guidelines for fasting triglyceride, October 2012. Reviewed patient's current plan of care and vital signs with nursing staff. Labs reviewed: [x] Yes  Last EKG in EMR reviewed: [x] Yes  QTc: 535    Medications  Current Facility-Administered Medications: aluminum & magnesium hydroxide-simethicone (MAALOX) 200-200-20 MG/5ML suspension 30 mL, 30 mL, Oral, Q6H PRN  acetaminophen (TYLENOL) tablet 650 mg, 650 mg, Oral, Q4H PRN  hydrOXYzine (ATARAX) tablet 50 mg, 50 mg, Oral, TID PRN  nicotine polacrilex (NICORETTE) gum 2 mg, 2 mg, Oral, PRN  polyethylene glycol (GLYCOLAX) packet 17 g, 17 g, Oral, Daily PRN  atenolol (TENORMIN) tablet 50 mg, 50 mg, Oral, Nightly  atorvastatin (LIPITOR) tablet 20 mg, 20 mg, Oral, Nightly  cetirizine (ZYRTEC) tablet 5 mg, 5 mg, Oral, Daily  hydroCHLOROthiazide (HYDRODIURIL) tablet 25 mg, 25 mg, Oral, Daily  therapeutic multivitamin-minerals 1 tablet, 1 tablet, Oral, Daily  pantoprazole (PROTONIX) tablet 40 mg, 40 mg, Oral, QAM AC  OLANZapine (ZYPREXA) tablet 5 mg, 5 mg, Oral, Nightly  traZODone (DESYREL) tablet 100 mg, 100 mg, Oral, Nightly PRN    ASSESSMENT  Severe mixed bipolar 1 disorder without psychosis ([facility]         HANDOFF  Patient symptoms remain unstable  Medications as determined by attending physician  Encourage participation in groups and milieu. Probable discharge is to be determined by MD    Electronically signed by YEYO Davenport Do, CNP on 6/2/2022 at 4:36 PM    **This report has been created using voice recognition software.  It may contain minor errors which are inherent in voice recognition technology. **                                       Psychiatry Attending Attestation     I independently saw and evaluated the patient. I reviewed the Advance Practice Provider's documentation above. Any additional comments or changes to the Advance Practice Provider's documentation are stated below otherwise agree with assessment. Patient reports that she is having active auditory hallucinations as hearing people asking her to kill self. Reports that suicidal thoughts across her mind. Notes that she is tolerating Zyprexa well and denies any side effects. Discussed with her about continue to titrate Zyprexa and she is agreeable to the plan. PLAN  Patient s symptoms show no change  Will continue to titrate Zyprexa  Attempt to develop insight  Psycho-education conducted. Supportive Therapy conducted.   Probable discharge is next week  Follow-up TBD    Electronically signed by Lorie Pierre MD on 6/2/22 at 6:29 PM EDT
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
## 2022-06-02 NOTE — PLAN OF CARE
Problem: Depression/Self Harm  Goal: Effect of psychiatric condition will be minimized and patient will be protected from self harm  Description: INTERVENTIONS:  1. Assess impact of patient's symptoms on level of functioning, self care needs and offer support as indicated  2. Assess patient/family knowledge of depression, impact on illness and need for teaching  3. Provide emotional support, presence and reassurance  4. Assess for possible suicidal thoughts or ideation. If patient expresses suicidal thoughts or statements do not leave alone, initiate Suicide Precautions, move to a room close to the nursing station and obtain sitter  5. Initiate consults as appropriate with Mental Health Professional, Spiritual Care, Psychosocial CNS, and consider a recommendation to the LIP for a Psychiatric Consultation  6/2/2022 0256 by Franklyn Rodríguez RN  Outcome: Progressing  Flowsheets  Taken 6/2/2022 0255  Effect of psychiatric condition will be minimized and patient will be protected from self harm:   Provide emotional support, presence and reassurance   Assess for suicidal thoughts or ideation. If patient expresses suicidal thoughts or statements do not leave alone, initiate Suicide Precautions, move near nurse station, obtain sitter  Taken 6/2/2022 0251  Effect of psychiatric condition will be minimized and patient will be protected from self harm:   Provide emotional support, presence and reassurance   Assess for suicidal thoughts or ideation.  If patient expresses suicidal thoughts or statements do not leave alone, initiate Suicide Precautions, move near nurse station, obtain sitter    Problem: Depression/Self Harm  Goal: STG-Able to verbalize suicidal ideations  Outcome: Progressing     Problem: Depression/Self Harm  Goal: LTG-Able to verbalize and/or display a decrease in depressive symptoms  Outcome: Progressing        Problem: Self Harm/Suicidality  Goal: Will have no self-injury during hospital stay  Description: INTERVENTIONS:  1. Q 30 MINUTES: Routine safety checks  2. Q SHIFT & PRN: Assess risk to determine if routine checks are adequate to maintain patient safety  Outcome: Progressing

## 2022-06-02 NOTE — PLAN OF CARE
Problem: Depression/Self Harm  Goal: STG-Able to verbalize suicidal ideations  6/2/2022 1945 by Roland Valle, RN  Outcome: Progressing   Denies current suicidal ideations. Problem: Self Harm/Suicidality  Goal: Will have no self-injury during hospital stay  Description: INTERVENTIONS:  1. Q 30 MINUTES: Routine safety checks  2. Q SHIFT & PRN: Assess risk to determine if routine checks are adequate to maintain patient safety  6/2/2022 1945 by Rolandorville Valle, RN  Outcome: Progressing   No injuries noted. Safe milieu maintained. Problem: Pain  Goal: Verbalizes/displays adequate comfort level or baseline comfort level  Outcome: Progressing   Confirms adequate comfort level. Focused on discharge & going to [facility]. Problem: Depression/Self Harm  Goal: STG-Able to verbalize suicidal ideations  6/2/2022 1945 by Roland Valle, RN  Outcome: Progressing   Denies suicidal ideation. Problem: Self Harm/Suicidality  Goal: Will have no self-injury during hospital stay  Description: INTERVENTIONS:  1. Q 30 MINUTES: Routine safety checks  2. Q SHIFT & PRN: Assess risk to determine if routine checks are adequate to maintain patient safety  6/2/2022 1945 by Roland Valle, RN  Outcome: Progressing   No self harm noted & she denies feelings/thoughts of self harm. Problem: Pain  Goal: Verbalizes/displays adequate comfort level or baseline comfort level  Outcome: Progressing   Denies current pain.

## 2022-06-02 NOTE — GROUP NOTE
Group Therapy Note    Date: 6/2/2022    Group Start Time: 1000  Group End Time: 9478  Group Topic: Psychotherapy    TANIA Harris LSW        Group Therapy Note    Attendees: 5/12         Patient's Goal:  Increase interpersonal relationship skills    Notes: Patient was an active participant in group discussion. She exhibited paranoid thoughts when discussing her neighbor, people in her apartment complex, and when discussing building relationships with others. She mentioned hearing things she is not sure if they were said or that others might not. Patient also stated she often believes her mind is \"playing tricks on me\". Status After Intervention:  Unchanged    Participation Level:  Active Listener, Interactive and Monopolizing    Participation Quality: Attentive and Sharing      Speech:  normal      Thought Process/Content: Flight of ideas      Affective Functioning: Congruent      Mood: euthymic      Level of consciousness:  Alert, Oriented x4, Attentive and Preoccupied      Response to Learning: Able to verbalize current knowledge/experience, Able to verbalize/acknowledge new learning and Able to retain information      Endings: None Reported    Modes of Intervention: Support, Socialization and Exploration      Discipline Responsible: /Counselor      Signature:  TANIA Vidal LSW

## 2022-06-02 NOTE — PLAN OF CARE
Problem: Depression/Self Harm  Goal: Effect of psychiatric condition will be minimized and patient will be protected from self harm  6/2/2022 1550 by Jose Juan Rosas RN  Outcome: Progressing     Problem: Depression/Self Harm  Goal: STG-Able to verbalize suicidal ideations  6/2/2022 1550 by Jose Juan Rosas RN  Outcome: Progressing     Problem: Depression/Self Harm  Goal: LTG-Able to verbalize and/or display a decrease in depressive symptoms  6/2/2022 1550 by Jose Juan Rosas RN  Outcome: Progressing     Problem: Self Harm/Suicidality  Goal: Will have no self-injury during hospital stay  6/2/2022 1550 by Jose Juan Rosas RN  Outcome: Progressing     Problem: Pain  Goal: Verbalizes/displays adequate comfort level or baseline comfort level  6/2/2022 0256 by Eufemia Martinez RN  Outcome: Progressing    Pt. Denies harm to self and other. Visual 15 minute checks completed. Pt. Sleeping well and eating .  Meds administered as provider ordered

## 2022-06-03 PROCEDURE — 99232 SBSQ HOSP IP/OBS MODERATE 35: CPT | Performed by: PSYCHIATRY & NEUROLOGY

## 2022-06-03 PROCEDURE — APPSS30 APP SPLIT SHARED TIME 16-30 MINUTES: Performed by: NURSE PRACTITIONER

## 2022-06-03 PROCEDURE — 1240000000 HC EMOTIONAL WELLNESS R&B

## 2022-06-03 PROCEDURE — 6370000000 HC RX 637 (ALT 250 FOR IP): Performed by: PSYCHIATRY & NEUROLOGY

## 2022-06-03 PROCEDURE — 6370000000 HC RX 637 (ALT 250 FOR IP): Performed by: NURSE PRACTITIONER

## 2022-06-03 PROCEDURE — 99232 SBSQ HOSP IP/OBS MODERATE 35: CPT | Performed by: INTERNAL MEDICINE

## 2022-06-03 RX ORDER — CETIRIZINE HYDROCHLORIDE 5 MG/1
5 TABLET ORAL DAILY
Qty: 30 TABLET | Refills: 0 | Status: SHIPPED | OUTPATIENT
Start: 2022-06-04

## 2022-06-03 RX ORDER — TRAZODONE HYDROCHLORIDE 100 MG/1
100 TABLET ORAL NIGHTLY PRN
Qty: 30 TABLET | Refills: 0 | Status: SHIPPED | OUTPATIENT
Start: 2022-06-03

## 2022-06-03 RX ORDER — OLANZAPINE 7.5 MG/1
7.5 TABLET ORAL NIGHTLY
Qty: 30 TABLET | Refills: 0 | Status: SHIPPED | OUTPATIENT
Start: 2022-06-04

## 2022-06-03 RX ORDER — ATORVASTATIN CALCIUM 20 MG/1
20 TABLET, FILM COATED ORAL NIGHTLY
Qty: 30 TABLET | Refills: 0 | Status: SHIPPED | OUTPATIENT
Start: 2022-06-04

## 2022-06-03 RX ADMIN — MULTIPLE VITAMINS W/ MINERALS TAB 1 TABLET: TAB at 08:21

## 2022-06-03 RX ADMIN — ALUMINUM HYDROXIDE, MAGNESIUM HYDROXIDE, AND SIMETHICONE 30 ML: 200; 200; 20 SUSPENSION ORAL at 01:25

## 2022-06-03 RX ADMIN — TRAZODONE HYDROCHLORIDE 100 MG: 100 TABLET ORAL at 21:32

## 2022-06-03 RX ADMIN — OLANZAPINE 7.5 MG: 7.5 TABLET, FILM COATED ORAL at 21:32

## 2022-06-03 RX ADMIN — ATORVASTATIN CALCIUM 20 MG: 20 TABLET, FILM COATED ORAL at 21:32

## 2022-06-03 RX ADMIN — HYDROXYZINE HYDROCHLORIDE 50 MG: 50 TABLET, FILM COATED ORAL at 21:31

## 2022-06-03 RX ADMIN — HYDROCHLOROTHIAZIDE 25 MG: 25 TABLET ORAL at 08:22

## 2022-06-03 RX ADMIN — ATENOLOL 50 MG: 50 TABLET ORAL at 21:31

## 2022-06-03 RX ADMIN — CETIRIZINE HYDROCHLORIDE 5 MG: 10 TABLET, FILM COATED ORAL at 08:21

## 2022-06-03 RX ADMIN — PANTOPRAZOLE SODIUM 40 MG: 40 TABLET, DELAYED RELEASE ORAL at 07:39
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
## 2022-06-03 NOTE — GROUP NOTE
Group Therapy Note    Date: 6/3/2022    Group Start Time: 1000  Group End Time: 6763  Group Topic: Psychotherapy    CZ B[facility] TANIA Cesar, BECKYW        Group Therapy Note    Attendees: 5/12         Patient's Goal: Increase Interpersonal relationship skills    Notes:  Patient was an active participant in group discussion    Status After Intervention:  Unchanged    Participation Level:  Active Listener and Interactive    Participation Quality: Appropriate, Attentive and Sharing      Speech:  normal      Thought Process/Content: Logical  Linear      Affective Functioning: Congruent      Mood: depressed      Level of consciousness:  Alert, Oriented x4 and Attentive      Response to Learning: Able to verbalize current knowledge/experience and Able to verbalize/acknowledge new learning      Endings: None Reported    Modes of Intervention: Support, Socialization and Exploration      Discipline Responsible: /Counselor      Signature:  TANIA Caballero, MYRNA

## 2022-06-03 NOTE — CARE COORDINATION
Social work and patient called [place] [id number] to assist patient in helping with housing needs. She was placed on the list for [facility] and will wait for them to call her for a bed. She refuses to go to [facility] [place] because they have bed bugs she states. Patient was also given the unit fax number to utilize so she can get applications faxed to her for apartments.

## 2022-06-03 NOTE — PLAN OF CARE
Problem: Depression/Self Harm  Goal: LTG-Able to verbalize and/or display a decrease in depressive symptoms  Outcome: Not Progressing  Note: Patient isolative to room except for meals and needs. Patient reports being tired and wanting to stay in bed. Problem: Depression/Self Harm  Goal: Effect of psychiatric condition will be minimized and patient will be protected from self harm  Description: INTERVENTIONS:  1. Assess impact of patient's symptoms on level of functioning, self care needs and offer support as indicated  2. Assess patient/family knowledge of depression, impact on illness and need for teaching  3. Provide emotional support, presence and reassurance  4. Assess for possible suicidal thoughts or ideation. If patient expresses suicidal thoughts or statements do not leave alone, initiate Suicide Precautions, move to a room close to the nursing station and obtain sitter  5. Initiate consults as appropriate with Mental Health Professional, Spiritual Care, Psychosocial CNS, and consider a recommendation to the LIP for a Psychiatric Consultation  Outcome: Progressing  Note: Patient denies suicidal ideations and thoughts to harm self or others. She is monitored every 15 minutes during rounds.      Problem: Self Harm/Suicidality  Goal: Will have no self-injury during hospital stay  Description: INTERVENTIONS:  1. Q 30 MINUTES: Routine safety checks  2. Q SHIFT & PRN: Assess risk to determine if routine checks are adequate to maintain patient safety  6/3/2022 9226 by Itzel Villagomez RN  Outcome: Progressing

## 2022-06-03 NOTE — GROUP NOTE
Group Therapy Note    Date: 6/3/2022    Group Start Time: 1430  Group End Time: 1892  Group Topic: Cognitive Skills    FLORENCIA Velásquez, CTRS        Group Therapy Note    Attendees: 4/12         Pt did not participate in Cognitive Skills Group at 1430 when encouraged by RT due to resting in room. Pt was offered talk time as an alternative to group but declined.      Discipline Responsible: Psychoeducational Specialist     Signature:  Sandra Garner

## 2022-06-03 NOTE — PROGRESS NOTES
Daily Progress Note  6/3/2022    Patient Name: Tamiko Ward    CHIEF COMPLAINT: Suicidal and homicidal ideation         SUBJECTIVE:      Noted that patient refused her lab draw this morning to recheck potassium levels. She was mildly hypokalemic at time of admission. Reviewed hemoglobin A1c: 5.9%. Drawn on 6/2/2022  Reviewed lipid panel: All results within normal limits. Drawn on 6/2/2022    Patient seen face-to-face for follow-up assessment. She is resting in bed, but is easily arousable to verbal stimuli. She is oriented x4. She remains concerned regarding eviction from her current living space states that she needs to gather her belongings and move them in the next few days. States that she feels anxious and on edge regarding the situation. Currently rates her anxiety as 4 out of 10 (with 10 indicating the most severe). She has been using organic coping skills to help and has not yet utilized hydroxyzine. Patient does voice that she feels her neighbors are out to get her. States that they have not interacted personally, but she knows that they called the  on her saying that she was talking to herself and using drugs. Patient adamantly denies this. She states that she does not have thoughts to harm these neighbors, but feels that when she goes to gather her belongings she will need a police escort or things could escalate. She does note an improvement in her suicidal thoughts, but is currently unable to contract for safety off unit. She notes that the olanzapine has been beneficial.  Titrated to 7.5 mg last night and does report her sleep is more restorative. She does state that she has decreased energy today, but has been observed on the phone, making phone calls to find new living arrangements. She has been attending some group programming and states that she is attempting to be active in her treatment. Social work have been assisting her with placement options.   At this time, patient requires continued inpatient hospitalization for safety and stability. Appetite:  [x] Normal/Adequate/Unchanged  [] Increased  [] Decreased      Sleep:       [] Normal/Adequate/Unchanged  [] Fair  [] Poor      Group Attendance on Unit:   [] Yes  [x] Selectively    [] No    Medication Side Effects: Patient denies any medication side effects at the time of assessment. Mental Status Exam  Level of consciousness: Alert and awake. Appearance: Appropriate attire for setting, resting in bed, with poor grooming and hygiene. Shower cap on head. Behavior/Motor: Approachable, no psychomotor abnormalities. Attitude toward examiner: Somewhat cooperative, distracted, fair eye contact  Speech: Delayed rate, normal volume, irritable tone but relaxes with continued approach  Mood:  Patient reports \"anxious\". Affect: Congruent, irritable at times  Thought processes: More linear today  Thought content: Endorses vague homicidal ideation. Suicidal Ideation: Reports improvement in suicidal ideations, unable to contract for safety off unit  Delusions: No evidence of delusions. Endorses paranoia. Perceptual Disturbance: Patient does not appear to be responding to internal stimuli. Endorses auditory hallucinations. Denies visual hallucinations. Cognition: Oriented to self, location, time, and situation. Memory: Intact. Insight & Judgement: Poor. Data   height is 5' 2\" (1.575 m) and weight is 152 lb (68.9 kg). Her oral temperature is 97.6 °F (36.4 °C). Her blood pressure is 119/78 and her pulse is 61. Her respiration is 14.    Labs:   Admission on 06/01/2022   Component Date Value Ref Range Status    Glucose 06/02/2022 170* 70 - 99 mg/dL Final    BUN 06/02/2022 9  6 - 20 mg/dL Final    CREATININE 06/02/2022 0.70  0.50 - 0.90 mg/dL Final    Calcium 06/02/2022 9.2  8.6 - 10.4 mg/dL Final    Sodium 06/02/2022 140  135 - 144 mmol/L Final    Potassium 06/02/2022 3.6* 3.7 - 5.3 mmol/L Final    Chloride 06/02/2022 102  98 - 107 mmol/L Final    CO2 06/02/2022 24  20 - 31 mmol/L Final    Anion Gap 06/02/2022 14  9 - 17 mmol/L Final    Alkaline Phosphatase 06/02/2022 122* 35 - 104 U/L Final    ALT 06/02/2022 21  5 - 33 U/L Final    AST 06/02/2022 32* <32 U/L Final    Total Bilirubin 06/02/2022 <0.15* 0.3 - 1.2 mg/dL Final    Total Protein 06/02/2022 7.2  6.4 - 8.3 g/dL Final    Albumin 06/02/2022 3.9  3.5 - 5.2 g/dL Final    GFR Non- 06/02/2022 >60  >60 mL/min Final    GFR  06/02/2022 >60  >60 mL/min Final    GFR Comment 06/02/2022        Final    Comment: Average GFR for 52-63 years old:   80 mL/min/1.73sq m  Chronic Kidney Disease:   <60 mL/min/1.73sq m  Kidney failure:   <15 mL/min/1.73sq m              eGFR calculated using average adult body mass.  Additional eGFR calculator available at:        [email or web address]            WBC 06/02/2022 4.2  3.5 - 11.0 k/uL Final    RBC 06/02/2022 4.54  4.0 - 5.2 m/uL Final    Hemoglobin 06/02/2022 13.5  12.0 - 16.0 g/dL Final    Hematocrit 06/02/2022 41.5  36 - 46 % Final    MCV 06/02/2022 91.4  80 - 100 fL Final    MCH 06/02/2022 29.9  26 - 34 pg Final    MCHC 06/02/2022 32.7  31 - 37 g/dL Final    RDW 06/02/2022 13.8  11.5 - 14.9 % Final    Platelets 18/55/7719 248  150 - 450 k/uL Final    MPV 06/02/2022 8.0  6.0 - 12.0 fL Final    Seg Neutrophils 06/02/2022 46  36 - 66 % Final    Lymphocytes 06/02/2022 43  24 - 44 % Final    Monocytes 06/02/2022 7  1 - 7 % Final    Eosinophils % 06/02/2022 4  0 - 4 % Final    Basophils 06/02/2022 0  0 - 2 % Final    Segs Absolute 06/02/2022 1.93  1.3 - 9.1 k/uL Final    Absolute Lymph # 06/02/2022 1.81  1.0 - 4.8 k/uL Final    Absolute Mono # 06/02/2022 0.29  0.1 - 1.3 k/uL Final    Absolute Eos # 06/02/2022 0.17  0.0 - 0.4 k/uL Final    Basophils Absolute 06/02/2022 0.00  0.0 - 0.2 k/uL Final    Morphology 06/02/2022 ANISOCYTOSIS PRESENT   Final  Morphology 06/02/2022 1+ TEARDROPS   Final    Morphology 06/02/2022 1+ ELLIPTOCYTES   Final    Morphology 06/02/2022 HYPOCHROMIA PRESENT   Final    Hemoglobin A1C 06/02/2022 5.9  4.0 - 6.0 % Final    Estimated Avg Glucose 06/02/2022 123  mg/dL Final    Comment: The ADA and AACC recommend providing the estimated average glucose result to permit better   patient understanding of their HBA1c result.  Cholesterol 06/02/2022 167  <200 mg/dL Final    Comment:    Cholesterol Guidelines:      <200  Desirable   200-240  Borderline      >240  Undesirable         HDL 06/02/2022 95  >40 mg/dL Final    Comment:    HDL Guidelines:    <40     Undesirable   40-59    Borderline    >59     Desirable         LDL Cholesterol 06/02/2022 50  0 - 130 mg/dL Final    Comment:    LDL Guidelines:     <100    Desirable   100-129   Near to/above Desirable   130-159   Borderline      >159   Undesirable     Direct (measured) LDL and calculated LDL are not interchangeable tests.  Chol/HDL Ratio 06/02/2022 1.8  <5 Final            Triglycerides 06/02/2022 110  <150 mg/dL Final    Comment:    Triglyceride Guidelines:     <150   Desirable   150-199  Borderline   200-499  High     >499   Very high   Based on AHA Guidelines for fasting triglyceride, October 2012. Reviewed patient's current plan of care and vital signs with nursing staff.     Labs reviewed: [x] Yes  Last EKG in EMR reviewed: [x] Yes  QTc: 535    Medications  Current Facility-Administered Medications: OLANZapine (ZYPREXA) tablet 7.5 mg, 7.5 mg, Oral, Nightly  aluminum & magnesium hydroxide-simethicone (MAALOX) 200-200-20 MG/5ML suspension 30 mL, 30 mL, Oral, Q6H PRN  acetaminophen (TYLENOL) tablet 650 mg, 650 mg, Oral, Q4H PRN  hydrOXYzine (ATARAX) tablet 50 mg, 50 mg, Oral, TID PRN  nicotine polacrilex (NICORETTE) gum 2 mg, 2 mg, Oral, PRN  polyethylene glycol (GLYCOLAX) packet 17 g, 17 g, Oral, Daily PRN  atenolol (TENORMIN) tablet 50 mg, 50 mg, Oral, Nightly  atorvastatin (LIPITOR) tablet 20 mg, 20 mg, Oral, Nightly  cetirizine (ZYRTEC) tablet 5 mg, 5 mg, Oral, Daily  hydroCHLOROthiazide (HYDRODIURIL) tablet 25 mg, 25 mg, Oral, Daily  therapeutic multivitamin-minerals 1 tablet, 1 tablet, Oral, Daily  pantoprazole (PROTONIX) tablet 40 mg, 40 mg, Oral, QAM AC  traZODone (DESYREL) tablet 100 mg, 100 mg, Oral, Nightly PRN    ASSESSMENT  Severe mixed bipolar 1 disorder without psychosis ([facility]         HANDOFF  Patient symptoms are improving  Medications as determined by attending physician  Social work assisting with placement options  Encourage participation in groups and milieu. Probable discharge is to be determined by MD    Electronically signed by YEYO Alberto CNP on 6/3/2022 at 2:21 PM    **This report has been created using voice recognition software. It may contain minor errors which are inherent in voice recognition technology. **                                         Psychiatry Attending Attestation     I independently saw and evaluated the patient. I reviewed the Advance Practice Provider's documentation above. Any additional comments or changes to the Advance Practice Provider's documentation are stated below otherwise agree with assessment. Patient feels better than before. Mood and affect are better. Patient reports fleeting suicidal thoughts with no intent or plan. Patient notes that these thoughts are occurring less frequently. Denies any homicidal thoughts, that was explored with the patient. Oriented to time place and person. Recent and remote memory is intact. Patient feels hopeful. Sleep and appetite is good. No side effect from medication reported. Side-effect of medication were discussed with the patient . Patient is responding to current treatment. Discharge soon, if patient continues to show improvement. Case discussed with the staff.      PLAN  Patient s symptoms are improving  Will continue same medication today and observe3  Attempt to develop insight  Psycho-education conducted. Supportive Therapy conducted.   Probable discharge is tomorrow  Follow-up TBD    Electronically signed by Dimitri Avila MD on 6/3/22 at 9:56 PM EDT

## 2022-06-03 NOTE — GROUP NOTE
Group Therapy Note    Date: 6/3/2022    Group Start Time: 1100  Group End Time: 1150  Group Topic: Cognitive Skills    FLORENCIA Breen, CTRS        Group Therapy Note    Attendees: 4/12         Pt did not participate in Cognitive Skills Group at 1100am when encouraged by RT due to resting in room. Pt was offered talk time as an alternative to group but declined.      Discipline Responsible: Psychoeducational Specialist     Signature:  [place]

## 2022-06-04 VITALS
BODY MASS INDEX: 27.97 KG/M2 | WEIGHT: 152 LBS | SYSTOLIC BLOOD PRESSURE: 147 MMHG | TEMPERATURE: 99.1 F | RESPIRATION RATE: 14 BRPM | DIASTOLIC BLOOD PRESSURE: 84 MMHG | HEART RATE: 77 BPM | HEIGHT: 62 IN

## 2022-06-04 PROCEDURE — 6370000000 HC RX 637 (ALT 250 FOR IP): Performed by: PSYCHIATRY & NEUROLOGY

## 2022-06-04 PROCEDURE — 6370000000 HC RX 637 (ALT 250 FOR IP): Performed by: NURSE PRACTITIONER

## 2022-06-04 PROCEDURE — 99239 HOSP IP/OBS DSCHRG MGMT >30: CPT | Performed by: PSYCHIATRY & NEUROLOGY

## 2022-06-04 PROCEDURE — 99232 SBSQ HOSP IP/OBS MODERATE 35: CPT | Performed by: INTERNAL MEDICINE

## 2022-06-04 RX ADMIN — PANTOPRAZOLE SODIUM 40 MG: 40 TABLET, DELAYED RELEASE ORAL at 08:47

## 2022-06-04 RX ADMIN — HYDROCHLOROTHIAZIDE 25 MG: 25 TABLET ORAL at 08:48

## 2022-06-04 RX ADMIN — MULTIPLE VITAMINS W/ MINERALS TAB 1 TABLET: TAB at 08:48

## 2022-06-04 RX ADMIN — HYDROXYZINE HYDROCHLORIDE 50 MG: 50 TABLET, FILM COATED ORAL at 01:10

## 2022-06-04 RX ADMIN — CETIRIZINE HYDROCHLORIDE 5 MG: 10 TABLET, FILM COATED ORAL at 08:47
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
## 2022-06-04 NOTE — BH NOTE
Patient given tobacco quitline number 83620463652 at this time, refusing to call at this time, states \" I just dont want to quit now\"- patient given information as to the dangers of long term tobacco use. Continue to reinforce the importance of tobacco cessation.

## 2022-06-04 NOTE — PLAN OF CARE
Problem: Depression/Self Harm  Goal: Effect of psychiatric condition will be minimized and patient will be protected from self harm  Description: INTERVENTIONS:  1. Assess impact of patient's symptoms on level of functioning, self care needs and offer support as indicated  2. Assess patient/family knowledge of depression, impact on illness and need for teaching  3. Provide emotional support, presence and reassurance  4. Assess for possible suicidal thoughts or ideation. If patient expresses suicidal thoughts or statements do not leave alone, initiate Suicide Precautions, move to a room close to the nursing station and obtain sitter  5. Initiate consults as appropriate with Mental Health Professional, Spiritual Care, Psychosocial CNS, and consider a recommendation to the LIP for a Psychiatric Consultation  6/3/2022 2220 by Ritika Falcon  Outcome: Progressing  Pt denies suicidal ideation, no self harm behaviors exhibited. Pt is compliant with medicine, has a good appetite. Remains aloof from peers when in day area. Isolates to room at long intervals. Pt is very brief with answers to any questions, refused group attendance. Problem: Self Harm/Suicidality  Goal: Will have no self-injury during hospital stay  Description: INTERVENTIONS:  1. Q 30 MINUTES: Routine safety checks  2. Q SHIFT & PRN: Assess risk to determine if routine checks are adequate to maintain patient safety  6/3/2022 2220 by Ritika Falcon  Outcome: Adequate for Discharge  Pt verbally contracts for safety. No self harm behaviors exhibited    Problem: Pain  Goal: Verbalizes/displays adequate comfort level or baseline comfort level  6/3/2022 2220 by Ritika Falcon  Outcome: Adequate for Discharge  Pt is focused on wanting D/C.  Believes she is better & doesn't need to be in hospital.
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
## 2022-06-06 NOTE — CARE COORDINATION
Name: Kobe Macdonald    : 1970    Discharge Date: 22    Primary Auth/Cert #: [id number]    Destination: Private residence    Discharge Medications:      Medication List      START taking these medications    OLANZapine 7.5 MG tablet  Commonly known as: ZYPREXA  Take 1 tablet by mouth nightly  Notes to patient: Clears thoughts        CHANGE how you take these medications    atorvastatin 20 MG tablet  Commonly known as: LIPITOR  Take 1 tablet by mouth nightly  What changed: when to take this  Notes to patient: cholesterol     cetirizine 5 mg tablet  Commonly known as: ZYRTEC  Take 1 tablet by mouth daily  What changed:   · medication strength  · how much to take  · how to take this  · when to take this  Notes to patient: allergy     traZODone 100 MG tablet  Commonly known as: DESYREL  Take 1 tablet by mouth nightly as needed for Sleep  What changed:   · when to take this  · reasons to take this  Notes to patient: Sleep aid        CONTINUE taking these medications    atenolol 50 MG tablet  Commonly known as: TENORMIN  Notes to patient: Blood pressure     hydroCHLOROthiazide 25 MG tablet  Commonly known as: HYDRODIURIL  Take 1 tablet by mouth daily  Notes to patient: Blood pressure     Knee Brace/Hinged/Large Misc  Use daily for knee pain, please provide according to size  Notes to patient: supplies     multivitamin with C & FA Chew chewable tablet  Notes to patient: vitamin     omeprazole 40 MG delayed release capsule  Commonly known as: PRILOSEC  Take 1 capsule by mouth daily  Notes to patient: Acid reflux     polyethylene glycol 17 GM/SCOOP powder  Commonly known as: GLYCOLAX  Use as directed by following your patient instructions given by office.   Notes to patient: constipation        STOP taking these medications    ammonium lactate 12 % cream  Commonly known as: AMLACTIN     bisacodyl 5 MG EC tablet  Commonly known as: DULCOLAX     clotrimazole 1 % cream  Commonly known as: LOTRIMIN     diclofenac sodium 1 % Gel  Commonly known as: VOLTAREN     lisinopril 30 MG tablet  Commonly known as: PRINIVIL;ZESTRIL     miconazole 2 % powder  Commonly known as: MICOTIN     Pre-Moistened Witch Hazel 50 % Pads     ProAir  (90 Base) MCG/ACT inhaler  Generic drug: albuterol sulfate HFA     simethicone 80 MG chewable tablet  Commonly known as: MYLICON     sucralfate 1 GM/10ML suspension  Commonly known as: Carafate     triamcinolone 0.025 % cream  Commonly known as: KENALOG     vitamin D3 25 MCG (1000 UT) Tabs tablet  Commonly known as: CHOLECALCIFEROL           Where to Get Your Medications      These medications were sent to [facility], [address], [address] [place] [id number]    Phone: [phone]   · atorvastatin 20 MG tablet  · cetirizine 5 mg tablet  · OLANZapine 7.5 MG tablet  · traZODone 100 MG tablet         Follow Up Appointment: [address]  [phone]    On 6/7/2022  you are scheduled with Dr. Makenna Saravia at 8:50 AM    Discharge to home  [address] [place] [id number]  Go on 6/4/2022  If [facility] does not have a ride home, please send by [employer] transportation

## 2022-08-13 ENCOUNTER — APPOINTMENT (OUTPATIENT)
Dept: GENERAL RADIOLOGY | Age: 52
End: 2022-08-13
Payer: COMMERCIAL

## 2022-08-13 ENCOUNTER — HOSPITAL ENCOUNTER (EMERGENCY)
Age: 52
Discharge: HOME | End: 2022-08-14
Attending: EMERGENCY MEDICINE
Payer: COMMERCIAL

## 2022-08-13 VITALS
HEIGHT: 63 IN | SYSTOLIC BLOOD PRESSURE: 145 MMHG | DIASTOLIC BLOOD PRESSURE: 95 MMHG | RESPIRATION RATE: 20 BRPM | OXYGEN SATURATION: 99 % | WEIGHT: 155 LBS | BODY MASS INDEX: 27.46 KG/M2 | HEART RATE: 79 BPM | TEMPERATURE: 97.9 F

## 2022-08-13 DIAGNOSIS — J06.9 VIRAL UPPER RESPIRATORY TRACT INFECTION: Primary | ICD-10-CM

## 2022-08-13 PROCEDURE — 99284 EMERGENCY DEPT VISIT MOD MDM: CPT

## 2022-08-13 PROCEDURE — 93005 ELECTROCARDIOGRAM TRACING: CPT

## 2022-08-13 PROCEDURE — 71046 X-RAY EXAM CHEST 2 VIEWS: CPT

## 2022-08-13 RX ORDER — GUAIFENESIN/DEXTROMETHORPHAN 100-10MG/5
5 SYRUP ORAL 3 TIMES DAILY PRN
Qty: 120 ML | Refills: 0 | Status: SHIPPED | OUTPATIENT
Start: 2022-08-13 | End: 2022-08-23

## 2022-08-13 RX ORDER — ECHINACEA PURPUREA EXTRACT 125 MG
1 TABLET ORAL PRN
Status: DISCONTINUED | OUTPATIENT
Start: 2022-08-13 | End: 2022-08-14 | Stop reason: HOSPADM

## 2022-08-13 ASSESSMENT — ENCOUNTER SYMPTOMS
RHINORRHEA: 1
VOICE CHANGE: 1
SORE THROAT: 0
VOMITING: 0
ABDOMINAL PAIN: 0
COUGH: 1
SINUS PAIN: 1
FACIAL SWELLING: 0
NAUSEA: 0
PHOTOPHOBIA: 0
BACK PAIN: 1
SINUS PRESSURE: 1
SHORTNESS OF BREATH: 0

## 2022-08-13 ASSESSMENT — PAIN DESCRIPTION - PAIN TYPE: TYPE: ACUTE PAIN

## 2022-08-13 ASSESSMENT — PAIN DESCRIPTION - FREQUENCY: FREQUENCY: CONTINUOUS

## 2022-08-13 ASSESSMENT — PAIN SCALES - GENERAL: PAINLEVEL_OUTOF10: 10

## 2022-08-13 ASSESSMENT — PAIN DESCRIPTION - DESCRIPTORS: DESCRIPTORS: SHOOTING

## 2022-08-13 ASSESSMENT — PAIN DESCRIPTION - ORIENTATION: ORIENTATION: RIGHT

## 2022-08-13 ASSESSMENT — PAIN DESCRIPTION - LOCATION: LOCATION: HIP

## 2022-08-13 ASSESSMENT — PAIN DESCRIPTION - ONSET: ONSET: ON-GOING

## 2022-08-14 PROCEDURE — 6370000000 HC RX 637 (ALT 250 FOR IP)

## 2022-08-14 RX ORDER — GUAIFENESIN DEXTROMETHORPHAN HYDROBROMIDE ORAL SOLUTION 10; 100 MG/5ML; MG/5ML
10 SOLUTION ORAL ONCE
Status: COMPLETED | OUTPATIENT
Start: 2022-08-14 | End: 2022-08-14

## 2022-08-14 RX ADMIN — GUAIFENESIN DEXTROMETHORPHAN HYDROBROMIDE ORAL SOLUTION 10 ML: 200; 20 SOLUTION ORAL at 00:19

## 2022-08-14 RX ADMIN — SALINE NASAL SPRAY 1 SPRAY: 1.5 SOLUTION NASAL at 00:20

## 2022-08-14 NOTE — DISCHARGE INSTRUCTIONS
1) TAKE THE PRESCRIBED MEDICINE FOR THE NASAL CONGESTION AND COUGH SUPPRESSION  2) FOLLOW-UP WITH SAQIB OMALLEY FOR HELP WITH BLOOD PRESSURE CONTROL AND HIP PAIN MANAGEMENT.   3) RETURN TO THE EMERGENCY DEPARTMENT SHOULD YOUR SYMPTOMS WORSEN, YOU DEVELOP WORSENING COUGH, NOTICE BLOODY COUGH, OR WORSENING SHORTNESS OF BREATH

## 2022-08-14 NOTE — ED NOTES
Discharge instructions given. Verbalized understanding. All questions answered.        Wang Grimm RN  08/14/22 [id number]

## 2022-08-14 NOTE — ED PROVIDER NOTES
[facility]  Emergency Department  Faculty Attestation     I performed a history and physical examination of the patient and discussed management with the resident. I reviewed the residents note and agree with the documented findings and plan of care. Any areas of disagreement are noted on the chart. I was personally present for the key portions of any procedures. I have documented in the chart those procedures where I was not present during the key portions. I have reviewed the emergency nurses triage note. I agree with the chief complaint, past medical history, past surgical history, allergies, medications, social and family history as documented unless otherwise noted below. For Physician Assistant/ Nurse Practitioner cases/documentation I have personally evaluated this patient and have completed at least one if not all key elements of the E/M (history, physical exam, and MDM). Additional findings are as noted. Primary Care Physician:  Cathlyn Cranker NP-C, YEYO - NP    Screenings:  [unfilled]    CHIEF COMPLAINT       Chief Complaint   Patient presents with    Hip Pain     L hip pain      Cough     Causing chest discomfort    Nasal Congestion       RECENT VITALS:   Temp: 97.9 °F (36.6 °C),  Heart Rate: 99, Resp: 20, BP: (!) 206/111    LABS:  Labs Reviewed - No data to display    Radiology  XR CHEST (2 VW)    (Results Pending)       CRITICAL CARE: There was a high probability of clinically significant/life threatening deterioration in this patient's condition which required my urgent intervention. Total critical care time was none minutes. This excludes any time for separately reportable procedures.      EKG:  EKG Interpretation    Interpreted by me    Rhythm: normal sinus   Rate: normal  Axis: normal  Ectopy: none  Conduction: slightly prolong QTc  ST Segments: ST depression laterally  T Waves: no acute change  Q Waves: none    Clinical Impression: LVH, with strain/nonspecific ST changes    Attending Physician Additional  Notes    Has had 2 to 3 days of illness with nasal congestion, cough, anterior chest discomfort with burning sensation especially with coughing and deep breathing. There is silver discoloration to her sputum production. No hemoptysis. No brown sputum. Minimal myalgias. No chills. She is lost her voice for the past day. No vomiting or diarrhea. No strokelike symptoms. No back pain. She has chronic right arthritis but points to her greater trochanter region as location of pain. She is taking lisinopril for blood pressure and states she is compliant. She is no longer on amlodipine due to peripheral edema. On exam she is hypertensive afebrile uncomfortable borderline tachycardic, significant nasal congestion noted. Voice is hoarse but no stridor noted. No increased work of breathing. No excessive muscle use retractions. Lungs are clear symmetrical.  There is reproducible anterior chest wall discomfort/tenderness. Normal pulses. No edema cords Homans or calf tenderness. Normal conjunctiva. Oropharynx is moist without lesion. Right hip has full range of movement without elicited tenderness. Impression is viral syndrome, viral bronchitis, rhinosinusitis, chest pain related to coughing/viral syndrome, hypertension, trochanteric bursitis. Plan is analgesics, chest x-ray, EKG, repeat blood pressure. Consider blood work for BUN/creatinine/renal function if symptoms persist.  Anticipate discharge home on simple analgesics, refill of medications as needed, nasal decongestant or cough medication as needed, return precautions. Alexus Baeza.  Kirstin Hamm MD, [address] [facility]  Attending Emergency  Physician                David Flores MD  08/13/22 0910

## 2022-08-14 NOTE — ED TRIAGE NOTES
Patient presented to the ED with c/o  L hip pain, SOB, nasal congestion and chest pain/discomfort that is being caused due to her coughing. Patient states she has a HX of asthma and bronchitis. Vitals have been obtained in triage. Patient states BP is probably high due to hip pain.
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
## 2022-08-15 LAB
EKG ATRIAL RATE: 80 BPM
EKG P AXIS: 57 DEGREES
EKG P-R INTERVAL: 160 MS
EKG Q-T INTERVAL: 404 MS
EKG QRS DURATION: 84 MS
EKG QTC CALCULATION (BAZETT): 465 MS
EKG R AXIS: 30 DEGREES
EKG T AXIS: 44 DEGREES
EKG VENTRICULAR RATE: 80 BPM

## 2022-08-15 PROCEDURE — 93010 ELECTROCARDIOGRAM REPORT: CPT | Performed by: INTERNAL MEDICINE

## 2022-10-16 ENCOUNTER — HOSPITAL ENCOUNTER (EMERGENCY)
Age: 52
Discharge: HOME | End: 2022-10-16
Attending: EMERGENCY MEDICINE
Payer: COMMERCIAL

## 2022-10-16 ENCOUNTER — APPOINTMENT (OUTPATIENT)
Dept: GENERAL RADIOLOGY | Age: 52
End: 2022-10-16
Payer: COMMERCIAL

## 2022-10-16 VITALS
SYSTOLIC BLOOD PRESSURE: 189 MMHG | DIASTOLIC BLOOD PRESSURE: 105 MMHG | TEMPERATURE: 97 F | OXYGEN SATURATION: 100 % | RESPIRATION RATE: 17 BRPM | HEART RATE: 74 BPM | HEIGHT: 62 IN | WEIGHT: 166 LBS | BODY MASS INDEX: 30.55 KG/M2

## 2022-10-16 DIAGNOSIS — M16.0 OSTEOARTHRITIS OF BOTH HIPS, UNSPECIFIED OSTEOARTHRITIS TYPE: Primary | ICD-10-CM

## 2022-10-16 PROCEDURE — 99283 EMERGENCY DEPT VISIT LOW MDM: CPT

## 2022-10-16 PROCEDURE — 6370000000 HC RX 637 (ALT 250 FOR IP): Performed by: STUDENT IN AN ORGANIZED HEALTH CARE EDUCATION/TRAINING PROGRAM

## 2022-10-16 PROCEDURE — 73502 X-RAY EXAM HIP UNI 2-3 VIEWS: CPT

## 2022-10-16 PROCEDURE — 72100 X-RAY EXAM L-S SPINE 2/3 VWS: CPT

## 2022-10-16 RX ORDER — OXYCODONE HYDROCHLORIDE AND ACETAMINOPHEN 5; 325 MG/1; MG/1
1 TABLET ORAL EVERY 8 HOURS PRN
Qty: 10 TABLET | Refills: 0 | Status: SHIPPED | OUTPATIENT
Start: 2022-10-16 | End: 2022-10-19

## 2022-10-16 RX ORDER — OXYCODONE HYDROCHLORIDE AND ACETAMINOPHEN 5; 325 MG/1; MG/1
1 TABLET ORAL ONCE
Status: COMPLETED | OUTPATIENT
Start: 2022-10-16 | End: 2022-10-16

## 2022-10-16 RX ADMIN — OXYCODONE HYDROCHLORIDE AND ACETAMINOPHEN 1 TABLET: 5; 325 TABLET ORAL at 15:08

## 2022-10-16 ASSESSMENT — PAIN SCALES - GENERAL: PAINLEVEL_OUTOF10: 10

## 2022-10-16 ASSESSMENT — PAIN DESCRIPTION - LOCATION: LOCATION: BACK;HIP

## 2022-10-16 ASSESSMENT — ENCOUNTER SYMPTOMS
NAUSEA: 0
DIARRHEA: 0
ABDOMINAL PAIN: 0
VOMITING: 0
SHORTNESS OF BREATH: 0
BACK PAIN: 1

## 2022-10-16 ASSESSMENT — PAIN - FUNCTIONAL ASSESSMENT: PAIN_FUNCTIONAL_ASSESSMENT: 0-10

## 2022-10-16 ASSESSMENT — PAIN DESCRIPTION - ORIENTATION: ORIENTATION: RIGHT
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
## 2022-10-16 NOTE — DISCHARGE INSTRUCTIONS
You were seen in the emergency department for left hip pain and left-sided lower back pain. Found to have severe osteoarthritis. I encourage you to make your orthopedic appointment on Wednesday where you can discuss potential surgery options with those physicians. If you begin to experience any numbness/tingling in your lower extremities, loss of bowel or bladder function, recurrent falls, or any other concerning symptoms please represent to the emergency department for reevaluation.   Please follow-up with your primary care physician within the next 1 to 2 days
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
## 2022-10-16 NOTE — ED PROVIDER NOTES
[facility]     Emergency Department     Faculty Attestation    I performed a history and physical examination of the patient and discussed management with the resident. I reviewed the resident´s note and agree with the documented findings and plan of care. Any areas of disagreement are noted on the chart. I was personally present for the key portions of any procedures. I have documented in the chart those procedures where I was not present during the key portions. I have reviewed the emergency nurses triage note. I agree with the chief complaint, past medical history, past surgical history, allergies, medications, social and family history as documented unless otherwise noted below. For Physician Assistant/ Nurse Practitioner cases/documentation I have personally evaluated this patient and have completed at least one if not all key elements of the E/M (history, physical exam, and MDM). Additional findings are as noted. Chronic pain right hip, diffuse pain across the low lumbar region, negative straight leg raising, normal position sense of the toes, normal sensation in the feet.      Michelle Lopes MD  10/16/22 4410

## 2022-10-16 NOTE — ED NOTES
Dr. Livan Fleming at bedside to perform pelvic exam with RN to chaperone.       Aundrea Schrader RN  10/16/22 [id number]

## 2022-10-17 ENCOUNTER — OFFICE VISIT (OUTPATIENT)
Dept: OBSTETRICS AND GYNECOLOGY | Facility: CLINIC | Age: 52
End: 2022-10-17
Payer: COMMERCIAL

## 2022-10-17 VITALS
SYSTOLIC BLOOD PRESSURE: 146 MMHG | HEART RATE: 75 BPM | DIASTOLIC BLOOD PRESSURE: 88 MMHG | HEIGHT: 61 IN | WEIGHT: 147 LBS | BODY MASS INDEX: 27.75 KG/M2

## 2022-10-17 DIAGNOSIS — Z01.419 ENCOUNTER FOR ANNUAL ROUTINE GYNECOLOGICAL EXAMINATION: Primary | ICD-10-CM

## 2022-10-17 PROCEDURE — 99386 PR PREVENTIVE VISIT,NEW,40-64: ICD-10-PCS | Mod: S$GLB,,, | Performed by: STUDENT IN AN ORGANIZED HEALTH CARE EDUCATION/TRAINING PROGRAM

## 2022-10-17 PROCEDURE — 99386 PREV VISIT NEW AGE 40-64: CPT | Mod: S$GLB,,, | Performed by: STUDENT IN AN ORGANIZED HEALTH CARE EDUCATION/TRAINING PROGRAM

## 2022-10-17 RX ORDER — KETOROLAC TROMETHAMINE 10 MG/1
10 TABLET, FILM COATED ORAL EVERY 6 HOURS
COMMUNITY

## 2022-10-17 NOTE — PROGRESS NOTES
CC: Well woman exam    Sandra Tucker is a 52 y.o. female  presents for well woman exam.  LMP: No LMP recorded. Patient has had a hysterectomy..  No issues, problems, or complaints. Patients last pap was normal prior to hysterectomy in  for 'uterus falling out'. Last wellness labs were done 2022.  Last mammogram was normal 2022. She is a non smoker . Denies any anxiety and depression. She uses a seat belt while riding in the car.  Eating well and exercising.  yes sexually active.The current method of family planning is post menopausal status.   Denies menopausal symptoms. No sexual issues.       Chief Complaint   Patient presents with    Well Woman     Patient here for annual.        Past Medical History:   Diagnosis Date    Essential (primary) hypertension     Type 2 diabetes mellitus without complications      Past Surgical History:   Procedure Laterality Date    HYSTERECTOMY           Social History     Socioeconomic History    Marital status: Unknown   Tobacco Use    Smoking status: Never     Passive exposure: Never    Smokeless tobacco: Never   Substance and Sexual Activity    Alcohol use: Never    Drug use: Never    Sexual activity: Yes     Partners: Male     Birth control/protection: Post-menopausal     History reviewed. No pertinent family history.  OB History          1    Para   0    Term   0       0    AB   0    Living   1         SAB   0    IAB   0    Ectopic   0    Multiple   0    Live Births   0               Current Outpatient Medications on File Prior to Visit   Medication Sig Dispense Refill    ketorolac (TORADOL) 10 mg tablet Take 10 mg by mouth every 6 (six) hours.       No current facility-administered medications on file prior to visit.        ROS:  Review of Systems   Constitutional: Negative.    Respiratory: Negative.     Cardiovascular:  Negative for chest pain and palpitations.   Gastrointestinal:  Negative for abdominal pain, constipation, diarrhea,  "nausea and vomiting.   Endocrine: Negative for hot flashes.   Genitourinary:  Negative for dysmenorrhea, dysuria, genital sores, menstrual problem, pelvic pain, vaginal bleeding and vaginal pain.   Neurological: Negative.    Psychiatric/Behavioral: Negative.     Breast: negative.       BP (!) 146/88   Pulse 75   Ht 5' 1" (1.549 m)   Wt 66.7 kg (147 lb)   BMI 27.78 kg/m²     PHYSICAL EXAM:  Physical Exam:   Constitutional: She is oriented to person, place, and time. She appears well-developed and well-nourished. No distress.    HENT:   Head: Normocephalic and atraumatic.      Cardiovascular:  Normal rate, regular rhythm and normal heart sounds.      Exam reveals no edema.        Pulmonary/Chest: Effort normal and breath sounds normal. No respiratory distress. Right breast exhibits no mass, no nipple discharge, no skin change, no tenderness, no bleeding, no swelling and no mastectomy. Left breast exhibits no mass, no nipple discharge, no skin change, no tenderness, no bleeding, no swelling and no mastectomy. Breasts are symmetrical.        Abdominal: Soft. Bowel sounds are normal. She exhibits no distension. There is no abdominal tenderness.     Genitourinary:    Vagina, right adnexa and left adnexa normal.      Pelvic exam was performed with patient supine.   The external female genitalia was normal.   No external genitalia lesions identified,Genitalia hair distrobution normal .   There is no tenderness on the right labia. There is no tenderness on the left labia. Right adnexum displays no tenderness and no fullness. Left adnexum displays no tenderness and no fullness. Vagina exhibits no lesion. Vaginal cuff normal.  No  no vaginal discharge, tenderness or bleeding in the vagina. Cervix is absent.Uterus is absent. Urethral Meatus exhibits: urethral lesion              Neurological: She is alert and oriented to person, place, and time.    Skin: Skin is warm and dry. She is not diaphoretic.    Psychiatric: She has " a normal mood and affect. Her behavior is normal.      Encounter for annual routine gynecological examination    No orders of the defined types were placed in this encounter.       Patient was counseled today on A.C.S. guidelines and recommendations for yearly pelvic exams, mammograms and monthly self breast exams; to see her PCP for other health maintenance. STD education counseling was performed during this visit patient expressed understanding, she declined STD testing today.  She is considering Cologuard for colon cancer screening, will follow-up with PCP.  Healthy lifestyle encouraged.    Follow up in about 1 year (around 10/17/2023) for annual gyn.

## 2022-10-19 ENCOUNTER — OFFICE VISIT (OUTPATIENT)
Dept: ORTHOPEDIC SURGERY | Age: 52
End: 2022-10-19
Payer: COMMERCIAL

## 2022-10-19 VITALS — WEIGHT: 166 LBS | BODY MASS INDEX: 30.55 KG/M2 | HEIGHT: 62 IN

## 2022-10-19 DIAGNOSIS — M25.551 RIGHT HIP PAIN: Primary | ICD-10-CM

## 2022-10-19 PROCEDURE — 99203 OFFICE O/P NEW LOW 30 MIN: CPT

## 2022-10-19 PROCEDURE — 3017F COLORECTAL CA SCREEN DOC REV: CPT

## 2022-10-19 PROCEDURE — G8484 FLU IMMUNIZE NO ADMIN: HCPCS

## 2022-10-19 PROCEDURE — G8427 DOCREV CUR MEDS BY ELIG CLIN: HCPCS

## 2022-10-19 PROCEDURE — G8417 CALC BMI ABV UP PARAM F/U: HCPCS

## 2022-10-19 PROCEDURE — 4004F PT TOBACCO SCREEN RCVD TLK: CPT
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
## 2022-11-18 ENCOUNTER — APPOINTMENT (OUTPATIENT)
Dept: GENERAL RADIOLOGY | Age: 52
End: 2022-11-18
Payer: COMMERCIAL

## 2022-11-18 ENCOUNTER — HOSPITAL ENCOUNTER (EMERGENCY)
Age: 52
Discharge: HOME | End: 2022-11-18
Attending: EMERGENCY MEDICINE
Payer: COMMERCIAL

## 2022-11-18 VITALS
RESPIRATION RATE: 16 BRPM | HEART RATE: 74 BPM | OXYGEN SATURATION: 99 % | TEMPERATURE: 98.2 F | SYSTOLIC BLOOD PRESSURE: 131 MMHG | DIASTOLIC BLOOD PRESSURE: 88 MMHG

## 2022-11-18 DIAGNOSIS — R19.8 SENSATION OF FOREIGN BODY IN ESOPHAGUS: Primary | ICD-10-CM

## 2022-11-18 PROCEDURE — 99284 EMERGENCY DEPT VISIT MOD MDM: CPT

## 2022-11-18 PROCEDURE — 96372 THER/PROPH/DIAG INJ SC/IM: CPT

## 2022-11-18 PROCEDURE — 71046 X-RAY EXAM CHEST 2 VIEWS: CPT

## 2022-11-18 PROCEDURE — 3609027000 HC BRONCHOSCOPY

## 2022-11-18 PROCEDURE — 2500000003 HC RX 250 WO HCPCS: Performed by: STUDENT IN AN ORGANIZED HEALTH CARE EDUCATION/TRAINING PROGRAM

## 2022-11-18 RX ORDER — LIDOCAINE HYDROCHLORIDE 20 MG/ML
4 SOLUTION OROPHARYNGEAL ONCE
Status: DISCONTINUED | OUTPATIENT
Start: 2022-11-18 | End: 2022-11-18 | Stop reason: HOSPADM

## 2022-11-18 RX ADMIN — GLUCAGON HYDROCHLORIDE 1 MG: KIT at 15:17

## 2022-11-18 ASSESSMENT — ENCOUNTER SYMPTOMS
DIARRHEA: 0
COUGH: 0
SORE THROAT: 0
SINUS PRESSURE: 0
EYE ITCHING: 0
ABDOMINAL PAIN: 0
CONSTIPATION: 0
SHORTNESS OF BREATH: 0
EYE PAIN: 0
NAUSEA: 0
ABDOMINAL DISTENTION: 0
SINUS PAIN: 0

## 2022-11-18 ASSESSMENT — PAIN - FUNCTIONAL ASSESSMENT: PAIN_FUNCTIONAL_ASSESSMENT: 0-10

## 2022-11-18 ASSESSMENT — PAIN SCALES - GENERAL: PAINLEVEL_OUTOF10: 10
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
## 2022-11-18 NOTE — DISCHARGE INSTRUCTIONS
You are seen in the emergency department for foreign body sensation in your esophagus. A video camera on a bronchoscope was used to visualize your throat and vocal cords and this was clear. You are given glucagon with no relief. Your case was discussed with GI. If you are still experiencing the foreign body sensation tomorrow please come to the emergency room in early morning to be reassessed and potentially undergo endoscopy.

## 2022-11-18 NOTE — ED NOTES
Dr Jerry Carlos, Dr Florecita Bryant, and respiratory at bedside for scope.      Sun Wade, AMOLN  46/96/54 [id number]

## 2022-11-21 NOTE — ED PROVIDER NOTES
[facility]     Emergency Department     Faculty Attestation    I performed a history and physical examination of the patient and discussed management with the resident. I reviewed the residents note and agree with the documented findings including all diagnostic interpretations and plan of care. Any areas of disagreement are noted on the chart. I was personally present for the key portions of any procedures. I have documented in the chart those procedures where I was not present during the key portions. I have reviewed the emergency nurses triage note. I agree with the chief complaint, past medical history, past surgical history, allergies, medications, social and family history as documented unless otherwise noted below. Documentation of the HPI, Physical Exam and Medical Decision Making performed by scribes is based on my personal performance of the HPI, PE and MDM. For Physician Assistant/ Nurse Practitioner cases/documentation I have personally evaluated this patient and have completed at least one if not all key elements of the E/M (history, physical exam, and MDM). Additional findings are as noted. Primary Care Physician: Joe Piper NP-C, APRN - NP    History: This is a 47 y.o. female who presents to the Emergency Department with complaint of sensation of lodged pill. 3 days. Feels like it is near the sternal notch. Still able to eat and drink. No vomiting. Physical:     oral temperature is 98.2 °F (36.8 °C). Her blood pressure is 131/88 and her pulse is 74. Her respiration is 16 and oxygen saturation is 99%.    47 y.o. female NAD, oropharynx clear, no erythema or trauma. Neck nontender no masses    Impression: pill esophagitis? Plan: XR in case of ferrous containing pill, glucagon, NP scope?     Concepción Em MD, Daphnie Ashraf  Attending Emergency Physician         Sosa Pérez MD  11/21/22 2671

## 2023-03-21 ENCOUNTER — HOSPITAL ENCOUNTER (EMERGENCY)
Age: 53
Discharge: HOME | End: 2023-03-21
Attending: EMERGENCY MEDICINE
Payer: COMMERCIAL

## 2023-03-21 VITALS
TEMPERATURE: 97.5 F | DIASTOLIC BLOOD PRESSURE: 101 MMHG | OXYGEN SATURATION: 96 % | RESPIRATION RATE: 16 BRPM | SYSTOLIC BLOOD PRESSURE: 196 MMHG | HEART RATE: 73 BPM

## 2023-03-21 DIAGNOSIS — R10.33 PERIUMBILICAL PAIN: Primary | ICD-10-CM

## 2023-03-21 LAB
ABSOLUTE EOS #: 0.13 K/UL (ref 0–0.44)
ABSOLUTE IMMATURE GRANULOCYTE: <0.03 K/UL (ref 0–0.3)
ABSOLUTE LYMPH #: 2.32 K/UL (ref 1.1–3.7)
ABSOLUTE MONO #: 0.57 K/UL (ref 0.1–1.2)
ANION GAP SERPL CALCULATED.3IONS-SCNC: 12 MMOL/L (ref 9–17)
BASOPHILS # BLD: 0 % (ref 0–2)
BASOPHILS ABSOLUTE: <0.03 K/UL (ref 0–0.2)
BILIRUBIN URINE: NEGATIVE
BUN SERPL-MCNC: 12 MG/DL (ref 6–20)
CALCIUM SERPL-MCNC: 9.2 MG/DL (ref 8.6–10.4)
CASTS UA: NORMAL /LPF (ref 0–8)
CHLORIDE SERPL-SCNC: 104 MMOL/L (ref 98–107)
CO2 SERPL-SCNC: 26 MMOL/L (ref 20–31)
COLOR: YELLOW
CREAT SERPL-MCNC: 0.77 MG/DL (ref 0.5–0.9)
EOSINOPHILS RELATIVE PERCENT: 2 % (ref 1–4)
EPITHELIAL CELLS UA: NORMAL /HPF (ref 0–5)
GFR SERPL CREATININE-BSD FRML MDRD: >60 ML/MIN/1.73M2
GLUCOSE SERPL-MCNC: 109 MG/DL (ref 70–99)
GLUCOSE UR STRIP.AUTO-MCNC: NEGATIVE MG/DL
HCT VFR BLD AUTO: 37.6 % (ref 36.3–47.1)
HGB BLD-MCNC: 12.4 G/DL (ref 11.9–15.1)
IMMATURE GRANULOCYTES: 0 %
KETONES UR STRIP.AUTO-MCNC: NEGATIVE MG/DL
LACTIC ACID, SEPSIS WHOLE BLOOD: 1.6 MMOL/L (ref 0.5–1.9)
LEUKOCYTE ESTERASE UR QL STRIP.AUTO: ABNORMAL
LYMPHOCYTES # BLD: 41 % (ref 24–43)
MCH RBC QN AUTO: 29.7 PG (ref 25.2–33.5)
MCHC RBC AUTO-ENTMCNC: 33 G/DL (ref 28.4–34.8)
MCV RBC AUTO: 90 FL (ref 82.6–102.9)
MONOCYTES # BLD: 10 % (ref 3–12)
NITRITE UR QL STRIP.AUTO: NEGATIVE
NRBC AUTOMATED: 0 PER 100 WBC
PDW BLD-RTO: 13.2 % (ref 11.8–14.4)
PLATELET # BLD AUTO: 241 K/UL (ref 138–453)
PMV BLD AUTO: 10 FL (ref 8.1–13.5)
POTASSIUM SERPL-SCNC: 4.2 MMOL/L (ref 3.7–5.3)
PROT UR STRIP.AUTO-MCNC: 7 MG/DL (ref 5–8)
PROT UR STRIP.AUTO-MCNC: NEGATIVE MG/DL
RBC # BLD: 4.18 M/UL (ref 3.95–5.11)
RBC CLUMPS #/AREA URNS AUTO: NORMAL /HPF (ref 0–4)
SEG NEUTROPHILS: 47 % (ref 36–65)
SEGMENTED NEUTROPHILS ABSOLUTE COUNT: 2.62 K/UL (ref 1.5–8.1)
SODIUM SERPL-SCNC: 142 MMOL/L (ref 135–144)
SPECIFIC GRAVITY UA: 1.02 (ref 1–1.03)
TURBIDITY: CLEAR
URINE HGB: NEGATIVE
UROBILINOGEN, URINE: NORMAL
WBC # BLD AUTO: 5.7 K/UL (ref 3.5–11.3)
WBC UA: NORMAL /HPF (ref 0–5)

## 2023-03-21 PROCEDURE — 6360000002 HC RX W HCPCS

## 2023-03-21 PROCEDURE — 99284 EMERGENCY DEPT VISIT MOD MDM: CPT

## 2023-03-21 PROCEDURE — 96372 THER/PROPH/DIAG INJ SC/IM: CPT

## 2023-03-21 PROCEDURE — 85025 COMPLETE CBC W/AUTO DIFF WBC: CPT

## 2023-03-21 PROCEDURE — 80048 BASIC METABOLIC PNL TOTAL CA: CPT

## 2023-03-21 PROCEDURE — 81001 URINALYSIS AUTO W/SCOPE: CPT

## 2023-03-21 PROCEDURE — 83605 ASSAY OF LACTIC ACID: CPT

## 2023-03-21 RX ORDER — IBUPROFEN 400 MG/1
400 TABLET ORAL ONCE
Status: DISCONTINUED | OUTPATIENT
Start: 2023-03-21 | End: 2023-03-21 | Stop reason: HOSPADM

## 2023-03-21 RX ORDER — PROCHLORPERAZINE EDISYLATE 5 MG/ML
10 INJECTION INTRAMUSCULAR; INTRAVENOUS ONCE
Status: DISCONTINUED | OUTPATIENT
Start: 2023-03-21 | End: 2023-03-21

## 2023-03-21 RX ORDER — PROCHLORPERAZINE EDISYLATE 5 MG/ML
10 INJECTION INTRAMUSCULAR; INTRAVENOUS ONCE
Status: COMPLETED | OUTPATIENT
Start: 2023-03-21 | End: 2023-03-21

## 2023-03-21 RX ADMIN — PROCHLORPERAZINE EDISYLATE 10 MG: 5 INJECTION INTRAMUSCULAR; INTRAVENOUS at 19:03

## 2023-03-21 ASSESSMENT — ENCOUNTER SYMPTOMS
ABDOMINAL PAIN: 1
CHEST TIGHTNESS: 0
ABDOMINAL DISTENTION: 0
CONSTIPATION: 0
DIARRHEA: 0
SINUS PRESSURE: 0
COLOR CHANGE: 0
VOMITING: 0
NAUSEA: 0
SHORTNESS OF BREATH: 0

## 2023-03-21 NOTE — ED PROVIDER NOTES
Handoff taken on the following patient from prior Attending Physician:    Pt Name: Devora Galvan    PCP:  Isabel Culver NP-C, APRN - NP         Attestation    I was available and discussed any additional care issues that arose and coordinated the management plans with the resident(s) caring for the patient during my duty period. Any areas of disagreement with residents documentation of care or procedures are noted on the chart. I was personally present for the key portions of any/all procedures during my duty period. I have documented in the chart those procedures where I was not present during the key portions.     Patient 39-year-old female multiple abdominal surgeries periumbilical pain still having gas and bowel movements patient has current labs pending will reevaluate for abdominal pain and assessment after no right lower quadrant pain patient having no symptoms of sudden onset symptoms have been going on for about 7 months now     Anthony Fam DO  03/21/23 6782

## 2023-03-21 NOTE — ED NOTES
The following labs were labeled with appropriate pt sticker and tubed to lab:     [] Blue     [x] Lavender   [] on ice  [x] Green/yellow  [x] Green/black [] on ice  [] Grey  [] on ice  [] Yellow  [] Red  [] Type/ Screen  [] ABG  [] VBG    [] COVID-19 swab    [] Rapid  [] PCR  [] Flu swab  [] Peds Viral Panel     [x] Urine Sample  [] Fecal Sample  [] Pelvic Cultures  [] Blood Cultures  [] X 2  [] STREP Cultures       Yvonne Mckeon LPN  53/98/55 519
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
## 2023-03-21 NOTE — ED PROVIDER NOTES
[facility] [facility]     Emergency Department     Faculty Attestation    I performed a history and physical examination of the patient and discussed management with the resident. I reviewed the residents note and agree with the documented findings and plan of care. Any areas of disagreement are noted on the chart. I was personally present for the key portions of any procedures. I have documented in the chart those procedures where I was not present during the key portions. I have reviewed the emergency nurses triage note. I agree with the chief complaint, past medical history, past surgical history, allergies, medications, social and family history as documented unless otherwise noted below. For Physician Assistant/ Nurse Practitioner cases/documentation I have personally evaluated this patient and have completed at least one if not all key elements of the E/M (history, physical exam, and MDM). Additional findings are as noted. Patient presents with periumbilical abdominal pain that she has had for the past 3 days or so. He says she has had some nausea with it but denies vomiting. She says she also has had some burning with urination. She denies any recent fever, cough, congestion, chest pain, shortness of breath, dysuria, constipation or diarrhea. Patient has had multiple abdominal surgeries. On exam, patient is resting comfortably in the bed and appears well. Lungs clear to auscultation bilaterally and heart sounds are normal.  Abdomen is soft with mild periumbilical tenderness. No rebound or guarding is present. There is no tenderness over McBurney's point. We will check labs and treat patient's pain and reassess.       Alan Jorgensen MD  Attending Emergency  Physician            Janeth Arnold MD  03/21/23 7153

## 2023-03-22 NOTE — DISCHARGE INSTRUCTIONS
-You were seen and evaluated by emergency medicine physicians at [facility].    -Please follow-up with your primary care physician and/or with the referrals to specialist.    -You were diagnosed with: Abdominal pain    - Your labs and physical exam were benign. You were able to tolerate food and pain medication / nausea mediation helped decrease the pain.    -Please follow up with your primary care physician. Please work on strengthening your abdominal muscles. If any worsening pain over a long period of time please return to the ED or call to schedule an appointment with the General Surgery clinic for further assessment.    -Please return to the Emergency Department if you are experiencing the following symptoms acutely: Worsening abdominal pain , Headache, fever, chills, nausea, vomiting, chest pain, shortness of breath, abdominal pain, change with urination, change with bowel movements, change in your skin/hair/nail, weakness, fatigue, altered mental status and/or any change from baseline health.     -Thank you for coming to [facility].

## 2023-06-20 ENCOUNTER — HOSPITAL ENCOUNTER (OUTPATIENT)
Dept: PHYSICAL THERAPY | Age: 53
Setting detail: THERAPIES SERIES
Discharge: HOME | End: 2023-06-20
Payer: COMMERCIAL

## 2023-06-20 PROCEDURE — 97161 PT EVAL LOW COMPLEX 20 MIN: CPT

## 2023-06-20 NOTE — CONSULTS
75%   Abd 45 45 4/5 4/5 Sidebend L 75% R 75%   Add        ER      STRENGTH   IR     Abdominals 4/5   Knee Flex   5/5 5/5 Erector Spinae 4/5   Ext    5/5 5/5 Scapular L R   Ankle DF     -Scaption     PF      -Retraction     Inversion     -Horz Abd     Eversion     -Extension        Special Tests:   Positive:B SLR, R SI compression         Negative: B OUSMANE PARKS     OBSERVATION Comments   Posture Fwd head   Joint Alignment No Deficit    Gait No Deficit    Palpation Tender lumbar paraspinals    Edema No Deficit    Neurological No Deficit      Assessment: Patient presents chronic lumbar and bilateral posterior hip pain likely due to radicular back pain and potential SI joint dysfunction. Patient will benefit from physical therapy to improve core and B hip strength to improve ADL tolerance. Problem list, as detailed above:   [x] ? Pain     [] ? ROM    [x] ? Strength  [x] ? Flexibility:   [x] ? Function: LEFS score 73% functional   [x] ? Balance  [] Edema  [x] Postural Deviations  [] Gait Deviations  [] Other    STG: (to be met in 8 treatments)  ? Pain: 4/10 low back / hip pain with ADLs. ? ROM:Patient demonstrates all trunk ROM @ 100% WNL to improve reaching. ? Strength: 4+/5 B hip flexion and extension to demonstrate improved core strength. ? Function:LEFS score to 90% functional or better to improve ADLs. Independent with Home Exercise Programs    LTG: (to be met in 12 treatments)  Patient is able to bend and lift 20 lbs from floor to waist without back pain. Patient is able to walk community distances without lumbar or hip pain. Patient goals: To get better.      Rehab Potential:  [x] Good  [] Fair  [] Poor   Suggested Professional Referral:  [x] No  [] Yes:  Barriers to Goal Achievement[de-identified]  [x] No  [] Yes:  Domestic Concerns:  [x] No  [] Yes:    Treatment Plan:  [x] Therapeutic Exercise   98904  [x] Vasopneumatic cold with compression  11273    [x] Therapeutic Activity  12612 [x] Cold/hotpack    []

## 2023-06-21 ENCOUNTER — HOSPITAL ENCOUNTER (EMERGENCY)
Age: 53
Discharge: HOME | End: 2023-06-21
Attending: EMERGENCY MEDICINE
Payer: COMMERCIAL

## 2023-06-21 VITALS
HEART RATE: 71 BPM | TEMPERATURE: 98.4 F | SYSTOLIC BLOOD PRESSURE: 153 MMHG | OXYGEN SATURATION: 97 % | WEIGHT: 167.55 LBS | RESPIRATION RATE: 18 BRPM | BODY MASS INDEX: 30.65 KG/M2 | DIASTOLIC BLOOD PRESSURE: 99 MMHG

## 2023-06-21 DIAGNOSIS — M54.50 LOW BACK PAIN, UNSPECIFIED BACK PAIN LATERALITY, UNSPECIFIED CHRONICITY, UNSPECIFIED WHETHER SCIATICA PRESENT: Primary | ICD-10-CM

## 2023-06-21 PROCEDURE — 99283 EMERGENCY DEPT VISIT LOW MDM: CPT

## 2023-06-21 PROCEDURE — 6370000000 HC RX 637 (ALT 250 FOR IP): Performed by: STUDENT IN AN ORGANIZED HEALTH CARE EDUCATION/TRAINING PROGRAM

## 2023-06-21 RX ORDER — LIDOCAINE 4 G/G
1 PATCH TOPICAL DAILY
Status: DISCONTINUED | OUTPATIENT
Start: 2023-06-21 | End: 2023-06-22 | Stop reason: HOSPADM

## 2023-06-21 RX ORDER — CYCLOBENZAPRINE HCL 10 MG
10 TABLET ORAL NIGHTLY PRN
Qty: 5 TABLET | Refills: 0 | Status: SHIPPED | OUTPATIENT
Start: 2023-06-21 | End: 2023-06-26

## 2023-06-21 RX ORDER — CYCLOBENZAPRINE HCL 10 MG
10 TABLET ORAL ONCE
Status: COMPLETED | OUTPATIENT
Start: 2023-06-21 | End: 2023-06-21

## 2023-06-21 RX ORDER — OXYCODONE HYDROCHLORIDE 5 MG/1
5 TABLET ORAL ONCE
Status: DISCONTINUED | OUTPATIENT
Start: 2023-06-21 | End: 2023-06-21

## 2023-06-21 RX ORDER — ACETAMINOPHEN 325 MG/1
325 TABLET ORAL EVERY 6 HOURS PRN
Qty: 20 TABLET | Refills: 0 | Status: SHIPPED | OUTPATIENT
Start: 2023-06-21

## 2023-06-21 RX ORDER — ACETAMINOPHEN 500 MG
1000 TABLET ORAL ONCE
Status: COMPLETED | OUTPATIENT
Start: 2023-06-21 | End: 2023-06-21

## 2023-06-21 RX ADMIN — CYCLOBENZAPRINE 10 MG: 10 TABLET, FILM COATED ORAL at 20:51

## 2023-06-21 RX ADMIN — ACETAMINOPHEN 1000 MG: 500 TABLET ORAL at 20:51

## 2023-06-21 ASSESSMENT — PAIN SCALES - GENERAL
PAINLEVEL_OUTOF10: 3
PAINLEVEL_OUTOF10: 10
PAINLEVEL_OUTOF10: 10

## 2023-06-21 ASSESSMENT — PAIN - FUNCTIONAL ASSESSMENT: PAIN_FUNCTIONAL_ASSESSMENT: 0-10

## 2023-06-21 NOTE — ED NOTES
Patient reports lower back pain that radiated to her hips with a history of arthritis in hips, patient reports that she needs surgery on hips. Patient denies any injury, states the pain is chronic, just worse today.       Audra Ribera RN  06/21/23 1935

## 2023-06-21 NOTE — ED TRIAGE NOTES
Patient reports lower back pain that radiates to hips. Patient states that sh was told she has arthritis in hips and needs surgery.
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
## 2023-06-22 NOTE — DISCHARGE INSTRUCTIONS
You have been seen in the ER today for back pain. If you begin to experience any symptoms such as chest pain shortness of breath nausea vomiting dizziness drowsiness abdominal pain loss of consciousness or any other symptoms you find concerning please return to the ED for follow-up evaluation. If you have been given pain medication please take them only as prescribed. Do not take more medication than prescribed at any given time. Please follow-up with your primary care provider within 3-5 days for continued care, sooner if you have concerns.

## 2023-06-23 NOTE — PRE-CERTIFICATION NOTE
..       [x] [address]  [address]  [phone]  F: [phone] [] [address] [facility] [address]   [address]  P: [phone]  F: [phone] [] [address]  [address] [facility]  P: [phone]  F: [phone] [] [address]  [facility]   [address]   [place]: [phone]  F: [phone]  [] Aurelia Sweet   Outpatient Occupational Therapy  [address] [facility]: [phone]  F: [phone]          Therapy Pre-certification Note      6/23/2023    Jordin Snyder  1970   [id number]      Insurance approval was received for Physical Therapy from Maria Dolores Fine on 06/23/23. Approval was received for 40 visits, from 06/21/23 to 09/29/23. Authorization number [id number].     Patient was contacted to be scheduled and 06/28/23.    39042 Approved 160 Units  58998 Approved 160 Units  18313 Approved 160 Units  07576 Approved 160 Units  95909 Approved 160 Units  58604 Approved 160 Units  97162 Approved 160 Units  27113 Approved 160 Units          Electronically signed by Sam Winston on 6/23/2023 at 2:02 PM

## 2023-06-28 ENCOUNTER — HOSPITAL ENCOUNTER (OUTPATIENT)
Dept: PHYSICAL THERAPY | Age: 53
Setting detail: THERAPIES SERIES
Discharge: HOME | End: 2023-06-28
Payer: COMMERCIAL

## 2023-07-10 ENCOUNTER — HOSPITAL ENCOUNTER (OUTPATIENT)
Dept: PHYSICAL THERAPY | Age: 53
Setting detail: THERAPIES SERIES
Discharge: HOME | End: 2023-07-10
Payer: COMMERCIAL

## 2023-07-10 PROCEDURE — 97110 THERAPEUTIC EXERCISES: CPT

## 2023-07-10 NOTE — FLOWSHEET NOTE
[x] [address]  [address] [facility] [place] [facility].  [phone]  F: [phone] [] [address] [facility]  [address]   [address]  P: [phone]  F: [phone] [] [address]  [address] [facility]  P: [phone]  F: [phone] [] [facility]: [phone]  F: [phone] [] [address] [facility]  [address] [facility]   [address]   P: [phone]  F: [phone]  [] [address] [facility].   P: [phone]  F: [phone] [] [address] [facility]  [address]  P: [phone]  F: [phone] [] [address] [facility]  [address] [facility]  [place]: [phone]  F: [phone] [] [address]  [place]: [phone]  F: [phone]      Physical Therapy Daily Treatment Note    Date:  7/10/2023  Patient Name:  Apple Jones    :  1970  MRN: [id number]   Physician: Seema Enriquez CNP               Insurance: [facility]   Approval was received for 40 visits, from 23 to 23. Authorization number [id number]  Medical Diagnosis: Bilateral hip pain M25.551, M25.552      Rehab Codes: M25.551, M25.552, M54.5  Onset Date: 23                                 Next 's appt: not scheduled  Visit# / total visits: ; Progress note  STG reassessment due at visit # 8     Cancels/No Shows: 0/0    Subjective:    Pain:  [x] Yes  [] No Location:  LB/ SI /buttock area    Pain Rating: (0-10 scale) 8/10 waist band area.    Pain altered Tx:  [] No  [x] Yes  Action: mat exercises

## 2023-07-13 ENCOUNTER — OFFICE VISIT (OUTPATIENT)
Dept: OBSTETRICS AND GYNECOLOGY | Facility: CLINIC | Age: 53
End: 2023-07-13
Payer: COMMERCIAL

## 2023-07-13 VITALS
BODY MASS INDEX: 28.01 KG/M2 | HEIGHT: 61 IN | SYSTOLIC BLOOD PRESSURE: 128 MMHG | WEIGHT: 148.38 LBS | DIASTOLIC BLOOD PRESSURE: 78 MMHG

## 2023-07-13 DIAGNOSIS — Z78.0 MENOPAUSE: Primary | ICD-10-CM

## 2023-07-13 DIAGNOSIS — R68.82 DECREASED LIBIDO: ICD-10-CM

## 2023-07-13 PROCEDURE — 99214 OFFICE O/P EST MOD 30 MIN: CPT | Mod: S$GLB,,, | Performed by: OBSTETRICS & GYNECOLOGY

## 2023-07-13 PROCEDURE — 99214 PR OFFICE/OUTPT VISIT, EST, LEVL IV, 30-39 MIN: ICD-10-PCS | Mod: S$GLB,,, | Performed by: OBSTETRICS & GYNECOLOGY

## 2023-07-13 RX ORDER — ESTRADIOL 1 MG/1
1 TABLET ORAL DAILY
Qty: 30 TABLET | Refills: 2 | Status: SHIPPED | OUTPATIENT
Start: 2023-07-13 | End: 2024-01-22

## 2023-07-13 NOTE — PROGRESS NOTES
Subjective     Patient ID: Sandra Tucker is a 53 y.o. female.    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 This is a new problem. The current episode started 1 to 4 weeks ago. The problem occurs intermittently. The problem has been unchanged. The patient is experiencing no pain. She is not pregnant. Associated symptoms include headaches. Pertinent negatives include no abdominal pain, anorexia, back pain, chills, constipation, diarrhea, discolored urine, dysuria, fever, flank pain, frequency, hematuria, nausea, painful intercourse, rash, urgency or vomiting. There has been no bleeding. She has not been passing clots. She has not been passing tissue. Nothing aggravates the symptoms. She has tried acetaminophen for the symptoms. The treatment provided mild relief. She is sexually active. No, her partner does not have an STD. She is postmenopausal. Her past medical history is significant for a gynecological surgery and ovarian cysts. There is no history of an abdominal surgery, a  section, an ectopic pregnancy, endometriosis, herpes simplex, menorrhagia, metrorrhagia, miscarriage, perineal abscess, PID, an STD, a terminated pregnancy or vaginosis.   Review of Systems   Constitutional:  Negative for chills and fever.   Gastrointestinal:  Negative for abdominal pain, anorexia, constipation, diarrhea, nausea and vomiting.   Genitourinary:  Negative for dysuria, flank pain, frequency, hematuria, menorrhagia, missed menses, pelvic pain, urgency and vaginal discharge.   Musculoskeletal:  Negative for back pain.   Integumentary:  Negative for  rash.   Neurological:  Positive for headaches.   Psychiatric/Behavioral:  Positive for sleep disturbance.         Objective     Physical Exam  Vitals and nursing note reviewed.   Constitutional:       Appearance: Normal appearance.   HENT:      Head: Normocephalic and atraumatic.   Pulmonary:      Effort: Pulmonary effort is normal.   Neurological:      Mental Status: She is alert.   Psychiatric:         Mood and Affect: Mood normal.         Behavior: Behavior normal.         Thought Content: Thought content normal.          Assessment and Plan     Menopause    Decreased libido    Other orders  -     estradioL (ESTRACE) 1 MG tablet; Take 1 tablet (1 mg total) by mouth once daily.  Dispense: 30 tablet; Refill: 2      Disc R/B and SE of ERT-disc osteoporosis, ht ds, BRCA, disc lab values and symptom control with QOL issues like HF and libido  Disc limiting life time exposure to extra estrogens and maintaining QOL  RTC 3 months for annual         No follow-ups on file.

## 2023-07-18 ENCOUNTER — HOSPITAL ENCOUNTER (OUTPATIENT)
Dept: PHYSICAL THERAPY | Age: 53
Setting detail: THERAPIES SERIES
Discharge: HOME | End: 2023-07-18
Payer: COMMERCIAL

## 2023-07-18 PROCEDURE — 97530 THERAPEUTIC ACTIVITIES: CPT

## 2023-07-18 PROCEDURE — 97110 THERAPEUTIC EXERCISES: CPT

## 2023-07-18 NOTE — FLOWSHEET NOTE
[x] [address]  4600 [facility] [place].  [phone]  F: [phone] [] 204 [facility]  [address]   [address]  P: [phone]  F: [phone] [] [address]  151 [facility]  P: [phone]  F: [phone] [] [facility]: [phone]  F: [phone] [] 224 [facility]  [address] [facility]   [address]   P: [phone]  F: [phone]  [] [address] [facility].   P: [phone]  F: [phone] [] 205 [facility]  [address]  P: [phone]  F: [phone] [] 224 [facility]  7[address] [facility]  [place]: [phone]  F: [phone] [] [address] [place] [address]  [place]: [phone]  F: [phone]      Physical Therapy Daily Treatment Note    Date:  2023  Patient Name:  Jimenez Bang    :  1970  MRN: [id number]   Physician: Manuelito Stiles CNP               Insurance: [facility]   Approval was received for 40 visits, from 23 to 23. Authorization number [id number]  Medical Diagnosis: Bilateral hip pain M25.551, M25.552      Rehab Codes: M25.551, M25.552, M54.5  Onset Date: 23                                 Next 's appt: not scheduled  Visit# / total visits: ; Progress note  STG reassessment due at visit # 8     Cancels/No Shows: 0/0    Subjective:    Pain:  [x] Yes  [] No Location:  LB/ SI /buttock area    Pain Rating: (0-10 scale) 3/10 waist band area.    Pain altered Tx:  [] No  [x] Yes  Action: mat exercises

## 2023-07-21 ENCOUNTER — HOSPITAL ENCOUNTER (OUTPATIENT)
Dept: PHYSICAL THERAPY | Age: 53
Setting detail: THERAPIES SERIES
Discharge: HOME | End: 2023-07-21
Payer: COMMERCIAL

## 2023-07-21 PROCEDURE — 97110 THERAPEUTIC EXERCISES: CPT

## 2023-07-21 NOTE — FLOWSHEET NOTE
onlty HP to LB post exercises,  Comments:     Reports LB is tight but no pain, with arrival  Addressing HEP and reports it has been helpful. Objective:  Modalities:  HP to LB in supine with wedge post seeeion x 12 mins    Precautions:  Exercises:   PT PREFERS TO WORK IN BACK TX AREA  Exercise   LB/SI rad stephen LE Reps/ Time Weight/ Level Comments 7/21/2023  Completed X   Nu step  5 mins  L2 Added 7/21 x          Education  x  Body mechanics with transfers/bed mobility. Stretching/exercises_purpose x   standing   Added 7/18    Hip ext  10x  Stephen, at elevated mat forearm on mat.  x   Hip abd w/ iso abdom 10x  Stephen  x          Supine       LTR 5x10\"   x   SKTC       DKTC 3x20\"  Towel, lower one leg at time  x   Iso abdom/neutral pelvis 10x5'    x   Neutral pelvis w/ march 2x10   x   Neutal pelvi w/  Alt UE 10x 1 lbs  x   Neutal pelvis hip add  10x      Neutal pelvis w/ alt  same UE/LE  1x12 1 lbs UE  x   Neutal pelvis with knee ext  1x10  Ball btw knees, added 7/21 x   Gluteal sets  10x5\"      butterfly 15x lime     Manual  3 x 1 min   R hip, distraction. SLR 1x10    x          Sitting       Lumbar flexion 3x15\"   x   Lumbar rotation 3x15ea      Lumbar lateral bending    One hand on hip,     HS stretch  3x20\"  Stephen  x   Piriformis stretch 3x20\"   Stephen, Prefers sitting vs hooklying   x   Hip  ER  add lime            SL    Add     clamshells        Hip abd. Other:        Treatment Charges: Mins Units   []  Modalities Hp      [x]  Ther Exercise  55 4   []  Manual Therapy     []  Ther Activities (education on purpose of PT/stretching/strengthening ,mechanics)     []  Neuro Re-ed     []  Vasocompression     [] Gait     []  Other     Total Treatment time 55 4       Assessment: [x] Progressing toward goals Progressed gluteal strengthening and continue with stretching and core strengthening as charted.   Increased focus on purpose of stretching/frequency and purpose and technique with all

## 2023-07-25 ENCOUNTER — HOSPITAL ENCOUNTER (OUTPATIENT)
Dept: PHYSICAL THERAPY | Age: 53
Setting detail: THERAPIES SERIES
Discharge: HOME | End: 2023-07-25
Payer: COMMERCIAL

## 2023-07-25 PROCEDURE — 97110 THERAPEUTIC EXERCISES: CPT

## 2023-07-25 NOTE — FLOWSHEET NOTE
[x] [address]  4600 [facility].  [phone]  F: [phone] [] 204 [facility]  [address]   [address]  P: [phone]  F: [phone] [] [address]  151 [facility]  P: [phone]  F: [phone] [] [place]: [phone]  F: [phone] [] 224 [facility]  One [facility]   [address]   P: [phone]  F: [phone]  [] 69[address] [facility].   P: [phone]  F: [phone] [] 205 [facility]  [address]  [address]  P: [phone]  F: [phone] [] 224 [facility]  [address] [facility]  [place]: [phone]  F: [phone] [] [address] [facility] [address]  [place]: [phone]  F: [phone]      Physical Therapy Daily Treatment Note    Date:  2023  Patient Name:  Marleni Wong    :  1970  MRN: [id number]   Physician: Pietro Carter CNP               Insurance: [facility]   Approval was received for 40 visits, from 23 to 23. Authorization number [id number]  Medical Diagnosis: Bilateral hip pain M25.551, M25.552      Rehab Codes: M25.551, M25.552, M54.5  Onset Date: 23                                 Next 's appt: not scheduled  Visit# / total visits: ; Progress note  STG reassessment due at visit # 8     Cancels/No Shows: 0/0    Subjective:    Pain:  [] Yes  [x] No Location:  LB/ SI /buttock area    Pain Rating: (0-10 scale) 3/10 waist band area.    Pain altered Tx:  [] No  [] Yes  Action:   Comments:

## 2023-07-27 ENCOUNTER — HOSPITAL ENCOUNTER (OUTPATIENT)
Dept: PHYSICAL THERAPY | Age: 53
Setting detail: THERAPIES SERIES
Discharge: HOME | End: 2023-07-27
Payer: COMMERCIAL

## 2023-07-27 NOTE — FLOWSHEET NOTE
[x] [facility] - [facility] &  Therapy  4600 [facility].    [phone]  F: [phone]   [] [address]  [address]  P: [phone]  F: [phone]  [] [address]  [address]  P: [phone]  F: [phone] [] [place]  P: [phone]  F: [phone]  [] 224 [facility]  [address]   [address]   [place]: [phone]  F: [phone]   [] 97 [facility]  [address]  [place]: [phone]   F: [phone]     Physical Therapy Cancel/No Show note    Date: 2023  Patient: Vadim Gomez  : 1970  MRN: [id number]    Cancels/No Shows to date:  (1 cancel prior to eval)    For today's appointment patient:    [x]  Cancelled    [] Rescheduled appointment    [] No-show     Reason given by patient:    []  Patient ill    []  Conflicting appointment    [x] No transportation      [] Conflict with work    [] No reason given    [] Weather related    [] [id number]    [] Other:      Comments:  23 CX due to having so many appts forgot she had this one today will be at appt on       [x] Next appointment was confirmed     Electronically signed by: Yolanda Baires, PT

## 2023-08-04 ENCOUNTER — HOSPITAL ENCOUNTER (OUTPATIENT)
Dept: PHYSICAL THERAPY | Age: 53
Setting detail: THERAPIES SERIES
Discharge: HOME | End: 2023-08-04
Payer: COMMERCIAL

## 2023-08-04 PROCEDURE — 97110 THERAPEUTIC EXERCISES: CPT

## 2023-08-04 NOTE — FLOWSHEET NOTE
throughout. [] No change. [] Other:  [x] Patient would continue to benefit from skilled physical therapy services in order to: improve core and B hip strength to improve ADL tolerance. Patient presents chronic lumbar and bilateral posterior hip pain likely due to radicular back pain and potential SI joint dysfunction    STG/LTG  STG: (to be met in 8 treatments)  ? Pain: 4/10 low back / hip pain with ADLs. ? ROM:Patient demonstrates all trunk ROM @ 100% WNL to improve reaching. ? Strength: 4+/5 B hip flexion and extension to demonstrate improved core strength. ? Function:LEFS score to 90% functional or better to improve ADLs. Independent with Home Exercise Programs     LTG: (to be met in 12 treatments)  Patient is able to bend and lift 20 lbs from floor to waist without back pain. Patient is able to walk community distances without lumbar or hip pain. Patient goals: To get better. Pt. Education:  [x] Yes  [] No  [x] Reviewed Prior HEP/Ed  Method of Education: [x] Verbal  [x] Demo  [x] Written  Comprehension of Education:  [x] Verbalizes understanding. [] Demonstrates understanding. [x] Needs review stretches   [] Demonstrates/verbalizes HEP/Ed previously given. Access Code: [email or web address]  URL: [email or web address]  Date: 06/20/2023  Prepared by: Puneet Ramirez     Exercises  - Supine Lower Trunk Rotation  - 1 x daily - 7 x weekly - 3 sets - 10 reps  - Supine Piriformis Stretch with Foot on Ground  - 1 x daily - 7 x weekly - 3 sets - 10 reps  - Supine Bridge  - 1 x daily - 7 x weekly - 3 sets - 10 reps   7/10/23  - Supine Posterior Pelvic Tilt  - 1 x daily - 7 x weekly - 2 sets - 10 reps - 3 hold  - Supine March with Posterior Pelvic Tilt  - 1 x daily - 7 x weekly - 2 sets - 10 reps - 3 hold  - Seated Flexion Stretch  - 1 x daily - 7 x weekly - 1 sets - 3 reps - 10 hold  - Seated Trunk Rotation - Arms Crossed  - 1 x daily - 7 x weekly - 1 sets - 3 reps - 10 hold   - Seated

## 2023-08-09 ENCOUNTER — HOSPITAL ENCOUNTER (OUTPATIENT)
Dept: PHYSICAL THERAPY | Age: 53
Setting detail: THERAPIES SERIES
Discharge: HOME | End: 2023-08-09
Payer: COMMERCIAL

## 2023-08-09 PROCEDURE — 97110 THERAPEUTIC EXERCISES: CPT

## 2023-08-09 PROCEDURE — 97140 MANUAL THERAPY 1/> REGIONS: CPT

## 2023-08-09 NOTE — FLOWSHEET NOTE
[x] [address]  4600 [facility] [place] [facility].  [phone]  F: [phone] [] 204 [facility]  [address]   [address]  P: [phone]  F: [phone] [] [address]  [address] [facility]  P: [phone]  F: [phone] [] [place]: [phone]  F: [phone] [] 224 [facility]  [address] [facility]   [address]   P: [phone]  F: [phone]  [] 7[address] [facility].   P: [phone]  F: [phone] [] 205 [facility]  [address]  P: [phone]  F: [phone] [] 224 [facility]  [address] [facility]  [place]: [phone]  F: [phone] [] [address] [place]  [address]  [place]: [phone]  F: [phone]      Physical Therapy Daily Treatment Note    Date:  2023  Patient Name:  Ina Metcalf    :  1970  MRN: [id number]   Physician: Abram Medel CNP               Insurance: [facility]   Approval was received for 40 visits, from 23 to 23. Authorization number [id number]  Medical Diagnosis: Bilateral hip pain M25.551, M25.552      Rehab Codes: M25.551, M25.552, M54.5  Onset Date: 23                                 Next 's appt: not scheduled  Visit# / total visits: ; Progress note  STG reassessment due at visit # 8     Cancels/No Shows: 0/0    Subjective:    Pain:  [] Yes  [x] No Location:  LB/ SI /buttock area    Pain Rating: (0-10 scale) 6/10 waist band area.    Pain altered Tx:  [] No  [] Yes  Action:   Comments:

## 2023-08-11 ENCOUNTER — HOSPITAL ENCOUNTER (OUTPATIENT)
Dept: PHYSICAL THERAPY | Age: 53
Setting detail: THERAPIES SERIES
Discharge: HOME | End: 2023-08-11
Payer: COMMERCIAL

## 2023-08-11 NOTE — FLOWSHEET NOTE
[x] [address]  4600 [facility].    [phone]  F: [phone]     Physical Therapy Cancel/No Show note    Date: 2023  Patient: Thompson Xavier  : 1970  MRN: [id number]    Cancels/No Shows to date: 3/0 (1 cancel prior to eval)    For today's appointment patient:    [x]  Cancelled    [] Rescheduled appointment    [] No-show     Reason given by patient:    [x]  Patient ill    []  Conflicting appointment    [] No transportation      [] Conflict with work    [] No reason given    [] Weather related    [] [id number]    [] Other:      Comments:  23 CX at 1:45 d/t not feeling well.       [x] Next appointment was confirmed previously    Electronically signed by: Edy Goldberg PT

## 2023-08-14 ENCOUNTER — HOSPITAL ENCOUNTER (OUTPATIENT)
Dept: PHYSICAL THERAPY | Age: 53
Setting detail: THERAPIES SERIES
Discharge: HOME | End: 2023-08-14
Payer: COMMERCIAL

## 2023-08-14 PROCEDURE — 97140 MANUAL THERAPY 1/> REGIONS: CPT

## 2023-08-14 PROCEDURE — 97110 THERAPEUTIC EXERCISES: CPT

## 2023-08-14 NOTE — FLOWSHEET NOTE
[x] [address] &  Therapy  4600 [facility].    [phone]  F: [phone]   [] 204 [facility]  [address]   [address]  P: [phone]  F: [phone]  [] [address]  [address] [place] [address]  P: [phone]  F: [phone] [] [facility]  P: [phone]  F: [phone]  [] [address] [place] [address]  [address]   [address]   [place]: [phone]  F: [phone]   [] 97 [facility]  [address]  [place]: [phone]   F: [phone]     Physical Therapy Cancel/No Show note    Date: 2023  Patient: Cedrick Bender  : 1970  MRN: [id number]    Cancels/No Shows to date:     For today's appointment patient:    [x]  Cancelled, same day. [] Rescheduled appointment    [] No-show     Reason given by patient:    []  Patient ill    []  Conflicting appointment    [] No transportation      [] Conflict with work    [x] No reason given    [] Weather related    [] [id number]    [x] Other:      Comments:  Remind pt on attendance policy.        [x] Next appointment was confirmed    Electronically signed by: Lashon Jules PTA
charted  Comments:  20 mins late. Reports the manual therapy  from prior session was helpful, felt better after prior session. However was active on her feet a lot on weekend with grandkids and pain is 6/10 in LB     Objective:  Modalities:  Hypervolt to LB and piriformis stephen x 8 mins total in side lying**   Precautions:  Exercises:   Exercise   LB/SI rad stephen LE Reps/ Time Weight/ Level Comments 8/14/2023  Completed X   Nu step  6 mins  L2   x           Education  x  Body mechanics with transfers/bed mobility. Stretching/exercises_purpose    standing       Hip ext  15x  Stephen, at elevated mat forearm on mat.  x    Hip abd w/ iso abdom 15x 1.5 lbs Stephen,   x   SLR/hip  10x 1.5 lbs   x          Supine       LTR 5x10\"    x    SKTC 3x20\"      DKTC 3x20\"    x   Piriformis stretch 3x20\"  Figure 4    Iso abdom/neutral pelvis 10x5'    x   Neutral pelvis w/ march 2x10x 1.5 lbs  x   Neutal pelvi w/  Alt UE 2x10 2 lbs  x   Neutal pelvis w/ alt  same UE/LE  2x10 2 lbs UE   x   Dead bug 1x10 2 lbs U/LE  x   Neutal pelvis with knee ext  1x10  Ball btw knees            butterfly 15x lime     Manual  3 x 1 min   R hip, distraction. SLR 2x10  2 lbs   x          Sitting       Lumbar flexion 3x15\"  2x15\" 8/14 x   Lumbar rotation 3x15ea  2x15 8/14 x   HS stretch  3x20\"  Stephen     Piriformis stretch 3x30\"  Stephen,     x   Hip  ER  15x lime     Scapular retraction   5x A Added 8/14 x   SL        clamshells 15x  Lime        Hip abd. 15x A                          Other:  alternating exercise due to time constraints. Manual: **see above and also added/trial 8/14/23:  Then pt addressed tennis ball piriformis TPR then thereacane self lumbar paraspinal DTM trials for about 3 mins      Treatment Charges: Mins Units   [x]  Modalities Hp      [x]  Ther Exercise  36 2   [x]  Manual Therapy   8 1   []  Ther Activities (education on purpose of PT/stretching/strengthening ,mechanics)     []  Neuro Re-ed     []  Vasocompression     [] Gait     []

## 2023-08-16 ENCOUNTER — HOSPITAL ENCOUNTER (OUTPATIENT)
Dept: PHYSICAL THERAPY | Age: 53
Setting detail: THERAPIES SERIES
Discharge: HOME | End: 2023-08-16
Payer: COMMERCIAL

## 2023-08-16 NOTE — FLOWSHEET NOTE
[x] [facility] ([facility]) - [facility] &  Therapy  [address] [facility] [place] [facility].    [phone]  F: [phone]   [] [address]  [address]  P: [phone]  F: [phone]  [] [address]  [address]  P: [phone]  F: [phone] [] [facility]  P: [phone]  F: [phone]  [] [address]   [address]   [place]: [phone]  F: [phone]   [] [address] [facility]  [address] [facility] [address] [facility]  [place]: [phone]   F: [phone]     Physical Therapy Cancel/No Show note    Date: 2023  Patient: Tan Duff  : 1970  MRN: [id number]    Cancels/No Shows to date:     For today's appointment patient:    [x]  Cancelled, same day cancel.     [] Rescheduled appointment    [] No-show     Reason given by patient:    []  Patient ill    []  Conflicting appointment    [] No transportation      [] Conflict with work    [x] No reason given    [] Weather related    [] [id number]    [x] Other:      Comments:  Pt has written copy of schedule for wk of 23      [] Next appointment was confirmed    Electronically signed by: Bharati Belle PTA

## 2023-08-21 ENCOUNTER — HOSPITAL ENCOUNTER (OUTPATIENT)
Dept: PHYSICAL THERAPY | Age: 53
Setting detail: THERAPIES SERIES
Discharge: HOME | End: 2023-08-21
Payer: COMMERCIAL

## 2023-08-21 NOTE — FLOWSHEET NOTE
[x] [facility] ([facility]) - [facility] &  Therapy  [address] [facility].    [phone]  F: [phone]   [] 204 [facility]  [address]   [address]  P: [phone]  F: [phone]  [] [address] &  Therapy  [address]  P: [phone]  F: [phone] [] [place]  P: [phone]  F: [phone]  [] [address] [facility]  [address]   [address]   [place]: [phone]  F: [phone]   [] 97 [facility]  [address]  [place]: [phone]   F: [phone]     Physical Therapy Cancel/No Show note    Date: 2023  Patient: Kacie Chicas  : 1970  MRN: [id number]    Cancels/No Shows to date:     For today's appointment patient:    [x]  Cancelled, same day cx.     [] Rescheduled appointment    [] No-show     Reason given by patient:    []  Patient ill    []  Conflicting appointment    [] No transportation      [] Conflict with work    [] No reason given    [] Weather related    [] COVID-19    [x] Other:      Comments:  called at 255PM to change appt time for 300PM appt. Pt offered 400PM with another therapist. Pt declined per office staff. Next appt of 23 was confirmed.        [] Next appointment was confirmed    Electronically signed by: Mere Chino PTA

## 2023-08-23 ENCOUNTER — OFFICE VISIT (OUTPATIENT)
Dept: ORTHOPEDIC SURGERY | Age: 53
End: 2023-08-23

## 2023-08-23 ENCOUNTER — HOSPITAL ENCOUNTER (OUTPATIENT)
Dept: PHYSICAL THERAPY | Age: 53
Setting detail: THERAPIES SERIES
Discharge: HOME | End: 2023-08-23
Payer: COMMERCIAL

## 2023-08-23 VITALS — WEIGHT: 167 LBS | HEIGHT: 62 IN | BODY MASS INDEX: 30.73 KG/M2

## 2023-08-23 DIAGNOSIS — M54.50 CHRONIC MIDLINE LOW BACK PAIN, UNSPECIFIED WHETHER SCIATICA PRESENT: ICD-10-CM

## 2023-08-23 DIAGNOSIS — M48.061 SPINAL STENOSIS OF LUMBAR REGION WITHOUT NEUROGENIC CLAUDICATION: ICD-10-CM

## 2023-08-23 DIAGNOSIS — G89.29 CHRONIC MIDLINE LOW BACK PAIN, UNSPECIFIED WHETHER SCIATICA PRESENT: ICD-10-CM

## 2023-08-23 DIAGNOSIS — M25.551 RIGHT HIP PAIN: Primary | ICD-10-CM

## 2023-08-23 DIAGNOSIS — M54.16 LUMBAR RADICULOPATHY: ICD-10-CM

## 2023-08-23 NOTE — FLOWSHEET NOTE
[x] [facility] ([facility]) - [facility] &  Therapy  [address] [facility].    [phone]  F: [phone]   [] [address]  [address]  P: [phone]  F: [phone]  [] [address]  [address]  P: [phone]  F: [phone] [] [facility]  P: [phone]  F: [phone]  [] 224 [facility]  [address]   [address]   [place]: [phone]  F: [phone]   [] 97 [facility]  [address] [facility] [address]  [place]: [phone]   F: [phone]     Physical Therapy Cancel/No Show note    Date: 2023  Patient: Stefan Beltran  : 1970  MRN: [id number]    Cancels/No Shows to date:     For today's appointment patient:    [x]  Cancelled, same day cx.     [] Rescheduled appointment    [] No-show     Reason given by patient:    []  Patient ill    []  Conflicting appointment    [] No transportation      [] Conflict with work    [] No reason given    [] Weather related    [] [id number]    [x] Other:      Comments:  23 CX due to getting MRI done in the morning/Hurting      [] Next appointment was confirmed    Electronically signed by: Luciana Carcamo, PT

## 2023-08-24 ENCOUNTER — HOSPITAL ENCOUNTER (OUTPATIENT)
Dept: MRI IMAGING | Age: 53
Discharge: HOME | End: 2023-08-26
Payer: COMMERCIAL

## 2023-08-24 DIAGNOSIS — M54.50 CHRONIC MIDLINE LOW BACK PAIN, UNSPECIFIED WHETHER SCIATICA PRESENT: ICD-10-CM

## 2023-08-24 DIAGNOSIS — G89.29 CHRONIC MIDLINE LOW BACK PAIN, UNSPECIFIED WHETHER SCIATICA PRESENT: ICD-10-CM

## 2023-08-24 DIAGNOSIS — M54.16 LUMBAR RADICULOPATHY: ICD-10-CM

## 2023-08-24 DIAGNOSIS — M48.061 SPINAL STENOSIS OF LUMBAR REGION WITHOUT NEUROGENIC CLAUDICATION: ICD-10-CM

## 2023-08-24 PROCEDURE — 72148 MRI LUMBAR SPINE W/O DYE: CPT

## 2023-08-24 NOTE — PROGRESS NOTES
section with patient who did agree and confirmed everything documented. Discussed plan and patient expressed understanding of diagnosis andprognosis with plan as stated. All questions answered.      Kristen Hsieh MD  Orthopedic Surgery Resident, PGY-2  [address], [place]

## 2023-08-29 ENCOUNTER — HOSPITAL ENCOUNTER (OUTPATIENT)
Dept: PHYSICAL THERAPY | Age: 53
Setting detail: THERAPIES SERIES
Discharge: HOME | End: 2023-08-29
Payer: COMMERCIAL

## 2023-08-29 PROCEDURE — 97140 MANUAL THERAPY 1/> REGIONS: CPT

## 2023-08-29 PROCEDURE — 97110 THERAPEUTIC EXERCISES: CPT

## 2023-08-29 NOTE — FLOWSHEET NOTE
sets - 10 reps - 3 hold  - Seated Flexion Stretch  - 1 x daily - 7 x weekly - 1 sets - 3 reps - 10 hold  - Seated Trunk Rotation - Arms Crossed  - 1 x daily - 7 x weekly - 1 sets - 3 reps - 10 hold   - Seated Hamstring Stretch  - 1 x daily - 7 x weekly - 1 sets - 10 reps  - Active Straight Leg Raise with Quad Set  - 1 x daily - 7 x weekly - 3 sets - 10 reps  7/21/23: SITTING PIRIFORMIS STRETCHING     Plan: [x] Continue current frequency toward long and short term goals. [x] Specific InstrFrequency:  2 x/week for 12 visits  Instructions for subsequent treatments: core strengthening, add standing hip abd, ext.        Time In:  1511        Time Out:   1603    Electronically signed by:  Lashon Jules PTA

## 2023-08-30 ENCOUNTER — OFFICE VISIT (OUTPATIENT)
Dept: ORTHOPEDIC SURGERY | Age: 53
End: 2023-08-30

## 2023-08-30 ENCOUNTER — TELEPHONE (OUTPATIENT)
Dept: ORTHOPEDIC SURGERY | Age: 53
End: 2023-08-30

## 2023-08-30 VITALS — WEIGHT: 167 LBS | HEIGHT: 62 IN | BODY MASS INDEX: 30.73 KG/M2

## 2023-08-30 DIAGNOSIS — G89.29 CHRONIC MIDLINE LOW BACK PAIN, UNSPECIFIED WHETHER SCIATICA PRESENT: Primary | ICD-10-CM

## 2023-08-30 DIAGNOSIS — M54.50 CHRONIC MIDLINE LOW BACK PAIN, UNSPECIFIED WHETHER SCIATICA PRESENT: Primary | ICD-10-CM

## 2023-08-30 DIAGNOSIS — M84.48XA BILATERAL SACRAL INSUFFICIENCY FRACTURE, INITIAL ENCOUNTER: Primary | ICD-10-CM

## 2023-08-30 NOTE — TELEPHONE ENCOUNTER
Suzy Cobian from Dr. Meaghan Feldman office called regarding referral that was sent to office and needed patient insurance information. Patient has caresource-stated office does not take that insurance.  Called patient to notify her, no answer left voicemail to call office for new referral.

## 2023-08-30 NOTE — PROGRESS NOTES
[address] [facility] ORTHO SPECIALISTS  [address] [address]  Dept: [phone]    Ambulatory Orthopedic Office Visit    CHIEF COMPLAINT:    Chief Complaint   Patient presents with    Results     MRI results lumbar spine       INTERVAL HPI:  Patient received her MRI and is here for evaluation. Patient denies any new symptoms. Still complaining of low back pain. Minimal hip pain. No numbness/tingling down legs. Endorses occasional radicular symptoms down right leg. HISTORY OF PRESENT ILLNESS:    The patient is a 46 y. o.female who is being seen for follow-up regarding her right hip pain. She was most recently seen in our clinic on 10/19/2022 where she was prescribed physical therapy. She is instructed to follow-up 8 weeks later however she has not seen in clinic. She is here to discuss options for right hip pain. Patient seen evaluated in clinic today with continued pain. She states all of her pain is located in her low back and posterior right hip in the gluteal region. She states she occasionally has radiating pain down her right lower extremity. She states the pain is exacerbated by walking. She states she can only walk a block before she must stop and rest.  She states physical therapy has provided minimal relief for her. She does note over the past several weeks she has had instances of loss of control of her bowel and bladder. She states she has numbness and tingling that occasionally will radiate into her groin area. Currently she has control of her bladder and bowels, currently denies numbness or tingling or sensory changes in her genital region. She denies prior work-up for her low back pain.       Past Medical History:    Past Medical History:   Diagnosis Date    Asthma     Bowel obstruction (HCC)     Chronic back pain     Chronic hepatitis (HCC)     HCV    GERD (gastroesophageal reflux disease)     Heart abnormality     \"small

## 2023-08-31 ENCOUNTER — TELEPHONE (OUTPATIENT)
Dept: ORTHOPEDIC SURGERY | Age: 53
End: 2023-08-31

## 2023-08-31 NOTE — TELEPHONE ENCOUNTER
Called and informed pt to call insurance company to see where insurance is accepted.  Unsure if pt understood, line was disconnected

## 2023-08-31 NOTE — TELEPHONE ENCOUNTER
Patient called to report that  does not take her Insurance and would like to be referred some place else near [address] .

## 2023-09-08 ENCOUNTER — HOSPITAL ENCOUNTER (EMERGENCY)
Age: 53
Discharge: HOME | End: 2023-09-08
Attending: EMERGENCY MEDICINE
Payer: COMMERCIAL

## 2023-09-08 VITALS
OXYGEN SATURATION: 96 % | TEMPERATURE: 97.9 F | HEART RATE: 69 BPM | DIASTOLIC BLOOD PRESSURE: 108 MMHG | SYSTOLIC BLOOD PRESSURE: 191 MMHG | RESPIRATION RATE: 20 BRPM

## 2023-09-08 DIAGNOSIS — M54.50 ACUTE EXACERBATION OF CHRONIC LOW BACK PAIN: Primary | ICD-10-CM

## 2023-09-08 DIAGNOSIS — G89.29 ACUTE EXACERBATION OF CHRONIC LOW BACK PAIN: Primary | ICD-10-CM

## 2023-09-08 PROCEDURE — 6360000002 HC RX W HCPCS: Performed by: EMERGENCY MEDICINE

## 2023-09-08 PROCEDURE — 6370000000 HC RX 637 (ALT 250 FOR IP): Performed by: EMERGENCY MEDICINE

## 2023-09-08 PROCEDURE — 96372 THER/PROPH/DIAG INJ SC/IM: CPT

## 2023-09-08 PROCEDURE — 99284 EMERGENCY DEPT VISIT MOD MDM: CPT

## 2023-09-08 PROCEDURE — 94640 AIRWAY INHALATION TREATMENT: CPT

## 2023-09-08 PROCEDURE — 94664 DEMO&/EVAL PT USE INHALER: CPT

## 2023-09-08 RX ORDER — PREDNISONE 50 MG/1
50 TABLET ORAL DAILY
Qty: 4 TABLET | Refills: 0 | Status: SHIPPED | OUTPATIENT
Start: 2023-09-08 | End: 2023-09-12

## 2023-09-08 RX ORDER — PREDNISONE 20 MG/1
60 TABLET ORAL ONCE
Status: COMPLETED | OUTPATIENT
Start: 2023-09-08 | End: 2023-09-08

## 2023-09-08 RX ORDER — ALBUTEROL SULFATE 2.5 MG/3ML
2.5 SOLUTION RESPIRATORY (INHALATION) EVERY 6 HOURS PRN
Status: DISCONTINUED | OUTPATIENT
Start: 2023-09-08 | End: 2023-09-08 | Stop reason: HOSPADM

## 2023-09-08 RX ORDER — LIDOCAINE 4 G/G
1 PATCH TOPICAL DAILY
Status: DISCONTINUED | OUTPATIENT
Start: 2023-09-08 | End: 2023-09-08 | Stop reason: HOSPADM

## 2023-09-08 RX ORDER — ORPHENADRINE CITRATE 30 MG/ML
60 INJECTION INTRAMUSCULAR; INTRAVENOUS ONCE
Status: COMPLETED | OUTPATIENT
Start: 2023-09-08 | End: 2023-09-08

## 2023-09-08 RX ADMIN — ORPHENADRINE CITRATE 60 MG: 60 INJECTION INTRAMUSCULAR; INTRAVENOUS at 12:29

## 2023-09-08 RX ADMIN — ALBUTEROL SULFATE 2.5 MG: 2.5 SOLUTION RESPIRATORY (INHALATION) at 12:38

## 2023-09-08 RX ADMIN — PREDNISONE 60 MG: 20 TABLET ORAL at 13:23

## 2023-09-08 ASSESSMENT — ENCOUNTER SYMPTOMS
ABDOMINAL PAIN: 0
NAUSEA: 0
COUGH: 0
VOMITING: 0
SHORTNESS OF BREATH: 0
BACK PAIN: 1

## 2023-09-08 ASSESSMENT — PAIN SCALES - GENERAL: PAINLEVEL_OUTOF10: 10

## 2023-09-08 NOTE — DISCHARGE INSTRUCTIONS
You are seen in the ER today for your back pain. This is likely related to your chronic back pain and you should follow-up with Dr. Kylie Schmitt as previously discussed. Please return to the ER for any new or worsening symptoms. Including falls with worsening pain, loss of control of bowel or bladder, inability to feel when you are wiping, or any other concerning symptoms. PLEASE RETURN TO THE EMERGENCY DEPARTMENT IMMEDIATELY if you develop any concerning symptoms such as: chest pain, shortness of breath, feeling like your heart is racing, high fever not relieved by acetaminophen (Tylenol) and/or ibuprofen (Motrin / Advil), chills, persistent nausea and/or vomiting, loss of consciousness, numbness, weakness or tingling in the arms or legs or change in color of the extremities, changes in mental status, persistent or severe headache, blurry vision, loss of bladder / bowel control, unable to follow up with your physician, or other any other care or concern.
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
## 2023-09-08 NOTE — ED TRIAGE NOTES
Pt presents to ED with chronic lower back pain. Pt is supposed to be having surgery to help with the issue 9/26 but states that the pain has become unbearable. Pt denies chest pain but does state she had some SOB yesterday that has resolved today. Pt ambulatory upon arrival with walker assistance. Pt alert and oriented, resting in stretcher, placed in gown.

## 2023-09-12 ENCOUNTER — HOSPITAL ENCOUNTER (OUTPATIENT)
Dept: PHYSICAL THERAPY | Age: 53
Setting detail: THERAPIES SERIES
Discharge: HOME | End: 2023-09-12
Payer: COMMERCIAL

## 2023-09-12 PROCEDURE — 97110 THERAPEUTIC EXERCISES: CPT

## 2023-09-12 NOTE — FLOWSHEET NOTE
[x] [address]  [address] [facility].  [phone]  F: [phone] [] [address] [facility]  [address]   [address]  P: [phone]  F: [phone] [] [address]  [address] [facility]  P: [phone]  F: [phone] [] [facility] [phone]  F: [phone] [] [address] [facility]  [address] [facility]   [address]   P: [phone]  F: [phone]  [] [address] [facility].   P: [phone]  F: [phone] [] [address] [facility]  [address]  P: [phone]  F: [phone] [] [address] [facility]  [address] [facility]  [place]: [phone]  F: [phone] [] [address]  [place]: [phone]  F: [phone]      Physical Therapy Daily Treatment Note    Date:  2023  Patient Name:  Michael Nails    :  1970  MRN: [id number]   Physician: Concepción Saavedra CNP               Insurance: [facility]   Approval was received for 40 visits, from 23 to 23. Authorization number [id number]  Medical Diagnosis: Bilateral hip pain M25.551, M25.552      Rehab Codes: M25.551, M25.552, M54.5  Onset Date: 23                                 Next 's appt: not scheduled  Visit# / total visits: 10/12; Progress note  STG reassessment due at visit # 8 ADDRESS STG    Cancels/No Shows: 5/0    Subjective:    Pain:  [x] Yes  [] No Location:  LB/ SI /buttock area    Pain Rating: (0-10 scale) 4/10 LB no radicular complaints.    Pain altered Tx:  [x] No  [] Yes

## 2023-09-20 ENCOUNTER — HOSPITAL ENCOUNTER (OUTPATIENT)
Dept: PHYSICAL THERAPY | Age: 53
Setting detail: THERAPIES SERIES
Discharge: HOME | End: 2023-09-20
Payer: COMMERCIAL

## 2023-09-20 NOTE — FLOWSHEET NOTE
[x] [facility] - [facility] &  Therapy  [address] [facility].    [phone]  F: [phone]   [] [address] [facility]  [address]   [address]  P: [phone]  F: [phone]  [] [address]  [address]  P: [phone]  F: [phone] [] Favian Hernandez  P: [phone]  F: [phone]  [] [address] [facility]  [address]   [address]   [place]: [phone]  F: [phone]   [] [address] [facility]  [address]  [place]: [phone]   F: [phone]     Physical Therapy Cancel/No Show note    Date: 2023  Patient: Marleni Wong  : 1970  MRN: [id number]    Cancels/No Shows to date:     For today's appointment patient:    [x]  Cancelled, same day cancel. [] Rescheduled appointment    [] No-show     Reason given by patient:    []  Patient ill    []  Conflicting appointment    [] No transportation      [] Conflict with work    [x] No reason given    [] Weather related    [] [id number]    [x] Other:   Pt previously issued written copy of schedule. If pt cx or misses 23 session will discharge due to poor attendance.     Comments:         [x] Next appointment was confirmed 23 4 pm    Electronically signed by: Philip Urbina PTA

## 2023-09-21 ENCOUNTER — HOSPITAL ENCOUNTER (EMERGENCY)
Age: 53
Discharge: HOME | End: 2023-09-21
Attending: EMERGENCY MEDICINE
Payer: COMMERCIAL

## 2023-09-21 ENCOUNTER — APPOINTMENT (OUTPATIENT)
Dept: GENERAL RADIOLOGY | Age: 53
End: 2023-09-21
Payer: COMMERCIAL

## 2023-09-21 VITALS
OXYGEN SATURATION: 100 % | DIASTOLIC BLOOD PRESSURE: 110 MMHG | HEIGHT: 62 IN | SYSTOLIC BLOOD PRESSURE: 171 MMHG | TEMPERATURE: 98.1 F | BODY MASS INDEX: 31.52 KG/M2 | RESPIRATION RATE: 19 BRPM | HEART RATE: 91 BPM | WEIGHT: 171.3 LBS

## 2023-09-21 DIAGNOSIS — B34.9 VIRAL ILLNESS: ICD-10-CM

## 2023-09-21 DIAGNOSIS — R51.9 ACUTE NONINTRACTABLE HEADACHE, UNSPECIFIED HEADACHE TYPE: Primary | ICD-10-CM

## 2023-09-21 LAB
FLUAV AG SPEC QL: NEGATIVE
FLUBV AG SPEC QL: NEGATIVE
S PYO AG THROAT QL: NEGATIVE
SARS-COV-2 RDRP RESP QL NAA+PROBE: NOT DETECTED
SPECIMEN DESCRIPTION: NORMAL
SPECIMEN SOURCE: NORMAL

## 2023-09-21 PROCEDURE — 6370000000 HC RX 637 (ALT 250 FOR IP): Performed by: STUDENT IN AN ORGANIZED HEALTH CARE EDUCATION/TRAINING PROGRAM

## 2023-09-21 PROCEDURE — 71046 X-RAY EXAM CHEST 2 VIEWS: CPT

## 2023-09-21 PROCEDURE — 87635 SARS-COV-2 COVID-19 AMP PRB: CPT

## 2023-09-21 PROCEDURE — 87804 INFLUENZA ASSAY W/OPTIC: CPT

## 2023-09-21 PROCEDURE — 87880 STREP A ASSAY W/OPTIC: CPT

## 2023-09-21 PROCEDURE — 99284 EMERGENCY DEPT VISIT MOD MDM: CPT

## 2023-09-21 RX ORDER — DIPHENHYDRAMINE HCL 25 MG
25 TABLET ORAL EVERY 6 HOURS PRN
Status: DISCONTINUED | OUTPATIENT
Start: 2023-09-21 | End: 2023-09-22 | Stop reason: HOSPADM

## 2023-09-21 RX ORDER — METOCLOPRAMIDE 10 MG/1
10 TABLET ORAL ONCE
Status: COMPLETED | OUTPATIENT
Start: 2023-09-21 | End: 2023-09-21

## 2023-09-21 RX ORDER — ASPIRIN 81 MG
5 TABLET, DELAYED RELEASE (ENTERIC COATED) ORAL 2 TIMES DAILY
Qty: 15 ML | Refills: 0 | Status: SHIPPED | OUTPATIENT
Start: 2023-09-21 | End: 2023-10-21

## 2023-09-21 RX ORDER — METOCLOPRAMIDE HYDROCHLORIDE 5 MG/ML
10 INJECTION INTRAMUSCULAR; INTRAVENOUS ONCE
Status: DISCONTINUED | OUTPATIENT
Start: 2023-09-21 | End: 2023-09-21

## 2023-09-21 RX ORDER — ACETAMINOPHEN 500 MG
1000 TABLET ORAL ONCE
Status: COMPLETED | OUTPATIENT
Start: 2023-09-21 | End: 2023-09-21

## 2023-09-21 RX ADMIN — METOCLOPRAMIDE 10 MG: 10 TABLET ORAL at 20:52

## 2023-09-21 RX ADMIN — DIPHENHYDRAMINE HYDROCHLORIDE 25 MG: 25 TABLET ORAL at 20:52

## 2023-09-21 RX ADMIN — ACETAMINOPHEN 1000 MG: 500 TABLET ORAL at 19:38

## 2023-09-21 ASSESSMENT — PAIN SCALES - GENERAL: PAINLEVEL_OUTOF10: 10

## 2023-09-21 ASSESSMENT — PAIN - FUNCTIONAL ASSESSMENT: PAIN_FUNCTIONAL_ASSESSMENT: 0-10
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
## 2023-09-21 NOTE — ED NOTES
Pt to xray via [facility], RN  09/21/23 1934

## 2023-09-21 NOTE — ED TRIAGE NOTES
Pt presents to the ED with c/o migraines and bilateral ear pain x 3days. Pt states throbbing in her ears that radiates to the back of her head. Pt denies taking any medications for pain. Pt states hx of htn and reports taking her daily medications. Pt does not appear to be in distress. Pt a/o x4. Ambulatory to ED room 34. Pt placed on monitor.

## 2023-09-22 NOTE — DISCHARGE INSTRUCTIONS
There is talometatarsal for headache, ear pain, sore throat. Laboratory studies including strep swab, influenza test, COVID test all negative. X-ray of the chest is also negative. Symptoms likely due to a viral infection. He should remain well-hydrated at home. He can use Tylenol Motrin for pain. Additionally, given a prescription for eardrops I will help with the wax buildup in ears. Please follow-up with a primary care provider. Please return for any new or worsening symptoms.

## 2023-09-24 ASSESSMENT — ENCOUNTER SYMPTOMS
BACK PAIN: 0
ABDOMINAL PAIN: 0
SHORTNESS OF BREATH: 0
CONSTIPATION: 0
NAUSEA: 0
DIARRHEA: 0
VOMITING: 0
SINUS PRESSURE: 0
WHEEZING: 0
SORE THROAT: 0

## 2023-09-26 ENCOUNTER — OFFICE VISIT (OUTPATIENT)
Dept: ORTHOPEDIC SURGERY | Age: 53
End: 2023-09-26
Payer: COMMERCIAL

## 2023-09-26 VITALS — WEIGHT: 167 LBS | RESPIRATION RATE: 14 BRPM | BODY MASS INDEX: 30.73 KG/M2 | HEIGHT: 62 IN

## 2023-09-26 DIAGNOSIS — M47.816 LUMBAR FACET ARTHROPATHY: ICD-10-CM

## 2023-09-26 DIAGNOSIS — M43.10 ACQUIRED SPONDYLOLISTHESIS: Primary | ICD-10-CM

## 2023-09-26 PROCEDURE — 3017F COLORECTAL CA SCREEN DOC REV: CPT | Performed by: ORTHOPAEDIC SURGERY

## 2023-09-26 PROCEDURE — G8427 DOCREV CUR MEDS BY ELIG CLIN: HCPCS | Performed by: ORTHOPAEDIC SURGERY

## 2023-09-26 PROCEDURE — G8417 CALC BMI ABV UP PARAM F/U: HCPCS | Performed by: ORTHOPAEDIC SURGERY

## 2023-09-26 PROCEDURE — 99203 OFFICE O/P NEW LOW 30 MIN: CPT | Performed by: ORTHOPAEDIC SURGERY

## 2023-09-26 PROCEDURE — 4004F PT TOBACCO SCREEN RCVD TLK: CPT | Performed by: ORTHOPAEDIC SURGERY

## 2023-09-26 RX ORDER — PREDNISONE 20 MG/1
60 TABLET ORAL DAILY
COMMUNITY
Start: 2023-05-19

## 2023-09-26 NOTE — PROGRESS NOTES
Lakesha Long DO at [address] [facility] N/A 10/15/2020    COLONOSCOPY POLYPECTOMY SNARE/COLD BIOPSY performed by Abdi Edmondson MD at [facility] Endoscopy    HYSTERECTOMY ([address])      KNEE SURGERY Right     has pins in right knee, at age 5    OTHER SURGICAL HISTORY  2000 or earlier ?    surgery for bowel obstruction    TUBAL LIGATION      UPPER GASTROINTESTINAL ENDOSCOPY N/A 10/15/2020    EGD BIOPSY performed by Abdi Edmondson MD at [address] [facility]     Family History   Problem Relation Age of Onset    High Blood Pressure Mother     High Blood Pressure Father         Physical Exam:  Vitals signs and nursing note reviewed. Constitutional:       Appearance: well-developed. HENT:      Head: Normocephalic and atraumatic. Nose: Nose normal.   Eyes:      Conjunctiva/sclera: Conjunctivae normal.   Neck:      Musculoskeletal: Normal range of motion and neck supple. Pulmonary:      Effort: Pulmonary effort is normal. No respiratory distress. Musculoskeletal:      Comments: Normal gait     Skin:     General: Skin is warm and dry. Neurological:      Mental Status: Alert and oriented to person, place, and time. Sensory: No sensory deficit. Psychiatric:         Behavior: Behavior normal.         Thought Content: Thought content normal.    Range of motion bilateral hips without aggravation of pain    Provider Attestation:  Katheleen Snellen Beeks, personally performed the services described in this documentation. All medical record entries made by the scribe were at my direction and in my presence. I have reviewed the chart and discharge instructions and agree that the records reflect my personal performance and is accurate and complete. Yee Dickey MD 9/26/23       Scribe Attestation:  By signing my name below, Kunal Dubon, attest that this documentation has been prepared under the direction and in the presence of Dr. Robert Frank.  Electronically signed: Zara Vargas, 9/26/23     Please

## 2023-10-18 ENCOUNTER — HOSPITAL ENCOUNTER (OUTPATIENT)
Dept: PAIN MANAGEMENT | Age: 53
Discharge: HOME | End: 2023-10-18
Payer: COMMERCIAL

## 2023-10-18 VITALS — BODY MASS INDEX: 30.73 KG/M2 | HEIGHT: 62 IN | RESPIRATION RATE: 20 BRPM | TEMPERATURE: 97.3 F | WEIGHT: 167 LBS

## 2023-10-18 DIAGNOSIS — M51.36 LUMBAR DEGENERATIVE DISC DISEASE: ICD-10-CM

## 2023-10-18 DIAGNOSIS — M47.817 LUMBOSACRAL SPONDYLOSIS WITHOUT MYELOPATHY: Primary | ICD-10-CM

## 2023-10-18 DIAGNOSIS — E66.9 CLASS 1 OBESITY WITH BODY MASS INDEX (BMI) OF 30.0 TO 30.9 IN ADULT, UNSPECIFIED OBESITY TYPE, UNSPECIFIED WHETHER SERIOUS COMORBIDITY PRESENT: ICD-10-CM

## 2023-10-18 PROBLEM — M51.369 LUMBAR DEGENERATIVE DISC DISEASE: Status: ACTIVE | Noted: 2023-10-18

## 2023-10-18 PROBLEM — E66.811 CLASS 1 OBESITY WITH BODY MASS INDEX (BMI) OF 30.0 TO 30.9 IN ADULT: Status: ACTIVE | Noted: 2023-10-18

## 2023-10-18 PROCEDURE — 99204 OFFICE O/P NEW MOD 45 MIN: CPT | Performed by: STUDENT IN AN ORGANIZED HEALTH CARE EDUCATION/TRAINING PROGRAM

## 2023-10-18 PROCEDURE — 99203 OFFICE O/P NEW LOW 30 MIN: CPT

## 2023-10-18 RX ORDER — METHYLPREDNISOLONE 4 MG/1
TABLET ORAL
Qty: 1 KIT | Refills: 0 | Status: SHIPPED | OUTPATIENT
Start: 2023-10-18

## 2023-10-18 NOTE — PROGRESS NOTES
Chronic Pain Clinic Note     Encounter Date: 10/18/2023     SUBJECTIVE:  Chief Complaint   Patient presents with    Back Pain     Lumbar Spine    New Patient       History of Present Illness:   Lior Leong is a 46 y.o. female who presents with low back pain. Medication Refill: n/a    Current Complaints of Pain:   Location: Low back  Radiation: no   Severity: severe   Pain Numerical Score -10  Average: 9-10     Highest: 10  Lowest: 9  Character/Quality: Complains of pain that is aching  Timing: Intermittent  Associated symptoms: weakness-   Numbness: n/a   Weakness: right lower back   Exacerbating factors: laying flat  prolong standing sitting   Alleviating factors: ER visits have given something and PT therapy   Length of time pain has been present: Started on 5 months ago   Inciting event/injury: no   Bowel/Bladder incontinence: bowels sometimes   Falls: no  Physical Therapy: yes in the past, is starting up again       History of Interventions:   Surgery: No previous lumbar/cervical surgeries  Injections: None    Imagin2023 MRI Lumbar Spine     FINDINGS:  BONES/ALIGNMENT: No significant spondylolisthesis. Visualized vertebral body  heights and intervertebral disc spaces appear preserved. Lumbar marrow  signal appears within normal limits. No convincing evidence of acute  fracture or aggressive appearing osseous lesions within the lumbar levels. SPINAL CORD: The conus terminates normally. SOFT TISSUES: No paraspinal mass identified. L1-L2: No significant spinal canal stenosis or foraminal narrowing. L2-L3: No significant spinal canal stenosis or foraminal narrowing. L3-L4: No significant spinal canal stenosis or foraminal narrowing. L4-L5: Facet hypertrophy. No significant spinal canal stenosis or foraminal  narrowing. L5-S1: Facet hypertrophy with fluid distended joints. Marrow edema present  in the bilateral sacrum at the S1 level adjacent to the facet joints.

## 2023-10-18 NOTE — H&P (VIEW-ONLY)
therapy, the following medications were prescribed:    -Start oral steroids    Opioid therapy:  -Not indicated    Interventions:  -Plan for bilateral lumbar L3, L4, L5 medial branch blocks    Imaging:  -Reviewed lumbar MRI with patient in room    Behavioral Therapies:  -Continue daily stretching and home exercise program    Referrals:  -None    Follow-up Plan:  -After procedure    Patient was offered intervention where appropriate. Multi-modal Pain Therapy: The patient was explicitly considered for multimodal and interdisciplinary therapy. Non-opioid and non-pharmacological opportunities to enhance analgesia and quality of life have been and will continue to be pursued. Nivia Luna DO  Interventional Pain Management/PM&R   [address]    Orders Placed This Encounter    FACET JOINT L/S     Standing Status:   Future     Standing Expiration Date:   10/18/2024     Scheduling Instructions:      Bilateral lumbar L3, L4, L5 MBBx2    FACET JOINT L/S 2ND LEVEL     Standing Status:   Future     Standing Expiration Date:   10/18/2024    methylPREDNISolone (MEDROL DOSEPACK) 4 MG tablet     Sig: Take by mouth.      Dispense:  1 kit     Refill:  0

## 2023-11-07 ENCOUNTER — HOSPITAL ENCOUNTER (OUTPATIENT)
Dept: PAIN MANAGEMENT | Facility: CLINIC | Age: 53
Discharge: HOME | End: 2023-11-07
Payer: COMMERCIAL

## 2023-11-07 VITALS
DIASTOLIC BLOOD PRESSURE: 85 MMHG | SYSTOLIC BLOOD PRESSURE: 144 MMHG | HEART RATE: 66 BPM | BODY MASS INDEX: 30.73 KG/M2 | RESPIRATION RATE: 15 BRPM | WEIGHT: 167 LBS | OXYGEN SATURATION: 95 % | HEIGHT: 62 IN | TEMPERATURE: 98 F

## 2023-11-07 DIAGNOSIS — R52 PAIN MANAGEMENT: ICD-10-CM

## 2023-11-07 PROCEDURE — 2500000003 HC RX 250 WO HCPCS: Performed by: STUDENT IN AN ORGANIZED HEALTH CARE EDUCATION/TRAINING PROGRAM

## 2023-11-07 PROCEDURE — 64493 INJ PARAVERT F JNT L/S 1 LEV: CPT | Performed by: STUDENT IN AN ORGANIZED HEALTH CARE EDUCATION/TRAINING PROGRAM

## 2023-11-07 PROCEDURE — 64494 INJ PARAVERT F JNT L/S 2 LEV: CPT

## 2023-11-07 PROCEDURE — 64494 INJ PARAVERT F JNT L/S 2 LEV: CPT | Performed by: STUDENT IN AN ORGANIZED HEALTH CARE EDUCATION/TRAINING PROGRAM

## 2023-11-07 PROCEDURE — 64493 INJ PARAVERT F JNT L/S 1 LEV: CPT

## 2023-11-07 RX ORDER — AMLODIPINE BESYLATE 5 MG/1
5 TABLET ORAL DAILY
COMMUNITY

## 2023-11-07 RX ORDER — LIDOCAINE HYDROCHLORIDE 20 MG/ML
INJECTION, SOLUTION EPIDURAL; INFILTRATION; INTRACAUDAL; PERINEURAL
Status: COMPLETED | OUTPATIENT
Start: 2023-11-07 | End: 2023-11-07

## 2023-11-07 RX ADMIN — LIDOCAINE HYDROCHLORIDE 5 ML: 20 INJECTION, SOLUTION EPIDURAL; INFILTRATION; INTRACAUDAL; PERINEURAL at 14:32

## 2023-11-07 ASSESSMENT — PAIN SCALES - GENERAL: PAINLEVEL_OUTOF10: 10

## 2023-11-07 ASSESSMENT — PAIN DESCRIPTION - DESCRIPTORS
DESCRIPTORS: ACHING
DESCRIPTORS: ACHING

## 2023-11-07 ASSESSMENT — PAIN DESCRIPTION - ORIENTATION: ORIENTATION: LOWER

## 2023-11-07 ASSESSMENT — PAIN DESCRIPTION - FREQUENCY: FREQUENCY: CONTINUOUS

## 2023-11-07 ASSESSMENT — PAIN - FUNCTIONAL ASSESSMENT
PAIN_FUNCTIONAL_ASSESSMENT: PREVENTS OR INTERFERES SOME ACTIVE ACTIVITIES AND ADLS
PAIN_FUNCTIONAL_ASSESSMENT: 0-10

## 2023-11-07 ASSESSMENT — PAIN DESCRIPTION - DIRECTION: RADIATING_TOWARDS: BILAT HIPS

## 2023-11-07 ASSESSMENT — PAIN DESCRIPTION - LOCATION: LOCATION: BACK

## 2023-11-07 ASSESSMENT — PAIN DESCRIPTION - PAIN TYPE: TYPE: CHRONIC PAIN

## 2023-11-07 NOTE — OP NOTE
PROCEDURE PERFORMED: Bilateral Lumbar medial branch block    PREOPERATIVE DIAGNOSIS: Lumbosacral spondylosis    INDICATIONS: Chronic low back pain    The patient's history and physical exam were reviewed. The risk, benefits, and alternatives of the procedure were discussed and all questions were answered to the patient's satisfaction. The patient agreed to proceed and written informed consent was obtained. POSTOPERATIVE DIAGNOSIS: Lumbar spondylosis    PHYSICIAN:  Dr. Wilfredo Clark DO    ANESTHESIA:  LOCAL    ASSISTANT:  NONE    PATHOLOGY:  NONE    ESTIMATED BLOOD LOSS:  N/A    IMPLANTS:  NONE    PROCEDURE DESCRIPTION: Diagnostic bilateral lumbar medial branch block using fluoroscopy    The patient was placed on the operative bed in prone position. The area was prepped with  Chlorhexidine. The area was then draped in a sterile fashion. Targeted levels: Bilateral lumbar L3, L4, L5 medial branch block    An AP  fluoroscopy image was used to identify and joycelyn Mcqueen's point at the L3, L4 levels on the targeted side. Additionally, the junction of the SAP and sacral ala was also marked on the same side. A 25-gauge 3-1/2 inch Quincke spinal needle was then advanced toward each of these points under fluoroscopic guidance. Once bone was contacted, negative aspiration was confirmed and 0.5 mL of lidocaine 2% was injected each level. The needles were removed and the needle sites were dressed appropriately. The same procedure was performed on the opposite side. The patient was transferred to the postoperative care unit in stable condition. Written discharge instructions were given to the patient. The patient was given a pain diary upon discharge. COMPLICATIONS:  There were no apparent complications. The patient tolerated the procedure well.

## 2023-11-07 NOTE — DISCHARGE INSTRUCTIONS
You have received a sedative/anesthetic therefore you should not consume any alcoholic beverages for 24 hours. Do not drive or operate machinery for 24 hours. Do not take a tub bath for 72 hours after procedure (this includes hot tubs). You may shower, but avoid hot water to injection site. Avoid strenuous activity TODAY especially if you experience dizziness. Remove band-aid the next day. Wash off any residual iodine 24 hours from today. Do not use heat, heating pad, or any other heating device over the injection site for 3 days after the procedure. If you experience pain after your procedure, you may continue with your current pain medication as prescribed. (DO NOT INCREASE YOUR PAIN MEDICATION WITHOUT TALKING TO DOCTOR)  Soreness and pain at injection site is common, may use ice to reduce soreness. Please complete pain diary as instructed. Office staff will contact you to review results.     Call Paige at [phone] if you experience:   Fever, chills or temperature over 100    Vomiting, headache, persistent stiff neck, nausea or blurred vision   Difficulty urinating or unable to urinate within 8 hours   Increase in weakness, numbness or loss of function of limbs  Increased redness, swelling or drainage at the injection site

## 2023-11-07 NOTE — INTERVAL H&P NOTE
Update History & Physical    The patient's History and Physical of October 18, 2023 was reviewed with the patient and I examined the patient. There was no change. The surgical site was confirmed by the patient and me. Plan: The risks, benefits, expected outcome, and alternative to the recommended procedure have been discussed with the patient. Patient understands and wants to proceed with the procedure.      Electronically signed by Henrique Blanco DO on 11/7/2023 at 1:58 PM

## 2023-11-09 ENCOUNTER — TELEPHONE (OUTPATIENT)
Dept: PAIN MANAGEMENT | Age: 53
End: 2023-11-09

## 2023-11-09 ENCOUNTER — TELEPHONE (OUTPATIENT)
Dept: FAMILY MEDICINE | Facility: CLINIC | Age: 53
End: 2023-11-09
Payer: COMMERCIAL

## 2023-11-09 NOTE — TELEPHONE ENCOUNTER
S/P:Bilateral lumbar L3, L4, L5 medial branch block      DOS: 11/7/2023    Pain  before procedure with activity : 10    Pain after procedure with activity: 8    Activities following procedure include: housework    % of pain relief: <20%    Procedure successful: No    OV scheduled: 11/22/2023

## 2023-11-09 NOTE — TELEPHONE ENCOUNTER
----- Message from Silver Moore MD sent at 11/9/2023  8:51 AM CST -----  Needs to be seen prior to any labs being ordered  ----- Message -----  From: Kena Vences MA  Sent: 11/8/2023   3:15 PM CST  To: Silver Moore MD    Please advise. Thank you.  ----- Message -----  From: Ayaz Man  Sent: 11/8/2023   2:02 PM CST  To: [facility] Staff    Type: Patient Call Back         Who called:Pt          What is the request in detail: Pt called in regarding requesting orders for blood work before up coming appointment on 12/8/23          Can the clinic reply by [facility]?no          Would the patient rather a call back or a response via [facility]? Call back          Best call back number: [phone] (mobile)          Additional Information:           Thank You

## 2023-11-22 ENCOUNTER — HOSPITAL ENCOUNTER (OUTPATIENT)
Dept: PAIN MANAGEMENT | Age: 53
Discharge: HOME | End: 2023-11-22
Payer: COMMERCIAL

## 2023-11-22 VITALS — HEIGHT: 62 IN | BODY MASS INDEX: 30.73 KG/M2 | WEIGHT: 167 LBS

## 2023-11-22 DIAGNOSIS — M51.36 LUMBAR DEGENERATIVE DISC DISEASE: ICD-10-CM

## 2023-11-22 DIAGNOSIS — E66.9 CLASS 1 OBESITY WITH BODY MASS INDEX (BMI) OF 30.0 TO 30.9 IN ADULT, UNSPECIFIED OBESITY TYPE, UNSPECIFIED WHETHER SERIOUS COMORBIDITY PRESENT: ICD-10-CM

## 2023-11-22 DIAGNOSIS — M47.817 LUMBOSACRAL SPONDYLOSIS WITHOUT MYELOPATHY: Primary | ICD-10-CM

## 2023-11-22 PROCEDURE — 99213 OFFICE O/P EST LOW 20 MIN: CPT | Performed by: STUDENT IN AN ORGANIZED HEALTH CARE EDUCATION/TRAINING PROGRAM

## 2023-11-22 PROCEDURE — 99213 OFFICE O/P EST LOW 20 MIN: CPT

## 2023-11-22 NOTE — PROGRESS NOTES
(PRILOSEC) 40 mg, Oral, DAILY    polyethylene glycol (GLYCOLAX) 17 GM/SCOOP powder Use as directed by following your patient instructions given by office. predniSONE (DELTASONE) 60 mg, DAILY    traZODone (DESYREL) 100 mg, Oral, NIGHTLY PRN       Allergies   Allergen Reactions    Latex Itching    Ibuprofen Hives and Nausea Only    Penicillins Hives       Review of Systems:   Constitutional: negative for weight changes or fevers  Cardiovascular: negative for chest pain, palpitations, irregular heart beat  Respiratory: negative for dyspnea, cough, wheezing  Gastrointestinal: negative for constipation, diarrhea, nausea  Genitourinary: negative for bowel/bladder incontinence   Musculoskeletal: positive for low back pain  Neurological: negative for radicular leg pain, leg weakness or numbness/tingling  Behavioral/Psych: negative for anxiety/depression   Hematological: negative for abnormal bleeding, anticoagulation use or antiplatelet use  All other systems reviewed and are negative    OBJECTIVE:    There were no vitals filed for this visit. PHYSICAL EXAM    GENERAL: No acute distress, pleasant, well-appearing  HEENT: Normocephalic, atraumatic, Pupils equal and round  CARDIOVASCULAR: Well perfused, No peripheral cyanosis  PULMONARY: Good chest wall excursion, breathing unlabored  PSYCH: Appropriate affect and insight, non-pressured speech  SKIN: No rashes or lesions  MUSCULOSKELETAL:  Inspection: The back and extremities are symmetric and aligned. Muscle bulk is normal in appearance.   Palpation: There is tenderness to palpation along the lumbar paraspinal musculature bilaterally  Lumbar range of motion is full  NEUROMUSCULAR:  Patient ambulates unassisted  Gait is nonantalgic  Sensation to light touch is grossly intact in lower extremities  Strength is grossly intact in lower extremities  No ankle clonus    Special Tests:  Lumbar facet loading is positive bilaterally  Seated straight leg raise is negative

## 2023-12-08 ENCOUNTER — OFFICE VISIT (OUTPATIENT)
Dept: FAMILY MEDICINE | Facility: CLINIC | Age: 53
End: 2023-12-08
Payer: COMMERCIAL

## 2023-12-08 VITALS
DIASTOLIC BLOOD PRESSURE: 82 MMHG | OXYGEN SATURATION: 98 % | BODY MASS INDEX: 28.15 KG/M2 | SYSTOLIC BLOOD PRESSURE: 134 MMHG | WEIGHT: 149 LBS | TEMPERATURE: 98 F | HEART RATE: 68 BPM

## 2023-12-08 DIAGNOSIS — S29.011A PECTORALIS MUSCLE STRAIN, INITIAL ENCOUNTER: ICD-10-CM

## 2023-12-08 DIAGNOSIS — Z12.11 SCREEN FOR COLON CANCER: ICD-10-CM

## 2023-12-08 DIAGNOSIS — E11.9 WELL CONTROLLED TYPE 2 DIABETES MELLITUS: Primary | ICD-10-CM

## 2023-12-08 PROCEDURE — 99386 PR PREVENTIVE VISIT,NEW,40-64: ICD-10-PCS | Mod: S$GLB,,, | Performed by: FAMILY MEDICINE

## 2023-12-08 PROCEDURE — 99386 PREV VISIT NEW AGE 40-64: CPT | Mod: S$GLB,,, | Performed by: FAMILY MEDICINE

## 2023-12-08 RX ORDER — ASPIRIN 325 MG
650 TABLET, DELAYED RELEASE (ENTERIC COATED) ORAL EVERY 6 HOURS PRN
COMMUNITY
Start: 2023-08-01 | End: 2024-07-31

## 2023-12-08 RX ORDER — CETIRIZINE HYDROCHLORIDE 10 MG/1
10 TABLET ORAL
COMMUNITY
Start: 2023-07-25 | End: 2024-07-24

## 2023-12-08 RX ORDER — PRAVASTATIN SODIUM 40 MG/1
40 TABLET ORAL DAILY
Qty: 90 TABLET | Refills: 3 | Status: SHIPPED | OUTPATIENT
Start: 2023-12-08 | End: 2024-12-07

## 2023-12-08 NOTE — PATIENT INSTRUCTIONS
Will send cologuard test to your house to screen for colon cancer  Get bloodwork in office after the holidays (2-3 months) - or just get routine insurance labs  Start low dose cholesterol medication solely to prevent heart disease    Start stretching out chest wall, keep up with heat and massage - 1000mg tylenol + 800mg ibuprofen twice a day    Follow up 6 months

## 2023-12-08 NOTE — PROGRESS NOTES
[facility] - [place]    Family Medicine Office Visit    Chief Complaint   Patient presents with    Establish Care     Pain in left upper shoulder area        HPI:  establish care as Dr. Chambers retiring    Diabetes controlled with A1C at goal - last 6.5%  Managed with diet only  Not on statin  Needs jayy    Works in lab at [employer]    Reporting frontal chest wall/shoulder pain to L side over past few weeks.  Worse with lateral extension of arm.  Tylenol/ibuprofen helps somewhat.  Heat/massage helps.    ROS: as above    Vitals:    12/08/23 0913   BP: 134/82   BP Location: Left arm   Patient Position: Sitting   BP Method: Medium (Manual)   Pulse: 68   Temp: 97.5 °F (36.4 °C)   TempSrc: Oral   SpO2: 98%   Weight: 67.6 kg (149 lb)      Body mass index is 28.15 kg/m².      General:  AOx3, well nourished and developed in no acute distress  Eyes:  PERRLA, EOMI, vision intact grossly  ENT:  normal hearing, moist oral mucosa  Neck:  trachea midline with no masses or thyromegaly  Heart:  RRR, no murmurs.  No edema noted, extremities warm and well perfused  Lungs:  clear to auscultation bilaterally with symmetric chest movement  Abdomen:  Soft, nontender, nondistended.  Normal bowel sounds  Musculoskeletal:  Normal gait.  Normal posture.  Normal muscular development with no joint swelling.  Neurological:  CN II-XII grossly intact. Symmetric strength and sensation  Psych:  Normal mood and affect.  Able to demonstrate good judgement and personal insight.      Assessment/Plan:    1. Well controlled type 2 diabetes mellitus  -     CBC Auto Differential; Future; Expected date: 12/08/2023  -     Comprehensive Metabolic Panel; Future; Expected date: 12/08/2023  -     Hemoglobin A1C; Future; Expected date: 12/08/2023  -     Lipid Panel; Future; Expected date: 12/08/2023  -     TSH; Future; Expected date: 12/08/2023    2. Screen for colon cancer  -     Cologuard Screening (Multitarget  Stool DNA); Future; Expected date: 12/08/2023    3. Pectoralis muscle strain, initial encounter    Other orders  -     pravastatin (PRAVACHOL) 40 MG tablet; Take 1 tablet (40 mg total) by mouth once daily.  Dispense: 90 tablet; Refill: 3         DM at goal.  Get labs in 2-3 months.  Start statin  Get cologuard  Advised on anti-inflammatory therapy, chest wall stretching.

## 2023-12-29 DIAGNOSIS — E11.9 TYPE 2 DIABETES MELLITUS WITHOUT COMPLICATION: ICD-10-CM

## 2023-12-29 DIAGNOSIS — Z12.31 OTHER SCREENING MAMMOGRAM: ICD-10-CM

## 2024-01-22 ENCOUNTER — OFFICE VISIT (OUTPATIENT)
Dept: OBSTETRICS AND GYNECOLOGY | Facility: CLINIC | Age: 54
End: 2024-01-22
Payer: COMMERCIAL

## 2024-01-22 VITALS
HEIGHT: 61 IN | SYSTOLIC BLOOD PRESSURE: 134 MMHG | DIASTOLIC BLOOD PRESSURE: 86 MMHG | WEIGHT: 150 LBS | BODY MASS INDEX: 28.32 KG/M2

## 2024-01-22 DIAGNOSIS — Z12.31 BREAST CANCER SCREENING BY MAMMOGRAM: ICD-10-CM

## 2024-01-22 DIAGNOSIS — Z01.419 ENCOUNTER FOR ANNUAL ROUTINE GYNECOLOGICAL EXAMINATION: Primary | ICD-10-CM

## 2024-01-22 PROCEDURE — 99396 PREV VISIT EST AGE 40-64: CPT | Mod: S$GLB,,, | Performed by: OBSTETRICS & GYNECOLOGY

## 2024-01-22 NOTE — PROGRESS NOTES
"Subjective:       Sandra Tucker is a 53 y.o. female here for a routine exam.  Current complaints: none.  Personal health questionnaire reviewed: yes.     Gynecologic History  No LMP recorded. Patient has had a hysterectomy.  Contraception: status post hysterectomy  Last Pap:  Prior to hysterectomy. Results were: normal  Last mammogram: . Results were: normal    Obstetric History  OB History    Para Term  AB Living   1 0 0 0 0 1   SAB IAB Ectopic Multiple Live Births   0 0 0 0 0      # Outcome Date GA Lbr Nathaniel/2nd Weight Sex Delivery Anes PTL Lv   1                   The following portions of the patient's history were reviewed and updated as appropriate: allergies, current medications, past family history, past medical history, past social history, past surgical history, and problem list.    Review of Systems    ROS: (except as stated in HPI)  GENERAL: Denies weight gain or weight loss. Feeling well overall.   SKIN: Denies rash or lesions.   HEAD: Denies head injury or headache.   NODES: Denies enlarged lymph nodes.   CHEST: Denies chest pain or shortness of breath.   CARDIOVASCULAR: Denies palpitations or left sided chest pain.   ABDOMEN: No abdominal pain, constipation, diarrhea, nausea, vomiting or rectal bleeding.   URINARY: No frequency, dysuria, hematuria, or burning on urination.  REPRODUCTIVE: See HPI.   BREASTS: The patient performs breast self-examination and denies pain, lumps, or nipple discharge.   HEMATOLOGIC: No easy bruisability or excessive bleeding.   MUSCULOSKELETAL: Denies joint pain or swelling.   NEUROLOGIC: Denies syncope or weakness.   PSYCHIATRIC: Denies depression, anxiety or mood swings.           Objective:        /86 (BP Location: Right arm, Patient Position: Sitting)   Ht 5' 1" (1.549 m)   Wt 68 kg (150 lb)   BMI 28.34 kg/m²     General Appearance:    Alert, cooperative, no distress, appears stated age   Head:    Normocephalic, without obvious " abnormality, atraumatic   Eyes:    PERRL, conjunctiva/corneas clear, EOM's intact, fundi     benign, both eyes   Ears:    Normal TM's and external ear canals, both ears   Nose:   Nares normal, septum midline, mucosa normal, no drainage    or sinus tenderness   Throat:   Lips, mucosa, and tongue normal; teeth and gums normal   Neck:   Supple, symmetrical, trachea midline, no adenopathy;     thyroid:  no enlargement/tenderness/nodules; no carotid    bruit or JVD   Back:     Symmetric, no curvature, ROM normal, no CVA tenderness   Lungs:     Clear to auscultation bilaterally, respirations unlabored   Chest Wall:    No tenderness or deformity    Heart:    Regular rate and rhythm, S1 and S2 normal, no murmur, rub   or gallop   Breast Exam:    No tenderness, masses, or nipple abnormality   Abdomen:     Soft, non-tender, bowel sounds active all four quadrants,     no masses, no organomegaly   Genitalia:    Normal female without lesion, discharge or tenderness    V/v mild to moderate atrophy, no lesions or discharge    Adnexa nonpalpable and nontender, no pelvic masses appreciated   Rectal:     Not done   Extremities:   Extremities normal, atraumatic, no cyanosis or edema   Pulses:   2+ and symmetric all extremities   Skin:   Skin color, texture, turgor normal, no rashes or lesions   Lymph nodes:   Cervical, supraclavicular, and axillary nodes normal   Neurologic:   CNII-XII intact, normal strength, sensation and reflexes     throughout         Assessment:      Healthy female exam.      Plan:      Mammogram ordered.  Cologuard done by PCP  She has no longer taking ERT      Answers submitted by the patient for this visit:  Gynecologic Exam Questionnaire  (Submitted on 1/17/2024)  Chief Complaint: Gynecologic exam  genital itching: No  genital lesions: No  genital odor: No  genital rash: No  missed menses: No  pelvic pain: No  vaginal bleeding: No  vaginal discharge: No  Pain severity: no pain  Pregnant now?: No  abdominal  pain: No  anorexia: No  back pain: No  chills: No  constipation: No  diarrhea: No  discolored urine: No  dysuria: No  fever: No  flank pain: No  frequency: No  headaches: No  hematuria: No  nausea: No  painful intercourse: No  rash: No  urgency: No  vomiting: No  Vaginal bleeding: no bleeding  Passing clots?: No  Passing tissue?: No  Sexual activity: sexually active  Partner with STD symptoms: no  Menstrual history: postmenopausal  STD: No  abdominal surgery: No   section: No  Ectopic pregnancy: No  Endometriosis: No  herpes simplex: No  gynecological surgery: Yes  menorrhagia: No  metrorrhagia: No  miscarriage: No  ovarian cysts: No  perineal abscess: No  PID: No  terminated pregnancy: No  vaginosis: No  l  it

## 2024-01-31 DIAGNOSIS — E11.9 TYPE 2 DIABETES MELLITUS WITHOUT COMPLICATION, UNSPECIFIED WHETHER LONG TERM INSULIN USE: ICD-10-CM

## 2024-01-31 LAB — NONINV COLON CA DNA+OCC BLD SCRN STL QL: POSITIVE

## 2024-03-01 ENCOUNTER — HOSPITAL ENCOUNTER (EMERGENCY)
Age: 54
Discharge: HOME | End: 2024-03-01
Attending: EMERGENCY MEDICINE
Payer: COMMERCIAL

## 2024-03-01 ENCOUNTER — APPOINTMENT (OUTPATIENT)
Dept: GENERAL RADIOLOGY | Age: 54
End: 2024-03-01
Payer: COMMERCIAL

## 2024-03-01 VITALS
SYSTOLIC BLOOD PRESSURE: 157 MMHG | HEART RATE: 70 BPM | OXYGEN SATURATION: 100 % | WEIGHT: 178.13 LBS | DIASTOLIC BLOOD PRESSURE: 96 MMHG | BODY MASS INDEX: 32.58 KG/M2 | TEMPERATURE: 97.7 F | RESPIRATION RATE: 22 BRPM

## 2024-03-01 DIAGNOSIS — E87.6 HYPOKALEMIA: ICD-10-CM

## 2024-03-01 DIAGNOSIS — L73.9 FOLLICULITIS: Primary | ICD-10-CM

## 2024-03-01 DIAGNOSIS — K62.5 RECTAL BLEEDING: ICD-10-CM

## 2024-03-01 DIAGNOSIS — R07.9 CHEST PAIN, UNSPECIFIED TYPE: ICD-10-CM

## 2024-03-01 LAB
ALBUMIN SERPL-MCNC: 4.2 G/DL (ref 3.5–5.2)
ALBUMIN/GLOB SERPL: 1.4 {RATIO} (ref 1–2.5)
ALP SERPL-CCNC: 89 U/L (ref 35–104)
ALT SERPL-CCNC: 20 U/L (ref 5–33)
ANION GAP SERPL CALCULATED.3IONS-SCNC: 12 MMOL/L (ref 9–17)
AST SERPL-CCNC: 27 U/L
BASOPHILS # BLD: <0.03 K/UL (ref 0–0.2)
BASOPHILS NFR BLD: 0 % (ref 0–2)
BILIRUB SERPL-MCNC: 0.2 MG/DL (ref 0.3–1.2)
BILIRUB UR QL STRIP: NEGATIVE
BNP SERPL-MCNC: <36 PG/ML
BUN SERPL-MCNC: 13 MG/DL (ref 6–20)
C TRACH DNA SPEC QL PROBE+SIG AMP: NORMAL
CALCIUM SERPL-MCNC: 8.6 MG/DL (ref 8.6–10.4)
CANDIDA SPECIES: NEGATIVE
CHLORIDE SERPL-SCNC: 105 MMOL/L (ref 98–107)
CLARITY UR: CLEAR
CO2 SERPL-SCNC: 23 MMOL/L (ref 20–31)
COLOR UR: YELLOW
COMMENT: ABNORMAL
CREAT SERPL-MCNC: 0.8 MG/DL (ref 0.5–0.9)
EKG ATRIAL RATE: 67 BPM
EKG P AXIS: 42 DEGREES
EKG P-R INTERVAL: 170 MS
EKG Q-T INTERVAL: 434 MS
EKG QRS DURATION: 84 MS
EKG QTC CALCULATION (BAZETT): 458 MS
EKG R AXIS: 38 DEGREES
EKG T AXIS: 35 DEGREES
EKG VENTRICULAR RATE: 67 BPM
EOSINOPHIL # BLD: 0.04 K/UL (ref 0–0.44)
EOSINOPHILS RELATIVE PERCENT: 1 % (ref 1–4)
ERYTHROCYTE [DISTWIDTH] IN BLOOD BY AUTOMATED COUNT: 13.3 % (ref 11.8–14.4)
GARDNERELLA VAGINALIS: NEGATIVE
GFR SERPL CREATININE-BSD FRML MDRD: >60 ML/MIN/1.73M2
GLUCOSE SERPL-MCNC: 111 MG/DL (ref 70–99)
GLUCOSE UR STRIP-MCNC: NEGATIVE MG/DL
HCT VFR BLD AUTO: 33.9 % (ref 36.3–47.1)
HGB BLD-MCNC: 11.1 G/DL (ref 11.9–15.1)
HGB UR QL STRIP.AUTO: NEGATIVE
IMM GRANULOCYTES # BLD AUTO: <0.03 K/UL (ref 0–0.3)
IMM GRANULOCYTES NFR BLD: 0 %
KETONES UR STRIP-MCNC: ABNORMAL MG/DL
LEUKOCYTE ESTERASE UR QL STRIP: NEGATIVE
LIPASE SERPL-CCNC: 64 U/L (ref 13–60)
LYMPHOCYTES NFR BLD: 1.6 K/UL (ref 1.1–3.7)
LYMPHOCYTES RELATIVE PERCENT: 33 % (ref 24–43)
MCH RBC QN AUTO: 29.2 PG (ref 25.2–33.5)
MCHC RBC AUTO-ENTMCNC: 32.7 G/DL (ref 28.4–34.8)
MCV RBC AUTO: 89.2 FL (ref 82.6–102.9)
MONOCYTES NFR BLD: 0.69 K/UL (ref 0.1–1.2)
MONOCYTES NFR BLD: 14 % (ref 3–12)
N GONORRHOEA DNA SPEC QL PROBE+SIG AMP: NORMAL
NEUTROPHILS NFR BLD: 52 % (ref 36–65)
NEUTS SEG NFR BLD: 2.48 K/UL (ref 1.5–8.1)
NITRITE UR QL STRIP: NEGATIVE
NRBC BLD-RTO: 0 PER 100 WBC
PH UR STRIP: 5.5 [PH] (ref 5–8)
PLATELET # BLD AUTO: 239 K/UL (ref 138–453)
PMV BLD AUTO: 10.2 FL (ref 8.1–13.5)
POTASSIUM SERPL-SCNC: 3.4 MMOL/L (ref 3.7–5.3)
PROT SERPL-MCNC: 7.2 G/DL (ref 6.4–8.3)
PROT UR STRIP-MCNC: NEGATIVE MG/DL
RBC # BLD AUTO: 3.8 M/UL (ref 3.95–5.11)
SODIUM SERPL-SCNC: 140 MMOL/L (ref 135–144)
SOURCE: NORMAL
SP GR UR STRIP: 1.03 (ref 1–1.03)
SPECIMEN DESCRIPTION: NORMAL
TRICHOMONAS: NEGATIVE
TROPONIN I SERPL HS-MCNC: <6 NG/L (ref 0–14)
TROPONIN I SERPL HS-MCNC: <6 NG/L (ref 0–14)
UROBILINOGEN UR STRIP-ACNC: NORMAL EU/DL (ref 0–1)
WBC OTHER # BLD: 4.8 K/UL (ref 3.5–11.3)

## 2024-03-01 PROCEDURE — 87491 CHLMYD TRACH DNA AMP PROBE: CPT

## 2024-03-01 PROCEDURE — 87510 GARDNER VAG DNA DIR PROBE: CPT

## 2024-03-01 PROCEDURE — 87480 CANDIDA DNA DIR PROBE: CPT

## 2024-03-01 PROCEDURE — 87660 TRICHOMONAS VAGIN DIR PROBE: CPT

## 2024-03-01 PROCEDURE — 85025 COMPLETE CBC W/AUTO DIFF WBC: CPT

## 2024-03-01 PROCEDURE — 71045 X-RAY EXAM CHEST 1 VIEW: CPT

## 2024-03-01 PROCEDURE — 83880 ASSAY OF NATRIURETIC PEPTIDE: CPT

## 2024-03-01 PROCEDURE — 81003 URINALYSIS AUTO W/O SCOPE: CPT

## 2024-03-01 PROCEDURE — 87591 N.GONORRHOEAE DNA AMP PROB: CPT

## 2024-03-01 PROCEDURE — 80053 COMPREHEN METABOLIC PANEL: CPT

## 2024-03-01 PROCEDURE — 6370000000 HC RX 637 (ALT 250 FOR IP): Performed by: STUDENT IN AN ORGANIZED HEALTH CARE EDUCATION/TRAINING PROGRAM

## 2024-03-01 PROCEDURE — 99285 EMERGENCY DEPT VISIT HI MDM: CPT

## 2024-03-01 PROCEDURE — 83690 ASSAY OF LIPASE: CPT

## 2024-03-01 PROCEDURE — 84484 ASSAY OF TROPONIN QUANT: CPT

## 2024-03-01 PROCEDURE — 93005 ELECTROCARDIOGRAM TRACING: CPT | Performed by: STUDENT IN AN ORGANIZED HEALTH CARE EDUCATION/TRAINING PROGRAM

## 2024-03-01 RX ORDER — POTASSIUM CHLORIDE 20 MEQ/1
40 TABLET, EXTENDED RELEASE ORAL ONCE
Status: COMPLETED | OUTPATIENT
Start: 2024-03-01 | End: 2024-03-01

## 2024-03-01 RX ADMIN — POTASSIUM CHLORIDE 40 MEQ: 1500 TABLET, EXTENDED RELEASE ORAL at 17:09

## 2024-03-01 ASSESSMENT — ENCOUNTER SYMPTOMS
SHORTNESS OF BREATH: 1
ABDOMINAL PAIN: 1
CONSTIPATION: 0
BACK PAIN: 0
COUGH: 0
VOMITING: 0
BLOOD IN STOOL: 1
DIARRHEA: 0
NAUSEA: 0

## 2024-03-01 ASSESSMENT — LIFESTYLE VARIABLES
HOW MANY STANDARD DRINKS CONTAINING ALCOHOL DO YOU HAVE ON A TYPICAL DAY: PATIENT DOES NOT DRINK
HOW OFTEN DO YOU HAVE A DRINK CONTAINING ALCOHOL: NEVER

## 2024-03-01 ASSESSMENT — PAIN - FUNCTIONAL ASSESSMENT: PAIN_FUNCTIONAL_ASSESSMENT: NONE - DENIES PAIN
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
## 2024-03-01 NOTE — ED TRIAGE NOTES
Pt presents to the ER via triage to room 16  Pt states she was recently placed on a new GERD drug and she noted red blood in her stool for a few days.   Pt states she also has a ingrown hair or swollen lymph nodes in her groin area.   No other complaints

## 2024-03-01 NOTE — ED PROVIDER NOTES
[facility] ED     Emergency Department     Faculty Attestation    I performed a history and physical examination of the patient and discussed management with the resident. I reviewed the resident’s note and agree with the documented findings and plan of care. Any areas of disagreement are noted on the chart. I was personally present for the key portions of any procedures. I have documented in the chart those procedures where I was not present during the key portions. I have reviewed the emergency nurses triage note. I agree with the chief complaint, past medical history, past surgical history, allergies, medications, social and family history as documented unless otherwise noted below. For Physician Assistant/ Nurse Practitioner cases/documentation I have personally evaluated this patient and have completed at least one if not all key elements of the E/M (history, physical exam, and MDM). Additional findings are as noted.    3:58 PM EST    Patient presents with rectal bleeding, malodor to urine, possible yeast infection, and cough with left-sided chest pain all of which she has had for the last several days.  She denies fever or abdominal pain.  Patient is resting comfortably in the bed and appears well.  Lungs clear to auscultation bilaterally and heart sounds are normal.  Abdomen is soft and nontender.  No rebound or guarding is present.  Please see resident documentation for pelvic and rectal exams.  Will get labs and reassess.      Yolanda Lobato MD  Attending Emergency  Physician

## 2024-03-01 NOTE — ED NOTES
The following labs were labeled with appropriate pt sticker and tubed to lab:     [] Blue     [] Lavender   [] on ice  [] Green/yellow  [] Green/black [] on ice  [] Grey  [] on ice  [] Yellow  [] Red  [] Pink  [] Type/ Screen  [] ABG  [] VBG    [] COVID-19 swab    [] Rapid  [] PCR  [] Flu swab  [] Peds Viral Panel     [x] Urine Sample  [] Fecal Sample  [x] Pelvic Cultures  [] Blood Cultures  [] X 2  [] STREP Cultures  [] Wound Cultures

## 2024-03-01 NOTE — DISCHARGE INSTRUCTIONS
You were evaluated in the emergency department for red bumps in your pubic area, vaginal discharge, foul-smelling urine, concerned that your uterus is coming out of your vagina.  Your lab work and imaging in the emergency department did not reveal any abnormalities.  Please follow-up with your primary care physician as soon as possible.  Please follow-up with OB/GYN and GI as soon as possible.  Contact information for OB/GYN and GI given with discharge paperwork.  Please return to the emergency department for any worsening symptoms, questions or concerns.

## 2024-03-04 ENCOUNTER — OFFICE VISIT (OUTPATIENT)
Dept: FAMILY MEDICINE | Facility: CLINIC | Age: 54
End: 2024-03-04
Payer: COMMERCIAL

## 2024-03-04 ENCOUNTER — TELEPHONE (OUTPATIENT)
Dept: GASTROENTEROLOGY | Age: 54
End: 2024-03-04

## 2024-03-04 ENCOUNTER — LAB VISIT (OUTPATIENT)
Dept: LAB | Facility: CLINIC | Age: 54
End: 2024-03-04
Payer: COMMERCIAL

## 2024-03-04 VITALS
BODY MASS INDEX: 27.85 KG/M2 | HEIGHT: 61 IN | HEART RATE: 66 BPM | DIASTOLIC BLOOD PRESSURE: 80 MMHG | OXYGEN SATURATION: 99 % | WEIGHT: 147.5 LBS | SYSTOLIC BLOOD PRESSURE: 118 MMHG

## 2024-03-04 DIAGNOSIS — R19.5 POSITIVE COLORECTAL CANCER SCREENING USING COLOGUARD TEST: ICD-10-CM

## 2024-03-04 DIAGNOSIS — Z00.00 ANNUAL PHYSICAL EXAM: ICD-10-CM

## 2024-03-04 DIAGNOSIS — E11.9 WELL CONTROLLED TYPE 2 DIABETES MELLITUS: Primary | ICD-10-CM

## 2024-03-04 DIAGNOSIS — E11.9 WELL CONTROLLED TYPE 2 DIABETES MELLITUS: ICD-10-CM

## 2024-03-04 LAB
C TRACH DNA SPEC QL PROBE+SIG AMP: NEGATIVE
EKG ATRIAL RATE: 67 BPM
EKG P AXIS: 42 DEGREES
EKG P-R INTERVAL: 170 MS
EKG Q-T INTERVAL: 434 MS
EKG QRS DURATION: 84 MS
EKG QTC CALCULATION (BAZETT): 458 MS
EKG R AXIS: 38 DEGREES
EKG T AXIS: 35 DEGREES
EKG VENTRICULAR RATE: 67 BPM
ESTIMATED AVG GLUCOSE: 148 MG/DL (ref 68–131)
HBA1C MFR BLD: 6.8 % (ref 4–5.6)
N GONORRHOEA DNA SPEC QL PROBE+SIG AMP: NEGATIVE
SPECIMEN DESCRIPTION: NORMAL

## 2024-03-04 PROCEDURE — 36415 COLL VENOUS BLD VENIPUNCTURE: CPT | Mod: ,,, | Performed by: FAMILY MEDICINE

## 2024-03-04 PROCEDURE — 83036 HEMOGLOBIN GLYCOSYLATED A1C: CPT | Performed by: FAMILY MEDICINE

## 2024-03-04 PROCEDURE — 99396 PREV VISIT EST AGE 40-64: CPT | Mod: S$GLB,,, | Performed by: FAMILY MEDICINE

## 2024-03-04 PROCEDURE — 93010 ELECTROCARDIOGRAM REPORT: CPT | Performed by: INTERNAL MEDICINE

## 2024-03-04 NOTE — PROGRESS NOTES
"    [facility]  [facility] - [place], [place]    Family Medicine Office Visit    Chief Complaint   Patient presents with    Follow-up     Welness f/u         HPI: wellness exam  Diabetes controlled with A1C at goal  UTD with pap and mammogram  Hyperlipidemia stable on appropriate intensity statin therapy      Needs Cscope for positive cologuard    ROS: as above    Vitals:    03/04/24 1158   BP: 118/80   BP Location: Right arm   Patient Position: Sitting   BP Method: Large (Manual)   Pulse: 66   SpO2: 99%   Weight: 66.9 kg (147 lb 8 oz)   Height: 5' 1" (1.549 m)      Body mass index is 27.87 kg/m².      General:  AOx3, well nourished and developed in no acute distress  Eyes:  PERRLA, EOMI, vision intact grossly  ENT:  normal hearing, moist oral mucosa  Neck:  trachea midline with no masses or thyromegaly  Heart:  RRR, no murmurs.  No edema noted, extremities warm and well perfused  Lungs:  clear to auscultation bilaterally with symmetric chest movement  Abdomen:  Soft, nontender, nondistended.  Normal bowel sounds  Musculoskeletal:  Normal gait.  Normal posture.  Normal muscular development with no joint swelling.  Neurological:  CN II-XII grossly intact. Symmetric strength and sensation  Psych:  Normal mood and affect.  Able to demonstrate good judgement and personal insight.      Assessment/Plan:    1. Well controlled type 2 diabetes mellitus    2. Annual physical exam    3. Positive colorectal cancer screening using Cologuard test       At goal, continue  Stable  Get Colonoscopy      "

## 2024-03-10 ENCOUNTER — APPOINTMENT (OUTPATIENT)
Dept: GENERAL RADIOLOGY | Age: 54
End: 2024-03-10
Payer: COMMERCIAL

## 2024-03-10 ENCOUNTER — HOSPITAL ENCOUNTER (EMERGENCY)
Age: 54
Discharge: HOME | End: 2024-03-10
Attending: EMERGENCY MEDICINE
Payer: COMMERCIAL

## 2024-03-10 VITALS
DIASTOLIC BLOOD PRESSURE: 98 MMHG | OXYGEN SATURATION: 99 % | RESPIRATION RATE: 16 BRPM | TEMPERATURE: 97 F | SYSTOLIC BLOOD PRESSURE: 137 MMHG | HEART RATE: 79 BPM | BODY MASS INDEX: 33.19 KG/M2 | WEIGHT: 181.44 LBS

## 2024-03-10 DIAGNOSIS — M11.241 PSEUDOGOUT OF JOINT OF RIGHT HAND: Primary | ICD-10-CM

## 2024-03-10 LAB
ANION GAP SERPL CALCULATED.3IONS-SCNC: 13 MMOL/L (ref 9–16)
BASOPHILS # BLD: <0.03 K/UL (ref 0–0.2)
BASOPHILS NFR BLD: 0 % (ref 0–2)
BUN SERPL-MCNC: 18 MG/DL (ref 6–20)
CALCIUM SERPL-MCNC: 9.4 MG/DL (ref 8.6–10.4)
CHLORIDE SERPL-SCNC: 105 MMOL/L (ref 98–107)
CO2 SERPL-SCNC: 25 MMOL/L (ref 20–31)
CREAT SERPL-MCNC: 0.8 MG/DL (ref 0.5–0.9)
CRP SERPL HS-MCNC: <3 MG/L (ref 0–5)
EOSINOPHIL # BLD: 0.12 K/UL (ref 0–0.44)
EOSINOPHILS RELATIVE PERCENT: 2 % (ref 1–4)
ERYTHROCYTE [DISTWIDTH] IN BLOOD BY AUTOMATED COUNT: 13.4 % (ref 11.8–14.4)
ERYTHROCYTE [SEDIMENTATION RATE] IN BLOOD BY PHOTOMETRIC METHOD: 44 MM/HR (ref 0–30)
GFR SERPL CREATININE-BSD FRML MDRD: >60 ML/MIN/1.73M2
GLUCOSE SERPL-MCNC: 96 MG/DL (ref 74–99)
HCT VFR BLD AUTO: 39.5 % (ref 36.3–47.1)
HGB BLD-MCNC: 13 G/DL (ref 11.9–15.1)
IMM GRANULOCYTES # BLD AUTO: <0.03 K/UL (ref 0–0.3)
IMM GRANULOCYTES NFR BLD: 0 %
LYMPHOCYTES NFR BLD: 2.55 K/UL (ref 1.1–3.7)
LYMPHOCYTES RELATIVE PERCENT: 31 % (ref 24–43)
MCH RBC QN AUTO: 28.8 PG (ref 25.2–33.5)
MCHC RBC AUTO-ENTMCNC: 32.9 G/DL (ref 28.4–34.8)
MCV RBC AUTO: 87.4 FL (ref 82.6–102.9)
MONOCYTES NFR BLD: 0.63 K/UL (ref 0.1–1.2)
MONOCYTES NFR BLD: 8 % (ref 3–12)
NEUTROPHILS NFR BLD: 59 % (ref 36–65)
NEUTS SEG NFR BLD: 4.83 K/UL (ref 1.5–8.1)
NRBC BLD-RTO: 0 PER 100 WBC
PLATELET # BLD AUTO: 346 K/UL (ref 138–453)
PMV BLD AUTO: 9.9 FL (ref 8.1–13.5)
POTASSIUM SERPL-SCNC: 3.8 MMOL/L (ref 3.7–5.3)
RBC # BLD AUTO: 4.52 M/UL (ref 3.95–5.11)
SODIUM SERPL-SCNC: 143 MMOL/L (ref 136–145)
URATE SERPL-MCNC: 5.3 MG/DL (ref 2.4–5.7)
WBC OTHER # BLD: 8.2 K/UL (ref 3.5–11.3)

## 2024-03-10 PROCEDURE — 85652 RBC SED RATE AUTOMATED: CPT

## 2024-03-10 PROCEDURE — 84550 ASSAY OF BLOOD/URIC ACID: CPT

## 2024-03-10 PROCEDURE — 99284 EMERGENCY DEPT VISIT MOD MDM: CPT

## 2024-03-10 PROCEDURE — 96374 THER/PROPH/DIAG INJ IV PUSH: CPT

## 2024-03-10 PROCEDURE — 96375 TX/PRO/DX INJ NEW DRUG ADDON: CPT

## 2024-03-10 PROCEDURE — 73130 X-RAY EXAM OF HAND: CPT

## 2024-03-10 PROCEDURE — 85025 COMPLETE CBC W/AUTO DIFF WBC: CPT

## 2024-03-10 PROCEDURE — 80048 BASIC METABOLIC PNL TOTAL CA: CPT

## 2024-03-10 PROCEDURE — 6360000002 HC RX W HCPCS

## 2024-03-10 PROCEDURE — 86140 C-REACTIVE PROTEIN: CPT

## 2024-03-10 PROCEDURE — 6370000000 HC RX 637 (ALT 250 FOR IP)

## 2024-03-10 RX ORDER — PREDNISONE 20 MG/1
50 TABLET ORAL ONCE
Status: COMPLETED | OUTPATIENT
Start: 2024-03-10 | End: 2024-03-10

## 2024-03-10 RX ORDER — ACETAMINOPHEN 500 MG
1000 TABLET ORAL ONCE
Status: COMPLETED | OUTPATIENT
Start: 2024-03-10 | End: 2024-03-10

## 2024-03-10 RX ORDER — PREDNISONE 50 MG/1
50 TABLET ORAL DAILY
Qty: 4 TABLET | Refills: 0 | Status: SHIPPED | OUTPATIENT
Start: 2024-03-11 | End: 2024-03-15

## 2024-03-10 RX ORDER — KETOROLAC TROMETHAMINE 30 MG/ML
30 INJECTION, SOLUTION INTRAMUSCULAR; INTRAVENOUS ONCE
Status: COMPLETED | OUTPATIENT
Start: 2024-03-10 | End: 2024-03-10

## 2024-03-10 RX ORDER — ONDANSETRON 2 MG/ML
4 INJECTION INTRAMUSCULAR; INTRAVENOUS ONCE
Status: COMPLETED | OUTPATIENT
Start: 2024-03-10 | End: 2024-03-10

## 2024-03-10 RX ORDER — COLCHICINE 0.6 MG/1
TABLET ORAL
Qty: 7 TABLET | Refills: 0 | Status: SHIPPED | OUTPATIENT
Start: 2024-03-10 | End: 2024-03-16

## 2024-03-10 RX ORDER — NAPROXEN 500 MG/1
500 TABLET ORAL 2 TIMES DAILY WITH MEALS
Qty: 10 TABLET | Refills: 0 | Status: SHIPPED | OUTPATIENT
Start: 2024-03-10 | End: 2024-03-15

## 2024-03-10 RX ORDER — ACETAMINOPHEN 325 MG/1
325 TABLET ORAL EVERY 6 HOURS PRN
Qty: 20 TABLET | Refills: 0 | Status: SHIPPED | OUTPATIENT
Start: 2024-03-10

## 2024-03-10 RX ADMIN — ONDANSETRON 4 MG: 2 INJECTION INTRAMUSCULAR; INTRAVENOUS at 15:17

## 2024-03-10 RX ADMIN — KETOROLAC TROMETHAMINE 30 MG: 30 INJECTION, SOLUTION INTRAMUSCULAR; INTRAVENOUS at 15:16

## 2024-03-10 RX ADMIN — PREDNISONE 50 MG: 20 TABLET ORAL at 15:19

## 2024-03-10 RX ADMIN — ACETAMINOPHEN 1000 MG: 500 TABLET ORAL at 16:16

## 2024-03-10 NOTE — DISCHARGE INSTRUCTIONS
You were seen in the emergency department for right thumb pain.  Your vital signs were stable.  No fever noted.  Lab work was within normal limits.  No concern for infection in the joint.  X-ray does show some calcium deposition also known as pseudogout that is likely contributing to your symptoms.    I have sent Tylenol, naproxen, and prednisone to the pharmacy.  Please complete the entire course of steroids.  Please wear the thumb spica for comfort.    Please follow-up with your primary care provider in 1 week given recent ER visit.  Please call the rheumatologist that I have provided tomorrow to schedule an appointment to establish care for further outpatient workup.

## 2024-03-10 NOTE — ED PROVIDER NOTES
[facility]  Emergency Department  Faculty Attestation     I performed a history and physical examination of the patient and discussed management with the resident. I reviewed the resident’s note and agree with the documented findings and plan of care. Any areas of disagreement are noted on the chart. I was personally present for the key portions of any procedures. I have documented in the chart those procedures where I was not present during the key portions. I have reviewed the emergency nurses triage note. I agree with the chief complaint, past medical history, past surgical history, allergies, medications, social and family history as documented unless otherwise noted below.    For Physician Assistant/ Nurse Practitioner cases/documentation I have personally evaluated this patient and have completed at least one if not all key elements of the E/M (history, physical exam, and MDM). Additional findings are as noted.    Preliminary note started at 3:03 PM EDT    Primary Care Physician:  Kaylah Gomes APRN - NP    Screenings:  [unfilled]    CHIEF COMPLAINT       Chief Complaint   Patient presents with    Hand Pain     Pt states that she woke up with her right hand swollen and in pain. Pt states that the pain is radiating up her arm as well       RECENT VITALS:   BP (!) 137/98   Pulse 79   Temp 97 °F (36.1 °C) (Oral)   Resp 16   Wt 82.3 kg (181 lb 7 oz)   SpO2 99%   BMI 33.19 kg/m²     LABS:  Labs Reviewed   CBC WITH AUTO DIFFERENTIAL   BASIC METABOLIC PANEL   SEDIMENTATION RATE   C-REACTIVE PROTEIN   URIC ACID       Radiology  XR HAND RIGHT (MIN 3 VIEWS)    (Results Pending)           Attending Physician Additional  Notes    Patient has severe pain in her right thumb.  She denies history of trauma.  No fever chills or sweats.  She has painful range of movement of the thumb MCP joint.  She has radiation of pain down to the nail as well as up the extensor surface near 
Not on file    Highest education level: Not on file   Occupational History    Not on file   Tobacco Use    Smoking status: Every Day     Current packs/day: 0.25     Average packs/day: 0.3 packs/day for 18.0 years (4.5 ttl pk-yrs)     Types: Cigarettes, Cigars     Start date: 3/23/2006    Smokeless tobacco: Never    Tobacco comments:     3 cigars/day   Substance and Sexual Activity    Alcohol use: Yes     Comment: socially    Drug use: Not Currently     Types: Cocaine, Marijuana (Weed)     Comment: quit recently    Sexual activity: Yes   Other Topics Concern    Not on file   Social History Narrative    Not on file     Social Determinants of Health     Financial Resource Strain: Medium Risk (8/5/2020)    Overall Financial Resource Strain (CARDIA)     Difficulty of Paying Living Expenses: Somewhat hard   Food Insecurity: Food Insecurity Present (8/5/2020)    Hunger Vital Sign     Worried About Running Out of Food in the Last Year: Sometimes true     Ran Out of Food in the Last Year: Sometimes true   Transportation Needs: No Transportation Needs (8/5/2020)    PRAPARE - Transportation     Lack of Transportation (Medical): No     Lack of Transportation (Non-Medical): No   Physical Activity: Not on file   Stress: Not on file   Social Connections: Not on file   Intimate Partner Violence: Not on file   Housing Stability: Not on file       Family History   Problem Relation Age of Onset    High Blood Pressure Mother     High Blood Pressure Father        Allergies:  Latex, Ibuprofen, and Penicillins    Home Medications:  Prior to Admission medications    Medication Sig Start Date End Date Taking? Authorizing Provider   acetaminophen (TYLENOL) 325 MG tablet Take 1 tablet by mouth every 6 hours as needed for Pain 3/10/24  Yes Matthias Shultz MD   predniSONE (DELTASONE) 50 MG tablet Take 1 tablet by mouth daily for 4 doses 3/11/24 3/15/24 Yes Matthias Shultz MD   naproxen (NAPROSYN) 500 MG tablet Take 1 tablet by mouth 2 times

## 2024-03-10 NOTE — ED TRIAGE NOTES
Pt to ed c/o right hand pain. Pt states the pain started last night, no known injury. Pt states she has a hx of arthritis. Pt states she cannot move her thumb with out pain shooting up her arm. Pt has swelling to knuckle around them. Pt has full rom of hand. Pt denies taking anything for pain.     Pt is alert and oriented x4. Ambulatory to room. Vitals stable. Call light in reach. Will continue with plan of care.

## 2024-03-15 ENCOUNTER — TELEPHONE (OUTPATIENT)
Dept: FAMILY MEDICINE | Facility: CLINIC | Age: 54
End: 2024-03-15
Payer: COMMERCIAL

## 2024-03-15 NOTE — TELEPHONE ENCOUNTER
----- Message from Shea Thomas sent at 3/15/2024  9:24 AM CDT -----  Contact: Patient  Type:  Needs Medical Advice    Who Called: Patient     Would the patient rather a call back or a response via [facility]? Call    Best Call Back Number: [phone] (home)     Additional Information: Patient states that the office of Dr Gatica states they never received referral. Please resend and advise          May 23, 2023       No Pcp Neede  Via       Patient: Sanford Sanchez   YOB: 1963   Date of Visit: 5/23/2023       Dear  Pcp:    Thank you for referring Sanford Sanchez to me for evaluation. Below are my notes for this visit with him.    If you have questions, please do not hesitate to call me. I look forward to following your patient along with you.      Sincerely,        Ricky Garcia MD        CC: No Recipients

## 2024-03-18 ENCOUNTER — TELEPHONE (OUTPATIENT)
Dept: FAMILY MEDICINE | Facility: CLINIC | Age: 54
End: 2024-03-18
Payer: COMMERCIAL

## 2024-03-18 NOTE — TELEPHONE ENCOUNTER
----- Message from Indira Bearden sent at 3/18/2024  8:39 AM CDT -----  Contact: pt  Type: Needs Medical Advice         Who Called: pt  Best Call Back Number:[phone]  Additional Information: Requesting a call back regarding  Pt said Dr Quarles office is stating they have not received the referral and asking to send it again.  Maybe office can call Dr Quarles to confirm oce sent. Phone: [phone]  Please Advise- Thank you

## 2024-03-21 ENCOUNTER — HOSPITAL ENCOUNTER (OUTPATIENT)
Age: 54
Discharge: HOME | End: 2024-03-21
Payer: COMMERCIAL

## 2024-03-21 LAB
ALBUMIN SERPL-MCNC: 4.6 G/DL (ref 3.5–5.2)
ALBUMIN SERPL-MCNC: 4.7 G/DL (ref 3.5–5.2)
ALP SERPL-CCNC: 106 U/L (ref 35–104)
ALP SERPL-CCNC: 107 U/L (ref 35–104)
ALT SERPL-CCNC: 22 U/L (ref 5–33)
ALT SERPL-CCNC: 23 U/L (ref 5–33)
ANION GAP SERPL CALCULATED.3IONS-SCNC: 14 MMOL/L (ref 9–17)
AST SERPL-CCNC: 24 U/L
AST SERPL-CCNC: 24 U/L
BASOPHILS # BLD: 0.1 K/UL (ref 0–0.2)
BASOPHILS NFR BLD: 1 % (ref 0–2)
BILIRUB DIRECT SERPL-MCNC: 0.1 MG/DL
BILIRUB INDIRECT SERPL-MCNC: 0.1 MG/DL (ref 0–1)
BILIRUB SERPL-MCNC: 0.2 MG/DL (ref 0.3–1.2)
BILIRUB SERPL-MCNC: 0.3 MG/DL (ref 0.3–1.2)
BUN SERPL-MCNC: 23 MG/DL (ref 6–20)
CALCIUM SERPL-MCNC: 9.6 MG/DL (ref 8.6–10.4)
CHLORIDE SERPL-SCNC: 101 MMOL/L (ref 98–107)
CO2 SERPL-SCNC: 25 MMOL/L (ref 20–31)
CREAT SERPL-MCNC: 1 MG/DL (ref 0.5–0.9)
CRP SERPL HS-MCNC: 3.5 MG/L (ref 0–5)
EOSINOPHIL # BLD: 0.1 K/UL (ref 0–0.4)
EOSINOPHILS RELATIVE PERCENT: 1 % (ref 0–4)
ERYTHROCYTE [DISTWIDTH] IN BLOOD BY AUTOMATED COUNT: 13.5 % (ref 11.5–14.9)
ERYTHROCYTE [SEDIMENTATION RATE] IN BLOOD BY PHOTOMETRIC METHOD: 32 MM/HR (ref 0–30)
GFR SERPL CREATININE-BSD FRML MDRD: >60 ML/MIN/1.73M2
GLUCOSE SERPL-MCNC: 96 MG/DL (ref 70–99)
HCT VFR BLD AUTO: 38.7 % (ref 36–46)
HCV GENTYP SERPL NAA+PROBE: NORMAL
HGB BLD-MCNC: 12.9 G/DL (ref 12–16)
IRON SATN MFR SERPL: 26 % (ref 20–55)
IRON SERPL-MCNC: 92 UG/DL (ref 37–145)
LYMPHOCYTES NFR BLD: 3.2 K/UL (ref 1–4.8)
LYMPHOCYTES RELATIVE PERCENT: 34 % (ref 24–44)
MCH RBC QN AUTO: 29.5 PG (ref 26–34)
MCHC RBC AUTO-ENTMCNC: 33.3 G/DL (ref 31–37)
MCV RBC AUTO: 88.5 FL (ref 80–100)
MONOCYTES NFR BLD: 0.6 K/UL (ref 0.1–1.3)
MONOCYTES NFR BLD: 7 % (ref 1–7)
NEUTROPHILS NFR BLD: 57 % (ref 36–66)
NEUTS SEG NFR BLD: 5.3 K/UL (ref 1.3–9.1)
PLATELET # BLD AUTO: 311 K/UL (ref 150–450)
PMV BLD AUTO: 8.6 FL (ref 6–12)
POTASSIUM SERPL-SCNC: 3.5 MMOL/L (ref 3.7–5.3)
PROT SERPL-MCNC: 8.1 G/DL (ref 6.4–8.3)
PROT SERPL-MCNC: 8.1 G/DL (ref 6.4–8.3)
RBC # BLD AUTO: 4.38 M/UL (ref 4–5.2)
RHEUMATOID FACT SER NEPH-ACNC: <10 IU/ML (ref 0–13)
SODIUM SERPL-SCNC: 140 MMOL/L (ref 135–144)
TIBC SERPL-MCNC: 358 UG/DL (ref 250–450)
UNSATURATED IRON BINDING CAPACITY: 266 UG/DL (ref 112–347)
URATE SERPL-MCNC: 3.9 MG/DL (ref 2.4–5.7)
WBC OTHER # BLD: 9.4 K/UL (ref 3.5–11)

## 2024-03-21 PROCEDURE — 86140 C-REACTIVE PROTEIN: CPT

## 2024-03-21 PROCEDURE — 87522 HEPATITIS C REVRS TRNSCRPJ: CPT

## 2024-03-21 PROCEDURE — 83550 IRON BINDING TEST: CPT

## 2024-03-21 PROCEDURE — 80053 COMPREHEN METABOLIC PANEL: CPT

## 2024-03-21 PROCEDURE — 86200 CCP ANTIBODY: CPT

## 2024-03-21 PROCEDURE — 86704 HEP B CORE ANTIBODY TOTAL: CPT

## 2024-03-21 PROCEDURE — 87902 NFCT AGT GNTYP ALYS HEP C: CPT

## 2024-03-21 PROCEDURE — 80076 HEPATIC FUNCTION PANEL: CPT

## 2024-03-21 PROCEDURE — 85025 COMPLETE CBC W/AUTO DIFF WBC: CPT

## 2024-03-21 PROCEDURE — 83540 ASSAY OF IRON: CPT

## 2024-03-21 PROCEDURE — 86803 HEPATITIS C AB TEST: CPT

## 2024-03-21 PROCEDURE — 85652 RBC SED RATE AUTOMATED: CPT

## 2024-03-21 PROCEDURE — 84550 ASSAY OF BLOOD/URIC ACID: CPT

## 2024-03-21 PROCEDURE — 86431 RHEUMATOID FACTOR QUANT: CPT

## 2024-03-21 PROCEDURE — 87340 HEPATITIS B SURFACE AG IA: CPT

## 2024-03-21 PROCEDURE — 36415 COLL VENOUS BLD VENIPUNCTURE: CPT

## 2024-03-22 LAB
HBV CORE AB SER QL: NONREACTIVE
HBV SURFACE AG SERPL QL IA: NONREACTIVE
HCV AB SERPL QL IA: REACTIVE

## 2024-03-23 LAB — CCP AB SER IA-ACNC: 1.2 U/ML (ref 0–7)

## 2024-03-24 LAB
HCV RNA # SERPL NAA+PROBE: NOT DETECTED {COPIES}/ML
SPECIMEN SOURCE: NORMAL

## 2024-03-27 LAB — HCV GENTYP SERPL NAA+PROBE: NORMAL

## 2024-04-02 NOTE — ED NOTES
Patient return from Xray via [address] [place] [address]  [facility]  08/30/20 6733
Patient up and ambulatory to use restroom; tolerated well     Isabell Mota RN  08/30/20 6816
Pt to ED with co R sided rib pain. Pt reports \"I ate some beans and I've been hurting ever since. \"  Pt states she also has an abscess in her pelvic area. Pt denies any other complaints at this time. Pt A&Ox4. Ambulatory upon arrival.   Pt sitting on cot. RR even and non labored. No signs of distress noted at this time. Will continue to monitor.       Enrique Lewis RN  08/30/20 1953
- - -

## 2024-04-03 ENCOUNTER — PATIENT MESSAGE (OUTPATIENT)
Dept: ADMINISTRATIVE | Facility: HOSPITAL | Age: 54
End: 2024-04-03
Payer: COMMERCIAL

## 2024-04-08 ENCOUNTER — HOSPITAL ENCOUNTER (EMERGENCY)
Age: 54
Discharge: HOME | End: 2024-04-08
Attending: EMERGENCY MEDICINE
Payer: MEDICAID

## 2024-04-08 ENCOUNTER — APPOINTMENT (OUTPATIENT)
Dept: CT IMAGING | Age: 54
End: 2024-04-08
Payer: MEDICAID

## 2024-04-08 VITALS
SYSTOLIC BLOOD PRESSURE: 120 MMHG | TEMPERATURE: 98.8 F | OXYGEN SATURATION: 98 % | BODY MASS INDEX: 32.92 KG/M2 | RESPIRATION RATE: 16 BRPM | DIASTOLIC BLOOD PRESSURE: 82 MMHG | WEIGHT: 180 LBS | HEART RATE: 79 BPM

## 2024-04-08 DIAGNOSIS — R10.9 FLANK PAIN: ICD-10-CM

## 2024-04-08 DIAGNOSIS — M54.50 LUMBAR PAIN: ICD-10-CM

## 2024-04-08 DIAGNOSIS — R35.89 POLYURIA: Primary | ICD-10-CM

## 2024-04-08 LAB
ALBUMIN SERPL-MCNC: 4.3 G/DL (ref 3.5–5.2)
ALBUMIN/GLOB SERPL: 1 {RATIO} (ref 1–2.5)
ALP SERPL-CCNC: 98 U/L (ref 35–104)
ALT SERPL-CCNC: 23 U/L (ref 10–35)
ANION GAP SERPL CALCULATED.3IONS-SCNC: 16 MMOL/L (ref 9–16)
AST SERPL-CCNC: 32 U/L (ref 10–35)
BACTERIA URNS QL MICRO: NORMAL
BASOPHILS # BLD: <0.03 K/UL (ref 0–0.2)
BASOPHILS NFR BLD: 0 % (ref 0–2)
BILIRUB SERPL-MCNC: <0.2 MG/DL (ref 0–1.2)
BILIRUB UR QL STRIP: NEGATIVE
BUN SERPL-MCNC: 16 MG/DL (ref 6–20)
CALCIUM SERPL-MCNC: 9.2 MG/DL (ref 8.6–10.4)
CASTS #/AREA URNS LPF: NORMAL /LPF (ref 0–8)
CHLORIDE SERPL-SCNC: 105 MMOL/L (ref 98–107)
CLARITY UR: CLEAR
CO2 SERPL-SCNC: 22 MMOL/L (ref 20–31)
COLOR UR: YELLOW
CREAT SERPL-MCNC: 1.1 MG/DL (ref 0.5–0.9)
EOSINOPHIL # BLD: 0.15 K/UL (ref 0–0.44)
EOSINOPHILS RELATIVE PERCENT: 2 % (ref 1–4)
EPI CELLS #/AREA URNS HPF: NORMAL /HPF (ref 0–5)
ERYTHROCYTE [DISTWIDTH] IN BLOOD BY AUTOMATED COUNT: 13.4 % (ref 11.8–14.4)
GFR SERPL CREATININE-BSD FRML MDRD: 58 ML/MIN/1.73M2
GLUCOSE SERPL-MCNC: 100 MG/DL (ref 74–99)
GLUCOSE UR STRIP-MCNC: NEGATIVE MG/DL
HCT VFR BLD AUTO: 36.4 % (ref 36.3–47.1)
HGB BLD-MCNC: 12 G/DL (ref 11.9–15.1)
HGB UR QL STRIP.AUTO: NEGATIVE
IMM GRANULOCYTES # BLD AUTO: <0.03 K/UL (ref 0–0.3)
IMM GRANULOCYTES NFR BLD: 0 %
KETONES UR STRIP-MCNC: NEGATIVE MG/DL
LEUKOCYTE ESTERASE UR QL STRIP: NEGATIVE
LIPASE SERPL-CCNC: 61 U/L (ref 13–60)
LYMPHOCYTES NFR BLD: 3.13 K/UL (ref 1.1–3.7)
LYMPHOCYTES RELATIVE PERCENT: 38 % (ref 24–43)
MCH RBC QN AUTO: 29 PG (ref 25.2–33.5)
MCHC RBC AUTO-ENTMCNC: 33 G/DL (ref 28.4–34.8)
MCV RBC AUTO: 87.9 FL (ref 82.6–102.9)
MONOCYTES NFR BLD: 0.67 K/UL (ref 0.1–1.2)
MONOCYTES NFR BLD: 8 % (ref 3–12)
NEUTROPHILS NFR BLD: 52 % (ref 36–65)
NEUTS SEG NFR BLD: 4.29 K/UL (ref 1.5–8.1)
NITRITE UR QL STRIP: NEGATIVE
NRBC BLD-RTO: 0 PER 100 WBC
PH UR STRIP: 5.5 [PH] (ref 5–8)
PLATELET # BLD AUTO: 311 K/UL (ref 138–453)
PMV BLD AUTO: 10.6 FL (ref 8.1–13.5)
POTASSIUM SERPL-SCNC: 3.6 MMOL/L (ref 3.7–5.3)
PROT SERPL-MCNC: 7.6 G/DL (ref 6.6–8.7)
PROT UR STRIP-MCNC: NEGATIVE MG/DL
RBC # BLD AUTO: 4.14 M/UL (ref 3.95–5.11)
RBC #/AREA URNS HPF: NORMAL /HPF (ref 0–4)
SODIUM SERPL-SCNC: 143 MMOL/L (ref 136–145)
SP GR UR STRIP: 1.02 (ref 1–1.03)
UROBILINOGEN UR STRIP-ACNC: NORMAL EU/DL (ref 0–1)
WBC #/AREA URNS HPF: NORMAL /HPF (ref 0–5)
WBC OTHER # BLD: 8.3 K/UL (ref 3.5–11.3)

## 2024-04-08 PROCEDURE — 81001 URINALYSIS AUTO W/SCOPE: CPT

## 2024-04-08 PROCEDURE — 85025 COMPLETE CBC W/AUTO DIFF WBC: CPT

## 2024-04-08 PROCEDURE — 74176 CT ABD & PELVIS W/O CONTRAST: CPT

## 2024-04-08 PROCEDURE — 83690 ASSAY OF LIPASE: CPT

## 2024-04-08 PROCEDURE — 99284 EMERGENCY DEPT VISIT MOD MDM: CPT

## 2024-04-08 PROCEDURE — 87086 URINE CULTURE/COLONY COUNT: CPT

## 2024-04-08 PROCEDURE — 6370000000 HC RX 637 (ALT 250 FOR IP): Performed by: STUDENT IN AN ORGANIZED HEALTH CARE EDUCATION/TRAINING PROGRAM

## 2024-04-08 PROCEDURE — 80053 COMPREHEN METABOLIC PANEL: CPT

## 2024-04-08 RX ORDER — ACETAMINOPHEN 500 MG
1000 TABLET ORAL ONCE
Status: COMPLETED | OUTPATIENT
Start: 2024-04-08 | End: 2024-04-08

## 2024-04-08 RX ADMIN — ACETAMINOPHEN 1000 MG: 500 TABLET ORAL at 20:44

## 2024-04-08 ASSESSMENT — PAIN SCALES - GENERAL: PAINLEVEL_OUTOF10: 10

## 2024-04-08 ASSESSMENT — ENCOUNTER SYMPTOMS
SHORTNESS OF BREATH: 0
NAUSEA: 0
ABDOMINAL PAIN: 0
DIARRHEA: 0
BACK PAIN: 1
VOMITING: 0

## 2024-04-08 ASSESSMENT — PAIN - FUNCTIONAL ASSESSMENT: PAIN_FUNCTIONAL_ASSESSMENT: 0-10

## 2024-04-08 NOTE — ED TRIAGE NOTES
Pt comes to ED with c/o flank pain. Pt states pain started two days ago, right back, radiating to both legs, burning sensation, is still voiding although she thinks she is done voiding. Pt denies CP, SOB, fever, N/V/D, kidney hx, dysuria, vaginal discharge. Pt a/o x4, resting on stretcher, attached to monitor, RR even and non labored, call light within reach.

## 2024-04-09 LAB
MICROORGANISM SPEC CULT: NORMAL
SPECIMEN DESCRIPTION: NORMAL

## 2024-04-09 NOTE — DISCHARGE INSTRUCTIONS
Your CT scan did not find any evidence of acute process in your back or abdomen. Your symptoms are likely related to musculoskeletal pain.  Your urine did not show evidence of infection either.    Tylenol can be taken every 6 hours. Maximum dose of tylenol in a 24 hour period is 4000mg.     Please call your PCP to schedule follow-up appointment.

## 2024-04-09 NOTE — ED PROVIDER NOTES
[facility] ED  Emergency Department  Emergency Medicine Sign-out     Care of Erica Loo was assumed from Dr. Hernandez and is being seen for Flank Pain (Right side, x 3-4 days)  .  The patient's initial evaluation and plan have been discussed with the prior attending who initially evaluated the patient.     EMERGENCY DEPARTMENT COURSE / MEDICAL DECISION MAKING:       MEDICATIONS GIVEN:  Orders Placed This Encounter   Medications    acetaminophen (TYLENOL) tablet 1,000 mg       LABS / RADIOLOGY:     Labs Reviewed   COMPREHENSIVE METABOLIC PANEL W/ REFLEX TO MG FOR LOW K - Abnormal; Notable for the following components:       Result Value    Potassium 3.6 (*)     Glucose 100 (*)     Creatinine 1.1 (*)     Est, Glom Filt Rate 58 (*)     All other components within normal limits   LIPASE - Abnormal; Notable for the following components:    Lipase 61 (*)     All other components within normal limits   CULTURE, URINE   URINALYSIS WITH MICROSCOPIC   CBC WITH AUTO DIFFERENTIAL       XR HAND RIGHT (MIN 3 VIEWS)    Result Date: 3/10/2024  EXAMINATION: THREE XRAY VIEWS OF THE RIGHT HAND 3/10/2024 3:18 pm COMPARISON: None. HISTORY: ORDERING SYSTEM PROVIDED HISTORY: Pain over right MCP joint of the thumb TECHNOLOGIST PROVIDED HISTORY: Pain over right MCP joint of the thumb Reason for Exam: MCP joint of right thumb pain FINDINGS: Three views provided. No focal soft tissue swelling.  About the 1st MCP there is evidence of chondrocalcinosis.  Normal bone mineral density and alignment.  Preserved joint spaces.  No suspicious periostitis or osseous erosions.  No acute fracture.     Normal right hand 1st digit alignment with no acute osseous abnormality.  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       RECENT VITALS:     Temp: 98.8 °F (37.1 °C),  Pulse: 79, Respirations: 16, BP: 120/82, SpO2: 98 %    This patient is a 54 y.o. Female with     Right flank 
I performed a history and physical examination of the patient and discussed management with the resident. I reviewed the resident’s note and agree with the documented findings and plan of care. Any areas of disagreement are noted on the chart. I was personally present for the key portions of any procedures. I have documented in the chart those procedures where I was not present during the key portions. Unless noted in my documentation, I agree with the chief complaint, past medical history, past surgical history, allergies, medications, social and family history as documented. Documentation of the HPI, Physical Exam and Medical Decision Making performed by medical students or scribes is based on my personal performance of the HPI, PE and MDM.   For Phys Assistant/ Nurse Practitioner cases/documentation I have personally evaluated this patient and have completed at least one if not all key elements of the E/M (history, physical exam, and MDM). I find the patient's history and physical exam are consistent with the NP/PA documentation. I agree with the care provided, treatment rendered, disposition and followup plan.   Additional findings are as noted.    Saturnino Heart MD  Attending Emergency  Physician        This patient presented to the emergency department with complaints of pain in the right low back/flank area for the past several days.  She reports that symptoms are worse with bending over or straightening up.  She denies any fall or direct trauma to the area.  Some radiation to the right flank.  No fevers or chills.  No abdominal pain.  No dysuria, hematuria, frequency, urgency.  No numbness, weakness, tingling.  No disturbance of bowel or bladder function.  She reports increased urinary frequency of small amounts.  No nausea or vomiting.  Past medical history is significant for hepatitis C as well as cholelithiasis.  No history of nephrolithiasis.  On examination patient is awake and alert.  She is 
Mental Status: She is alert and oriented to person, place, and time.      Sensory: No sensory deficit.      Motor: No weakness.           DDX/DIAGNOSTIC RESULTS / EMERGENCY DEPARTMENT COURSE / MDM     Medical Decision Making  53 yo F presents with R flank pain, bilateral lower extremity pain, polyuria. Hx hysterectomy. Reports finishing recent course of antibiotics and evaluation by a provider a week ago who told her she had bladder prolapse. She denies fevers, dysuria, hematuria, frequency, urgency, incontinence, numbness, weakness. Ambulating without difficulty.  No focal neurologic deficits on exam.  No saddle anesthesia, no IVDU, no urinary retention or incontinence, no fecal retention or incontinence.  Denies any trauma.  Overall well-appearing on examination, vital signs are stable, afebrile.  Abdomen is soft and nontender in all quadrants with no rebound rigidity or guarding.  Very minimal right-sided CVA tenderness.  No left CVA tenderness.  Patient has not undergone any testing including UA or imaging.  Will obtain UA with culture, lab workup, CT abdomen pelvis without contrast to rule out nephrolithiasis and/or bladder prolapse.    Amount and/or Complexity of Data Reviewed  Labs: ordered.  Radiology: ordered.    Risk  OTC drugs.      EMERGENCY DEPARTMENT COURSE:    ED Course as of 04/08/24 2216 Mon Apr 08, 2024 2150 Assumed care from day resident awaiting CT scan.  [CP]      ED Course User Index  [CP] Joshua Dukes MD     CONSULTS:  None    FINAL IMPRESSION      1. Polyuria    2. Flank pain          DISPOSITION / PLAN     DISPOSITION  Signed out to oncoming resident 4/8/24 10:00 PM      PATIENT REFERRED TO:  No follow-up provider specified.    DISCHARGE MEDICATIONS:  New Prescriptions    No medications on file       Ginette Hernandez MD  Emergency Medicine Resident    (Please note that portions of this note were completed with a voice recognition program.  Efforts were made to edit the

## 2024-04-09 NOTE — ED NOTES
The following labs were labeled with appropriate pt sticker and tubed to lab:     [x] Blue     [x] Lavender   [] on ice  [x] Green/yellow  [] Green/black [] on ice  [] Grey  [] on ice  [] Yellow  [] Red  [] Pink  [] Type/ Screen  [] ABG  [] VBG    [] COVID-19 swab    [] Rapid  [] PCR  [] Flu swab  [] Peds Viral Panel     [x] Urine Sample  [] Fecal Sample  [] Pelvic Cultures  [] Blood Cultures  [] X 2  [] STREP Cultures  [] Wound Cultures

## 2024-04-12 ENCOUNTER — PATIENT MESSAGE (OUTPATIENT)
Dept: OTHER | Facility: OTHER | Age: 54
End: 2024-04-12
Payer: COMMERCIAL

## 2024-05-03 ENCOUNTER — PATIENT MESSAGE (OUTPATIENT)
Dept: ADMINISTRATIVE | Facility: HOSPITAL | Age: 54
End: 2024-05-03
Payer: COMMERCIAL

## 2024-05-07 ENCOUNTER — HOSPITAL ENCOUNTER (EMERGENCY)
Age: 54
Discharge: HOME | End: 2024-05-07
Attending: EMERGENCY MEDICINE
Payer: MEDICAID

## 2024-05-07 ENCOUNTER — APPOINTMENT (OUTPATIENT)
Dept: GENERAL RADIOLOGY | Age: 54
End: 2024-05-07
Payer: MEDICAID

## 2024-05-07 VITALS
HEART RATE: 81 BPM | OXYGEN SATURATION: 97 % | BODY MASS INDEX: 32.37 KG/M2 | WEIGHT: 177 LBS | RESPIRATION RATE: 18 BRPM | DIASTOLIC BLOOD PRESSURE: 110 MMHG | SYSTOLIC BLOOD PRESSURE: 170 MMHG | TEMPERATURE: 98.2 F

## 2024-05-07 DIAGNOSIS — M79.641 HAND PAIN, RIGHT: Primary | ICD-10-CM

## 2024-05-07 LAB
BASOPHILS # BLD: <0.03 K/UL (ref 0–0.2)
BASOPHILS NFR BLD: 0 % (ref 0–2)
CRP SERPL HS-MCNC: <3 MG/L (ref 0–5)
EOSINOPHIL # BLD: 0.15 K/UL (ref 0–0.44)
EOSINOPHILS RELATIVE PERCENT: 2 % (ref 1–4)
ERYTHROCYTE [DISTWIDTH] IN BLOOD BY AUTOMATED COUNT: 14.1 % (ref 11.8–14.4)
ERYTHROCYTE [SEDIMENTATION RATE] IN BLOOD BY PHOTOMETRIC METHOD: 20 MM/HR (ref 0–30)
HCT VFR BLD AUTO: 34.2 % (ref 36.3–47.1)
HGB BLD-MCNC: 11.1 G/DL (ref 11.9–15.1)
IMM GRANULOCYTES # BLD AUTO: <0.03 K/UL (ref 0–0.3)
IMM GRANULOCYTES NFR BLD: 0 %
LYMPHOCYTES NFR BLD: 1.88 K/UL (ref 1.1–3.7)
LYMPHOCYTES RELATIVE PERCENT: 27 % (ref 24–43)
MCH RBC QN AUTO: 28.5 PG (ref 25.2–33.5)
MCHC RBC AUTO-ENTMCNC: 32.5 G/DL (ref 28.4–34.8)
MCV RBC AUTO: 87.9 FL (ref 82.6–102.9)
MONOCYTES NFR BLD: 0.48 K/UL (ref 0.1–1.2)
MONOCYTES NFR BLD: 7 % (ref 3–12)
NEUTROPHILS NFR BLD: 63 % (ref 36–65)
NEUTS SEG NFR BLD: 4.36 K/UL (ref 1.5–8.1)
NRBC BLD-RTO: 0 PER 100 WBC
PLATELET # BLD AUTO: ABNORMAL K/UL (ref 138–453)
PLATELET, FLUORESCENCE: 97 K/UL (ref 138–453)
PLATELETS.RETICULATED NFR BLD AUTO: 9.2 % (ref 1.1–10.3)
RBC # BLD AUTO: 3.89 M/UL (ref 3.95–5.11)
WBC OTHER # BLD: 6.9 K/UL (ref 3.5–11.3)

## 2024-05-07 PROCEDURE — 85652 RBC SED RATE AUTOMATED: CPT

## 2024-05-07 PROCEDURE — 86140 C-REACTIVE PROTEIN: CPT

## 2024-05-07 PROCEDURE — 85025 COMPLETE CBC W/AUTO DIFF WBC: CPT

## 2024-05-07 PROCEDURE — 99284 EMERGENCY DEPT VISIT MOD MDM: CPT

## 2024-05-07 PROCEDURE — 6360000002 HC RX W HCPCS

## 2024-05-07 PROCEDURE — 96372 THER/PROPH/DIAG INJ SC/IM: CPT

## 2024-05-07 PROCEDURE — 85055 RETICULATED PLATELET ASSAY: CPT

## 2024-05-07 PROCEDURE — 6370000000 HC RX 637 (ALT 250 FOR IP): Performed by: STUDENT IN AN ORGANIZED HEALTH CARE EDUCATION/TRAINING PROGRAM

## 2024-05-07 PROCEDURE — 73130 X-RAY EXAM OF HAND: CPT

## 2024-05-07 RX ORDER — SULFAMETHOXAZOLE AND TRIMETHOPRIM 800; 160 MG/1; MG/1
1 TABLET ORAL ONCE
Status: COMPLETED | OUTPATIENT
Start: 2024-05-07 | End: 2024-05-07

## 2024-05-07 RX ORDER — KETOROLAC TROMETHAMINE 30 MG/ML
30 INJECTION, SOLUTION INTRAMUSCULAR; INTRAVENOUS ONCE
Status: DISCONTINUED | OUTPATIENT
Start: 2024-05-07 | End: 2024-05-07

## 2024-05-07 RX ORDER — KETOROLAC TROMETHAMINE 15 MG/ML
15 INJECTION, SOLUTION INTRAMUSCULAR; INTRAVENOUS ONCE
Status: COMPLETED | OUTPATIENT
Start: 2024-05-07 | End: 2024-05-07

## 2024-05-07 RX ORDER — ACETAMINOPHEN 500 MG
1000 TABLET ORAL EVERY 8 HOURS PRN
Qty: 21 TABLET | Refills: 0 | Status: SHIPPED | OUTPATIENT
Start: 2024-05-07 | End: 2024-05-14

## 2024-05-07 RX ORDER — SULFAMETHOXAZOLE AND TRIMETHOPRIM 800; 160 MG/1; MG/1
1 TABLET ORAL 2 TIMES DAILY
Qty: 14 TABLET | Refills: 0 | Status: SHIPPED | OUTPATIENT
Start: 2024-05-07 | End: 2024-05-14

## 2024-05-07 RX ADMIN — KETOROLAC TROMETHAMINE 15 MG: 15 INJECTION, SOLUTION INTRAMUSCULAR; INTRAVENOUS at 18:22

## 2024-05-07 RX ADMIN — SULFAMETHOXAZOLE AND TRIMETHOPRIM 1 TABLET: 800; 160 TABLET ORAL at 20:28

## 2024-05-07 ASSESSMENT — ENCOUNTER SYMPTOMS
COLOR CHANGE: 0
SHORTNESS OF BREATH: 0
ABDOMINAL PAIN: 0

## 2024-05-07 NOTE — ED NOTES
Pt to ED via self with c/o gout flare up in bilateral thumbs/wrist. Takes daily gout medications, but states they are not working. Pt is a/ox4, ambulatory, RR even and non labored on room air.

## 2024-05-07 NOTE — ED PROVIDER NOTES
[facility]     Emergency Department     Faculty Note/ Attestation      Pt Name: Erica Loo                                       MRN: [id number]  Birthdate 1970  Date of evaluation: 5/7/2024    Patients PCP:    Kaylah Gomes APRN - NP    Note Started: 6:22 PM EDT      Attestation  I performed a history and physical examination of the patient and discussed management with the resident. I reviewed the resident’s note and agree with the documented findings and plan of care. Any areas of disagreement are noted on the chart. I was personally present for the key portions of any procedures. I have documented in the chart those procedures where I was not present during the key portions. I have reviewed the emergency nurses triage note. I agree with the chief complaint, past medical history, past surgical history, allergies, medications, social and family history as documented unless otherwise noted below.    For Physician Assistant/ Nurse Practitioner cases/documentation I have personally evaluated this patient and have completed at least one if not all key elements of the E/M (history, physical exam, and MDM). Additional findings are as noted.      Initial Screens:        [place] Coma Scale  Eye Opening: Spontaneous  Best Verbal Response: Oriented  Best Motor Response: Obeys commands  [place] Coma Scale Score: 15    Vitals:    Vitals:    05/07/24 1729 05/07/24 1730   BP:  (!) 170/110   Pulse: 81    Resp: 19    Temp: 98.2 °F (36.8 °C)    TempSrc: Oral    SpO2: 97%    Weight: 80.3 kg (177 lb)        CHIEF COMPLAINT     No chief complaint on file.            DIAGNOSTIC RESULTS             RADIOLOGY:   XR HAND RIGHT (MIN 3 VIEWS)    (Results Pending)         LABS:  Labs Reviewed   CBC WITH AUTO DIFFERENTIAL   C-REACTIVE PROTEIN   SEDIMENTATION RATE         EMERGENCY DEPARTMENT COURSE:     -------------------------  BP: (!) 170/110, Temp: 98.2 °F (36.8 °C), Pulse: 81, Respirations: 
     [facility] ED  Emergency Department  Emergency Medicine Resident Turn-Over     Note Started: 7:03 PM EDT    Care of Erica Loo was assumed from Dr. Briggs and is being seen for Wrist Pain (Bilateral gout flare up) and Hand Pain  .  The patient's initial evaluation and plan have been discussed with the prior provider who initially evaluated the patient.     EMERGENCY DEPARTMENT COURSE / MEDICAL DECISION MAKING:       MEDICATIONS GIVEN:  Orders Placed This Encounter   Medications    DISCONTD: ketorolac (TORADOL) injection 30 mg    ketorolac (TORADOL) injection 15 mg    sulfamethoxazole-trimethoprim (BACTRIM DS;SEPTRA DS) 800-160 MG per tablet 1 tablet     Order Specific Question:   Antimicrobial Indications     Answer:   Skin and Soft Tissue Infection    sulfamethoxazole-trimethoprim (BACTRIM DS) 800-160 MG per tablet     Sig: Take 1 tablet by mouth 2 times daily for 7 days     Dispense:  14 tablet     Refill:  0    acetaminophen (TYLENOL) 500 MG tablet     Sig: Take 2 tablets by mouth every 8 hours as needed for Pain     Dispense:  21 tablet     Refill:  0       LABS / RADIOLOGY:     Labs Reviewed   CBC WITH AUTO DIFFERENTIAL - Abnormal; Notable for the following components:       Result Value    RBC 3.89 (*)     Hemoglobin 11.1 (*)     Hematocrit 34.2 (*)     Platelet, Fluorescence 97 (*)     All other components within normal limits   C-REACTIVE PROTEIN   SEDIMENTATION RATE       CT ABDOMEN PELVIS WO CONTRAST Additional Contrast? None    Result Date: 4/8/2024  EXAMINATION: CT OF THE ABDOMEN AND PELVIS WITHOUT CONTRAST 4/8/2024 9:51 pm TECHNIQUE: CT of the abdomen and pelvis was performed without the administration of intravenous contrast. Multiplanar reformatted images are provided for review. Automated exposure control, iterative reconstruction, and/or weight based adjustment of the mA/kV was utilized to reduce the radiation dose to as low as reasonably achievable. COMPARISON: CTA chest 
  Gastrointestinal:  Negative for abdominal pain.   Musculoskeletal:  Positive for arthralgias (Right hand) and joint swelling.   Skin:  Positive for wound (Possible wound). Negative for color change.       PHYSICAL EXAM      INITIAL VITALS:   BP (!) 170/110   Pulse 81   Temp 98.2 °F (36.8 °C) (Oral)   Resp 19   Wt 80.3 kg (177 lb)   SpO2 97%   BMI 32.37 kg/m²     Physical Exam  Vitals reviewed.   Constitutional:       General: She is not in acute distress.     Appearance: Normal appearance. She is not ill-appearing.   Eyes:      Extraocular Movements: Extraocular movements intact.      Conjunctiva/sclera: Conjunctivae normal.   Cardiovascular:      Rate and Rhythm: Normal rate and regular rhythm.      Pulses: Normal pulses.      Heart sounds: Normal heart sounds.   Pulmonary:      Effort: Pulmonary effort is normal.      Breath sounds: Normal breath sounds.   Musculoskeletal:         General: Swelling (Right thumb joints, thenar eminence, MCP of the pointer finger.) and tenderness (Right thumb, thenar eminence) present.      Comments: Range of motion of the right thumb limited secondary to pain.  Able to move all of the fingers.  No warmth of the joints.   Skin:     General: Skin is warm and dry.      Capillary Refill: Capillary refill takes less than 2 seconds.   Neurological:      Mental Status: She is alert and oriented to person, place, and time.      Sensory: No sensory deficit.           DDX/DIAGNOSTIC RESULTS / EMERGENCY DEPARTMENT COURSE / MDM     Medical Decision Making  This is a 54-year-old female here for concerns of gout flare.  Patient was seen a couple months ago for similar symptoms.  Patient with swelling of the joints of the right thumb and thenar eminence, as well as the MCP of the pointer finger.  Thumb is tender, not held in flexion, no obvious signs of trauma or punctures.  Patient reports that she was giving her cat a bath and has scratched her previously, unable to determine timeline.

## 2024-05-08 NOTE — DISCHARGE INSTRUCTIONS
Take antibiotics as the thumb might be infected.  Use the thumb splint as needed for pain.  Use Tylenol as needed for pain control.  Follow-up with your primary care physician in the next few days for reevaluation.  If you have any worsening pain swelling fevers or chills return to the emergency department

## 2024-05-09 ENCOUNTER — APPOINTMENT (OUTPATIENT)
Dept: GENERAL RADIOLOGY | Age: 54
End: 2024-05-09
Payer: MEDICAID

## 2024-05-09 ENCOUNTER — HOSPITAL ENCOUNTER (EMERGENCY)
Age: 54
Discharge: HOME | End: 2024-05-09
Attending: EMERGENCY MEDICINE
Payer: MEDICAID

## 2024-05-09 VITALS
OXYGEN SATURATION: 100 % | SYSTOLIC BLOOD PRESSURE: 141 MMHG | RESPIRATION RATE: 20 BRPM | DIASTOLIC BLOOD PRESSURE: 96 MMHG | BODY MASS INDEX: 32.57 KG/M2 | WEIGHT: 177 LBS | HEART RATE: 72 BPM | TEMPERATURE: 98.6 F | HEIGHT: 62 IN

## 2024-05-09 DIAGNOSIS — M79.89 HAND SWELLING: ICD-10-CM

## 2024-05-09 DIAGNOSIS — M79.644 PAIN OF RIGHT THUMB: Primary | ICD-10-CM

## 2024-05-09 PROCEDURE — 73130 X-RAY EXAM OF HAND: CPT

## 2024-05-09 PROCEDURE — 99283 EMERGENCY DEPT VISIT LOW MDM: CPT

## 2024-05-09 PROCEDURE — 6370000000 HC RX 637 (ALT 250 FOR IP): Performed by: PHYSICIAN ASSISTANT

## 2024-05-09 RX ORDER — PREDNISONE 20 MG/1
40 TABLET ORAL DAILY
Qty: 10 TABLET | Refills: 0 | Status: SHIPPED | OUTPATIENT
Start: 2024-05-09 | End: 2024-05-14

## 2024-05-09 RX ORDER — HYDROCODONE BITARTRATE AND ACETAMINOPHEN 5; 325 MG/1; MG/1
1 TABLET ORAL ONCE
Status: COMPLETED | OUTPATIENT
Start: 2024-05-09 | End: 2024-05-09

## 2024-05-09 RX ADMIN — HYDROCODONE BITARTRATE AND ACETAMINOPHEN 1 TABLET: 5; 325 TABLET ORAL at 16:44

## 2024-05-09 ASSESSMENT — PAIN DESCRIPTION - LOCATION
LOCATION: HAND
LOCATION: HAND

## 2024-05-09 ASSESSMENT — PAIN - FUNCTIONAL ASSESSMENT: PAIN_FUNCTIONAL_ASSESSMENT: 0-10

## 2024-05-09 ASSESSMENT — PAIN DESCRIPTION - PAIN TYPE: TYPE: ACUTE PAIN

## 2024-05-09 ASSESSMENT — PAIN SCALES - GENERAL: PAINLEVEL_OUTOF10: 10

## 2024-05-09 ASSESSMENT — PAIN DESCRIPTION - DESCRIPTORS: DESCRIPTORS: ACHING

## 2024-05-09 ASSESSMENT — PAIN DESCRIPTION - ORIENTATION
ORIENTATION: RIGHT
ORIENTATION: RIGHT

## 2024-05-09 NOTE — ED TRIAGE NOTES
Mode of arrival (squad #, walk in, police, etc) : walk in        Chief complaint(s): RT hand swelling,         Arrival Note (brief scenario, treatment PTA, etc).: no injury, onset 2/3 days, once was told she had gout, but then she doesn't. Feels like thumb is dislocated.         C= \"Have you ever felt that you should Cut down on your drinking?\"  No  A= \"Have people Annoyed you by criticizing your drinking?\"  No  G= \"Have you ever felt bad or Guilty about your drinking?\"  No  E= \"Have you ever had a drink as an Eye-opener first thing in the morning to steady your nerves or to help a hangover?\"  No      Deferred []      Reason for deferring: N/A    *If yes to two or more: probable alcohol abuse.*

## 2024-05-09 NOTE — ED PROVIDER NOTES
eMERGENCY dEPARTMENT eNCOUnter   Independent Attestation     Pt Name: Erica Loo  MRN: [id number]  Birthdate 1970  Date of evaluation: 5/9/24     Erica Loo is a 54 y.o. female with CC: Hand Pain      Based on the medical record the care appears appropriate.  I was personally available for consultation in the Emergency Department.    Dick Galvin DO  Attending Emergency Physician                 Dick Galvin DO  05/11/24 0703    
dr. Rebollar.   ERICK Galvin who is agreeable with plan.     Patient repeat assessment:  pain improved. Pt flexing thumb now.     Disposition discussion with patient/family, Shared Decision Making:  Discussed results and plan with the pt.  They expressed appropriate understanding.  Pt given close follow up, supportive care instructions and strict return instructions at the bedside.      MIPS:      PROCEDURES:    Procedures      DATA FOR LAB AND RADIOLOGY TESTS ORDERED BELOW ARE REVIEWED BY THE ED CLINICIAN:    RADIOLOGY: All x-rays, CT, MRI, and formal ultrasound images (except ED bedside ultrasound) are read by the radiologist, see reports below, unless otherwise noted in MDM or here.  Reports below are reviewed by myself.  XR HAND RIGHT (MIN 3 VIEWS)   Final Result   No acute osseous abnormality of the right hand with attention to the thumb.   No interval change.             LABS: Lab orders shown below, the results are reviewed by myself, and all abnormals are listed below.  Labs Reviewed - No data to display    Vitals Reviewed:    Vitals:    05/09/24 1551   BP: (!) 141/96   Pulse: 72   Resp: 20   Temp: 98.6 °F (37 °C)   TempSrc: Oral   SpO2: 100%   Weight: 80.3 kg (177 lb)   Height: 1.575 m (5' 2\")     MEDICATIONS GIVEN TO PATIENT THIS ENCOUNTER:  Orders Placed This Encounter   Medications    HYDROcodone-acetaminophen (NORCO) 5-325 MG per tablet 1 tablet    predniSONE (DELTASONE) 20 MG tablet     Sig: Take 2 tablets by mouth daily for 5 days     Dispense:  10 tablet     Refill:  0     DISCHARGE PRESCRIPTIONS:  Discharge Medication List as of 5/9/2024  6:25 PM        START taking these medications    Details   predniSONE (DELTASONE) 20 MG tablet Take 2 tablets by mouth daily for 5 days, Disp-10 tablet, R-0Normal           PHYSICIAN CONSULTS ORDERED THIS ENCOUNTER:  None  FINAL IMPRESSION      1. Pain of right thumb    2. Hand swelling          DISPOSITION/PLAN   DISPOSITION Decision To Discharge 05/09/2024

## 2024-05-09 NOTE — DISCHARGE INSTRUCTIONS
Recommend ice.  Follow up with dr. Rebollar.  Return to the ED if you develop any worsening pain, swelling, redness, numbness, difficulty bending or extending thumb, arm swelling, chest pain, shortness of breath, fevers or any other concerning symptoms.

## 2024-05-22 ENCOUNTER — PATIENT OUTREACH (OUTPATIENT)
Dept: ADMINISTRATIVE | Facility: HOSPITAL | Age: 54
End: 2024-05-22
Payer: COMMERCIAL

## 2024-05-22 NOTE — LETTER
"       AUTHORIZATION FOR RELEASE OF   CONFIDENTIAL INFORMATION    Dear Garrett,    We are seeing Sandra Tucker, date of birth 1970, in the clinic at [facility]. Silver Moore MD is the patient's PCP. Sandra Tucker has an outstanding lab/procedure at the time we reviewed her chart. In order to help keep her health information updated, she has authorized us to request the following medical record(s):                                                   ( X )  EYE EXAM                Please fax records to [facility], Hulin, Michael P., MD, [phone] or [phone]    Thank you in advance,  Graciela    Patient Name: Sandra Tucker  : 1970  Patient Phone #: [phone]         A. Consent for Examination and Treatment: I hereby authorize the providers and employees of [facility] to provide medical treatment/services which includes, but is not limited to, performing and administering tests and diagnostic procedures that are deemed necessary, Including, but not limited to, imaging examinations, blood tests and other laboratory procedures as may be required by the hospital, clinic, or may be ordered by my physician(s) or persons working under the general and/or special instructions of my physician(s).                   1.      I understand and agree that this consent covers all authorized persons, including but not limited to physicians, residents, nurse practitioners, physicians' assistants, specialists, consultants, student nurses, and independently contracted physicians, who are called upon by the physician in charge, to carry out the diagnostic procedures and medical or surgical treatment.  2.      I hereby authorize [facility] to retain or dispose of any specimens or tissue, should there be such remaining from any test or procedure.  3.      I hereby authorize and give consent for [facility] providers and employees to take photographs, images or videotapes of such diagnostic, " surgical or treatment procedures of Patient as may be required by [facility] or as may be ordered by a physician.  I further acknowledge and agree that [facility] may use cameras or other devices for patient monitoring.  4.      I am aware that the practice of medicine is not an exact science, and I acknowledge that no guarantees have been made to me as to the outcome of any tests, procedures or treatment.                   B. Authorization for Release of Information:  I understand that my insurance company and/or their agents may need information necessary to make determinations about payment/reimbursement.  I hereby provide authorization to release to all insurance companies, their successors, assignees, other parties with whom they may have contracted, or others acting on their behalf, that are involved with payment for any hospital and/or clinic charges incurred by the patient, any information that they request and deem necessary for payment/reimbursement, and/or quality review.  I further authorize the release of my health information to physicians or other health care practitioners on staff who are involved in my health care now and in the future, and to other health care providers, entities, or institutions for the purpose of my continued care and treatment, including referrals.     C. Medicare Patient's Certification and Authorization to Release Information and Payment Request:  I certify that the information given by me in applying for payment under Title XVIII of the Social Security Act is correct.  I authorize any ulloa of medical or other information about me to release to the Social Security Administration, or its intermediaries or carriers, any information needed for this or a related Medicare claim.  I request that payment of authorized benefits be made on my behalf.     D. Assignment of Insurance Benefits:  I hereby authorize any and all insurance companies, health plans, defined benefit plans, health  insurers or any entity that is or may be responsible for payment of my medical expenses to pay all hospital and medical benefits now due, and to become due and payable to me under any hospital benefits, sick benefits, injury benefits or any other benefit for services rendered to me, including Major Medical Benefits, direct to [facility] and all independently contracted physicians.  I assign any and all rights that I may have against any and all insurance companies, health plans, defined benefit plans, health insurers or any entity that is or may be responsible for payment of my medical expenses, including, but not limited to any right to appeal a denial of a claim, any right to bring any action, lawsuit, administrative proceeding, or other cause of action on my behalf.  I specifically assign my right to pursue litigation against any and all insurance companies, health plans, defined benefit plans, health insurers or any entity that is or may be responsible for payment of medical expenses based upon a refusal to pay charges.              REGISTRATION  AUTHORIZATION                    E. Valuables:  It is understood and agreed that [facility] is not liable for the damage to or loss of any money, jewelry, documents, dentures, eye glasses, hearing aids, prosthetics, or other property of value.     F. Computer Equipment:  I understand and agree that should I choose to use computer equipment owned by [facility] or if I choose to access the Internet via [employer]'s network, I do so at my own risk.  [facility] is not responsible for any damage to my computer equipment or to any damages of any type that might arise from my loss of equipment or data.                   G. Acceptance of Financial Responsibility:  I agree that in consideration of the services and supplies that have been or will be furnished to the patient,  I am hereby obligated to pay all charges made for or on the account of the patient according to the standard rates (in  effect at the time the services and supplies are delivered) established by [facility], including its Patient Financial Assistance Policy to the extent it is applicable.  I understand that I am responsible for all charges, or portions thereof, not covered by insurance or other sources.  Patient refunds will be distributed only after balances at all [facility] facilities are paid.                   H. Communication Authorization:  I hereby authorize [facility] and its representatives, along with any billing service or  who may work on their behalf, to contact me on my cell phone and/or home phone using prerecorded messages, artificial voice messages, automatic telephone dialing devices or other computer assisted technology, or by electronic mail, text messaging, or by any other form of electronic communication.  This includes, but is not limited to appointment reminders, yearly physical exam reminders, preventive care reminders, patient campaigns, welcome calls, and calls about account balances on my account or any account on which I am listed as a guarantor.  I understand I have the right to opt out of these communications at any time.     I.  Relationship Between Facility and Physician:  I understand that some, but not all, providers furnishing services to the patient are not employees or agents of [facility].  The patient is under the care and supervision of his/her attending physician, and it is the responsibility of the facility and its nursing staff to carry out the instructions of such physicians.  It is the responsibility of the patient's physician/designee to obtain the patient's informed consent, when required, for medical or surgical treatment, special diagnostic or therapeutic procedures, or hospital services rendered for the patient under the special instructions of the physician/designee.     J. Notice of Private Practices:  I acknowledge I have received a copy of [facility]'s Notice of Privacy  Practices.     K. Facility Directory:  I have discussed with the organization my desire to be either included or excluded in the facility directory.  I understand that if my choice is to opt-out of being identified in the facility directory that the facility will not provide any information about me such as my condition (e.g. Fair, stable, etc.) or my location in the facility (e.g. Room number, department).     L. LINKS:  For [place] Residents:  [facility] is a LINKS ([place] [facility]) participating facility.  LINKS is a [facility]-sponsored confidential computer system that helps you and your doctor keep track of you and your child's immunization history.  I acknowledge that I am allowing [facility] to share this information with [facility].  For [place] Residents:  [facility] is a MIIX ([place] Immunization Information eXchange) participant.  [employer] is a [place] Department of Health-sponsored confidential computer system that helps you and your doctor keep track of you and your child's immunization history.  I acknowledge that I am allowing [facility] to share information with [email or web address].     M. Term:  This authorization is valid for this and subsequent care/treatment I receive at [facility] and will remain valid unless/until revoked in writing by me.      ACKNOWLEDGMENT OF POTENTIAL RISKS OF COVID-19 VACCINE  It is important that you, as the patient or patients legally authorized representative(s), understand and acknowledge the following, with regard to administration of the COVID-19 vaccine offered by [facility]:  The SARS-CoV-2 virus (COVID-19) has caused an unprecedented modern global pandemic that has mobilized scientists and drug manufacturers to work to create safe and effective vaccines to get the crisis under control.  No vaccine is released in the [place] without undergoing rigorous, multi-layered testing and approval by the Food and Drug Administration.  During a public health  emergency, however, vaccines can be released for patient administration by the FDA prior to completion of multi-phase clinical trials and approval. This is done by the FDAs granting of Emergency Use Authorization (EUA) when the vaccine meets reasonable thresholds for safety and effectiveness and people are in urgent need of care. Under an EUA, the FDA has found that known and potential benefits outweigh its known and potential risks.  The vaccine for which you are presenting to [facility] has been released under an EUA, which [facility] is honoring in its distribution of the vaccine to the public. While the FDAs authorization indicates its belief that usage is recommended over possible risks, there is still the possibility that unknown risks of the vaccine could exist.  By signing this document, you acknowledge and assume these risks. Further, you waive any and all claims of liability against and hold harmless any [facility] entity or provider for any harm caused to you by said possible unknown risks of the vaccine.        N. [facility]:  As used in this document, [facility] means all [facility] affiliated entities including all health centers, surgery centers, clinics, and hospitals.  It includes more specifically, the following entities: [facility], a not for profit [place] [employer], and its subsidiaries and affiliates, including [facility], [facility], [facility]., [facility], [facility], [facility], [facility], [facility], [facility], L.L.C.d/b/a [facility], [facility].d/b/a [facility] [employer], [employer]. As manager of Sha[facility]en SANCHE[facility], [facility], [facility] [employer], L.L.C.d/b/a [facility], [facility], [facility]., [facility], L.L[facility]C., and [facility] as manager of [facility].                                                                Patient/Legal Guardian Signature                                                    This signature was collected at 12/05/2023      /                                                                            Printed Name/Relationship to Patient

## 2024-05-22 NOTE — PROGRESS NOTES
Population Health Chart Review & Patient Outreach Details      Additional Pop Health Notes:               Updates Requested / Reviewed:      Updated Care Coordination Note, Care Everywhere, and Care Team Updated         Health Maintenance Topics Overdue:      [facility] Score: 3     Urine Screening  Eye Exam  Foot Exam                       Health Maintenance Topic(s) Outreach Outcomes & Actions Taken:    Eye Exam - Outreach Outcomes & Actions Taken  : External Records Requested & Care Team Updated if Applicable, Requested from [facility]

## 2024-05-24 ENCOUNTER — PATIENT OUTREACH (OUTPATIENT)
Dept: ADMINISTRATIVE | Facility: HOSPITAL | Age: 54
End: 2024-05-24
Payer: COMMERCIAL

## 2024-05-24 NOTE — PROGRESS NOTES
Population Health Chart Review & Patient Outreach Details      Additional Pop Health Notes:    LAST EYE EXAM 4/24/22 AS OF 5/24/24           Updates Requested / Reviewed:      Updated Care Coordination Note and          Health Maintenance Topics Overdue:      [facility] Score: 3     Urine Screening  Eye Exam  Foot Exam                       Health Maintenance Topic(s) Outreach Outcomes & Actions Taken:    Eye Exam - Outreach Outcomes & Actions Taken  : Diabetic Eye External Records Uploaded, Care Team & History Updated if Applicable

## 2024-06-03 PROBLEM — Z00.00 ANNUAL PHYSICAL EXAM: Status: RESOLVED | Noted: 2024-03-04 | Resolved: 2024-06-03

## 2024-06-14 NOTE — TELEPHONE ENCOUNTER
Subjective:   Patient ID:  Richy Douglas is a 82 y.o. male who presents for cardiac consult of Shortness of Breath    Referring Physician:    Vivian Ch MD [[id number] [facility] [place] [address]      Reason for consult: CHF, AI    HPI  The patient came in today for cardiac consult of Shortness of Breath      Richy Douglas is a 82 y.o. male pt with BI V failure EF 30%, moderate AI, GERD, HTN, pulm HTN, PVD, emphysema, aortic aneurysm, FE def anemia presents for follow up CV eval.         11/9/23  ECHO 2/2023 with LVEF 30%, RV dec function, mild TR, Mod AI, inc CVP, PASP 80 mmHg, mod dilated aortic root.   He saw Dr. Christianson in Aug 2023 - deferred ICD again.   BP and HR stable today. BMI 24 - 170 lbs - from 159 lbs.       6/14/24  BP 94/42, HR 83    ECHO 2/2024 - LV EF 30-35%, normal RV, mild AI, mild MR, mild TR, PASP 70 mmHg.   Has more SANCHEZ - will see pulm as well.     BNP (pg/mL)   Date Value   06/10/2024 120 (H)   12/28/2023 3,572 (H)   11/13/2023 2,565 (H)   05/09/2023 3,158 (H)   04/05/2023 2,728 (H)        Patient has fairly good exercise tolerance. He does recycling, separation.     Patient is compliant with medications.    FH - mother - DM, PVD, tobacco    Results for orders placed during the hospital encounter of 02/09/24    Echo    Interpretation Summary    Left Ventricle: The left ventricle is moderately dilated. Normal wall thickness. There is eccentric hypertrophy. Moderate global hypokinesis present. There is moderately reduced systolic function with a visually estimated ejection fraction of 30 - 35%. There is normal diastolic function.    Right Ventricle: Mild right ventricular enlargement. Wall thickness is normal. Right ventricle wall motion  is normal. Systolic function is normal.    Left Atrium: Left atrium is moderately dilated.    Right Atrium: Right atrium is mildly dilated.    Aortic Valve: There is mild aortic regurgitation with a centrally directed jet.    Mitral Valve:  Writer called pt and left v/m asking pt to call back to schedule appt from referral   There is mild regurgitation with a centrally directed jet.    Tricuspid Valve: There is mild regurgitation with a centrally directed jet.    Pulmonary Artery: The estimated pulmonary artery systolic pressure is 70 mmHg.    IVC/SVC: Normal venous pressure at 3 mmHg.      Results for orders placed during the hospital encounter of 02/11/23    Echo    Interpretation Summary  · The left ventricle is normal in size with concentric hypertrophy and moderately decreased systolic function.  · Severe left atrial enlargement.  · Trivial pericardial effusion.  · Mild tricuspid regurgitation.  · Severe right atrial enlargement.  · The estimated ejection fraction is 30%.  · There is severe left ventricular global hypokinesis.  · Mild right ventricular enlargement with mildly to moderately reduced right ventricular systolic function.  · Moderate aortic regurgitation.  · Elevated central venous pressure (15 mmHg).  · The estimated PA systolic pressure is 80 mmHg.  · There is pulmonary hypertension.  · The ascending aorta is moderately dilated.  · The aortic root is moderately dilated.  · Atrial fibrillation observed.  Aortic Root - End Diastolic   Ao root annulus 4.69 cm          Sinus 4.84 cm          STJ 4.89 cm          Ascending aorta 4.62 cm               LE arterial u/s 5/2022  Conclusion    Normal BLE MARIBEL with distal occlusions noted with moderate to severe plaque/blockage  Right distal PT/PER arteries are occluded  Left distal PT with monophasic waveforms, L GUILLE occluded      Conclusion 5/2022    A Baker's cyst measuring 1.67 cm x 3.77 cm was seen in the right extremity.  A Baker's cyst measuring 1.53 cm x 2.78 cm was seen in the left extremity.  There is no evidence of a left lower extremity DVT.  The right smaller saphenous vein is abnormal. A chronic thrombus is present.          Results for orders placed during the hospital encounter of 01/09/22    Cardiac catheterization    Conclusion  · The pre-procedure left  ventricular end diastolic pressure was 13.  · The estimated blood loss was none.  · The coronary arteries were normal..  · The filling pressures on the right and left were normal.    The procedure log was documented by Documenter: Jenna Cantu, RN and verified by Janneth Tovar MD.    Date: 1/13/2022  Time: 5:22 PM          Past Medical History:   Diagnosis Date    Alcoholic peripheral neuropathy 03/01/2023    Anemia of chronic disease     Aortic aneurysm     Atrial fibrillation with RVR 09/24/2021    Chronic systolic congestive heart failure 08/31/2017    Depression     Emphysema of lung 08/31/2017    GERD (gastroesophageal reflux disease)     Hypertension     Knee osteoarthritis 10/21/2022    Major neurocognitive disorder due to multiple etiologies 04/14/2023    Microcytic anemia 11/24/2020    Other hyperlipidemia 04/27/2021    Personal history of colonic polyps 2022    Prostate cancer        Past Surgical History:   Procedure Laterality Date    CATHETERIZATION OF BOTH LEFT AND RIGHT HEART N/A 1/13/2022    Procedure: CATHETERIZATION, HEART, BOTH LEFT AND RIGHT;  Surgeon: Janneth Tovar MD;  Location: [facility] CATH LAB;  Service: Cardiology;  Laterality: N/A;  poss cx    COLONOSCOPY      COLONOSCOPY N/A 6/30/2022    Procedure: COLONOSCOPY;  Surgeon: Sandra Perez MD;  Location: [facility] ENDO;  Service: Endoscopy;  Laterality: N/A;    COLONOSCOPY N/A 7/1/2022    Procedure: COLONOSCOPY;  Surgeon: Woodrow Christian MD;  Location: [facility];  Service: Endoscopy;  Laterality: N/A;    CORONARY ANGIOGRAPHY N/A 1/13/2022    Procedure: ANGIOGRAM, CORONARY ARTERY;  Surgeon: Janneth Tovar MD;  Location: [facility] CATH LAB;  Service: Cardiology;  Laterality: N/A;    ESOPHAGOGASTRODUODENOSCOPY N/A 6/30/2022    Procedure: EGD (ESOPHAGOGASTRODUODENOSCOPY);  Surgeon: Sandra Perez MD;  Location: [facility];  Service: Endoscopy;  Laterality: N/A;    INJECTION OF ANESTHETIC AGENT AROUND NERVE Bilateral 6/20/2023     Procedure: Bilateral Genicular nerve block RN IV Sedation;  Surgeon: Devon Grant MD;  Location: [facility]IN [facility];  Service: Pain Management;  Laterality: Bilateral;    INJECTION OF JOINT Bilateral 10/21/2022    Procedure: Bilateral intraarticular knee injection with local ALLERGIC TO IODINE;  Surgeon: Devon Grant MD;  Location: [facility];  Service: Pain Management;  Laterality: Bilateral;    INJECTION OF JOINT Bilateral 1/31/2023    Procedure: Bilateral IA knee joint injection with steroid RN IV Sedation;  Surgeon: Devon Grant MD;  Location: [facility];  Service: Pain Management;  Laterality: Bilateral;    INJECTION OF JOINT Bilateral 5/10/2023    Procedure: Bilateral IA knee joint injection Synvisc RN IV Sedation;  Surgeon: Devon Grant MD;  Location: [facility];  Service: Pain Management;  Laterality: Bilateral;    INJECTION OF JOINT Bilateral 11/7/2023    Procedure: Bilateral intraarticular knee injection;  Surgeon: Devon Grant MD;  Location: [facility];  Service: Pain Management;  Laterality: Bilateral;    PROSTATE SURGERY      RADIOFREQUENCY THERMOCOAGULATION Left 9/1/2023    Procedure: Left Genicular Nerve RFA;  Surgeon: Devon Grant MD;  Location: [facility];  Service: Pain Management;  Laterality: Left;    RADIOFREQUENCY THERMOCOAGULATION Right 9/15/2023    Procedure: Right Genicular Nerve RFA RN IV Sedation;  Surgeon: Devon Grant MD;  Location: [facility];  Service: Pain Management;  Laterality: Right;    SPINE SURGERY         Social History     Tobacco Use    Smoking status: Some Days     Current packs/day: 0.50     Average packs/day: 1 pack/day for 70.3 years (69.7 ttl pk-yrs)     Types: Cigarettes     Start date: [birth year]1/1955     Last attempt to quit: 3/1/2023     Passive exposure: Past    Smokeless tobacco: Never   Substance Use Topics    Alcohol use: Yes     Comment: reports only drinks red wine when games are on    Drug use: Never       Family History   Problem  Relation Name Age of Onset    Diabetes Mother      Peripheral vascular disease Mother         Patient's Medications   New Prescriptions    MIDODRINE (PROAMATINE) 5 MG TAB    Take 1 tablet (5 mg total) by mouth every meal as needed (give if BP is below 90/70).    SACUBITRIL-VALSARTAN (ENTRESTO) 24-26 MG PER TABLET    Take 1 tablet by mouth 2 (two) times daily. Do no take if BP is below 110/70   Previous Medications    ALBUTEROL (PROVENTIL) 2.5 MG /3 ML (0.083 %) NEBULIZER SOLUTION    Take 3 mLs (2.5 mg total) by nebulization every 4 to 6 hours as needed for Wheezing or Shortness of Breath.    ALBUTEROL (PROVENTIL/VENTOLIN HFA) 90 MCG/ACTUATION INHALER    Inhale 2 puffs into the lungs every 4 (four) hours as needed for Wheezing or Shortness of Breath.    DICLOFENAC SODIUM (VOLTAREN) 1 % GEL    Apply 4 grams topically 4 (four) times daily. Bilateral knees    EMPAGLIFLOZIN (JARDIANCE) 10 MG TABLET    Take 1 tablet (10 mg total) by mouth once daily.    FERROUS SULFATE 325 (65 FE) MG EC TABLET    Take 1 tablet (325 mg total) by mouth once daily.    FLUTICASONE-UMECLIDIN-VILANTER (TRELEGY ELLIPTA) 200-62.5-25 MCG INHALER    Inhale 1 puff into the lungs once daily.    MEMANTINE (NAMENDA) 5 MG TAB    Take 1 tablet (5 mg total) by mouth 2 (two) times daily.   Modified Medications    Modified Medication Previous Medication    FUROSEMIDE (LASIX) 40 MG TABLET furosemide (LASIX) 40 MG tablet       Take 1 tablet (40 mg total) by mouth once daily. Do not take if BP is below 110/70    Take 1 tablet (40 mg total) by mouth 2 (two) times a day.    METOPROLOL SUCCINATE (TOPROL-XL) 25 MG 24 HR TABLET metoprolol succinate (TOPROL-XL) 25 MG 24 hr tablet       Take 0.5 tablets (12.5 mg total) by mouth once daily. Do no take if BP is below 110/70    Take 0.5 tablets (12.5 mg total) by mouth once daily.   Discontinued Medications    SACUBITRIL-VALSARTAN (ENTRESTO)  MG PER TABLET    Take 1 tablet by mouth 2 (two) times daily.        Review of Systems   Constitutional:  Positive for malaise/fatigue.   HENT: Negative.     Eyes: Negative.    Respiratory:  Positive for shortness of breath.    Cardiovascular:  Positive for chest pain and leg swelling.   Gastrointestinal: Negative.    Genitourinary: Negative.    Musculoskeletal:  Positive for back pain and joint pain.   Skin: Negative.    Neurological: Negative.    Endo/Heme/Allergies: Negative.    Psychiatric/Behavioral: Negative.     All 12 systems otherwise negative.      Wt Readings from Last 3 Encounters:   06/14/24 68.4 kg (150 lb 12.7 oz)   06/10/24 69.8 kg (153 lb 14.1 oz)   05/22/24 69.2 kg (152 lb 8.9 oz)     Temp Readings from Last 3 Encounters:   06/10/24 98.7 °F (37.1 °C) (Tympanic)   12/31/23 98 °F (36.7 °C)   11/13/23 98.2 °F (36.8 °C) (Oral)     BP Readings from Last 3 Encounters:   06/14/24 (!) 94/42   06/10/24 (!) 100/40   05/22/24 (!) 96/40     Pulse Readings from Last 3 Encounters:   06/14/24 83   06/10/24 88   05/22/24 78       BP (!) 94/42 (BP Location: Right arm, Patient Position: Sitting, BP Method: Small (Manual))   Pulse 83   Wt 68.4 kg (150 lb 12.7 oz)   SpO2 98%   BMI 22.93 kg/m²     Objective:   Physical Exam  Vitals and nursing note reviewed.   Constitutional:       General: He is not in acute distress.     Appearance: He is well-developed. He is ill-appearing. He is not diaphoretic.   HENT:      Head: Normocephalic and atraumatic.      Nose: Nose normal.   Eyes:      General: No scleral icterus.     Conjunctiva/sclera: Conjunctivae normal.   Neck:      Thyroid: No thyromegaly.      Vascular: No JVD.   Cardiovascular:      Rate and Rhythm: Normal rate and regular rhythm.      Heart sounds: S1 normal and S2 normal. Murmur heard.      No friction rub. No gallop. No S3 or S4 sounds.   Pulmonary:      Effort: Pulmonary effort is normal. No respiratory distress.      Breath sounds: Normal breath sounds. No stridor. No wheezing or rales.   Chest:      Chest wall: No  tenderness.   Abdominal:      General: Bowel sounds are normal. There is no distension.      Palpations: Abdomen is soft. There is no mass.      Tenderness: There is no abdominal tenderness. There is no rebound.   Genitourinary:     Comments: Deferred  Musculoskeletal:         General: No tenderness or deformity. Normal range of motion.      Cervical back: Normal range of motion and neck supple.      Right lower leg: Edema present.      Left lower leg: Edema present.   Lymphadenopathy:      Cervical: No cervical adenopathy.   Skin:     General: Skin is warm and dry.      Coloration: Skin is not pale.      Findings: No erythema or rash.   Neurological:      Mental Status: He is alert and oriented to person, place, and time.      Motor: No abnormal muscle tone.      Coordination: Coordination normal.   Psychiatric:         Behavior: Behavior normal.         Thought Content: Thought content normal.         Judgment: Judgment normal.         Lab Results   Component Value Date     06/10/2024    K 5.6 (H) 06/10/2024     06/10/2024    CO2 22 (L) 06/10/2024    BUN 35 (H) 06/10/2024    CREATININE 1.4 06/10/2024    GLU 66 (L) 06/10/2024    HGBA1C 5.3 06/10/2024    MG 2.3 06/10/2024    AST 20 06/10/2024    ALT 11 06/10/2024    ALBUMIN 4.1 06/10/2024    PROT 7.1 06/10/2024    BILITOT 0.7 06/10/2024    WBC 5.88 06/10/2024    HGB 10.5 (L) 06/10/2024    HCT 32.5 (L) 06/10/2024    MCV 85 06/10/2024     06/10/2024    INR 1.4 (H) 09/24/2021    TSH 1.170 06/10/2024    CHOL 123 06/10/2024    HDL 50 06/10/2024    LDLCALC 61.6 (L) 06/10/2024    TRIG 57 06/10/2024     (H) 06/10/2024     Assessment:      1. Chronic systolic congestive heart failure    2. Chronic combined systolic and diastolic congestive heart failure, NYHA class 3    3. Acute on chronic systolic congestive heart failure    4. Longstanding persistent atrial fibrillation    5. PVD (peripheral vascular disease)    6. Atherosclerosis of abdominal  aorta    7. Iron deficiency anemia due to chronic blood loss    8. Chronic pain of left knee    9. Shortness of breath    10. SOB (shortness of breath)    11. AVM (arteriovenous malformation) of small bowel, acquired with hemorrhage    12. Paroxysmal atrial fibrillation    13. Localized edema    14. PAF (paroxysmal atrial fibrillation)    15. SANCHEZ (dyspnea on exertion)    16. Dilated aortic root          Plan:   1. BIV failure EF 30%, AI with aortic atherosc;  BNP 1047 -- 969 --> 1844 --> 615 --> 952 --> 3158 --> 120  - R/LHC normal coronaries 1/2022  - ECHO 2/2024 - LV EF 30-35%, normal RV, mild AI, mild MR, mild TR, PASP 70 mmHg  - cont Entresto and BB - low dose if BP allows     2. Emphysema with dyspnea, pulm HTN; still smokes   - cont tx PCP/Pulm    3. HTN with aortic root moderately dilated with AI -low BP now  - titrate meds  - cont to monitor   - start midodrine PRN     4. HLD with aortic athero and Coronary artery calcifications  - cont statin     5. PAF, diagnosed in setting of sepsis/PNA, had SVT in hosp - sec to alc withdrawal  - cont BB; stop Anticoag  - [place] AF, Dr. Christianson discussed with pt to stop anticoag sec risk of bleeding    6. AVM/Symptomatic anemia s/p PRBC with fe def  - angioectasias noted in recent EGD s/p APC  - off Eliquis now  - f/u heme/onc - recent Fe levels improved     7. PVD, Leg Edema, pain with claudication   - LE arterial u/s with distal occlusions/monophasic waveforms, normal BLE MARIBEL.   - prior LE venous u/s with b/l baker's cysts and chronic right small saphenous vein thrombus   - repeat LE u/s     8. Fall with hematoma in right hip  - f/u pain management for knee pain/baker's cysts  - s/p knee injections     Visit today included increased complexity associated with the care of the episodic problem CHF addressed and managing the longitudinal care of the patient due to the serious and/or complex managed problem(s) .      Thank you for allowing me to participate in this patient's  care. Please do not hesitate to contact me with any questions or concerns. Consult note has been forwarded to the referral physician.

## 2024-06-18 ENCOUNTER — TRANSCRIBE ORDERS (OUTPATIENT)
Dept: ADMINISTRATIVE | Age: 54
End: 2024-06-18

## 2024-06-18 DIAGNOSIS — Z12.31 ENCOUNTER FOR SCREENING MAMMOGRAM FOR BREAST CANCER: Primary | ICD-10-CM

## 2024-06-19 ENCOUNTER — HOSPITAL ENCOUNTER (OUTPATIENT)
Dept: WOMENS IMAGING | Age: 54
Discharge: HOME | End: 2024-06-21
Payer: MEDICAID

## 2024-06-19 DIAGNOSIS — Z12.31 ENCOUNTER FOR SCREENING MAMMOGRAM FOR BREAST CANCER: ICD-10-CM

## 2024-06-19 PROCEDURE — 77067 SCR MAMMO BI INCL CAD: CPT

## 2024-06-20 LAB
LEFT EYE DM RETINOPATHY: NEGATIVE
RIGHT EYE DM RETINOPATHY: NEGATIVE

## 2024-07-11 ENCOUNTER — LAB VISIT (OUTPATIENT)
Dept: LAB | Facility: CLINIC | Age: 54
End: 2024-07-11
Payer: COMMERCIAL

## 2024-07-11 ENCOUNTER — OFFICE VISIT (OUTPATIENT)
Dept: FAMILY MEDICINE | Facility: CLINIC | Age: 54
End: 2024-07-11
Payer: COMMERCIAL

## 2024-07-11 VITALS
HEART RATE: 67 BPM | SYSTOLIC BLOOD PRESSURE: 120 MMHG | BODY MASS INDEX: 28.04 KG/M2 | OXYGEN SATURATION: 99 % | WEIGHT: 148.5 LBS | HEIGHT: 61 IN | DIASTOLIC BLOOD PRESSURE: 70 MMHG

## 2024-07-11 DIAGNOSIS — M25.512 LEFT SHOULDER PAIN, UNSPECIFIED CHRONICITY: ICD-10-CM

## 2024-07-11 DIAGNOSIS — E78.2 MIXED HYPERLIPIDEMIA: ICD-10-CM

## 2024-07-11 DIAGNOSIS — E11.9 WELL CONTROLLED TYPE 2 DIABETES MELLITUS: Primary | ICD-10-CM

## 2024-07-11 DIAGNOSIS — R19.5 POSITIVE COLORECTAL CANCER SCREENING USING COLOGUARD TEST: ICD-10-CM

## 2024-07-11 DIAGNOSIS — E11.9 WELL CONTROLLED TYPE 2 DIABETES MELLITUS: ICD-10-CM

## 2024-07-11 LAB
ALBUMIN SERPL BCP-MCNC: 4 G/DL (ref 3.5–5.2)
ALBUMIN/CREAT UR: 6.4 UG/MG (ref 0–30)
ALP SERPL-CCNC: 71 U/L (ref 55–135)
ALT SERPL W/O P-5'-P-CCNC: 38 U/L (ref 10–44)
ANION GAP SERPL CALC-SCNC: 10 MMOL/L (ref 8–16)
AST SERPL-CCNC: 34 U/L (ref 10–40)
BILIRUB SERPL-MCNC: 0.7 MG/DL (ref 0.1–1)
BUN SERPL-MCNC: 13 MG/DL (ref 6–20)
CALCIUM SERPL-MCNC: 10.5 MG/DL (ref 8.7–10.5)
CHLORIDE SERPL-SCNC: 104 MMOL/L (ref 95–110)
CHOLEST SERPL-MCNC: 225 MG/DL (ref 120–199)
CHOLEST/HDLC SERPL: 5.1 {RATIO} (ref 2–5)
CO2 SERPL-SCNC: 25 MMOL/L (ref 23–29)
CREAT SERPL-MCNC: 0.8 MG/DL (ref 0.5–1.4)
CREAT UR-MCNC: 94 MG/DL (ref 15–325)
EST. GFR  (NO RACE VARIABLE): >60 ML/MIN/1.73 M^2
ESTIMATED AVG GLUCOSE: 148 MG/DL (ref 68–131)
GLUCOSE SERPL-MCNC: 116 MG/DL (ref 70–110)
HBA1C MFR BLD: 6.8 % (ref 4–5.6)
HDLC SERPL-MCNC: 44 MG/DL (ref 40–75)
HDLC SERPL: 19.6 % (ref 20–50)
LDLC SERPL CALC-MCNC: 153.6 MG/DL (ref 63–159)
MICROALBUMIN UR DL<=1MG/L-MCNC: 6 UG/ML
NONHDLC SERPL-MCNC: 181 MG/DL
POTASSIUM SERPL-SCNC: 4.1 MMOL/L (ref 3.5–5.1)
PROT SERPL-MCNC: 9.1 G/DL (ref 6–8.4)
SODIUM SERPL-SCNC: 139 MMOL/L (ref 136–145)
TRIGL SERPL-MCNC: 137 MG/DL (ref 30–150)

## 2024-07-11 PROCEDURE — 82043 UR ALBUMIN QUANTITATIVE: CPT | Performed by: FAMILY MEDICINE

## 2024-07-11 PROCEDURE — 36415 COLL VENOUS BLD VENIPUNCTURE: CPT | Mod: ,,, | Performed by: FAMILY MEDICINE

## 2024-07-11 PROCEDURE — 80053 COMPREHEN METABOLIC PANEL: CPT | Performed by: FAMILY MEDICINE

## 2024-07-11 PROCEDURE — 99214 OFFICE O/P EST MOD 30 MIN: CPT | Mod: S$GLB,,, | Performed by: FAMILY MEDICINE

## 2024-07-11 PROCEDURE — 80061 LIPID PANEL: CPT | Performed by: FAMILY MEDICINE

## 2024-07-11 PROCEDURE — 83036 HEMOGLOBIN GLYCOSYLATED A1C: CPT | Performed by: FAMILY MEDICINE

## 2024-07-11 PROCEDURE — G2211 COMPLEX E/M VISIT ADD ON: HCPCS | Mod: S$GLB,,, | Performed by: FAMILY MEDICINE

## 2024-07-11 NOTE — PATIENT INSTRUCTIONS
Doing well  Get x rays at [facility]  Get labs today    Follow up 4 months    Talk to Dr. Gatica's office about colonoscopy options

## 2024-07-11 NOTE — PROGRESS NOTES
"    [facility]  [facility] Care [facility] - [place], [place]    Family Medicine Office Visit    Chief Complaint   Patient presents with    Follow-up     4 month f/u         HPI:  followup chronic conditions:    Had positive cologuard - since sent to GI - she is hesitant, as friend has issues with colonoscopy  Diabetes controlled with A1C at goal - repeat labs today  Hyperlipidemia stable on appropriate intensity statin therapy    Continues to have shoulder and upper chest wall strain, despite stretching and therapy    ROS: as above    Vitals:    07/11/24 0908   BP: 120/70   BP Location: Left arm   Patient Position: Sitting   BP Method: Large (Manual)   Pulse: 67   SpO2: 99%   Weight: 67.4 kg (148 lb 8 oz)   Height: 5' 1" (1.549 m)      Body mass index is 28.06 kg/m².      General:  AOx3, well nourished and developed in no acute distress  Eyes:  PERRLA, EOMI, vision intact grossly  ENT:  normal hearing, moist oral mucosa  Neck:  trachea midline with no masses or thyromegaly  Heart:  RRR, no murmurs.  No edema noted, extremities warm and well perfused  Lungs:  clear to auscultation bilaterally with symmetric chest movement  Abdomen:  Soft, nontender, nondistended.  Normal bowel sounds  Musculoskeletal:  Normal gait.  Normal posture.  Normal muscular development with no joint swelling.  Neurological:  CN II-XII grossly intact. Symmetric strength and sensation  Psych:  Normal mood and affect.  Able to demonstrate good judgement and personal insight.      Assessment/Plan:    1. Well controlled type 2 diabetes mellitus  -     Hemoglobin A1C; Future; Expected date: 07/11/2024  -     Comprehensive Metabolic Panel; Future; Expected date: 07/11/2024  -     Lipid Panel; Future; Expected date: 07/11/2024  -     Microalbumin/Creatinine Ratio, Urine; Future; Expected date: 07/11/2024    2. Positive colorectal cancer screening using Cologuard test    3. Left shoulder pain, unspecified chronicity  -     X-Ray Shoulder 2 or More " Views Left; Future; Expected date: 07/11/2024    4. Mixed hyperlipidemia       Check labs today  Will call GI to further review options for colon screen  Get x ray  Stable on statin    Visit today included increased complexity associated with the care of the episodic problem DM, hld, shoulder addressed and managing the longitudinal care of the patient due to the serious and/or complex managed problem(s) dm, hld, shoulder pain.

## 2024-07-15 ENCOUNTER — TELEPHONE (OUTPATIENT)
Dept: FAMILY MEDICINE | Facility: CLINIC | Age: 54
End: 2024-07-15
Payer: COMMERCIAL

## 2024-07-15 NOTE — TELEPHONE ENCOUNTER
----- Message from Indira Bearden sent at 7/15/2024  9:07 AM CDT -----  Contact: pt  Type: Needs Medical Advice         Who Called: Pt  Best Call Back Number:[phone]  Additional Information: Requesting a call back regarding [facility] keeps telling pt that they are not getting the orders for the XRAY.  Pt is asking if office can print orders so she can come  the orders.   Please Advise- Thank you

## 2024-07-27 ENCOUNTER — PATIENT MESSAGE (OUTPATIENT)
Dept: ADMINISTRATIVE | Facility: HOSPITAL | Age: 54
End: 2024-07-27
Payer: COMMERCIAL

## 2024-08-08 ENCOUNTER — PATIENT OUTREACH (OUTPATIENT)
Dept: ADMINISTRATIVE | Facility: HOSPITAL | Age: 54
End: 2024-08-08
Payer: COMMERCIAL

## 2024-08-14 ENCOUNTER — PATIENT OUTREACH (OUTPATIENT)
Dept: ADMINISTRATIVE | Facility: HOSPITAL | Age: 54
End: 2024-08-14
Payer: COMMERCIAL

## 2024-08-14 NOTE — PROGRESS NOTES
Population Health Chart Review & Patient Outreach Details      Additional [facility] Notes:               Updates Requested / Reviewed:      Updated Care Coordination Note, Care Everywhere, Care Team Updated, Removed  or Duplicate Orders, and Immunizations Reconciliation Completed or Queried: [facility] Topics Overdue:      [facility] Score: 1     Foot Exam                       Health Maintenance Topic(s) Outreach Outcomes & Actions Taken:    Eye Exam - Outreach Outcomes & Actions Taken  : Diabetic Eye External Records Uploaded, Care Team & History Updated if Applicable

## 2024-09-12 ENCOUNTER — PATIENT MESSAGE (OUTPATIENT)
Dept: ADMINISTRATIVE | Facility: OTHER | Age: 54
End: 2024-09-12
Payer: COMMERCIAL

## 2024-09-23 ENCOUNTER — PATIENT MESSAGE (OUTPATIENT)
Dept: ADMINISTRATIVE | Facility: OTHER | Age: 54
End: 2024-09-23
Payer: COMMERCIAL

## 2024-10-10 ENCOUNTER — OFFICE VISIT (OUTPATIENT)
Dept: FAMILY MEDICINE | Facility: CLINIC | Age: 54
End: 2024-10-10
Payer: COMMERCIAL

## 2024-10-10 VITALS
OXYGEN SATURATION: 97 % | BODY MASS INDEX: 28.25 KG/M2 | HEART RATE: 70 BPM | WEIGHT: 149.63 LBS | DIASTOLIC BLOOD PRESSURE: 90 MMHG | HEIGHT: 61 IN | SYSTOLIC BLOOD PRESSURE: 142 MMHG

## 2024-10-10 DIAGNOSIS — E11.9 WELL CONTROLLED TYPE 2 DIABETES MELLITUS: Primary | ICD-10-CM

## 2024-10-10 DIAGNOSIS — J32.9 SINUSITIS, UNSPECIFIED CHRONICITY, UNSPECIFIED LOCATION: ICD-10-CM

## 2024-10-10 DIAGNOSIS — E78.2 MIXED HYPERLIPIDEMIA: ICD-10-CM

## 2024-10-10 PROCEDURE — 99214 OFFICE O/P EST MOD 30 MIN: CPT | Mod: S$GLB,,, | Performed by: FAMILY MEDICINE

## 2024-10-10 PROCEDURE — G2211 COMPLEX E/M VISIT ADD ON: HCPCS | Mod: S$GLB,,, | Performed by: FAMILY MEDICINE

## 2024-10-10 RX ORDER — AMOXICILLIN AND CLAVULANATE POTASSIUM 875; 125 MG/1; MG/1
1 TABLET, FILM COATED ORAL 2 TIMES DAILY
Qty: 14 TABLET | Refills: 0 | Status: SHIPPED | OUTPATIENT
Start: 2024-10-10 | End: 2024-10-17

## 2024-10-10 RX ORDER — IPRATROPIUM BROMIDE 42 UG/1
2 SPRAY, METERED NASAL 2 TIMES DAILY
Qty: 15 ML | Refills: 6 | Status: SHIPPED | OUTPATIENT
Start: 2024-10-10 | End: 2024-11-09

## 2024-11-14 ENCOUNTER — LAB VISIT (OUTPATIENT)
Dept: LAB | Facility: CLINIC | Age: 54
End: 2024-11-14
Payer: COMMERCIAL

## 2024-11-14 ENCOUNTER — OFFICE VISIT (OUTPATIENT)
Dept: FAMILY MEDICINE | Facility: CLINIC | Age: 54
End: 2024-11-14
Payer: COMMERCIAL

## 2024-11-14 VITALS
HEART RATE: 84 BPM | SYSTOLIC BLOOD PRESSURE: 130 MMHG | DIASTOLIC BLOOD PRESSURE: 86 MMHG | OXYGEN SATURATION: 99 % | BODY MASS INDEX: 28.51 KG/M2 | WEIGHT: 151 LBS | HEIGHT: 61 IN

## 2024-11-14 DIAGNOSIS — E78.2 MIXED HYPERLIPIDEMIA: Primary | ICD-10-CM

## 2024-11-14 DIAGNOSIS — E11.9 WELL CONTROLLED TYPE 2 DIABETES MELLITUS: ICD-10-CM

## 2024-11-14 DIAGNOSIS — R19.5 POSITIVE COLORECTAL CANCER SCREENING USING COLOGUARD TEST: ICD-10-CM

## 2024-11-14 LAB
ESTIMATED AVG GLUCOSE: 151 MG/DL (ref 68–131)
HBA1C MFR BLD: 6.9 % (ref 4–5.6)

## 2024-11-14 PROCEDURE — G2211 COMPLEX E/M VISIT ADD ON: HCPCS | Mod: S$GLB,,, | Performed by: FAMILY MEDICINE

## 2024-11-14 PROCEDURE — 83036 HEMOGLOBIN GLYCOSYLATED A1C: CPT | Performed by: FAMILY MEDICINE

## 2024-11-14 PROCEDURE — 99214 OFFICE O/P EST MOD 30 MIN: CPT | Mod: S$GLB,,, | Performed by: FAMILY MEDICINE

## 2024-11-14 PROCEDURE — 36415 COLL VENOUS BLD VENIPUNCTURE: CPT | Mod: ,,, | Performed by: FAMILY MEDICINE

## 2024-11-14 RX ORDER — PRAVASTATIN SODIUM 40 MG/1
40 TABLET ORAL DAILY
Qty: 90 TABLET | Refills: 3 | Status: SHIPPED | OUTPATIENT
Start: 2024-11-14 | End: 2025-11-14

## 2024-11-14 NOTE — PROGRESS NOTES
"    [facility]  [facility] Care Clin[facility] - [place], [place]    Family Medicine Office Visit    Chief Complaint   Patient presents with    Follow-up     4 month f/u         HPI:  routine follow up today:    Diabetes controlled with A1C at goal  Hyperlipidemia stable on appropriate intensity statin therapy - finds taking statin in AM was causing nausea  Will need colonoscopy at some point (she is hesitant) as she had positive cologuard in past    ROS: as above    Vitals:    11/14/24 0718   BP: 130/86   BP Location: Right arm   Patient Position: Sitting   Pulse: 84   SpO2: 99%   Weight: 68.5 kg (151 lb)   Height: 5' 1" (1.549 m)      Body mass index is 28.53 kg/m².      General:  AOx3, well nourished and developed in no acute distress  Eyes:  PERRLA, EOMI, vision intact grossly  ENT:  normal hearing, moist oral mucosa  Neck:  trachea midline with no masses or thyromegaly  Heart:  RRR, no murmurs.  No edema noted, extremities warm and well perfused  Lungs:  clear to auscultation bilaterally with symmetric chest movement  Abdomen:  Soft, nontender, nondistended.  Normal bowel sounds  Musculoskeletal:  Normal gait.  Normal posture.  Normal muscular development with no joint swelling.  Neurological:  CN II-XII grossly intact. Symmetric strength and sensation  Psych:  Normal mood and affect.  Able to demonstrate good judgement and personal insight.      Assessment/Plan:    1. Mixed hyperlipidemia    2. Well controlled type 2 diabetes mellitus    3. Positive colorectal cancer screening using Cologuard test         Take statin at night to reduce side effects  Stable, check A1C  Will need colonoscopy, likely next year    Visit today included increased complexity associated with the care of the episodic problem hld, dm addressed and managing the longitudinal care of the patient due to the serious and/or complex managed problem(s) hld, dm.      "

## 2025-03-14 ENCOUNTER — TELEPHONE (OUTPATIENT)
Dept: FAMILY MEDICINE | Facility: CLINIC | Age: 55
End: 2025-03-14

## 2025-03-14 ENCOUNTER — OFFICE VISIT (OUTPATIENT)
Dept: FAMILY MEDICINE | Facility: CLINIC | Age: 55
End: 2025-03-14
Payer: COMMERCIAL

## 2025-03-14 VITALS
HEART RATE: 84 BPM | BODY MASS INDEX: 28.6 KG/M2 | OXYGEN SATURATION: 99 % | SYSTOLIC BLOOD PRESSURE: 118 MMHG | DIASTOLIC BLOOD PRESSURE: 84 MMHG | WEIGHT: 151.5 LBS | HEIGHT: 61 IN

## 2025-03-14 DIAGNOSIS — M75.102 ROTATOR CUFF SYNDROME OF LEFT SHOULDER: ICD-10-CM

## 2025-03-14 DIAGNOSIS — E78.2 MIXED HYPERLIPIDEMIA: Primary | ICD-10-CM

## 2025-03-14 DIAGNOSIS — Z12.31 SCREENING MAMMOGRAM FOR BREAST CANCER: ICD-10-CM

## 2025-03-14 DIAGNOSIS — E11.9 WELL CONTROLLED TYPE 2 DIABETES MELLITUS: ICD-10-CM

## 2025-03-14 RX ORDER — KETOROLAC TROMETHAMINE 10 MG/1
10 TABLET, FILM COATED ORAL EVERY 6 HOURS
Qty: 30 TABLET | Refills: 6 | Status: SHIPPED | OUTPATIENT
Start: 2025-03-14

## 2025-03-14 NOTE — TELEPHONE ENCOUNTER
----- Message from Eri Calzada sent at 3/14/2025  4:11 PM CDT -----  This pt would like her mammogram order sent to [facility] please. Thank you

## 2025-03-14 NOTE — PATIENT INSTRUCTIONS
GYN care - consider Dr. Mancilla for possible hormone therapy  Will send to physical therapy for shoulder    Get A1C  Will order mammogram

## 2025-03-14 NOTE — PROGRESS NOTES
"    [facility] - [place], [place]    Family Medicine Office Visit    Chief Complaint   Patient presents with    Follow-up     4 month follow up         HPI:  follow up chronic conditions:    Diabetes controlled with A1C at goal  Hyperlipidemia stable on appropriate intensity statin therapy  BP at goal    L arm pain has worsened.  Pain with internal rotation.  Self-directed exercise not helping.    ROS: as above    Vitals:    03/14/25 0746   BP: 118/84   BP Location: Right arm   Patient Position: Sitting   Pulse: 84   SpO2: 99%   Weight: 68.7 kg (151 lb 8 oz)   Height: 5' 1" (1.549 m)      Body mass index is 28.63 kg/m².      General:  AOx3, well nourished and developed in no acute distress  Eyes:  PERRLA, EOMI, vision intact grossly  ENT:  normal hearing, moist oral mucosa  Neck:  trachea midline with no masses or thyromegaly  Heart:  RRR, no murmurs.  No edema noted, extremities warm and well perfused  Lungs:  clear to auscultation bilaterally with symmetric chest movement  Abdomen:  Soft, nontender, nondistended.  Normal bowel sounds  Musculoskeletal:  Normal gait.  Normal posture.  Normal muscular development with no joint swelling.  Neurological:  CN II-XII grossly intact. Symmetric strength and sensation  Psych:  Normal mood and affect.  Able to demonstrate good judgement and personal insight.      Assessment/Plan:    1. Mixed hyperlipidemia    2. Well controlled type 2 diabetes mellitus  -     Hemoglobin A1C; Future; Expected date: 03/14/2025    3. Rotator cuff syndrome of left shoulder  -     Ambulatory referral/consult to Physical/Occupational Therapy; Future; Expected date: 03/21/2025    4. Screening mammogram for breast cancer  -     Mammo Digital Screening Bilat w/ Ramses (XPD); Future; Expected date: 03/14/2025    Other orders  -     ketorolac (TORADOL) 10 mg tablet; Take 1 tablet (10 mg total) by mouth every 6 (six) hours.  Dispense: 30 tablet; Refill: 6       Stable on " statin  Stable, check A1C  Send to PT, consider MRI if not improved  Get mammogram    Visit today included increased complexity associated with the care of the episodic problem dm, hld, rotator cuff addressed and managing the longitudinal care of the patient due to the serious and/or complex managed problem(s) dm, hld, rotator cuff.

## 2025-03-15 ENCOUNTER — HOSPITAL ENCOUNTER (EMERGENCY)
Age: 55
Discharge: LEFT BEFORE DISCHARGE | End: 2025-03-15
Attending: EMERGENCY MEDICINE
Payer: COMMERCIAL

## 2025-03-15 ENCOUNTER — PATIENT MESSAGE (OUTPATIENT)
Dept: FAMILY MEDICINE | Facility: CLINIC | Age: 55
End: 2025-03-15
Payer: COMMERCIAL

## 2025-03-15 VITALS
HEART RATE: 75 BPM | SYSTOLIC BLOOD PRESSURE: 171 MMHG | RESPIRATION RATE: 18 BRPM | OXYGEN SATURATION: 98 % | DIASTOLIC BLOOD PRESSURE: 97 MMHG | TEMPERATURE: 98.2 F

## 2025-03-15 DIAGNOSIS — B37.31 YEAST INFECTION OF THE VAGINA: ICD-10-CM

## 2025-03-15 DIAGNOSIS — N76.0 BV (BACTERIAL VAGINOSIS): ICD-10-CM

## 2025-03-15 DIAGNOSIS — E11.9 WELL CONTROLLED TYPE 2 DIABETES MELLITUS: Primary | ICD-10-CM

## 2025-03-15 DIAGNOSIS — N89.8 VAGINAL DISCHARGE: Primary | ICD-10-CM

## 2025-03-15 DIAGNOSIS — B96.89 BV (BACTERIAL VAGINOSIS): ICD-10-CM

## 2025-03-15 LAB
BILIRUB UR QL STRIP: NEGATIVE
C TRACH DNA SPEC QL PROBE+SIG AMP: NORMAL
CANDIDA SPECIES: POSITIVE
CLARITY UR: ABNORMAL
COLOR UR: YELLOW
GARDNERELLA VAGINALIS: POSITIVE
GLUCOSE UR STRIP-MCNC: NEGATIVE MG/DL
HGB UR QL STRIP.AUTO: NEGATIVE
KETONES UR STRIP-MCNC: ABNORMAL MG/DL
LEUKOCYTE ESTERASE UR QL STRIP: NEGATIVE
N GONORRHOEA DNA SPEC QL PROBE+SIG AMP: NORMAL
NITRITE UR QL STRIP: NEGATIVE
PH UR STRIP: 5.5 [PH] (ref 5–8)
PROT UR STRIP-MCNC: NEGATIVE MG/DL
SOURCE: ABNORMAL
SP GR UR STRIP: 1.03 (ref 1–1.03)
SPECIMEN DESCRIPTION: NORMAL
TRICHOMONAS: NEGATIVE
UROBILINOGEN UR STRIP-ACNC: NORMAL EU/DL (ref 0–1)

## 2025-03-15 PROCEDURE — 87491 CHLMYD TRACH DNA AMP PROBE: CPT

## 2025-03-15 PROCEDURE — 87591 N.GONORRHOEAE DNA AMP PROB: CPT

## 2025-03-15 PROCEDURE — 81003 URINALYSIS AUTO W/O SCOPE: CPT

## 2025-03-15 PROCEDURE — 99283 EMERGENCY DEPT VISIT LOW MDM: CPT | Performed by: EMERGENCY MEDICINE

## 2025-03-15 PROCEDURE — 87660 TRICHOMONAS VAGIN DIR PROBE: CPT

## 2025-03-15 PROCEDURE — 87480 CANDIDA DNA DIR PROBE: CPT

## 2025-03-15 PROCEDURE — 87510 GARDNER VAG DNA DIR PROBE: CPT

## 2025-03-15 ASSESSMENT — PAIN - FUNCTIONAL ASSESSMENT: PAIN_FUNCTIONAL_ASSESSMENT: NONE - DENIES PAIN

## 2025-03-16 RX ORDER — FLUCONAZOLE 150 MG/1
150 TABLET ORAL ONCE
Qty: 1 TABLET | Refills: 0 | Status: SHIPPED | OUTPATIENT
Start: 2025-03-16 | End: 2025-03-16

## 2025-03-16 RX ORDER — METRONIDAZOLE 500 MG/1
500 TABLET ORAL 2 TIMES DAILY
Qty: 14 TABLET | Refills: 0 | Status: SHIPPED | OUTPATIENT
Start: 2025-03-16 | End: 2025-03-23

## 2025-03-16 NOTE — DISCHARGE INSTR - COC
hyperlipidemia E78.2    Tobacco abuse Z72.0    Major depressive disorder with single episode, in full remission F32.5    Acute pain of left knee M25.562    Nicotine dependence, cigarettes, uncomplicated F17.210    Incisional hernia, without obstruction or gangrene K43.2    Vitamin D deficiency E55.9    Chronic fatigue R53.82    Cocaine dependence (HCC) F14.20    Hyperopia with presbyopia, bilateral H52.03, H52.4    Nuclear sclerotic cataract of both eyes H25.13    SI joint arthritis M46.1    Prediabetes R73.03    Elevated liver enzymes R74.8    Rib pain on right side R07.81    Abnormal liver scan (Lymph nodes) R93.2    Dyspepsia R10.13    Abdominal pain R10.9    Hernia, paraesophageal K44.9    Calculus of gallbladder without cholecystitis without obstruction K80.20    Hypertensive urgency I16.0    Constipation K59.00    Candidal intertrigo B37.2    GERD (gastroesophageal reflux disease) K21.9    Cholecystitis, chronic K81.1    Acute cholecystitis K81.0    Right upper quadrant abdominal pain R10.11    Allergic rhinitis J30.9    Mild intermittent asthma without complication J45.20    Depression with suicidal ideation F32.A, R45.851    Severe mixed bipolar 1 disorder without psychosis (HCC) F31.63    Chronic hepatitis C without hepatic coma (HCC) B18.2    Lumbosacral spondylosis without myelopathy M47.817    Class 1 obesity with body mass index (BMI) of 30.0 to 30.9 in adult E66.811, Z68.30    Lumbar degenerative disc disease M51.369       Isolation/Infection:   Isolation            No Isolation          Patient Infection Status    None to display              Nurse Assessment:  Last Vital Signs: BP (!) 171/97   Pulse 75   Temp 98.2 °F (36.8 °C) (Oral)   Resp 18   SpO2 98%     Last documented pain score (0-10 scale):    Last Weight:   Wt Readings from Last 1 Encounters:   05/09/24 80.3 kg (177 lb)     Mental Status:  {IP PT MENTAL STATUS:20030}    IV Access:  { MARIE IV ACCESS:[id number]}    Nursing

## 2025-03-16 NOTE — ED NOTES
Pt reports she would like to leave at this time because her cab is here. Writer explains that if she leaves now it will be against medical advice. Pt voices understanding, stating she is going to leave. Writer prints out AMA form. Pt leaves before signing AMA paperwork. ED resident and attending made aware.

## 2025-03-16 NOTE — ED PROVIDER NOTES
[facility] EMERGENCY DEPARTMENT     Emergency Department     Faculty Attestation        I performed a history and physical examination of the patient and discussed management with the resident. I reviewed the resident’s note and agree with the documented findings and plan of care. Any areas of disagreement are noted on the chart. I was personally present for the key portions of any procedures. I have documented in the chart those procedures where I was not present during the key portions. I have reviewed the emergency nurses triage note. I agree with the chief complaint, past medical history, past surgical history, allergies, medications, social and family history as documented unless otherwise noted below.  For Physician Assistant/ Nurse Practitioner cases/documentation I have personally evaluated this patient and have completed at least one if not all key elements of the E/M (history, physical exam, and MDM). Additional findings are as noted.      Vital Signs: BP: (!) 171/97  Pulse: 75  Respirations: 18  Temp: 98.2 °F (36.8 °C) SpO2: 98 %  PCP:  Kaylah Gomes APRN - NP  Note Started: 3/15/25, 9:59 PM EDT    Pertinent Comments:         Critical Care  None      (Please note that portions of this note were completed with a voice recognition program. Efforts were made to edit the dictations but occasionally words are mis-transcribed. Whenever words are used in this note in any gender, they shall be construed as though they were used in the gender appropriate to the circumstances; and whenever words are used in this note in the singular or plural form, they shall be construed as though they were used in the form appropriate to the circumstances.)    Wyatt Almaraz MD [facility]  Attending Emergency Medicine Physician           Wyatt Almaraz MD  03/15/25 2582
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
## 2025-03-17 LAB
C TRACH DNA SPEC QL PROBE+SIG AMP: NEGATIVE
N GONORRHOEA DNA SPEC QL PROBE+SIG AMP: NEGATIVE
SPECIMEN DESCRIPTION: NORMAL

## 2025-03-18 ENCOUNTER — LAB VISIT (OUTPATIENT)
Dept: LAB | Facility: CLINIC | Age: 55
End: 2025-03-18
Payer: COMMERCIAL

## 2025-03-18 DIAGNOSIS — E11.9 WELL CONTROLLED TYPE 2 DIABETES MELLITUS: ICD-10-CM

## 2025-03-18 LAB
ESTIMATED AVG GLUCOSE: 177 MG/DL (ref 68–131)
HBA1C MFR BLD: 7.8 % (ref 4–5.6)

## 2025-03-18 PROCEDURE — 83036 HEMOGLOBIN GLYCOSYLATED A1C: CPT | Performed by: FAMILY MEDICINE

## 2025-03-18 PROCEDURE — 36415 COLL VENOUS BLD VENIPUNCTURE: CPT | Mod: ,,, | Performed by: FAMILY MEDICINE

## 2025-03-31 ENCOUNTER — PATIENT MESSAGE (OUTPATIENT)
Dept: ADMINISTRATIVE | Facility: OTHER | Age: 55
End: 2025-03-31
Payer: COMMERCIAL

## 2025-04-20 ENCOUNTER — PATIENT MESSAGE (OUTPATIENT)
Dept: FAMILY MEDICINE | Facility: CLINIC | Age: 55
End: 2025-04-20
Payer: COMMERCIAL

## 2025-04-21 ENCOUNTER — OFFICE VISIT (OUTPATIENT)
Dept: FAMILY MEDICINE | Facility: CLINIC | Age: 55
End: 2025-04-21
Payer: COMMERCIAL

## 2025-04-21 VITALS
DIASTOLIC BLOOD PRESSURE: 78 MMHG | BODY MASS INDEX: 28.28 KG/M2 | HEART RATE: 66 BPM | SYSTOLIC BLOOD PRESSURE: 110 MMHG | WEIGHT: 149.81 LBS | OXYGEN SATURATION: 99 % | HEIGHT: 61 IN

## 2025-04-21 DIAGNOSIS — H10.9 CONJUNCTIVITIS, UNSPECIFIED CONJUNCTIVITIS TYPE, UNSPECIFIED LATERALITY: ICD-10-CM

## 2025-04-21 DIAGNOSIS — E11.9 WELL CONTROLLED TYPE 2 DIABETES MELLITUS: Primary | ICD-10-CM

## 2025-04-21 PROCEDURE — 99213 OFFICE O/P EST LOW 20 MIN: CPT | Mod: S$GLB,,, | Performed by: FAMILY MEDICINE

## 2025-04-21 PROCEDURE — G2211 COMPLEX E/M VISIT ADD ON: HCPCS | Mod: S$GLB,,, | Performed by: FAMILY MEDICINE

## 2025-04-21 RX ORDER — ASPIRIN 325 MG
50000 TABLET, DELAYED RELEASE (ENTERIC COATED) ORAL
COMMUNITY
Start: 2025-04-17

## 2025-04-21 RX ORDER — MOXIFLOXACIN 5 MG/ML
1 SOLUTION/ DROPS OPHTHALMIC 3 TIMES DAILY
Qty: 3 ML | Refills: 1 | Status: SHIPPED | OUTPATIENT
Start: 2025-04-21 | End: 2025-04-28

## 2025-04-21 NOTE — PATIENT INSTRUCTIONS
We will repeat your A1C in July  Start eye drop, and continue warm compresses    Get mammogram later this week

## 2025-04-21 NOTE — PROGRESS NOTES
"    [facility]  [facility] - [place], [place]    Family Medicine Office Visit    Chief Complaint   Patient presents with    Eye Drainage        HPI:  3 days of worsening conjunctival drainage and tearing.  Some scant eye pain as well.  L eye.  Warm compresses help somewhat more.    Diabetes controlled with A1C at goal, but slight increase last check  Has mammogram pending this week    ROS: as above    Vitals:    04/21/25 0812   BP: 110/78   BP Location: Right arm   Patient Position: Sitting   Pulse: 66   SpO2: 99%   Weight: 67.9 kg (149 lb 12.8 oz)   Height: 5' 1" (1.549 m)      Body mass index is 28.3 kg/m².      General:  AOx3, well nourished and developed in no acute distress  Eyes:  PERRLA, EOMI, vision intact grossly, injected L eye conjunctiva  ENT:  normal hearing, moist oral mucosa  Neck:  trachea midline with no masses or thyromegaly  Heart:  RRR, no murmurs.  No edema noted, extremities warm and well perfused  Lungs:  clear to auscultation bilaterally with symmetric chest movement  Abdomen:  Soft, nontender, nondistended.  Normal bowel sounds  Musculoskeletal:  Normal gait.  Normal posture.  Normal muscular development with no joint swelling.  Neurological:  CN II-XII grossly intact. Symmetric strength and sensation  Psych:  Normal mood and affect.  Able to demonstrate good judgement and personal insight.      Assessment/Plan:    1. Well controlled type 2 diabetes mellitus    2. Conjunctivitis, unspecified conjunctivitis type, unspecified laterality    Other orders  -     moxifloxacin (VIGAMOX) 0.5 % ophthalmic solution; Place 1 drop into the left eye 3 (three) times daily. for 7 days  Dispense: 3 mL; Refill: 1         Stable, will be improving diet and activity upcoming  Start drop and continue warm compresses    Visit today included increased complexity associated with the care of the episodic problem conjunctivitis, dm addressed and managing the longitudinal care of the patient due to " the serious and/or complex managed problem(s) DM, conjunctivitis.

## 2025-04-27 ENCOUNTER — HOSPITAL ENCOUNTER (INPATIENT)
Age: 55
LOS: 2 days | Discharge: HOME | DRG: 199 | End: 2025-04-29
Attending: EMERGENCY MEDICINE
Payer: COMMERCIAL

## 2025-04-27 ENCOUNTER — APPOINTMENT (OUTPATIENT)
Dept: GENERAL RADIOLOGY | Age: 55
DRG: 199 | End: 2025-04-27
Payer: COMMERCIAL

## 2025-04-27 DIAGNOSIS — I10 UNCONTROLLED HYPERTENSION: ICD-10-CM

## 2025-04-27 DIAGNOSIS — R06.02 SHORTNESS OF BREATH: ICD-10-CM

## 2025-04-27 DIAGNOSIS — I50.9 ACUTE CONGESTIVE HEART FAILURE, UNSPECIFIED HEART FAILURE TYPE (HCC): ICD-10-CM

## 2025-04-27 DIAGNOSIS — R07.9 CHEST PAIN, UNSPECIFIED TYPE: Primary | ICD-10-CM

## 2025-04-27 LAB
ALBUMIN SERPL-MCNC: 3.7 G/DL (ref 3.5–5.2)
ALP SERPL-CCNC: 102 U/L (ref 35–104)
ALT SERPL-CCNC: 12 U/L (ref 10–35)
ANION GAP SERPL CALCULATED.3IONS-SCNC: 15 MMOL/L (ref 9–16)
AST SERPL-CCNC: 21 U/L (ref 10–35)
BASOPHILS # BLD: 0.1 K/UL (ref 0–0.2)
BASOPHILS NFR BLD: 1 % (ref 0–2)
BILIRUB SERPL-MCNC: <0.2 MG/DL (ref 0–1.2)
BNP SERPL-MCNC: 404 PG/ML (ref 0–300)
BUN SERPL-MCNC: 13 MG/DL (ref 6–20)
CALCIUM SERPL-MCNC: 9 MG/DL (ref 8.6–10.4)
CHLORIDE SERPL-SCNC: 108 MMOL/L (ref 98–107)
CO2 SERPL-SCNC: 17 MMOL/L (ref 20–31)
CREAT SERPL-MCNC: 1 MG/DL (ref 0.7–1.2)
EOSINOPHIL # BLD: 0.1 K/UL (ref 0–0.4)
EOSINOPHILS RELATIVE PERCENT: 2 % (ref 0–4)
ERYTHROCYTE [DISTWIDTH] IN BLOOD BY AUTOMATED COUNT: 14.9 % (ref 11.5–14.9)
GFR, ESTIMATED: 66 ML/MIN/1.73M2
GLUCOSE SERPL-MCNC: 107 MG/DL (ref 74–99)
HCT VFR BLD AUTO: 40.8 % (ref 36–46)
HGB BLD-MCNC: 12.9 G/DL (ref 12–16)
INR PPP: 1
LYMPHOCYTES NFR BLD: 1.6 K/UL (ref 1–4.8)
LYMPHOCYTES RELATIVE PERCENT: 30 % (ref 24–44)
MAGNESIUM SERPL-MCNC: 1.4 MG/DL (ref 1.6–2.6)
MCH RBC QN AUTO: 28.3 PG (ref 26–34)
MCHC RBC AUTO-ENTMCNC: 31.6 G/DL (ref 31–37)
MCV RBC AUTO: 89.7 FL (ref 80–100)
MONOCYTES NFR BLD: 0.5 K/UL (ref 0.1–1.3)
MONOCYTES NFR BLD: 9 % (ref 1–7)
NEUTROPHILS NFR BLD: 58 % (ref 36–66)
NEUTS SEG NFR BLD: 3 K/UL (ref 1.3–9.1)
PLATELET # BLD AUTO: 273 K/UL (ref 150–450)
PMV BLD AUTO: 8.2 FL (ref 6–12)
POTASSIUM SERPL-SCNC: 4 MMOL/L (ref 3.7–5.3)
PROT SERPL-MCNC: 7 G/DL (ref 6.6–8.7)
PROTHROMBIN TIME: 13.8 SEC (ref 11.8–14.6)
RBC # BLD AUTO: 4.54 M/UL (ref 4–5.2)
SODIUM SERPL-SCNC: 140 MMOL/L (ref 136–145)
TROPONIN I SERPL HS-MCNC: 6 NG/L (ref 0–14)
TROPONIN I SERPL HS-MCNC: 7 NG/L (ref 0–14)
WBC OTHER # BLD: 5.2 K/UL (ref 3.5–11)

## 2025-04-27 PROCEDURE — 36415 COLL VENOUS BLD VENIPUNCTURE: CPT

## 2025-04-27 PROCEDURE — 93005 ELECTROCARDIOGRAM TRACING: CPT | Performed by: EMERGENCY MEDICINE

## 2025-04-27 PROCEDURE — 71045 X-RAY EXAM CHEST 1 VIEW: CPT

## 2025-04-27 PROCEDURE — 83735 ASSAY OF MAGNESIUM: CPT

## 2025-04-27 PROCEDURE — 6360000002 HC RX W HCPCS: Performed by: EMERGENCY MEDICINE

## 2025-04-27 PROCEDURE — 85610 PROTHROMBIN TIME: CPT

## 2025-04-27 PROCEDURE — 2500000003 HC RX 250 WO HCPCS

## 2025-04-27 PROCEDURE — 85025 COMPLETE CBC W/AUTO DIFF WBC: CPT

## 2025-04-27 PROCEDURE — 99285 EMERGENCY DEPT VISIT HI MDM: CPT

## 2025-04-27 PROCEDURE — 96375 TX/PRO/DX INJ NEW DRUG ADDON: CPT

## 2025-04-27 PROCEDURE — 96365 THER/PROPH/DIAG IV INF INIT: CPT

## 2025-04-27 PROCEDURE — 80053 COMPREHEN METABOLIC PANEL: CPT

## 2025-04-27 PROCEDURE — 2060000000 HC ICU INTERMEDIATE R&B

## 2025-04-27 PROCEDURE — 6370000000 HC RX 637 (ALT 250 FOR IP): Performed by: EMERGENCY MEDICINE

## 2025-04-27 PROCEDURE — 6370000000 HC RX 637 (ALT 250 FOR IP)

## 2025-04-27 PROCEDURE — 83880 ASSAY OF NATRIURETIC PEPTIDE: CPT

## 2025-04-27 PROCEDURE — 84484 ASSAY OF TROPONIN QUANT: CPT

## 2025-04-27 RX ORDER — ENOXAPARIN SODIUM 100 MG/ML
40 INJECTION SUBCUTANEOUS DAILY
Status: DISCONTINUED | OUTPATIENT
Start: 2025-04-28 | End: 2025-04-29 | Stop reason: HOSPADM

## 2025-04-27 RX ORDER — BISACODYL 10 MG
10 SUPPOSITORY, RECTAL RECTAL DAILY PRN
Status: DISCONTINUED | OUTPATIENT
Start: 2025-04-27 | End: 2025-04-29 | Stop reason: HOSPADM

## 2025-04-27 RX ORDER — ACETAMINOPHEN 650 MG/1
650 SUPPOSITORY RECTAL EVERY 6 HOURS PRN
Status: DISCONTINUED | OUTPATIENT
Start: 2025-04-27 | End: 2025-04-29 | Stop reason: HOSPADM

## 2025-04-27 RX ORDER — MAGNESIUM SULFATE HEPTAHYDRATE 40 MG/ML
2000 INJECTION, SOLUTION INTRAVENOUS PRN
Status: DISCONTINUED | OUTPATIENT
Start: 2025-04-27 | End: 2025-04-29 | Stop reason: HOSPADM

## 2025-04-27 RX ORDER — ONDANSETRON 4 MG/1
4 TABLET, ORALLY DISINTEGRATING ORAL EVERY 8 HOURS PRN
Status: DISCONTINUED | OUTPATIENT
Start: 2025-04-27 | End: 2025-04-29 | Stop reason: HOSPADM

## 2025-04-27 RX ORDER — MAGNESIUM SULFATE 1 G/100ML
1000 INJECTION INTRAVENOUS ONCE
Status: COMPLETED | OUTPATIENT
Start: 2025-04-27 | End: 2025-04-27

## 2025-04-27 RX ORDER — SODIUM CHLORIDE 0.9 % (FLUSH) 0.9 %
10 SYRINGE (ML) INJECTION PRN
Status: DISCONTINUED | OUTPATIENT
Start: 2025-04-27 | End: 2025-04-29 | Stop reason: HOSPADM

## 2025-04-27 RX ORDER — ASPIRIN 81 MG/1
324 TABLET, CHEWABLE ORAL ONCE
Status: COMPLETED | OUTPATIENT
Start: 2025-04-27 | End: 2025-04-27

## 2025-04-27 RX ORDER — SODIUM CHLORIDE 0.9 % (FLUSH) 0.9 %
5-40 SYRINGE (ML) INJECTION EVERY 12 HOURS SCHEDULED
Status: DISCONTINUED | OUTPATIENT
Start: 2025-04-27 | End: 2025-04-29 | Stop reason: HOSPADM

## 2025-04-27 RX ORDER — POTASSIUM CHLORIDE 7.45 MG/ML
10 INJECTION INTRAVENOUS PRN
Status: DISCONTINUED | OUTPATIENT
Start: 2025-04-27 | End: 2025-04-29 | Stop reason: HOSPADM

## 2025-04-27 RX ORDER — ONDANSETRON 2 MG/ML
4 INJECTION INTRAMUSCULAR; INTRAVENOUS EVERY 6 HOURS PRN
Status: DISCONTINUED | OUTPATIENT
Start: 2025-04-27 | End: 2025-04-29 | Stop reason: HOSPADM

## 2025-04-27 RX ORDER — NICOTINE 21 MG/24HR
1 PATCH, TRANSDERMAL 24 HOURS TRANSDERMAL DAILY
Status: DISCONTINUED | OUTPATIENT
Start: 2025-04-27 | End: 2025-04-29 | Stop reason: HOSPADM

## 2025-04-27 RX ORDER — NITROGLYCERIN 0.4 MG/1
0.4 TABLET SUBLINGUAL ONCE
Status: COMPLETED | OUTPATIENT
Start: 2025-04-27 | End: 2025-04-27

## 2025-04-27 RX ORDER — POLYETHYLENE GLYCOL 3350 17 G/17G
17 POWDER, FOR SOLUTION ORAL DAILY PRN
Status: DISCONTINUED | OUTPATIENT
Start: 2025-04-27 | End: 2025-04-29 | Stop reason: HOSPADM

## 2025-04-27 RX ORDER — ACETAMINOPHEN 325 MG/1
650 TABLET ORAL EVERY 6 HOURS PRN
Status: DISCONTINUED | OUTPATIENT
Start: 2025-04-27 | End: 2025-04-29 | Stop reason: HOSPADM

## 2025-04-27 RX ORDER — FUROSEMIDE 10 MG/ML
40 INJECTION INTRAMUSCULAR; INTRAVENOUS ONCE
Status: COMPLETED | OUTPATIENT
Start: 2025-04-27 | End: 2025-04-27

## 2025-04-27 RX ORDER — SODIUM CHLORIDE 9 MG/ML
INJECTION, SOLUTION INTRAVENOUS PRN
Status: DISCONTINUED | OUTPATIENT
Start: 2025-04-27 | End: 2025-04-29 | Stop reason: HOSPADM

## 2025-04-27 RX ORDER — POTASSIUM CHLORIDE 1500 MG/1
40 TABLET, EXTENDED RELEASE ORAL PRN
Status: DISCONTINUED | OUTPATIENT
Start: 2025-04-27 | End: 2025-04-29 | Stop reason: HOSPADM

## 2025-04-27 RX ORDER — ACETAMINOPHEN 500 MG
1000 TABLET ORAL ONCE
Status: COMPLETED | OUTPATIENT
Start: 2025-04-27 | End: 2025-04-27

## 2025-04-27 RX ADMIN — ACETAMINOPHEN 1000 MG: 500 TABLET ORAL at 19:47

## 2025-04-27 RX ADMIN — MAGNESIUM SULFATE 1000 MG: 1 INJECTION INTRAVENOUS at 20:10

## 2025-04-27 RX ADMIN — ASPIRIN 324 MG: 81 TABLET, CHEWABLE ORAL at 19:03

## 2025-04-27 RX ADMIN — NITROGLYCERIN 0.4 MG: 0.4 TABLET SUBLINGUAL at 18:52

## 2025-04-27 RX ADMIN — FUROSEMIDE 40 MG: 10 INJECTION, SOLUTION INTRAMUSCULAR; INTRAVENOUS at 20:56

## 2025-04-27 RX ADMIN — SODIUM CHLORIDE, PRESERVATIVE FREE 10 ML: 5 INJECTION INTRAVENOUS at 23:36

## 2025-04-27 RX ADMIN — NITROGLYCERIN 0.4 MG: 0.4 TABLET SUBLINGUAL at 21:52

## 2025-04-27 ASSESSMENT — PAIN DESCRIPTION - DESCRIPTORS
DESCRIPTORS: ACHING
DESCRIPTORS: ACHING

## 2025-04-27 ASSESSMENT — PAIN DESCRIPTION - ORIENTATION
ORIENTATION: RIGHT;LEFT
ORIENTATION: RIGHT;LEFT

## 2025-04-27 ASSESSMENT — LIFESTYLE VARIABLES
HOW OFTEN DO YOU HAVE A DRINK CONTAINING ALCOHOL: 2-3 TIMES A WEEK
HOW OFTEN DO YOU HAVE A DRINK CONTAINING ALCOHOL: NEVER
HOW MANY STANDARD DRINKS CONTAINING ALCOHOL DO YOU HAVE ON A TYPICAL DAY: PATIENT DOES NOT DRINK
HOW MANY STANDARD DRINKS CONTAINING ALCOHOL DO YOU HAVE ON A TYPICAL DAY: 1 OR 2

## 2025-04-27 ASSESSMENT — PAIN SCALES - GENERAL
PAINLEVEL_OUTOF10: 8
PAINLEVEL_OUTOF10: 9

## 2025-04-27 ASSESSMENT — PAIN DESCRIPTION - LOCATION
LOCATION: HEAD;FOOT
LOCATION: HEAD;FOOT

## 2025-04-27 NOTE — ED PROVIDER NOTES
2 seconds.      Findings: No erythema or rash.   Neurological:      General: No focal deficit present.      Mental Status: She is alert and oriented to person, place, and time.      Coordination: Coordination normal.   Psychiatric:         Mood and Affect: Mood normal.         Behavior: Behavior normal.         Thought Content: Thought content normal.         Judgment: Judgment normal.         MEDICAL DECISION MAKING / ED COURSE:         1)  Number and Complexity of Problems Addressed at this Encounter  Problem List This Visit: Chest tightness dyspnea orthopnea dyspnea with exertion.  Severe uncontrolled hypertension    Differential Diagnosis: Cardiomyopathy, ACS, SHERIE, hypertensive related heart disease    Diagnoses Considered but Do Not Suspect: Stroke aortic dissection, sepsis    Pertinent Comorbid Conditions: Uncontrolled hypertension    2)  Data Reviewed and Analyzed  (Lab and radiology tests/orders below in next section)        3)  Treatment and Disposition        ED Course as of 04/27/25 2052   Sun Apr 27, 2025   1844 I interpreted the twelve-lead EKG, sinus rhythm with LVH changes.  No ST elevation or depression.  Normal rate and intervals [WM]   1939 Blood pressure improves after sublingual nitroglycerin [WM]      ED Course User Index  [WM] Uri Lizarraga MD     Procedures    Improved with sublingual nitroglycerin.  Starting some Lasix for suspected CHF new onset from uncontrolled hypertension.  Do not suspect hypertensive encephalopathy or aortic dissection.  She is responding well to first rounds of oral medications.  I think her oral Norvasc and be resumed most likely 2.  She probably needs to see cardiology and get an echo tomorrow because of new onset CHF    Discussed with hospitalist ABDIAS Marquez for admission to progressive care unit    DATA FOR LAB AND RADIOLOGY TESTS ORDERED BELOW ARE REVIEWED BY THE ED CLINICIAN:    RADIOLOGY: All x-rays, CT, MRI, and formal ultrasound images (except ED bedside

## 2025-04-28 ENCOUNTER — APPOINTMENT (OUTPATIENT)
Dept: GENERAL RADIOLOGY | Age: 55
DRG: 199 | End: 2025-04-28
Payer: COMMERCIAL

## 2025-04-28 LAB
AMPHET UR QL SCN: NEGATIVE
ANION GAP SERPL CALCULATED.3IONS-SCNC: 11 MMOL/L (ref 9–16)
BARBITURATES UR QL SCN: NEGATIVE
BASOPHILS # BLD: 0.1 K/UL (ref 0–0.2)
BASOPHILS NFR BLD: 1 % (ref 0–2)
BENZODIAZ UR QL: NEGATIVE
BNP SERPL-MCNC: 381 PG/ML (ref 0–300)
BUN SERPL-MCNC: 16 MG/DL (ref 6–20)
CALCIUM SERPL-MCNC: 9 MG/DL (ref 8.6–10.4)
CANNABINOIDS UR QL SCN: NEGATIVE
CHLORIDE SERPL-SCNC: 103 MMOL/L (ref 98–107)
CHOLEST SERPL-MCNC: 161 MG/DL (ref 0–199)
CHOLESTEROL/HDL RATIO: 2.8
CO2 SERPL-SCNC: 28 MMOL/L (ref 20–31)
COCAINE UR QL SCN: POSITIVE
CREAT SERPL-MCNC: 1.1 MG/DL (ref 0.7–1.2)
EOSINOPHIL # BLD: 0.1 K/UL (ref 0–0.4)
EOSINOPHILS RELATIVE PERCENT: 2 % (ref 0–4)
ERYTHROCYTE [DISTWIDTH] IN BLOOD BY AUTOMATED COUNT: 14.7 % (ref 11.5–14.9)
FENTANYL UR QL: NEGATIVE
GFR, ESTIMATED: 59 ML/MIN/1.73M2
GLUCOSE SERPL-MCNC: 116 MG/DL (ref 74–99)
HCT VFR BLD AUTO: 38 % (ref 36–46)
HDLC SERPL-MCNC: 57 MG/DL
HGB BLD-MCNC: 12.5 G/DL (ref 12–16)
LDLC SERPL CALC-MCNC: 54 MG/DL (ref 0–100)
LYMPHOCYTES NFR BLD: 2.2 K/UL (ref 1–4.8)
LYMPHOCYTES RELATIVE PERCENT: 35 % (ref 24–44)
MAGNESIUM SERPL-MCNC: 1.7 MG/DL (ref 1.6–2.6)
MAGNESIUM SERPL-MCNC: 1.8 MG/DL (ref 1.6–2.6)
MCH RBC QN AUTO: 28.5 PG (ref 26–34)
MCHC RBC AUTO-ENTMCNC: 33 G/DL (ref 31–37)
MCV RBC AUTO: 86.4 FL (ref 80–100)
METHADONE UR QL: NEGATIVE
MONOCYTES NFR BLD: 0.6 K/UL (ref 0.1–1.3)
MONOCYTES NFR BLD: 10 % (ref 1–7)
NEUTROPHILS NFR BLD: 52 % (ref 36–66)
NEUTS SEG NFR BLD: 3.2 K/UL (ref 1.3–9.1)
OPIATES UR QL SCN: NEGATIVE
OXYCODONE UR QL SCN: NEGATIVE
PCP UR QL SCN: NEGATIVE
PLATELET # BLD AUTO: 254 K/UL (ref 150–450)
PMV BLD AUTO: 8.4 FL (ref 6–12)
POTASSIUM SERPL-SCNC: 3.5 MMOL/L (ref 3.7–5.3)
RBC # BLD AUTO: 4.39 M/UL (ref 4–5.2)
SODIUM SERPL-SCNC: 142 MMOL/L (ref 136–145)
TEST INFORMATION: ABNORMAL
TRIGL SERPL-MCNC: 252 MG/DL (ref 0–149)
TSH SERPL DL<=0.05 MIU/L-ACNC: 0.98 UIU/ML (ref 0.27–4.2)
WBC OTHER # BLD: 6.3 K/UL (ref 3.5–11)

## 2025-04-28 PROCEDURE — 71045 X-RAY EXAM CHEST 1 VIEW: CPT

## 2025-04-28 PROCEDURE — 99223 1ST HOSP IP/OBS HIGH 75: CPT

## 2025-04-28 PROCEDURE — 6370000000 HC RX 637 (ALT 250 FOR IP)

## 2025-04-28 PROCEDURE — 85025 COMPLETE CBC W/AUTO DIFF WBC: CPT

## 2025-04-28 PROCEDURE — 83880 ASSAY OF NATRIURETIC PEPTIDE: CPT

## 2025-04-28 PROCEDURE — 99222 1ST HOSP IP/OBS MODERATE 55: CPT | Performed by: INTERNAL MEDICINE

## 2025-04-28 PROCEDURE — 36415 COLL VENOUS BLD VENIPUNCTURE: CPT

## 2025-04-28 PROCEDURE — 6360000002 HC RX W HCPCS

## 2025-04-28 PROCEDURE — 80307 DRUG TEST PRSMV CHEM ANLYZR: CPT

## 2025-04-28 PROCEDURE — 84443 ASSAY THYROID STIM HORMONE: CPT

## 2025-04-28 PROCEDURE — 83735 ASSAY OF MAGNESIUM: CPT

## 2025-04-28 PROCEDURE — 6370000000 HC RX 637 (ALT 250 FOR IP): Performed by: INTERNAL MEDICINE

## 2025-04-28 PROCEDURE — 2060000000 HC ICU INTERMEDIATE R&B

## 2025-04-28 PROCEDURE — 80048 BASIC METABOLIC PNL TOTAL CA: CPT

## 2025-04-28 PROCEDURE — 80061 LIPID PANEL: CPT

## 2025-04-28 PROCEDURE — 2500000003 HC RX 250 WO HCPCS

## 2025-04-28 RX ORDER — COLCHICINE 0.6 MG/1
0.6 TABLET ORAL DAILY
Status: DISCONTINUED | OUTPATIENT
Start: 2025-04-28 | End: 2025-04-29 | Stop reason: HOSPADM

## 2025-04-28 RX ORDER — ACETAMINOPHEN 325 MG/1
325 TABLET ORAL EVERY 6 HOURS PRN
Status: DISCONTINUED | OUTPATIENT
Start: 2025-04-28 | End: 2025-04-29 | Stop reason: HOSPADM

## 2025-04-28 RX ORDER — PANTOPRAZOLE SODIUM 40 MG/1
40 TABLET, DELAYED RELEASE ORAL
Status: DISCONTINUED | OUTPATIENT
Start: 2025-04-28 | End: 2025-04-29 | Stop reason: HOSPADM

## 2025-04-28 RX ORDER — AMLODIPINE BESYLATE 10 MG/1
10 TABLET ORAL DAILY
Status: DISCONTINUED | OUTPATIENT
Start: 2025-04-29 | End: 2025-04-29 | Stop reason: HOSPADM

## 2025-04-28 RX ORDER — MAGNESIUM SULFATE 1 G/100ML
1000 INJECTION INTRAVENOUS ONCE
Status: DISCONTINUED | OUTPATIENT
Start: 2025-04-28 | End: 2025-04-28

## 2025-04-28 RX ORDER — OLANZAPINE 7.5 MG/1
7.5 TABLET, FILM COATED ORAL NIGHTLY
Status: DISCONTINUED | OUTPATIENT
Start: 2025-04-28 | End: 2025-04-29 | Stop reason: HOSPADM

## 2025-04-28 RX ORDER — ATENOLOL 50 MG/1
50 TABLET ORAL NIGHTLY
Status: DISCONTINUED | OUTPATIENT
Start: 2025-04-28 | End: 2025-04-28

## 2025-04-28 RX ORDER — AMLODIPINE BESYLATE 5 MG/1
5 TABLET ORAL DAILY
Status: DISCONTINUED | OUTPATIENT
Start: 2025-04-28 | End: 2025-04-28

## 2025-04-28 RX ORDER — METHOCARBAMOL 500 MG/1
1000 TABLET, FILM COATED ORAL 4 TIMES DAILY PRN
Status: DISCONTINUED | OUTPATIENT
Start: 2025-04-28 | End: 2025-04-29 | Stop reason: HOSPADM

## 2025-04-28 RX ORDER — HYDROCHLOROTHIAZIDE 25 MG/1
25 TABLET ORAL DAILY
Status: DISCONTINUED | OUTPATIENT
Start: 2025-04-28 | End: 2025-04-28

## 2025-04-28 RX ORDER — FUROSEMIDE 10 MG/ML
40 INJECTION INTRAMUSCULAR; INTRAVENOUS DAILY
Status: DISCONTINUED | OUTPATIENT
Start: 2025-04-28 | End: 2025-04-28

## 2025-04-28 RX ORDER — HYDRALAZINE HYDROCHLORIDE 20 MG/ML
5 INJECTION INTRAMUSCULAR; INTRAVENOUS EVERY 6 HOURS PRN
Status: DISCONTINUED | OUTPATIENT
Start: 2025-04-28 | End: 2025-04-29 | Stop reason: HOSPADM

## 2025-04-28 RX ORDER — CETIRIZINE HYDROCHLORIDE 10 MG/1
5 TABLET ORAL DAILY
Status: DISCONTINUED | OUTPATIENT
Start: 2025-04-28 | End: 2025-04-29 | Stop reason: HOSPADM

## 2025-04-28 RX ORDER — SPIRONOLACTONE 25 MG/1
25 TABLET ORAL DAILY
Status: DISCONTINUED | OUTPATIENT
Start: 2025-04-29 | End: 2025-04-29 | Stop reason: HOSPADM

## 2025-04-28 RX ORDER — CARVEDILOL 6.25 MG/1
6.25 TABLET ORAL 2 TIMES DAILY WITH MEALS
Status: DISCONTINUED | OUTPATIENT
Start: 2025-04-28 | End: 2025-04-29 | Stop reason: HOSPADM

## 2025-04-28 RX ORDER — LANOLIN ALCOHOL/MO/W.PET/CERES
800 CREAM (GRAM) TOPICAL ONCE
Status: COMPLETED | OUTPATIENT
Start: 2025-04-28 | End: 2025-04-28

## 2025-04-28 RX ORDER — ATORVASTATIN CALCIUM 20 MG/1
20 TABLET, FILM COATED ORAL NIGHTLY
Status: DISCONTINUED | OUTPATIENT
Start: 2025-04-28 | End: 2025-04-29 | Stop reason: HOSPADM

## 2025-04-28 RX ADMIN — Medication 3 MG: at 00:50

## 2025-04-28 RX ADMIN — COLCHICINE 0.6 MG: 0.6 TABLET ORAL at 08:17

## 2025-04-28 RX ADMIN — HYDRALAZINE HYDROCHLORIDE 5 MG: 20 INJECTION INTRAMUSCULAR; INTRAVENOUS at 09:50

## 2025-04-28 RX ADMIN — HYDROCHLOROTHIAZIDE 25 MG: 25 TABLET ORAL at 08:17

## 2025-04-28 RX ADMIN — METHOCARBAMOL 1000 MG: 500 TABLET ORAL at 22:32

## 2025-04-28 RX ADMIN — OLANZAPINE 7.5 MG: 7.5 TABLET, FILM COATED ORAL at 21:38

## 2025-04-28 RX ADMIN — PANTOPRAZOLE SODIUM 40 MG: 40 TABLET, DELAYED RELEASE ORAL at 05:24

## 2025-04-28 RX ADMIN — AMLODIPINE BESYLATE 5 MG: 5 TABLET ORAL at 08:17

## 2025-04-28 RX ADMIN — SODIUM CHLORIDE, PRESERVATIVE FREE 10 ML: 5 INJECTION INTRAVENOUS at 08:18

## 2025-04-28 RX ADMIN — CETIRIZINE HYDROCHLORIDE 5 MG: 10 TABLET, FILM COATED ORAL at 08:17

## 2025-04-28 RX ADMIN — CARVEDILOL 6.25 MG: 6.25 TABLET, FILM COATED ORAL at 19:58

## 2025-04-28 RX ADMIN — ATORVASTATIN CALCIUM 20 MG: 20 TABLET, FILM COATED ORAL at 21:37

## 2025-04-28 RX ADMIN — ACETAMINOPHEN 650 MG: 325 TABLET ORAL at 00:50

## 2025-04-28 RX ADMIN — ENOXAPARIN SODIUM 40 MG: 100 INJECTION SUBCUTANEOUS at 08:17

## 2025-04-28 RX ADMIN — POTASSIUM CHLORIDE 40 MEQ: 1500 TABLET, EXTENDED RELEASE ORAL at 08:27

## 2025-04-28 RX ADMIN — FUROSEMIDE 40 MG: 10 INJECTION, SOLUTION INTRAMUSCULAR; INTRAVENOUS at 09:50

## 2025-04-28 RX ADMIN — ACETAMINOPHEN 650 MG: 325 TABLET ORAL at 08:27

## 2025-04-28 RX ADMIN — Medication 800 MG: at 23:56

## 2025-04-28 RX ADMIN — METHOCARBAMOL 1000 MG: 500 TABLET ORAL at 18:23

## 2025-04-28 RX ADMIN — SODIUM CHLORIDE, PRESERVATIVE FREE 10 ML: 5 INJECTION INTRAVENOUS at 19:59

## 2025-04-28 ASSESSMENT — PAIN - FUNCTIONAL ASSESSMENT
PAIN_FUNCTIONAL_ASSESSMENT: PREVENTS OR INTERFERES SOME ACTIVE ACTIVITIES AND ADLS
PAIN_FUNCTIONAL_ASSESSMENT: ACTIVITIES ARE NOT PREVENTED

## 2025-04-28 ASSESSMENT — PAIN DESCRIPTION - ORIENTATION
ORIENTATION: MID
ORIENTATION: RIGHT;LEFT

## 2025-04-28 ASSESSMENT — PAIN SCALES - GENERAL
PAINLEVEL_OUTOF10: 6
PAINLEVEL_OUTOF10: 3
PAINLEVEL_OUTOF10: 3
PAINLEVEL_OUTOF10: 10
PAINLEVEL_OUTOF10: 10

## 2025-04-28 ASSESSMENT — PAIN DESCRIPTION - DESCRIPTORS
DESCRIPTORS: ACHING

## 2025-04-28 ASSESSMENT — PAIN DESCRIPTION - LOCATION
LOCATION: LEG;FOOT
LOCATION: HEAD
LOCATION: HEAD

## 2025-04-28 NOTE — PLAN OF CARE
Problem: Discharge Planning  Goal: Discharge to home or other facility with appropriate resources  4/28/2025 1606 by Lakeshia Ac RN  Outcome: Progressing     Problem: Pain  Goal: Verbalizes/displays adequate comfort level or baseline comfort level  4/28/2025 1606 by Lakeshia Ac RN  Outcome: Progressing     Problem: Safety - Adult  Goal: Free from fall injury  4/28/2025 1606 by Lakeshia Ac RN  Outcome: Progressing

## 2025-04-28 NOTE — PLAN OF CARE
Problem: Discharge Planning  Goal: Discharge to home or other facility with appropriate resources  Outcome: Progressing  Flowsheets (Taken 4/27/2025 2247)  Discharge to home or other facility with appropriate resources:   Identify barriers to discharge with patient and caregiver   Refer to discharge planning if patient needs post-hospital services based on physician order or complex needs related to functional status, cognitive ability or social support system   Identify discharge learning needs (meds, wound care, etc)     Problem: Pain  Goal: Verbalizes/displays adequate comfort level or baseline comfort level  Outcome: Progressing  Flowsheets (Taken 4/28/2025 0318)  Verbalizes/displays adequate comfort level or baseline comfort level:   Encourage patient to monitor pain and request assistance   Administer analgesics based on type and severity of pain and evaluate response   Consider cultural and social influences on pain and pain management  Note: Pt medicated with pain medication prn.  Assessed all pain characteristics including level, type, location, frequency, and onset.  Non-pharmacologic interventions offered to pt as well.  Pt states pain is tolerable at this time.        Problem: Skin/Tissue Integrity  Goal: Skin integrity remains intact  Description: 1.  Monitor for areas of redness and/or skin breakdown2.  Assess vascular access sites hourly3.  Every 4-6 hours minimum:  Change oxygen saturation probe site4.  Every 4-6 hours:  If on nasal continuous positive airway pressure, respiratory therapy assess nares and determine need for appliance change or resting period  Outcome: Progressing

## 2025-04-28 NOTE — H&P
Latex, Ibuprofen, and Penicillins    Social History:     Tobacco:    reports that she has been smoking cigarettes and cigars. She started smoking about 19 years ago. She has a 4.8 pack-year smoking history. She has never used smokeless tobacco.  Alcohol:      reports current alcohol use.  Drug Use:  reports that she does not currently use drugs after having used the following drugs: Cocaine and Marijuana (Weed).    Family History:     Family History   Problem Relation Age of Onset    High Blood Pressure Mother     High Blood Pressure Father     Breast Cancer Paternal Aunt        Review of Systems:     Positive and Negative as described in HPI.    CONSTITUTIONAL:  negative for fevers, chills, sweats, fatigue, weight loss  HEENT:  negative for vision, hearing changes, runny nose, throat pain  RESPIRATORY:  negative for shortness of breath, cough, congestion, wheezing  CARDIOVASCULAR:  negative for chest pain, palpitations  GASTROINTESTINAL:  negative for nausea, vomiting, diarrhea, constipation, change in bowel habits, abdominal pain   GENITOURINARY:  negative for difficulty of urination, burning with urination, frequency   INTEGUMENT:  negative for rash, skin lesions, easy bruising   HEMATOLOGIC/LYMPHATIC:  negative for swelling/edema   ALLERGIC/IMMUNOLOGIC:  negative for urticaria , itching  ENDOCRINE:  negative increase in drinking, increase in urination, hot or cold intolerance  MUSCULOSKELETAL:  negative joint pains, muscle aches, swelling of joints  NEUROLOGICAL:  negative for headaches, dizziness, lightheadedness, numbness, pain, tingling extremities  BEHAVIOR/PSYCH:  negative for depression, anxiety    Physical Exam:   BP (!) 155/94   Pulse 70   Temp 97.6 °F (36.4 °C) (Oral)   Resp 16   Ht 1.575 m (5' 2\")   Wt 78 kg (172 lb)   SpO2 100%   BMI 31.46 kg/m²   Temp (24hrs), Av.7 °F (36.5 °C), Min:97.2 °F (36.2 °C), Max:98.1 °F (36.7 °C)    No results for input(s): \"POCGLU\" in the last 72

## 2025-04-28 NOTE — FLOWSHEET NOTE
04/28/25 0427   Treatment Team Notification   Name of Team Member Notified Dr. Berumen   Notification Time 0427         Consult sent to Dr. Berumen for patient and MD acknowledged to see patient in the AM

## 2025-04-28 NOTE — FLOWSHEET NOTE
04/27/25 2253   Treatment Team Notification   Reason for Communication Evaluate   Name of Team Member Notified Lori LAMBERT   Treatment Team Role Advanced Practice Nurse   Method of Communication Secure Message   Response Waiting for response   Notification Time 2254          Patient is complaining of leg pain and headache and states she use to take tramadol or trazadone and that helps . The tylenol had never helped . Request sent to NP and awaiting response
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
## 2025-04-29 ENCOUNTER — APPOINTMENT (OUTPATIENT)
Age: 55
DRG: 199 | End: 2025-04-29
Payer: COMMERCIAL

## 2025-04-29 VITALS
RESPIRATION RATE: 16 BRPM | BODY MASS INDEX: 34.44 KG/M2 | WEIGHT: 187.17 LBS | SYSTOLIC BLOOD PRESSURE: 109 MMHG | HEIGHT: 62 IN | DIASTOLIC BLOOD PRESSURE: 74 MMHG | HEART RATE: 76 BPM | OXYGEN SATURATION: 97 % | TEMPERATURE: 97.5 F

## 2025-04-29 LAB
ALBUMIN SERPL-MCNC: 4.5 G/DL (ref 3.5–5.2)
ALP SERPL-CCNC: 114 U/L (ref 35–104)
ALT SERPL-CCNC: 15 U/L (ref 10–35)
ANION GAP SERPL CALCULATED.3IONS-SCNC: 16 MMOL/L (ref 9–16)
AST SERPL-CCNC: 21 U/L (ref 10–35)
BASOPHILS # BLD: 0.2 K/UL (ref 0–0.2)
BASOPHILS NFR BLD: 3 % (ref 0–2)
BILIRUB SERPL-MCNC: <0.2 MG/DL (ref 0–1.2)
BUN SERPL-MCNC: 16 MG/DL (ref 6–20)
CALCIUM SERPL-MCNC: 10 MG/DL (ref 8.6–10.4)
CHLORIDE SERPL-SCNC: 100 MMOL/L (ref 98–107)
CO2 SERPL-SCNC: 24 MMOL/L (ref 20–31)
CREAT SERPL-MCNC: 1 MG/DL (ref 0.7–1.2)
ECHO AO ASC DIAM: 3.2 CM
ECHO AO ASCENDING AORTA INDEX: 1.72 CM/M2
ECHO AO ROOT DIAM: 3.2 CM
ECHO AO ROOT INDEX: 1.72 CM/M2
ECHO AR MAX VEL PISA: 4.3 M/S
ECHO AV AREA PEAK VELOCITY: 2.3 CM2
ECHO AV AREA VTI: 2 CM2
ECHO AV AREA/BSA PEAK VELOCITY: 1.2 CM2/M2
ECHO AV AREA/BSA VTI: 1.1 CM2/M2
ECHO AV MEAN GRADIENT: 4 MMHG
ECHO AV MEAN VELOCITY: 0.9 M/S
ECHO AV PEAK GRADIENT: 8 MMHG
ECHO AV PEAK VELOCITY: 1.4 M/S
ECHO AV REGURGITANT PHT: 1627 MS
ECHO AV VELOCITY RATIO: 0.71
ECHO AV VTI: 25.6 CM
ECHO BSA: 1.85 M2
ECHO EST RA PRESSURE: 3 MMHG
ECHO LA AREA 2C: 18.1 CM2
ECHO LA AREA 4C: 14.2 CM2
ECHO LA DIAMETER INDEX: 1.88 CM/M2
ECHO LA DIAMETER: 3.5 CM
ECHO LA MAJOR AXIS: 4.8 CM
ECHO LA MINOR AXIS: 4.8 CM
ECHO LA TO AORTIC ROOT RATIO: 1.09
ECHO LA VOL BP: 43 ML (ref 22–52)
ECHO LA VOL MOD A2C: 56 ML (ref 22–52)
ECHO LA VOL MOD A4C: 33 ML (ref 22–52)
ECHO LA VOL/BSA BIPLANE: 23 ML/M2 (ref 16–34)
ECHO LA VOLUME INDEX MOD A2C: 30 ML/M2 (ref 16–34)
ECHO LA VOLUME INDEX MOD A4C: 18 ML/M2 (ref 16–34)
ECHO LV E' LATERAL VELOCITY: 5.22 CM/S
ECHO LV E' SEPTAL VELOCITY: 2.61 CM/S
ECHO LV EF PHYSICIAN: 60 %
ECHO LV FRACTIONAL SHORTENING: 30 % (ref 28–44)
ECHO LV INTERNAL DIMENSION DIASTOLE INDEX: 2.31 CM/M2
ECHO LV INTERNAL DIMENSION DIASTOLIC: 4.3 CM (ref 3.9–5.3)
ECHO LV INTERNAL DIMENSION SYSTOLIC INDEX: 1.61 CM/M2
ECHO LV INTERNAL DIMENSION SYSTOLIC: 3 CM
ECHO LV IVSD: 1.1 CM (ref 0.6–0.9)
ECHO LV MASS 2D: 162.9 G (ref 67–162)
ECHO LV MASS INDEX 2D: 87.6 G/M2 (ref 43–95)
ECHO LV POSTERIOR WALL DIASTOLIC: 1.1 CM (ref 0.6–0.9)
ECHO LV RELATIVE WALL THICKNESS RATIO: 0.51
ECHO LVOT AREA: 3.1 CM2
ECHO LVOT AV VTI INDEX: 0.65
ECHO LVOT DIAM: 2 CM
ECHO LVOT MEAN GRADIENT: 2 MMHG
ECHO LVOT PEAK GRADIENT: 4 MMHG
ECHO LVOT PEAK VELOCITY: 1 M/S
ECHO LVOT STROKE VOLUME INDEX: 28 ML/M2
ECHO LVOT SV: 52.1 ML
ECHO LVOT VTI: 16.6 CM
ECHO MV A VELOCITY: 0.66 M/S
ECHO MV E DECELERATION TIME (DT): 178 MS
ECHO MV E VELOCITY: 0.34 M/S
ECHO MV E/A RATIO: 0.52
ECHO MV E/E' LATERAL: 6.51
ECHO MV E/E' RATIO (AVERAGED): 9.77
ECHO MV E/E' SEPTAL: 13.03
ECHO PV MAX VELOCITY: 0.7 M/S
ECHO PV PEAK GRADIENT: 2 MMHG
ECHO RA AREA 4C: 9.2 CM2
ECHO RA END SYSTOLIC VOLUME APICAL 4 CHAMBER INDEX BSA: 9 ML/M2
ECHO RA VOLUME: 17 ML
ECHO RIGHT VENTRICULAR SYSTOLIC PRESSURE (RVSP): 24 MMHG
ECHO RV FREE WALL PEAK S': 4.1 CM/S
ECHO RV INTERNAL DIMENSION: 2.5 CM
ECHO RV TAPSE: 1.1 CM (ref 1.7–?)
ECHO TV REGURGITANT MAX VELOCITY: 2.28 M/S
ECHO TV REGURGITANT PEAK GRADIENT: 21 MMHG
EKG ATRIAL RATE: 66 BPM
EKG P AXIS: 41 DEGREES
EKG P-R INTERVAL: 170 MS
EKG Q-T INTERVAL: 452 MS
EKG QRS DURATION: 76 MS
EKG QTC CALCULATION (BAZETT): 473 MS
EKG R AXIS: 3 DEGREES
EKG T AXIS: 32 DEGREES
EKG VENTRICULAR RATE: 66 BPM
EOSINOPHIL # BLD: 0 K/UL (ref 0–0.4)
EOSINOPHILS RELATIVE PERCENT: 0 % (ref 0–4)
ERYTHROCYTE [DISTWIDTH] IN BLOOD BY AUTOMATED COUNT: 14.7 % (ref 11.5–14.9)
GFR, ESTIMATED: 66 ML/MIN/1.73M2
GLUCOSE SERPL-MCNC: 132 MG/DL (ref 74–99)
HCT VFR BLD AUTO: 43.2 % (ref 36–46)
HGB BLD-MCNC: 14.2 G/DL (ref 12–16)
LYMPHOCYTES NFR BLD: 1.5 K/UL (ref 1–4.8)
LYMPHOCYTES RELATIVE PERCENT: 18 % (ref 24–44)
MAGNESIUM SERPL-MCNC: 1.9 MG/DL (ref 1.6–2.6)
MCH RBC QN AUTO: 28.7 PG (ref 26–34)
MCHC RBC AUTO-ENTMCNC: 33 G/DL (ref 31–37)
MCV RBC AUTO: 86.9 FL (ref 80–100)
MONOCYTES NFR BLD: 0.6 K/UL (ref 0.1–1.3)
MONOCYTES NFR BLD: 7 % (ref 1–7)
NEUTROPHILS NFR BLD: 72 % (ref 36–66)
NEUTS SEG NFR BLD: 5.8 K/UL (ref 1.3–9.1)
PLATELET # BLD AUTO: 301 K/UL (ref 150–450)
PMV BLD AUTO: 8.8 FL (ref 6–12)
POTASSIUM SERPL-SCNC: 4 MMOL/L (ref 3.7–5.3)
PROT SERPL-MCNC: 8 G/DL (ref 6.6–8.7)
RBC # BLD AUTO: 4.97 M/UL (ref 4–5.2)
SODIUM SERPL-SCNC: 140 MMOL/L (ref 136–145)
URATE SERPL-MCNC: 4.8 MG/DL (ref 2.4–5.7)
WBC OTHER # BLD: 8.1 K/UL (ref 3.5–11)

## 2025-04-29 PROCEDURE — 83735 ASSAY OF MAGNESIUM: CPT

## 2025-04-29 PROCEDURE — 6370000000 HC RX 637 (ALT 250 FOR IP): Performed by: INTERNAL MEDICINE

## 2025-04-29 PROCEDURE — 84550 ASSAY OF BLOOD/URIC ACID: CPT

## 2025-04-29 PROCEDURE — 93306 TTE W/DOPPLER COMPLETE: CPT

## 2025-04-29 PROCEDURE — 93010 ELECTROCARDIOGRAM REPORT: CPT | Performed by: INTERNAL MEDICINE

## 2025-04-29 PROCEDURE — 99233 SBSQ HOSP IP/OBS HIGH 50: CPT

## 2025-04-29 PROCEDURE — 85025 COMPLETE CBC W/AUTO DIFF WBC: CPT

## 2025-04-29 PROCEDURE — 36415 COLL VENOUS BLD VENIPUNCTURE: CPT

## 2025-04-29 PROCEDURE — 80053 COMPREHEN METABOLIC PANEL: CPT

## 2025-04-29 PROCEDURE — 6370000000 HC RX 637 (ALT 250 FOR IP)

## 2025-04-29 PROCEDURE — 6360000002 HC RX W HCPCS

## 2025-04-29 PROCEDURE — 93306 TTE W/DOPPLER COMPLETE: CPT | Performed by: INTERNAL MEDICINE

## 2025-04-29 RX ORDER — CARVEDILOL 6.25 MG/1
6.25 TABLET ORAL 2 TIMES DAILY WITH MEALS
Qty: 60 TABLET | Refills: 0 | Status: SHIPPED | OUTPATIENT
Start: 2025-04-29

## 2025-04-29 RX ORDER — SPIRONOLACTONE 25 MG/1
25 TABLET ORAL DAILY
Qty: 30 TABLET | Refills: 0 | Status: SHIPPED | OUTPATIENT
Start: 2025-04-30

## 2025-04-29 RX ORDER — AMLODIPINE BESYLATE 10 MG/1
10 TABLET ORAL DAILY
Qty: 30 TABLET | Refills: 0 | Status: SHIPPED | OUTPATIENT
Start: 2025-04-29 | End: 2025-05-29

## 2025-04-29 RX ADMIN — SPIRONOLACTONE 25 MG: 25 TABLET ORAL at 09:18

## 2025-04-29 RX ADMIN — ENOXAPARIN SODIUM 40 MG: 100 INJECTION SUBCUTANEOUS at 09:21

## 2025-04-29 RX ADMIN — CARVEDILOL 6.25 MG: 6.25 TABLET, FILM COATED ORAL at 09:18

## 2025-04-29 RX ADMIN — COLCHICINE 0.6 MG: 0.6 TABLET ORAL at 09:18

## 2025-04-29 RX ADMIN — PANTOPRAZOLE SODIUM 40 MG: 40 TABLET, DELAYED RELEASE ORAL at 05:16

## 2025-04-29 RX ADMIN — CETIRIZINE HYDROCHLORIDE 5 MG: 10 TABLET, FILM COATED ORAL at 09:18

## 2025-04-29 NOTE — CARE COORDINATION
Case Management Assessment  Initial Evaluation    Date/Time of Evaluation: 4/28/2025 10:38 AM  Assessment Completed by: Lori Macias RN    If patient is discharged prior to next notation, then this note serves as note for discharge by case management.    Patient Name: Erica Loo                   YOB: 1970  Diagnosis: Hypertensive urgency [I16.0]  Uncontrolled hypertension [I10]  Chest pain, unspecified type [R07.9]  Acute congestive heart failure, unspecified heart failure type (HCC) [I50.9]                   Date / Time: 4/27/2025  6:16 PM    Patient Admission Status: Inpatient   Readmission Risk (Low < 19, Mod (19-27), High > 27): Readmission Risk Score: 7.7    Current PCP: Kaylah Gomes APRN - NP  PCP verified by CM? Yes    Chart Reviewed: Yes      History Provided by: Patient  Patient Orientation: Alert and Oriented    Patient Cognition: Alert    Hospitalization in the last 30 days (Readmission):  No    If yes, Readmission Assessment in  Navigator will be completed.    Advance Directives:      Code Status: Full Code   Patient's Primary Decision Maker is: Legal Next of Kin      Discharge Planning:    Patient lives with: Alone Type of Home: Apartment  Primary Care Giver: Self  Patient Support Systems include: Spouse/Significant Other, Children   Current Financial resources: Medicaid, Food [place]  Current community resources: None  Current services prior to admission: None            Current DME:              Type of Home Care services:  None    ADLS  Prior functional level: Independent in ADLs/IADLs  Current functional level: Independent in ADLs/IADLs    PT AM-PAC:   /24  OT AM-PAC:   /24    Family can provide assistance at DC: No  Would you like Case Management to discuss the discharge plan with any other family members/significant others, and if so, who? No  Plans to Return to Present Housing: Yes  Other Identified Issues/Barriers to RETURNING to current housing: no barriers 
Erica GARIBAY Loo  [id number]  meets criteria for hypertension education. The following resources and interventions were provided: Flyer Education, Hypertension Flyer education, and Coordination with Home Care regarding disease education, medication compliance, and PCP follow-up.     Electronically signed by Effie Cox RN on 4/28/2025 at 9:59 AM     
ONGOING DISCHARGE PLAN:    Patient is alert and oriented x4.    Spoke with patient regarding discharge plan and patient confirms that plan is still home without needs. Referral sent to [facility] and has cardiology follow up 5/28.    Echo ordered.    Walker and shower chair orders faxed to [facility].    Refused AOD resources.    Will continue to follow for additional discharge needs.    If patient is discharged prior to next notation, then this note serves as note for discharge by case management.    Electronically signed by Suad Bowman RN on 4/29/2025 at 11:25 AM    Received message from primary care RN stating [facility] advised her pt cannot get a walker as she received one 2 years ago.    Electronically signed by Suad Bowman RN on 4/29/2025 at 12:07 PM    
13.8 11.8 - 14.6 sec     INR 1.0     Magnesium     Collection Time: 04/27/25  7:05 PM   Result Value Ref Range     Magnesium 1.4 (L) 1.6 - 2.6 mg/dL   Troponin     Collection Time: 04/27/25  8:11 PM   Result Value Ref Range     Troponin, High Sensitivity 7 0 - 14 ng/L   Lipid Panel     Collection Time: 04/28/25  2:58 AM   Result Value Ref Range     Cholesterol, Total 161 0 - 199 mg/dL     HDL 57 >40 mg/dL     LDL Cholesterol 54 0 - 100 mg/dL     Chol/HDL Ratio 2.8       Triglycerides 252 (H) 0 - 149 mg/dL   Magnesium     Collection Time: 04/28/25  2:58 AM   Result Value Ref Range     Magnesium 1.7 1.6 - 2.6 mg/dL   TSH reflex to FT4     Collection Time: 04/28/25  2:58 AM   Result Value Ref Range     TSH 0.98 0.27 - 4.20 uIU/mL   Drug Scr, Abuse, Ur     Collection Time: 04/28/25  4:34 AM   Result Value Ref Range     Amphetamine Screen, Ur NEGATIVE NEGATIVE     Barbiturate Screen, Ur NEGATIVE NEGATIVE     Benzodiazepine Screen, Urine NEGATIVE NEGATIVE     Cocaine Metabolite, Urine POSITIVE (A) NEGATIVE     Methadone Screen, Urine NEGATIVE NEGATIVE     Opiates, Urine NEGATIVE NEGATIVE     Phencyclidine, Urine NEGATIVE NEGATIVE     Cannabinoid Scrn, Ur NEGATIVE NEGATIVE     Oxycodone Screen, Ur NEGATIVE NEGATIVE     Fentanyl, Ur NEGATIVE NEGATIVE     Test Information           This method is a screening test to detect only these drug classes as part of a medical workup. Confirmatory testing by another method should be ordered if clinically indicated.   Basic Metabolic Panel w/ Reflex to MG     Collection Time: 04/28/25  5:32 AM   Result Value Ref Range     Sodium 142 136 - 145 mmol/L     Potassium 3.5 (L) 3.7 - 5.3 mmol/L     Chloride 103 98 - 107 mmol/L     CO2 28 20 - 31 mmol/L     Anion Gap 11 9 - 16 mmol/L     Glucose 116 (H) 74 - 99 mg/dL     BUN 16 6 - 20 mg/dL     Creatinine 1.1 0.7 - 1.2 mg/dL     Est, Glom Filt Rate 59 (L) >60 mL/min/1.73m2     Calcium 9.0 8.6 - 10.4 mg/dL   CBC with auto differential

## 2025-04-29 NOTE — PLAN OF CARE
Problem: Discharge Planning  Goal: Discharge to home or other facility with appropriate resources  4/29/2025 0230 by Allison Huff RN  Outcome: Progressing     Problem: Pain  Goal: Verbalizes/displays adequate comfort level or baseline comfort level  4/29/2025 0230 by Allison Huff RN  Outcome: Progressing     Problem: Safety - Adult  Goal: Free from fall injury  4/29/2025 0230 by Allison Huff RN  Outcome: Progressing     Problem: Skin/Tissue Integrity  Goal: Skin integrity remains intact  Description: 1.  Monitor for areas of redness and/or skin breakdown2.  Assess vascular access sites hourly3.  Every 4-6 hours minimum:  Change oxygen saturation probe site4.  Every 4-6 hours:  If on nasal continuous positive airway pressure, respiratory therapy assess nares and determine need for appliance change or resting period  Outcome: Progressing     Problem: ABCDS Injury Assessment  Goal: Absence of physical injury  Outcome: Progressing  Flowsheets (Taken 4/28/2025 2000)  Absence of Physical Injury: Implement safety measures based on patient assessment

## 2025-04-29 NOTE — PROGRESS NOTES
CLINICAL PHARMACY NOTE: MEDS TO BEDS    Total # of Prescriptions Filled: 3   The following medications were delivered to the patient:  Carvedilol 6.25MG Tablets   Amlodipine Besylate 10MG Tablets   Spironolactone 25MG Tablets     Additional Documentation:  Picked up at the pharmacy by the PT at 3:49PM 4/29/25  
Dr copeland notified pt bp is still elevated. this AM it was 201/114 and she received her scheduled hydrachlorothiazide 25mg. recheck was 186/93. then  put that med on hold and changed it to hydralazine prn 5 mg iv and lasix 40 iv which was given. after this bp now 177/92. any further orders? does not appear to have been seen by cardio yet as she was new admit     Hydralazine prn increasing to 10 mg  
[facility] called RN stating they were not able to give patient walker because she had one delivered in the last two years.   
NP Garett notified pt c/o 10/10 leg and feet pain. she said tylenol isnt helping. she stated she thinks its her gout acting up. she does have colcrys ordered that she recieved this am   
Patient has been discharged.No IV present and telemetry removed. All  belongings gathered. All questions answered at this time.  
Results sent to Dr. Purdy of ECHO  1518: Dr. Purdy ok with discharge if BP better controlled.   1524: RN sent recent BP of 109/74 to Dr. Purdy.    
Unsuccessful IV attempts x2 by writer and house supervisor.  
Writer entered room to administer magnesium piggyback, attempted to flush IV in left hand. Patient noted extreme pain when pushing, line would not flush. Attempted one insertion in the left hand with assistance by CONCEPCION Barth. IV insertion unsuccessful. Charge nurse currently at bedside, attempting IV placement.  
Writer went into pt room with CONCEPCION Alexander to attempt to regain IV access. Multiple nurses have attempted; pt is a hard stick and no IV ultrasound trained personnel available. Patient adamantly refusing to even let nurse look, states she is not about to keep getting poked. RN educated patient on need for IV access. Charge RN Romelia notified of refusal.  
Value Ref Range    Protime 13.8 11.8 - 14.6 sec    INR 1.0    Magnesium    Collection Time: 04/27/25  7:05 PM   Result Value Ref Range    Magnesium 1.4 (L) 1.6 - 2.6 mg/dL   Troponin    Collection Time: 04/27/25  8:11 PM   Result Value Ref Range    Troponin, High Sensitivity 7 0 - 14 ng/L       Imaging/Diagnostics:  XR CHEST PORTABLE  Result Date: 4/27/2025  Bilateral pulmonary vascular congestion.         Plan:     Patient status inpatient in the Progressive Unit/Step down          YEYO Talavera - NP  4/28/2025  1:21 AM      Please note that this chart was generated using voice recognition Dragon dictation software.  Although every effort was made to ensure the accuracy of this automated transcription, some errors in transcription may [place] [address].   Phone [phone]           
   LVOT SV 52.0 ml    MV E Wave Deceleration Time 178.0 ms    MV A Velocity 0.66 m/s    MV E Velocity 0.34 m/s    MV Area by PHT 4.2 cm2    PV Max Velocity 0.7 m/s    PV Peak Gradient 2 mmHg    RV Free Wall Peak S' 4.1 cm/s    TAPSE 1.1 (A) >=1.7 cm    TR Max Velocity 2.28 m/s    TR Peak Gradient 21 mmHg       Imaging/Diagnostics:  XR CHEST PORTABLE  Result Date: 4/27/2025  Bilateral pulmonary vascular congestion.       Assessment :      Hospital Problems           Last Modified POA    * (Principal) Hypertensive urgency 4/27/2025 Yes    Chest pain 4/28/2025 Yes       Plan:     Patient status inpatient in the Progressive Unit/Step down    55-year-old female presented with shortness of breath, orthopnea worsening for the past 3 days.  Hypertensive urgency.  Resolving. Resume home antihypertensives.   Acute on chronic heart failure secondary to above.  With elevated proBNP, chest x-ray shows pulmonary vascular congestion, repeat chest x-ray shows improvement.  Switch to p.o. Aldactone.  Echo is pending  Hypokalemia.  Replace as per protocol.  Daily BMP.  Hypomagnesemia.  Replaced.  Resolved.  Cocaine abuse.  Advised cessation. Hold BB  Hypertension. Resume home medications.  Hyperlipidemia. Continue statin.   History of chronic back pain.  Smoker. Nicotine patch.   Prediabetes.  Social work consult for housing insecurities.  DVT prophylaxis. Lovenox.   Echo pending  PT OT    Consultations:   IP CONSULT TO HOSPITALIST  IP CONSULT TO SOCIAL WORK  IP CONSULT TO CARDIOLOGY      Akin Ortiz MD  4/29/2025  11:43 AM    Please note that this chart was generated using voice recognition Dragon dictation software.  Although every effort was made to ensure the accuracy of this automated transcription, some errors in transcription may have occurred.

## 2025-04-29 NOTE — FLOWSHEET NOTE
04/28/25 2218   Treatment Team Notification   Name of Team Member Notified Dr Purdy   Notification Time 2218     Dr Purdy perfect served \" When we went to hang Mg, patient's IV was not working. Multiple nurses have attempted to regain access, all unsuccessful and pt is now refusing any attempts. Can we switch mag over to PO \"    2219 MD states : Magnesium oxide 800 mg po x1      IV 1,000 mg Mag Dc'd. Mag 800 mg PO x1 order placed.

## 2025-05-16 ENCOUNTER — HOSPITAL ENCOUNTER (OUTPATIENT)
Dept: OTHER | Age: 55
Discharge: HOME | End: 2025-05-16

## 2025-05-16 NOTE — PROGRESS NOTES
Received message that pt needed to cancel [facility] apt today. Called and left message requesting return all to reschedule.

## 2025-06-13 ENCOUNTER — TELEPHONE (OUTPATIENT)
Age: 55
End: 2025-06-13

## 2025-06-13 RX ORDER — CARVEDILOL 6.25 MG/1
6.25 TABLET ORAL 2 TIMES DAILY WITH MEALS
Qty: 180 TABLET | Refills: 3 | Status: SHIPPED | OUTPATIENT
Start: 2025-06-13

## 2025-06-13 RX ORDER — SPIRONOLACTONE 25 MG/1
25 TABLET ORAL DAILY
Qty: 90 TABLET | Refills: 3 | Status: SHIPPED | OUTPATIENT
Start: 2025-06-13

## 2025-06-13 RX ORDER — AMLODIPINE BESYLATE 10 MG/1
10 TABLET ORAL DAILY
Qty: 90 TABLET | Refills: 3 | Status: SHIPPED | OUTPATIENT
Start: 2025-06-13 | End: 2025-10-11

## 2025-06-13 NOTE — TELEPHONE ENCOUNTER
Erica Loo is requesting a refill on NORVASC,  COREG, ALDACTONE,   Also, MAG-OX( not on list).      Last Office  visit: none ( seen in hospital)  Next Office visit:  8/14/2025    Last prescribing provider:  ?

## 2025-07-14 ENCOUNTER — OFFICE VISIT (OUTPATIENT)
Dept: FAMILY MEDICINE | Facility: CLINIC | Age: 55
End: 2025-07-14
Payer: COMMERCIAL

## 2025-07-14 VITALS
HEART RATE: 66 BPM | HEIGHT: 61 IN | OXYGEN SATURATION: 96 % | DIASTOLIC BLOOD PRESSURE: 80 MMHG | WEIGHT: 147.38 LBS | BODY MASS INDEX: 27.83 KG/M2 | SYSTOLIC BLOOD PRESSURE: 115 MMHG

## 2025-07-14 DIAGNOSIS — E11.9 WELL CONTROLLED TYPE 2 DIABETES MELLITUS: ICD-10-CM

## 2025-07-14 DIAGNOSIS — E78.2 MIXED HYPERLIPIDEMIA: Primary | ICD-10-CM

## 2025-07-14 DIAGNOSIS — M75.102 ROTATOR CUFF SYNDROME OF LEFT SHOULDER: ICD-10-CM

## 2025-07-14 PROCEDURE — 83036 HEMOGLOBIN GLYCOSYLATED A1C: CPT | Performed by: FAMILY MEDICINE

## 2025-07-14 PROCEDURE — G2211 COMPLEX E/M VISIT ADD ON: HCPCS | Mod: S$GLB,,, | Performed by: FAMILY MEDICINE

## 2025-07-14 PROCEDURE — 99214 OFFICE O/P EST MOD 30 MIN: CPT | Mod: S$GLB,,, | Performed by: FAMILY MEDICINE

## 2025-07-14 PROCEDURE — 80061 LIPID PANEL: CPT | Performed by: FAMILY MEDICINE

## 2025-07-14 NOTE — PATIENT INSTRUCTIONS
Doing well today  Get labs  Use eye drop for eye irritation  If shoulder does not get better, let me know and we will order MRI

## 2025-07-14 NOTE — PROGRESS NOTES
"  [facility] - [place], [place]    Family Medicine Office Visit    Chief Complaint   Patient presents with    Health Maintenance        HPI:  followup  L shoulder improving with PT - ROM slowly getting better, but still having some functional pain and difficulty  with internal rotation    Diabetes controlled with A1C at goal  Hyperlipidemia stable on appropriate intensity statin therapy      ROS: as above    Vitals:    07/14/25 0809   BP: 115/80   BP Location: Left arm   Patient Position: Sitting   Pulse: 66   SpO2: 96%   Weight: 66.8 kg (147 lb 6 oz)   Height: 5' 1" (1.549 m)      Body mass index is 27.85 kg/m².      General:  AOx3, well nourished and developed in no acute distress  Eyes:  PERRLA, EOMI, vision intact grossly  ENT:  normal hearing, moist oral mucosa  Neck:  trachea midline with no masses or thyromegaly  Heart:  RRR, no murmurs.  No edema noted, extremities warm and well perfused  Lungs:  clear to auscultation bilaterally with symmetric chest movement  Abdomen:  Soft, nontender, nondistended.  Normal bowel sounds  Musculoskeletal:  Normal gait.  Normal posture.  Normal muscular development with no joint swelling.  Limites to ROM left shoulder  Neurological:  CN II-XII grossly intact. Symmetric strength and sensation  Psych:  Normal mood and affect.  Able to demonstrate good judgement and personal insight.      Assessment/Plan:    1. Mixed hyperlipidemia    2. Well controlled type 2 diabetes mellitus  -     Hemoglobin A1C; Future; Expected date: 07/14/2025  -     Lipid Panel; Future; Expected date: 07/14/2025    3. Rotator cuff syndrome of left shoulder       Stable on statin  Stable with diet, check A1C  May get MRI if not improving    Visit today included increased complexity associated with the care of the episodic problem hld, dm, shoulder addressed and managing the longitudinal care of the patient due to the serious and/or complex managed problem(s) dm, hld, " shoulder.

## 2025-07-25 ENCOUNTER — HOSPITAL ENCOUNTER (EMERGENCY)
Age: 55
Discharge: HOME | End: 2025-07-25
Attending: EMERGENCY MEDICINE
Payer: COMMERCIAL

## 2025-07-25 VITALS
WEIGHT: 170 LBS | DIASTOLIC BLOOD PRESSURE: 99 MMHG | BODY MASS INDEX: 31.28 KG/M2 | RESPIRATION RATE: 18 BRPM | SYSTOLIC BLOOD PRESSURE: 162 MMHG | HEART RATE: 82 BPM | OXYGEN SATURATION: 95 % | TEMPERATURE: 98.6 F | HEIGHT: 62 IN

## 2025-07-25 DIAGNOSIS — M54.50 ACUTE EXACERBATION OF CHRONIC LOW BACK PAIN: ICD-10-CM

## 2025-07-25 DIAGNOSIS — L03.311 CELLULITIS OF ABDOMINAL WALL: Primary | ICD-10-CM

## 2025-07-25 DIAGNOSIS — G89.29 ACUTE EXACERBATION OF CHRONIC LOW BACK PAIN: ICD-10-CM

## 2025-07-25 PROCEDURE — 6360000002 HC RX W HCPCS

## 2025-07-25 PROCEDURE — 96372 THER/PROPH/DIAG INJ SC/IM: CPT

## 2025-07-25 PROCEDURE — 6370000000 HC RX 637 (ALT 250 FOR IP)

## 2025-07-25 PROCEDURE — 99284 EMERGENCY DEPT VISIT MOD MDM: CPT

## 2025-07-25 PROCEDURE — 6360000002 HC RX W HCPCS: Performed by: EMERGENCY MEDICINE

## 2025-07-25 RX ORDER — ACETAMINOPHEN 500 MG
1000 TABLET ORAL
Status: COMPLETED | OUTPATIENT
Start: 2025-07-25 | End: 2025-07-25

## 2025-07-25 RX ORDER — DOXYCYCLINE 100 MG/1
100 CAPSULE ORAL ONCE
Status: COMPLETED | OUTPATIENT
Start: 2025-07-25 | End: 2025-07-25

## 2025-07-25 RX ORDER — ORPHENADRINE CITRATE 30 MG/ML
60 INJECTION INTRAMUSCULAR; INTRAVENOUS ONCE
Status: COMPLETED | OUTPATIENT
Start: 2025-07-25 | End: 2025-07-25

## 2025-07-25 RX ORDER — DOXYCYCLINE HYCLATE 100 MG
100 TABLET ORAL 2 TIMES DAILY
Qty: 14 TABLET | Refills: 0 | Status: SHIPPED | OUTPATIENT
Start: 2025-07-25 | End: 2025-08-01

## 2025-07-25 RX ORDER — KETOROLAC TROMETHAMINE 30 MG/ML
30 INJECTION, SOLUTION INTRAMUSCULAR; INTRAVENOUS ONCE
Status: COMPLETED | OUTPATIENT
Start: 2025-07-25 | End: 2025-07-25

## 2025-07-25 RX ORDER — KETOROLAC TROMETHAMINE 10 MG/1
10 TABLET, FILM COATED ORAL DAILY PRN
Qty: 5 TABLET | Refills: 0 | Status: SHIPPED | OUTPATIENT
Start: 2025-07-25 | End: 2025-07-30

## 2025-07-25 RX ADMIN — ACETAMINOPHEN 1000 MG: 500 TABLET ORAL at 21:19

## 2025-07-25 RX ADMIN — ORPHENADRINE CITRATE 60 MG: 60 INJECTION INTRAMUSCULAR; INTRAVENOUS at 21:29

## 2025-07-25 RX ADMIN — DOXYCYCLINE 100 MG: 100 CAPSULE ORAL at 22:58

## 2025-07-25 RX ADMIN — KETOROLAC TROMETHAMINE 30 MG: 30 INJECTION, SOLUTION INTRAMUSCULAR at 21:24

## 2025-07-25 ASSESSMENT — PAIN DESCRIPTION - DESCRIPTORS
DESCRIPTORS: SHOOTING
DESCRIPTORS: THROBBING
DESCRIPTORS: SHOOTING
DESCRIPTORS: SHOOTING

## 2025-07-25 ASSESSMENT — LIFESTYLE VARIABLES
HOW OFTEN DO YOU HAVE A DRINK CONTAINING ALCOHOL: MONTHLY OR LESS
HOW MANY STANDARD DRINKS CONTAINING ALCOHOL DO YOU HAVE ON A TYPICAL DAY: 1 OR 2

## 2025-07-25 ASSESSMENT — PAIN DESCRIPTION - ORIENTATION: ORIENTATION: RIGHT;LEFT

## 2025-07-25 ASSESSMENT — PAIN SCALES - GENERAL
PAINLEVEL_OUTOF10: 10
PAINLEVEL_OUTOF10: 5
PAINLEVEL_OUTOF10: 10
PAINLEVEL_OUTOF10: 0
PAINLEVEL_OUTOF10: 10
PAINLEVEL_OUTOF10: 10

## 2025-07-25 ASSESSMENT — PAIN DESCRIPTION - LOCATION
LOCATION: HEAD
LOCATION: BACK

## 2025-07-25 ASSESSMENT — PAIN - FUNCTIONAL ASSESSMENT: PAIN_FUNCTIONAL_ASSESSMENT: 0-10

## 2025-07-26 ASSESSMENT — ENCOUNTER SYMPTOMS
NAUSEA: 0
VOMITING: 0
SHORTNESS OF BREATH: 0
ABDOMINAL PAIN: 0
DIARRHEA: 0
COLOR CHANGE: 1
BACK PAIN: 1
COUGH: 0

## 2025-07-26 NOTE — ED NOTES
This pt's Doxycycline 100mg PO BID x 7 days and Ketorolac 10mg PO daily PRN pain (#5) called in to [facility], [address] [place] [address] [phone].
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
## 2025-07-26 NOTE — DISCHARGE INSTRUCTIONS
If given narcotics (opiates) during this Emergency Department visit or at any point in time, you should not drink, drive or operate any machinery for at least 6 hours after receiving the medication.    Take your medication as indicated and prescribed.  You are being given an antibiotic, so make sure you get the prescription filled and take the antibiotics until finished.  Drink plenty of water while taking the antibiotics.  Avoid drinking alcohol or drinks that have caffeine in it while taking antibiotics.       For pain use acetaminophen (Tylenol), unless prescribed medications that have acetaminophen (or similar medications) in it.  You can take over the counter acetaminophen tablets (1 - 2 tablets of the 500-mg strength every 6 hours).    PLEASE RETURN TO THE EMERGENCY DEPARTMENT IMMEDIATELY for worsening symptoms, white drainage from the wound, redness or streaking, or if you develop any concerning symptoms such as: high fever not relieved by acetaminophen (Tylenol), chills, feeling of your heart fluttering or racing, persistent nausea and/or vomiting, vomiting up blood, numbness, weakness or tingling in the legs, loss of bladder / bowel control, unable to follow up with your physician, or any other care or concern.

## 2025-08-05 ENCOUNTER — HOSPITAL ENCOUNTER (EMERGENCY)
Age: 55
Discharge: HOME | End: 2025-08-06
Attending: EMERGENCY MEDICINE
Payer: COMMERCIAL

## 2025-08-05 VITALS
SYSTOLIC BLOOD PRESSURE: 175 MMHG | DIASTOLIC BLOOD PRESSURE: 96 MMHG | HEART RATE: 78 BPM | RESPIRATION RATE: 18 BRPM | OXYGEN SATURATION: 99 % | TEMPERATURE: 98.4 F

## 2025-08-05 DIAGNOSIS — G89.29 ACUTE EXACERBATION OF CHRONIC LOW BACK PAIN: Primary | ICD-10-CM

## 2025-08-05 DIAGNOSIS — M54.50 ACUTE EXACERBATION OF CHRONIC LOW BACK PAIN: Primary | ICD-10-CM

## 2025-08-05 DIAGNOSIS — N76.2 CELLULITIS OF LABIA: ICD-10-CM

## 2025-08-05 PROCEDURE — 99284 EMERGENCY DEPT VISIT MOD MDM: CPT

## 2025-08-06 LAB
BACTERIA URNS QL MICRO: ABNORMAL
BILIRUB UR QL STRIP: NEGATIVE
CASTS #/AREA URNS LPF: ABNORMAL /LPF (ref 0–8)
CLARITY UR: CLEAR
COLOR UR: YELLOW
EPI CELLS #/AREA URNS HPF: ABNORMAL /HPF (ref 0–5)
GLUCOSE UR STRIP-MCNC: NEGATIVE MG/DL
HGB UR QL STRIP.AUTO: NEGATIVE
KETONES UR STRIP-MCNC: ABNORMAL MG/DL
LEUKOCYTE ESTERASE UR QL STRIP: NEGATIVE
NITRITE UR QL STRIP: NEGATIVE
PH UR STRIP: 5.5 [PH] (ref 5–8)
PROT UR STRIP-MCNC: NEGATIVE MG/DL
RBC #/AREA URNS HPF: ABNORMAL /HPF (ref 0–4)
SP GR UR STRIP: 1.03 (ref 1–1.03)
UROBILINOGEN UR STRIP-ACNC: NORMAL EU/DL (ref 0–1)
WBC #/AREA URNS HPF: ABNORMAL /HPF (ref 0–5)

## 2025-08-06 PROCEDURE — 6360000002 HC RX W HCPCS

## 2025-08-06 PROCEDURE — 81001 URINALYSIS AUTO W/SCOPE: CPT

## 2025-08-06 PROCEDURE — 96372 THER/PROPH/DIAG INJ SC/IM: CPT

## 2025-08-06 PROCEDURE — 6370000000 HC RX 637 (ALT 250 FOR IP)

## 2025-08-06 RX ORDER — ORPHENADRINE CITRATE 30 MG/ML
60 INJECTION INTRAMUSCULAR; INTRAVENOUS ONCE
Status: COMPLETED | OUTPATIENT
Start: 2025-08-06 | End: 2025-08-06

## 2025-08-06 RX ORDER — NAPROXEN 250 MG/1
500 TABLET ORAL ONCE
Status: COMPLETED | OUTPATIENT
Start: 2025-08-06 | End: 2025-08-06

## 2025-08-06 RX ORDER — DOXYCYCLINE HYCLATE 100 MG
100 TABLET ORAL 2 TIMES DAILY
Qty: 14 TABLET | Refills: 0 | Status: SHIPPED | OUTPATIENT
Start: 2025-08-06 | End: 2025-08-13

## 2025-08-06 RX ADMIN — NAPROXEN 500 MG: 250 TABLET ORAL at 01:43

## 2025-08-06 RX ADMIN — ORPHENADRINE CITRATE 60 MG: 60 INJECTION INTRAMUSCULAR; INTRAVENOUS at 00:29

## 2025-08-06 ASSESSMENT — PAIN SCALES - GENERAL: PAINLEVEL_OUTOF10: 10

## 2025-08-11 ENCOUNTER — OFFICE VISIT (OUTPATIENT)
Dept: FAMILY MEDICINE | Facility: CLINIC | Age: 55
End: 2025-08-11
Payer: COMMERCIAL

## 2025-08-11 VITALS
OXYGEN SATURATION: 98 % | HEIGHT: 61 IN | DIASTOLIC BLOOD PRESSURE: 84 MMHG | WEIGHT: 146.06 LBS | HEART RATE: 71 BPM | BODY MASS INDEX: 27.58 KG/M2 | SYSTOLIC BLOOD PRESSURE: 115 MMHG

## 2025-08-11 DIAGNOSIS — E11.9 WELL CONTROLLED TYPE 2 DIABETES MELLITUS: Primary | ICD-10-CM

## 2025-08-11 DIAGNOSIS — N39.0 COMPLICATED UTI (URINARY TRACT INFECTION): ICD-10-CM

## 2025-08-11 PROCEDURE — 82040 ASSAY OF SERUM ALBUMIN: CPT | Performed by: FAMILY MEDICINE

## 2025-08-11 PROCEDURE — 83036 HEMOGLOBIN GLYCOSYLATED A1C: CPT | Performed by: FAMILY MEDICINE

## 2025-08-11 PROCEDURE — 82570 ASSAY OF URINE CREATININE: CPT | Performed by: FAMILY MEDICINE

## 2025-08-11 PROCEDURE — 99214 OFFICE O/P EST MOD 30 MIN: CPT | Mod: S$GLB,,, | Performed by: FAMILY MEDICINE

## 2025-08-11 PROCEDURE — G2211 COMPLEX E/M VISIT ADD ON: HCPCS | Mod: S$GLB,,, | Performed by: FAMILY MEDICINE

## 2025-08-11 PROCEDURE — 85025 COMPLETE CBC W/AUTO DIFF WBC: CPT | Performed by: FAMILY MEDICINE

## 2025-08-11 RX ORDER — CIPROFLOXACIN 500 MG/1
500 TABLET, FILM COATED ORAL 2 TIMES DAILY
Qty: 14 TABLET | Refills: 0 | Status: SHIPPED | OUTPATIENT
Start: 2025-08-11 | End: 2025-08-18

## 2025-08-23 ENCOUNTER — PATIENT MESSAGE (OUTPATIENT)
Dept: ADMINISTRATIVE | Facility: OTHER | Age: 55
End: 2025-08-23
Payer: COMMERCIAL

## (undated) DEVICE — ADHESIVE SKIN CLOSURE TOP 36 CC HI VISC DERMBND MINI

## (undated) DEVICE — RELOAD STPL L45MM H2-4.1MM THCK TISS GRN GRIPPING SURF B

## (undated) DEVICE — VESSEL SEALER EXTEND: Brand: ENDOWRIST

## (undated) DEVICE — APPLICATOR MEDICATED 26 CC SOLUTION HI LT ORNG CHLORAPREP

## (undated) DEVICE — GLOVE SURG SZ 6 THK91MIL LTX FREE SYN POLYISOPRENE ANTI

## (undated) DEVICE — PROTECTOR ULN NRV PUR FOAM HK LOOP STRP ANATOMICALLY

## (undated) DEVICE — SUTURE VCRL + SZ 0 L27IN ABSRB VLT L26MM UR-6 5/8 CIR VCP603H

## (undated) DEVICE — SUTURE SZ 0 27IN 5/8 CIR UR-6  TAPER PT VIOLET ABSRB VICRYL J603H

## (undated) DEVICE — TROCAR: Brand: KII FIOS FIRST ENTRY

## (undated) DEVICE — DEVICE TRCR 12X9X3IN WHT CLSR DISP OMNICLOSE

## (undated) DEVICE — BLADELESS OBTURATOR: Brand: WECK VISTA

## (undated) DEVICE — INSUFFLATION TUBING SET WITH FILTER, FUNNEL CONNECTOR AND LUER LOCK: Brand: JOSNOE MEDICAL INC

## (undated) DEVICE — STAPLER INT L340MM 45MM STD 12 FIRING B FRM PWR + GRIPPING

## (undated) DEVICE — DRAIN SURG 19FR 100% SIL RADPQ RND CHN FULL FLUT

## (undated) DEVICE — GOWN,AURORA,NONREINFORCED,LARGE: Brand: MEDLINE

## (undated) DEVICE — TOWEL,OR,DSP,ST,BLUE,DLX,XR,4/PK,20PK/CS: Brand: MEDLINE

## (undated) DEVICE — ELECTRO LUBE IS A SINGLE PATIENT USE DEVICE THAT IS INTENDED TO BE USED ON ELECTROSURGICAL ELECTRODES TO REDUCE STICKING.: Brand: KEY SURGICAL ELECTRO LUBE

## (undated) DEVICE — RESERVOIR,SUCTION,100CC,SILICONE: Brand: MEDLINE

## (undated) DEVICE — TISSUE RETRIEVAL SYSTEM: Brand: INZII RETRIEVAL SYSTEM

## (undated) DEVICE — SUCTION IRRIGATOR: Brand: ENDOWRIST

## (undated) DEVICE — SUTURE MCRYL SZ 4-0 L18IN ABSRB UD L16MM PC-3 3/8 CIR PRIM Y845G

## (undated) DEVICE — FORCEPS BX L240CM WRK CHN 2.8MM STD CAP W/ NDL MIC MESH

## (undated) DEVICE — COVER LT HNDL BLU PLAS

## (undated) DEVICE — ARM DRAPE

## (undated) DEVICE — CLIP INT L POLYMER LOK LIG HEM O LOK

## (undated) DEVICE — SOLUTION ANTIFOG VIS SYS CLEARIFY LAPSCP

## (undated) DEVICE — Device

## (undated) DEVICE — GLOVE ORANGE PI 7   MSG9070

## (undated) DEVICE — CANNULA SEAL